# Patient Record
Sex: FEMALE | Race: WHITE | URBAN - NONMETROPOLITAN AREA
[De-identification: names, ages, dates, MRNs, and addresses within clinical notes are randomized per-mention and may not be internally consistent; named-entity substitution may affect disease eponyms.]

---

## 2017-03-05 ENCOUNTER — NURSE TRIAGE (OUTPATIENT)
Dept: ADMINISTRATIVE | Age: 37
End: 2017-03-05

## 2017-03-19 ENCOUNTER — NURSE TRIAGE (OUTPATIENT)
Dept: ADMINISTRATIVE | Age: 37
End: 2017-03-19

## 2017-04-16 ENCOUNTER — NURSE TRIAGE (OUTPATIENT)
Dept: ADMINISTRATIVE | Age: 37
End: 2017-04-16

## 2022-09-10 ENCOUNTER — HOSPITAL ENCOUNTER (INPATIENT)
Age: 42
LOS: 25 days | Discharge: NURSING, REHAB OR LONG-TERM CARE | DRG: 870 | End: 2022-10-05
Attending: INTERNAL MEDICINE | Admitting: STUDENT IN AN ORGANIZED HEALTH CARE EDUCATION/TRAINING PROGRAM
Payer: COMMERCIAL

## 2022-09-10 ENCOUNTER — APPOINTMENT (OUTPATIENT)
Dept: CT IMAGING | Age: 42
DRG: 870 | End: 2022-09-10
Attending: INTERNAL MEDICINE
Payer: COMMERCIAL

## 2022-09-10 DIAGNOSIS — R65.21 SEPTIC SHOCK (HCC): Primary | ICD-10-CM

## 2022-09-10 DIAGNOSIS — A41.9 SEPTIC SHOCK (HCC): Primary | ICD-10-CM

## 2022-09-10 PROBLEM — R41.82 ALTERED MENTAL STATUS: Status: ACTIVE | Noted: 2022-09-10

## 2022-09-10 LAB
ABSOLUTE EOS #: 0.41 K/UL (ref 0–0.4)
ABSOLUTE IMMATURE GRANULOCYTE: 0 K/UL (ref 0–0.3)
ABSOLUTE LYMPH #: 1.84 K/UL (ref 1–4.8)
ABSOLUTE MONO #: 0.61 K/UL (ref 0.1–0.8)
ALBUMIN SERPL-MCNC: 1.9 G/DL (ref 3.5–5.2)
ALBUMIN/GLOBULIN RATIO: 0.5 (ref 1–2.5)
ALP BLD-CCNC: 126 U/L (ref 35–104)
ALT SERPL-CCNC: 12 U/L (ref 5–33)
AMORPHOUS: ABNORMAL
ANION GAP SERPL CALCULATED.3IONS-SCNC: 11 MMOL/L (ref 9–17)
AST SERPL-CCNC: 18 U/L
BACTERIA: ABNORMAL
BASOPHILS # BLD: 0 % (ref 0–2)
BASOPHILS ABSOLUTE: 0 K/UL (ref 0–0.2)
BILIRUB SERPL-MCNC: 0.8 MG/DL (ref 0.3–1.2)
BILIRUBIN DIRECT: 0.6 MG/DL
BILIRUBIN URINE: NEGATIVE
BILIRUBIN, INDIRECT: 0.2 MG/DL (ref 0–1)
BUN BLDV-MCNC: 57 MG/DL (ref 6–20)
CALCIUM IONIZED: 1.1 MMOL/L (ref 1.13–1.33)
CALCIUM SERPL-MCNC: 7.7 MG/DL (ref 8.6–10.4)
CARBOXYHEMOGLOBIN: 1 % (ref 0–5)
CHLORIDE BLD-SCNC: 101 MMOL/L (ref 98–107)
CO2: 14 MMOL/L (ref 20–31)
COLOR: ABNORMAL
CREAT SERPL-MCNC: 2.18 MG/DL (ref 0.5–0.9)
EOSINOPHILS RELATIVE PERCENT: 2 % (ref 1–4)
EPITHELIAL CELLS UA: ABNORMAL /HPF (ref 0–5)
FIO2: ABNORMAL
GFR AFRICAN AMERICAN: 30 ML/MIN
GFR NON-AFRICAN AMERICAN: 25 ML/MIN
GFR SERPL CREATININE-BSD FRML MDRD: ABNORMAL ML/MIN/{1.73_M2}
GLUCOSE BLD-MCNC: 137 MG/DL (ref 65–105)
GLUCOSE BLD-MCNC: 155 MG/DL (ref 70–99)
GLUCOSE URINE: NEGATIVE
HCG QUALITATIVE: NEGATIVE
HCO3 VENOUS: 13.9 MMOL/L (ref 24–30)
HCT VFR BLD CALC: 32.2 % (ref 36.3–47.1)
HEMOGLOBIN: 10.1 G/DL (ref 11.9–15.1)
IMMATURE GRANULOCYTES: 0 %
INR BLD: 1
KETONES, URINE: ABNORMAL
LACTIC ACID, WHOLE BLOOD: 2.8 MMOL/L (ref 0.7–2.1)
LEUKOCYTE ESTERASE, URINE: ABNORMAL
LYMPHOCYTES # BLD: 9 % (ref 24–44)
MAGNESIUM: 2.2 MG/DL (ref 1.6–2.6)
MCH RBC QN AUTO: 27.6 PG (ref 25.2–33.5)
MCHC RBC AUTO-ENTMCNC: 31.4 G/DL (ref 28.4–34.8)
MCV RBC AUTO: 88 FL (ref 82.6–102.9)
MONOCYTES # BLD: 3 % (ref 1–7)
MORPHOLOGY: ABNORMAL
MORPHOLOGY: ABNORMAL
MYOGLOBIN: 123 NG/ML (ref 25–58)
NEGATIVE BASE EXCESS, VEN: 12.2 MMOL/L (ref 0–2)
NITRITE, URINE: NEGATIVE
NRBC AUTOMATED: 0.1 PER 100 WBC
O2 SAT, VEN: 86.4 % (ref 60–85)
PARTIAL THROMBOPLASTIN TIME: 25.2 SEC (ref 20.5–30.5)
PATIENT TEMP: 37
PCO2, VEN: 33.7 (ref 39–55)
PDW BLD-RTO: 15.9 % (ref 11.8–14.4)
PH UA: 5 (ref 5–8)
PH VENOUS: 7.24 (ref 7.32–7.42)
PLATELET # BLD: 249 K/UL (ref 138–453)
PMV BLD AUTO: 10.1 FL (ref 8.1–13.5)
PO2, VEN: 58.9 (ref 30–50)
POTASSIUM SERPL-SCNC: 5.3 MMOL/L (ref 3.7–5.3)
PROTEIN UA: ABNORMAL
PROTHROMBIN TIME: 11.1 SEC (ref 9.1–12.3)
RBC # BLD: 3.66 M/UL (ref 3.95–5.11)
RBC UA: ABNORMAL /HPF (ref 0–4)
SEG NEUTROPHILS: 86 % (ref 36–66)
SEGMENTED NEUTROPHILS ABSOLUTE COUNT: 17.54 K/UL (ref 1.8–7.7)
SODIUM BLD-SCNC: 126 MMOL/L (ref 135–144)
SPECIFIC GRAVITY UA: 1.02 (ref 1–1.03)
TOTAL CK: 97 U/L (ref 26–192)
TOTAL PROTEIN: 5.5 G/DL (ref 6.4–8.3)
TROPONIN, HIGH SENSITIVITY: 17 NG/L (ref 0–14)
TSH SERPL DL<=0.05 MIU/L-ACNC: 4.96 UIU/ML (ref 0.3–5)
TURBIDITY: ABNORMAL
URINE HGB: ABNORMAL
UROBILINOGEN, URINE: NORMAL
VANCOMYCIN RANDOM: 21.2 UG/ML
WBC # BLD: 20.4 K/UL (ref 3.5–11.3)
WBC UA: ABNORMAL /HPF (ref 0–5)
YEAST: ABNORMAL

## 2022-09-10 PROCEDURE — 2000000000 HC ICU R&B

## 2022-09-10 PROCEDURE — 80076 HEPATIC FUNCTION PANEL: CPT

## 2022-09-10 PROCEDURE — 2500000003 HC RX 250 WO HCPCS

## 2022-09-10 PROCEDURE — 6360000002 HC RX W HCPCS: Performed by: STUDENT IN AN ORGANIZED HEALTH CARE EDUCATION/TRAINING PROGRAM

## 2022-09-10 PROCEDURE — 82947 ASSAY GLUCOSE BLOOD QUANT: CPT

## 2022-09-10 PROCEDURE — 94761 N-INVAS EAR/PLS OXIMETRY MLT: CPT

## 2022-09-10 PROCEDURE — 82805 BLOOD GASES W/O2 SATURATION: CPT

## 2022-09-10 PROCEDURE — 80048 BASIC METABOLIC PNL TOTAL CA: CPT

## 2022-09-10 PROCEDURE — 84703 CHORIONIC GONADOTROPIN ASSAY: CPT

## 2022-09-10 PROCEDURE — 2500000003 HC RX 250 WO HCPCS: Performed by: STUDENT IN AN ORGANIZED HEALTH CARE EDUCATION/TRAINING PROGRAM

## 2022-09-10 PROCEDURE — 83735 ASSAY OF MAGNESIUM: CPT

## 2022-09-10 PROCEDURE — 2700000000 HC OXYGEN THERAPY PER DAY

## 2022-09-10 PROCEDURE — 2580000003 HC RX 258: Performed by: EMERGENCY MEDICINE

## 2022-09-10 PROCEDURE — 87086 URINE CULTURE/COLONY COUNT: CPT

## 2022-09-10 PROCEDURE — 6360000002 HC RX W HCPCS

## 2022-09-10 PROCEDURE — 81001 URINALYSIS AUTO W/SCOPE: CPT

## 2022-09-10 PROCEDURE — 6360000002 HC RX W HCPCS: Performed by: EMERGENCY MEDICINE

## 2022-09-10 PROCEDURE — 74176 CT ABD & PELVIS W/O CONTRAST: CPT

## 2022-09-10 PROCEDURE — 83874 ASSAY OF MYOGLOBIN: CPT

## 2022-09-10 PROCEDURE — 94002 VENT MGMT INPAT INIT DAY: CPT

## 2022-09-10 PROCEDURE — C9113 INJ PANTOPRAZOLE SODIUM, VIA: HCPCS | Performed by: STUDENT IN AN ORGANIZED HEALTH CARE EDUCATION/TRAINING PROGRAM

## 2022-09-10 PROCEDURE — 85610 PROTHROMBIN TIME: CPT

## 2022-09-10 PROCEDURE — 5A1955Z RESPIRATORY VENTILATION, GREATER THAN 96 CONSECUTIVE HOURS: ICD-10-PCS | Performed by: INTERNAL MEDICINE

## 2022-09-10 PROCEDURE — 82330 ASSAY OF CALCIUM: CPT

## 2022-09-10 PROCEDURE — 80202 ASSAY OF VANCOMYCIN: CPT

## 2022-09-10 PROCEDURE — 87641 MR-STAPH DNA AMP PROBE: CPT

## 2022-09-10 PROCEDURE — 36415 COLL VENOUS BLD VENIPUNCTURE: CPT

## 2022-09-10 PROCEDURE — 84484 ASSAY OF TROPONIN QUANT: CPT

## 2022-09-10 PROCEDURE — 82550 ASSAY OF CK (CPK): CPT

## 2022-09-10 PROCEDURE — 83605 ASSAY OF LACTIC ACID: CPT

## 2022-09-10 PROCEDURE — 36600 WITHDRAWAL OF ARTERIAL BLOOD: CPT

## 2022-09-10 PROCEDURE — 2580000003 HC RX 258: Performed by: STUDENT IN AN ORGANIZED HEALTH CARE EDUCATION/TRAINING PROGRAM

## 2022-09-10 PROCEDURE — 87040 BLOOD CULTURE FOR BACTERIA: CPT

## 2022-09-10 PROCEDURE — 85730 THROMBOPLASTIN TIME PARTIAL: CPT

## 2022-09-10 PROCEDURE — 84443 ASSAY THYROID STIM HORMONE: CPT

## 2022-09-10 PROCEDURE — 85025 COMPLETE CBC W/AUTO DIFF WBC: CPT

## 2022-09-10 RX ORDER — ENOXAPARIN SODIUM 100 MG/ML
30 INJECTION SUBCUTANEOUS 2 TIMES DAILY
Status: DISCONTINUED | OUTPATIENT
Start: 2022-09-10 | End: 2022-09-10

## 2022-09-10 RX ORDER — POTASSIUM CHLORIDE 7.45 MG/ML
10 INJECTION INTRAVENOUS PRN
Status: DISCONTINUED | OUTPATIENT
Start: 2022-09-10 | End: 2022-10-05 | Stop reason: HOSPADM

## 2022-09-10 RX ORDER — LEVOTHYROXINE SODIUM 0.07 MG/1
75 TABLET ORAL DAILY
Status: DISCONTINUED | OUTPATIENT
Start: 2022-09-11 | End: 2022-10-05 | Stop reason: HOSPADM

## 2022-09-10 RX ORDER — SODIUM CHLORIDE 9 MG/ML
INJECTION, SOLUTION INTRAVENOUS PRN
Status: DISCONTINUED | OUTPATIENT
Start: 2022-09-10 | End: 2022-10-05 | Stop reason: HOSPADM

## 2022-09-10 RX ORDER — ONDANSETRON 2 MG/ML
4 INJECTION INTRAMUSCULAR; INTRAVENOUS EVERY 6 HOURS PRN
Status: DISCONTINUED | OUTPATIENT
Start: 2022-09-10 | End: 2022-10-05 | Stop reason: HOSPADM

## 2022-09-10 RX ORDER — NOREPINEPHRINE BIT/0.9 % NACL 16MG/250ML
2-100 INFUSION BOTTLE (ML) INTRAVENOUS CONTINUOUS
Status: DISCONTINUED | OUTPATIENT
Start: 2022-09-10 | End: 2022-09-10 | Stop reason: SDUPTHER

## 2022-09-10 RX ORDER — HEPARIN SODIUM 5000 [USP'U]/ML
5000 INJECTION, SOLUTION INTRAVENOUS; SUBCUTANEOUS EVERY 8 HOURS SCHEDULED
Status: DISCONTINUED | OUTPATIENT
Start: 2022-09-10 | End: 2022-09-14

## 2022-09-10 RX ORDER — POTASSIUM CHLORIDE 29.8 MG/ML
20 INJECTION INTRAVENOUS PRN
Status: DISCONTINUED | OUTPATIENT
Start: 2022-09-10 | End: 2022-10-05 | Stop reason: HOSPADM

## 2022-09-10 RX ORDER — SODIUM CHLORIDE 0.9 % (FLUSH) 0.9 %
5-40 SYRINGE (ML) INJECTION PRN
Status: DISCONTINUED | OUTPATIENT
Start: 2022-09-10 | End: 2022-10-05 | Stop reason: HOSPADM

## 2022-09-10 RX ORDER — ONDANSETRON 4 MG/1
4 TABLET, ORALLY DISINTEGRATING ORAL EVERY 8 HOURS PRN
Status: DISCONTINUED | OUTPATIENT
Start: 2022-09-10 | End: 2022-10-05 | Stop reason: HOSPADM

## 2022-09-10 RX ORDER — POLYETHYLENE GLYCOL 3350 17 G/17G
17 POWDER, FOR SOLUTION ORAL DAILY PRN
Status: DISCONTINUED | OUTPATIENT
Start: 2022-09-10 | End: 2022-10-05 | Stop reason: HOSPADM

## 2022-09-10 RX ORDER — CALCIUM GLUCONATE 20 MG/ML
2000 INJECTION, SOLUTION INTRAVENOUS ONCE
Status: COMPLETED | OUTPATIENT
Start: 2022-09-10 | End: 2022-09-11

## 2022-09-10 RX ORDER — ACETAMINOPHEN 325 MG/1
650 TABLET ORAL EVERY 6 HOURS PRN
Status: DISCONTINUED | OUTPATIENT
Start: 2022-09-10 | End: 2022-10-05 | Stop reason: HOSPADM

## 2022-09-10 RX ORDER — SODIUM CHLORIDE 0.9 % (FLUSH) 0.9 %
5-40 SYRINGE (ML) INJECTION EVERY 12 HOURS SCHEDULED
Status: DISCONTINUED | OUTPATIENT
Start: 2022-09-10 | End: 2022-10-05 | Stop reason: HOSPADM

## 2022-09-10 RX ORDER — NOREPINEPHRINE BIT/0.9 % NACL 16MG/250ML
1-100 INFUSION BOTTLE (ML) INTRAVENOUS CONTINUOUS
Status: DISCONTINUED | OUTPATIENT
Start: 2022-09-10 | End: 2022-09-17

## 2022-09-10 RX ORDER — 0.9 % SODIUM CHLORIDE 0.9 %
1000 INTRAVENOUS SOLUTION INTRAVENOUS ONCE
Status: COMPLETED | OUTPATIENT
Start: 2022-09-10 | End: 2022-09-11

## 2022-09-10 RX ORDER — ACETAMINOPHEN 650 MG/1
650 SUPPOSITORY RECTAL EVERY 6 HOURS PRN
Status: DISCONTINUED | OUTPATIENT
Start: 2022-09-10 | End: 2022-10-05 | Stop reason: HOSPADM

## 2022-09-10 RX ORDER — PROPOFOL 10 MG/ML
5-50 INJECTION, EMULSION INTRAVENOUS CONTINUOUS
Status: DISCONTINUED | OUTPATIENT
Start: 2022-09-10 | End: 2022-09-13

## 2022-09-10 RX ADMIN — ANTI-FUNGAL POWDER MICONAZOLE NITRATE TALC FREE: 1.42 POWDER TOPICAL at 20:47

## 2022-09-10 RX ADMIN — SODIUM CHLORIDE, PRESERVATIVE FREE 10 ML: 5 INJECTION INTRAVENOUS at 20:48

## 2022-09-10 RX ADMIN — Medication 100 MCG/HR: at 19:22

## 2022-09-10 RX ADMIN — Medication 20 MCG/MIN: at 17:08

## 2022-09-10 RX ADMIN — CEFEPIME 2000 MG: 2 INJECTION, POWDER, FOR SOLUTION INTRAVENOUS at 20:46

## 2022-09-10 RX ADMIN — HEPARIN SODIUM 5000 UNITS: 5000 INJECTION INTRAVENOUS; SUBCUTANEOUS at 21:32

## 2022-09-10 RX ADMIN — SODIUM CHLORIDE: 9 INJECTION, SOLUTION INTRAVENOUS at 21:12

## 2022-09-10 RX ADMIN — PROPOFOL 5 MCG/KG/MIN: 10 INJECTION, EMULSION INTRAVENOUS at 21:26

## 2022-09-10 RX ADMIN — CALCIUM GLUCONATE 2000 MG: 20 INJECTION, SOLUTION INTRAVENOUS at 22:52

## 2022-09-10 RX ADMIN — SODIUM CHLORIDE, PRESERVATIVE FREE 40 MG: 5 INJECTION INTRAVENOUS at 20:47

## 2022-09-10 RX ADMIN — Medication 5 MG/HR: at 19:26

## 2022-09-10 RX ADMIN — SODIUM CHLORIDE 1000 ML: 9 INJECTION, SOLUTION INTRAVENOUS at 22:28

## 2022-09-10 ASSESSMENT — PULMONARY FUNCTION TESTS
PIF_VALUE: 11
PIF_VALUE: 13
PIF_VALUE: 13
PIF_VALUE: 11
PIF_VALUE: 10

## 2022-09-10 NOTE — PLAN OF CARE
Problem: Respiratory - Adult  Goal: Achieves optimal ventilation and oxygenation  9/10/2022 1941 by Radha Jorge RN  Outcome: Progressing  9/10/2022 1930 by Britt Curry RCP  Outcome: Progressing  Flowsheets (Taken 9/10/2022 1930)  Achieves optimal ventilation and oxygenation:   Assess for changes in respiratory status   Assess the need for suctioning and aspirate as needed   Respiratory therapy support as indicated   Assess for changes in mentation and behavior   Oxygen supplementation based on oxygen saturation or arterial blood gases  9/10/2022 1847 by Lalitha Nunez RCP  Outcome: Progressing     Problem: Safety - Medical Restraint  Goal: Remains free of injury from restraints (Restraint for Interference with Medical Device)  Description: INTERVENTIONS:  1. Determine that other, less restrictive measures have been tried or would not be effective before applying the restraint  2. Evaluate the patient's condition at the time of restraint application  3. Inform patient/family regarding the reason for restraint  4.  Q2H: Monitor safety, psychosocial status, comfort, nutrition and hydration  Outcome: Progressing

## 2022-09-10 NOTE — H&P
Critical Care - History and Physical Examination    Patient's name:  Chipper Canavan  Medical Record Number: [id number]  Patient's account/billing number: [de-identified]  Patient's YOB: 1980  Age: 38 y.o. Date of Admission: 9/10/2022  4:45 PM  Reason of ICU admission: Septic shock, SBO, AMS  Date of History and Physical Examination: 9/10/2022    Primary Care Physician: No primary care provider on file. Attending Physician: Dr. Alfredo Duvall Status: Full Code    Chief complaint: Septic shock      Reason for ICU admission: Septic shock, SBO, AMS    History Of Present Illness:   History was obtained from chart review. Chipper Canavan is a 38 y.o.  female who presented to [facility] ED after being found down at home unresponsive for an unknown period of time. At home she was found to be covered in urine and fecal matter with several scattered wounds, approximately 15 total per chart review, as well as concern for maggots in some of the wounds. Labs done at the time showed and CHULA with a BUN/Cr of 59/2.32, and CT scan showed malpositioning of the jejunum to the right abdomen with small bowel obstruction with abnormally dilated loops of small bowel which are gas and fluid filled with at least one transition point in the RLQ and hepatic steatosis. Labs were also significant for hyponatremia (128), hyperkalemia (5.1), hypoalbuminemia (1.5), elevated procalcitonin at 12.57, elevated lactate at 2.1. CBC showed leukocytosis of 19.8, anemia at 10.4. Urinalysis positive for UTI. She was also found to have positive blood cultures showed gram negative rods and gram positive cocci in clusters. She was being treated at outlying facility with vancomycin and zosyn. She was being treated for urosepsis and was subsequently found to have a small bowel obstruction that the surgical team at the facility could not manage and she was transferred. Past Medical History:    No past medical history on file.     Past Surgical History:    No past surgical history on file. Allergies:    No Known Allergies      Home Medications:   Prior to Admission medications    Not on File       Social History:   TOBACCO:   has no history on file for tobacco use. ETOH:   has no history on file for alcohol use. DRUGS:  has no history on file for drug use. OCCUPATION:      Family History:   No family history on file. REVIEW OF SYSTEMS (ROS):  Review of Systems   Unable to perform ROS: Acuity of condition (Patient condition)     Physical Exam:    Vitals: Pulse (!) 106   Resp 25   Ht 5' 7\" (1.702 m)   Wt (!) 307 lb (139.3 kg)   SpO2 99%   BMI 48.08 kg/m²     Body weight:   Wt Readings from Last 3 Encounters:   09/10/22 (!) 307 lb (139.3 kg)       Body Mass Index : Body mass index is 48.08 kg/m². PHYSICAL EXAMINATION :  Constitutional: Intubated, sedated  EENT: PERRLA, sclera clear, anicteric, moist mucous membranes  Neck: Supple, symmetrical, trachea midline  Respiratory: Diffuse rhonchi on ascultation  Cardiovascular: tachycardia with regular rhythm, normal S1, S2, no murmur noted, and 2+ distal pulses in all 4 extremities   Abdomen: soft, distended,  no masses or organomegaly  Neurological: Intubated and sedated. PERRL   Extremities:  peripheral pulses normal, 1+ pedal edema, no clubbing or cyanosis    Laboratory findings:-  CBC: No results for input(s): WBC, HGB, PLT in the last 72 hours. BMP:  No results for input(s): NA, K, CL, CO2, BUN, CREATININE, GLUCOSE in the last 72 hours. S. Calcium:No results for input(s): CALCIUM in the last 72 hours. S. Ionized Calcium:No results for input(s): IONCA in the last 72 hours. S. Magnesium:No results for input(s): MG in the last 72 hours. S. Phosphorus:No results for input(s): PHOS in the last 72 hours. S. Glucose:  Recent Labs     09/10/22  1754   POCGLU 137*     Glycosylated hemoglobin A1C: No results for input(s): LABA1C in the last 72 hours.   INR: No results for input(s): INR in the last 72 hours. Hepatic functions: No results for input(s): ALKPHOS, ALT, AST, PROT, BILITOT, BILIDIR, LABALBU in the last 72 hours. Pancreatic functions:No results for input(s): LACTA, AMYLASE in the last 72 hours. S. Lactic Acid: No results for input(s): LACTA in the last 72 hours. Cardiac enzymes:No results for input(s): CKTOTAL, CKMB, CKMBINDEX, TROPONINI in the last 72 hours. BNP:No results for input(s): BNP in the last 72 hours. Lipid profile: No results for input(s): CHOL, TRIG, HDL, LDL, LDLCALC in the last 72 hours. Blood Gases: No results found for: PH, PCO2, PO2, HCO3, O2SAT  Thyroid functions: No results found for: T4, TSH     Urinalysis: Urine dipstick shows positive for WBC's, positive for protein, and positive for leukocytes. Micro exam: 48 WBC's per HPF and + bacteria. Microbiology:  Cultures during this admission:   Blood cultures:                 [] None drawn      [] Negative             [x]  Positive (Details: gram negative rods and gram + cocci in clusters)  Urine Culture:                   [] None drawn      [] Negative             []  Positive (Details:  )  Sputum Culture:               [] None drawn       [] Negative             []  Positive (Details:  )   Endotracheal aspirate:     [] None drawn       [] Negative             []  Positive (Details:  )   -----------------------------------------------------------------  Radiological reports:    CXR: Right venous catheter tip at the SVC, ET tube 4 cm above leticia. Enteric tube extends into stomach. Mild right infrahilar opacities reflect area of nodular scar/calcified lung granuloma. No lobar consolidation, large pleural effusions, PTX, no acute bony abnormalities. Borderline prominent heart size. Multiple remote left thoracic rib deformity.     Last Echocardiogram findings: Preserved EF at 72% and no valvular abnormalities    Assessment and Plan     Patient Active Problem List   Diagnosis    Altered mental status       Additional assessment:  Amelia Toney is a 38 y.o. female who intially presented on 9/10/2022 for No chief complaint on file. PLAN/MEDICAL DECISION MAKING:  Neurologic:   Neuro checks per protocol  Sedation: versed and fentanyl  Pain control: Fentanyl  Cardiovascular:  HR:104  MAP: 59  MAP goal >65  EKG and Echo findings as mentioned above  Levophed to maintain MAP>65, currently on 20   Pulmonary:  Maintain oxygen sats >92%  Pulmonary toilet  Vent settings: Mode PRVC  RR 14 PEEP 8 FiO2 40  ABG: pH 7.24 pCO2 30 pO2 401 HCO3: 13 [facility])  Most recent CXR as mentioned above  Albuterol nebulizer,  GI/Nutrition  Glycolax prn  Ulcer Prophylaxis: PPI  Diet:Diet NPO  CT scan shows SBO   OG tube connected to low intermittent suction  General surgery consulted  Renal/Fluid/Electrolyte  IV Fluids: none  I/O: viramontes catheter in place, will continue to monitor I/O  BUN/Cr: 59/2.32  Monitor electrolytes, replace PRN   ID  WBC: No results found for: WBC  Tmax: No data recorded. Blood cultures + for gram negative rods and gram + cocci in clusters  Antimicrobials: vancomycin and cefipime day   Wound care nursing to evaluate the patient for multiple wounds  Hematology:  Hb 9.9/ Hct 30.3 at outlying facility  Will continue to monitor and transfuse as needed  Endocrine:   glucose controlled  Will do glucose checks q4 hours  Restarted home dose of synthroid  DVT Prophylaxis  Heparin subQ  Discharge Needs:  PT, OT, and Case Management      CODE STATUS: Full Code    DISPOSITION:  [x] To remain ICU  [] OK for out of ICU from [address] [facility] Robel Espinosa MD  Emergency Medicine Resident, PGY3  Critical Care Service   09/10/22 6:17 PM

## 2022-09-10 NOTE — PROGRESS NOTES
Pharmacy Note     Enoxaparin Dose Adjustment    Chris Rivas is a 38 y.o. female. Pharmacist assessment of enoxaparin dose for VTE prophylaxis. No results for input(s): BUN in the last 72 hours. No results for input(s): CREATININE in the last 72 hours. CrCl cannot be calculated (No successful lab value found. ).     Height:   Ht Readings from Last 1 Encounters:   09/10/22 5' 7\" (1.702 m)     Weight:  Wt Readings from Last 1 Encounters:   09/10/22 (!) 307 lb (139.3 kg)       The following enoxaparin dose has been adjusted based upon renal function and/or patient weight per P&T Guidelines:             Lovenox 30 mg BID changed to heparin 5000 u Q8    Liz Newman PharmD, BCCCP  9/10/2022  7:13 PM

## 2022-09-10 NOTE — PROGRESS NOTES
Multiple wounds noted on patient, including bilateral breasts, left flank, and chest. Wounds were cleansed and pictures were taken and uploaded to [facility]. Pictures were assigned to LDA's. Patient's back and buttocks were unable to be assessed due to tachypnea and fighting the ventilator.

## 2022-09-10 NOTE — PROGRESS NOTES
Patient arrived via EMS with fentanyl and versed drip. 500 mcg of fentanyl and 10 mg of versed wasted with Nicholas Villafana RN in the Rx destroyer. New fentanyl and versed bags hung.

## 2022-09-10 NOTE — PLAN OF CARE
Problem: Respiratory - Adult  Goal: Achieves optimal ventilation and oxygenation  9/10/2022 1930 by Triston Curry RCP  Outcome: Progressing  Flowsheets (Taken 9/10/2022 1930)  Achieves optimal ventilation and oxygenation:   Assess for changes in respiratory status   Assess the need for suctioning and aspirate as needed   Respiratory therapy support as indicated   Assess for changes in mentation and behavior   Oxygen supplementation based on oxygen saturation or arterial blood gases

## 2022-09-11 ENCOUNTER — APPOINTMENT (OUTPATIENT)
Dept: GENERAL RADIOLOGY | Age: 42
DRG: 870 | End: 2022-09-11
Attending: INTERNAL MEDICINE
Payer: COMMERCIAL

## 2022-09-11 ENCOUNTER — APPOINTMENT (OUTPATIENT)
Dept: ULTRASOUND IMAGING | Age: 42
DRG: 870 | End: 2022-09-11
Attending: INTERNAL MEDICINE
Payer: COMMERCIAL

## 2022-09-11 PROBLEM — R79.89 ELEVATED PROCALCITONIN: Status: ACTIVE | Noted: 2022-09-11

## 2022-09-11 PROBLEM — A41.9 SEPTIC SHOCK (HCC): Status: ACTIVE | Noted: 2022-09-11

## 2022-09-11 PROBLEM — R65.21 SEPTIC SHOCK (HCC): Status: ACTIVE | Noted: 2022-09-11

## 2022-09-11 PROBLEM — R65.20 SYSTEMIC INFLAMMATORY RESPONSE SYNDROME (SIRS) OF INFECTIOUS ORIGIN WITH ACUTE ORGAN FAILURE (HCC): Status: ACTIVE | Noted: 2022-09-11

## 2022-09-11 PROBLEM — N17.9 ACUTE KIDNEY INJURY (HCC): Status: ACTIVE | Noted: 2022-09-11

## 2022-09-11 PROBLEM — N64.1 BREAST, FAT NECROSIS: Status: ACTIVE | Noted: 2022-09-11

## 2022-09-11 PROBLEM — D72.825 BANDEMIA: Status: ACTIVE | Noted: 2022-09-11

## 2022-09-11 PROBLEM — G93.40 ACUTE ENCEPHALOPATHY: Status: ACTIVE | Noted: 2022-09-11

## 2022-09-11 PROBLEM — A41.89 GRAM POSITIVE SEPTICEMIA (HCC): Status: ACTIVE | Noted: 2022-09-11

## 2022-09-11 PROBLEM — L03.319 CELLULITIS OF SACRAL REGION: Status: ACTIVE | Noted: 2022-09-11

## 2022-09-11 PROBLEM — A41.50 GRAM-NEG SEPTICEMIA (HCC): Status: ACTIVE | Noted: 2022-09-11

## 2022-09-11 LAB
ABSOLUTE EOS #: 0 K/UL (ref 0–0.4)
ABSOLUTE IMMATURE GRANULOCYTE: 0.22 K/UL (ref 0–0.3)
ABSOLUTE LYMPH #: 1.74 K/UL (ref 1–4.8)
ABSOLUTE MONO #: 1.52 K/UL (ref 0.1–0.8)
ALBUMIN SERPL-MCNC: 2 G/DL (ref 3.5–5.2)
ALBUMIN/GLOBULIN RATIO: 0.6 (ref 1–2.5)
ALLEN TEST: ABNORMAL
ALLEN TEST: ABNORMAL
ALP BLD-CCNC: 120 U/L (ref 35–104)
ALT SERPL-CCNC: 12 U/L (ref 5–33)
ANION GAP SERPL CALCULATED.3IONS-SCNC: 15 MMOL/L (ref 9–17)
AST SERPL-CCNC: 19 U/L
BASOPHILS # BLD: 0 % (ref 0–2)
BASOPHILS ABSOLUTE: 0 K/UL (ref 0–0.2)
BILIRUB SERPL-MCNC: 0.6 MG/DL (ref 0.3–1.2)
BILIRUBIN DIRECT: 0.3 MG/DL
BILIRUBIN, INDIRECT: 0.3 MG/DL (ref 0–1)
BUN BLDV-MCNC: 53 MG/DL (ref 6–20)
CALCIUM SERPL-MCNC: 8 MG/DL (ref 8.6–10.4)
CHLORIDE BLD-SCNC: 100 MMOL/L (ref 98–107)
CO2: 13 MMOL/L (ref 20–31)
CREAT SERPL-MCNC: 2.22 MG/DL (ref 0.5–0.9)
CULTURE: NO GROWTH
EOSINOPHILS RELATIVE PERCENT: 0 % (ref 1–4)
FIO2: 30
FIO2: 40
GFR AFRICAN AMERICAN: 29 ML/MIN
GFR NON-AFRICAN AMERICAN: 24 ML/MIN
GFR SERPL CREATININE-BSD FRML MDRD: ABNORMAL ML/MIN/{1.73_M2}
GLUCOSE BLD-MCNC: 140 MG/DL (ref 74–100)
GLUCOSE BLD-MCNC: 183 MG/DL (ref 74–100)
GLUCOSE BLD-MCNC: 196 MG/DL (ref 70–99)
HCG(URINE) PREGNANCY TEST: NEGATIVE
HCT VFR BLD CALC: 31.2 % (ref 36.3–47.1)
HEMOGLOBIN: 9.8 G/DL (ref 11.9–15.1)
IMMATURE GRANULOCYTES: 1 %
LACTIC ACID, WHOLE BLOOD: 2.5 MMOL/L (ref 0.7–2.1)
LYMPHOCYTES # BLD: 8 % (ref 24–44)
MAGNESIUM: 2.1 MG/DL (ref 1.6–2.6)
MCH RBC QN AUTO: 28.4 PG (ref 25.2–33.5)
MCHC RBC AUTO-ENTMCNC: 31.4 G/DL (ref 28.4–34.8)
MCV RBC AUTO: 90.4 FL (ref 82.6–102.9)
MONOCYTES # BLD: 7 % (ref 1–7)
MORPHOLOGY: ABNORMAL
MRSA, DNA, NASAL: NEGATIVE
NEGATIVE BASE EXCESS, ART: 12 (ref 0–2)
NEGATIVE BASE EXCESS, ART: 12 (ref 0–2)
NRBC AUTOMATED: 0.2 PER 100 WBC
O2 DEVICE/FLOW/%: ABNORMAL
PATIENT TEMP: 36.3
PDW BLD-RTO: 16.1 % (ref 11.8–14.4)
PLATELET # BLD: 224 K/UL (ref 138–453)
PMV BLD AUTO: 10.4 FL (ref 8.1–13.5)
POC HCO3: 13.8 MMOL/L (ref 21–28)
POC HCO3: 14 MMOL/L (ref 21–28)
POC O2 SATURATION: 100 % (ref 94–98)
POC O2 SATURATION: 98 % (ref 94–98)
POC PCO2: 30.5 MM HG (ref 35–48)
POC PCO2: 31.5 MM HG (ref 35–48)
POC PH: 7.26 (ref 7.35–7.45)
POC PH: 7.26 (ref 7.35–7.45)
POC PO2: 127 MM HG (ref 83–108)
POC PO2: 190.1 MM HG (ref 83–108)
POTASSIUM SERPL-SCNC: 5.3 MMOL/L (ref 3.7–5.3)
RBC # BLD: 3.45 M/UL (ref 3.95–5.11)
SAMPLE SITE: ABNORMAL
SAMPLE SITE: ABNORMAL
SEG NEUTROPHILS: 84 % (ref 36–66)
SEGMENTED NEUTROPHILS ABSOLUTE COUNT: 18.22 K/UL (ref 1.8–7.7)
SODIUM BLD-SCNC: 128 MMOL/L (ref 135–144)
SPECIMEN DESCRIPTION: NORMAL
SPECIMEN DESCRIPTION: NORMAL
TOTAL PROTEIN: 5.4 G/DL (ref 6.4–8.3)
WBC # BLD: 21.7 K/UL (ref 3.5–11.3)

## 2022-09-11 PROCEDURE — 2580000003 HC RX 258: Performed by: STUDENT IN AN ORGANIZED HEALTH CARE EDUCATION/TRAINING PROGRAM

## 2022-09-11 PROCEDURE — 03HY32Z INSERTION OF MONITORING DEVICE INTO UPPER ARTERY, PERCUTANEOUS APPROACH: ICD-10-PCS | Performed by: INTERNAL MEDICINE

## 2022-09-11 PROCEDURE — 99291 CRITICAL CARE FIRST HOUR: CPT | Performed by: INTERNAL MEDICINE

## 2022-09-11 PROCEDURE — 94761 N-INVAS EAR/PLS OXIMETRY MLT: CPT

## 2022-09-11 PROCEDURE — 76642 ULTRASOUND BREAST LIMITED: CPT

## 2022-09-11 PROCEDURE — 71045 X-RAY EXAM CHEST 1 VIEW: CPT

## 2022-09-11 PROCEDURE — 37799 UNLISTED PX VASCULAR SURGERY: CPT

## 2022-09-11 PROCEDURE — 85025 COMPLETE CBC W/AUTO DIFF WBC: CPT

## 2022-09-11 PROCEDURE — 6360000002 HC RX W HCPCS: Performed by: STUDENT IN AN ORGANIZED HEALTH CARE EDUCATION/TRAINING PROGRAM

## 2022-09-11 PROCEDURE — 2580000003 HC RX 258: Performed by: INTERNAL MEDICINE

## 2022-09-11 PROCEDURE — 2700000000 HC OXYGEN THERAPY PER DAY

## 2022-09-11 PROCEDURE — 51702 INSERT TEMP BLADDER CATH: CPT

## 2022-09-11 PROCEDURE — 82947 ASSAY GLUCOSE BLOOD QUANT: CPT

## 2022-09-11 PROCEDURE — 6360000002 HC RX W HCPCS

## 2022-09-11 PROCEDURE — C9113 INJ PANTOPRAZOLE SODIUM, VIA: HCPCS | Performed by: STUDENT IN AN ORGANIZED HEALTH CARE EDUCATION/TRAINING PROGRAM

## 2022-09-11 PROCEDURE — 81025 URINE PREGNANCY TEST: CPT

## 2022-09-11 PROCEDURE — 2500000003 HC RX 250 WO HCPCS

## 2022-09-11 PROCEDURE — 6370000000 HC RX 637 (ALT 250 FOR IP): Performed by: EMERGENCY MEDICINE

## 2022-09-11 PROCEDURE — 82550 ASSAY OF CK (CPK): CPT

## 2022-09-11 PROCEDURE — 99255 IP/OBS CONSLTJ NEW/EST HI 80: CPT | Performed by: INTERNAL MEDICINE

## 2022-09-11 PROCEDURE — 82803 BLOOD GASES ANY COMBINATION: CPT

## 2022-09-11 PROCEDURE — 83605 ASSAY OF LACTIC ACID: CPT

## 2022-09-11 PROCEDURE — 36620 INSERTION CATHETER ARTERY: CPT

## 2022-09-11 PROCEDURE — 80076 HEPATIC FUNCTION PANEL: CPT

## 2022-09-11 PROCEDURE — 6360000002 HC RX W HCPCS: Performed by: INTERNAL MEDICINE

## 2022-09-11 PROCEDURE — 2500000003 HC RX 250 WO HCPCS: Performed by: STUDENT IN AN ORGANIZED HEALTH CARE EDUCATION/TRAINING PROGRAM

## 2022-09-11 PROCEDURE — 80048 BASIC METABOLIC PNL TOTAL CA: CPT

## 2022-09-11 PROCEDURE — 83735 ASSAY OF MAGNESIUM: CPT

## 2022-09-11 PROCEDURE — 94003 VENT MGMT INPAT SUBQ DAY: CPT

## 2022-09-11 PROCEDURE — 2000000000 HC ICU R&B

## 2022-09-11 RX ORDER — 0.9 % SODIUM CHLORIDE 0.9 %
1000 INTRAVENOUS SOLUTION INTRAVENOUS ONCE
Status: COMPLETED | OUTPATIENT
Start: 2022-09-11 | End: 2022-09-11

## 2022-09-11 RX ORDER — LINEZOLID 2 MG/ML
600 INJECTION, SOLUTION INTRAVENOUS EVERY 12 HOURS
Status: DISCONTINUED | OUTPATIENT
Start: 2022-09-11 | End: 2022-09-11

## 2022-09-11 RX ORDER — GINSENG 100 MG
CAPSULE ORAL 3 TIMES DAILY
Status: DISCONTINUED | OUTPATIENT
Start: 2022-09-11 | End: 2022-09-14

## 2022-09-11 RX ADMIN — DAPTOMYCIN 550 MG: 500 INJECTION, POWDER, LYOPHILIZED, FOR SOLUTION INTRAVENOUS at 20:10

## 2022-09-11 RX ADMIN — SODIUM CHLORIDE, PRESERVATIVE FREE 10 ML: 5 INJECTION INTRAVENOUS at 22:21

## 2022-09-11 RX ADMIN — BACITRACIN: 500 OINTMENT TOPICAL at 22:21

## 2022-09-11 RX ADMIN — Medication 4 MG/HR: at 12:08

## 2022-09-11 RX ADMIN — SODIUM BICARBONATE: 84 INJECTION, SOLUTION INTRAVENOUS at 22:45

## 2022-09-11 RX ADMIN — HEPARIN SODIUM 5000 UNITS: 5000 INJECTION INTRAVENOUS; SUBCUTANEOUS at 22:22

## 2022-09-11 RX ADMIN — BACITRACIN: 500 OINTMENT TOPICAL at 13:43

## 2022-09-11 RX ADMIN — HEPARIN SODIUM 5000 UNITS: 5000 INJECTION INTRAVENOUS; SUBCUTANEOUS at 06:16

## 2022-09-11 RX ADMIN — Medication 25 MCG/MIN: at 02:48

## 2022-09-11 RX ADMIN — SODIUM CHLORIDE, PRESERVATIVE FREE 10 ML: 5 INJECTION INTRAVENOUS at 11:58

## 2022-09-11 RX ADMIN — Medication 30 MCG/MIN: at 12:07

## 2022-09-11 RX ADMIN — PROPOFOL 10 MCG/KG/MIN: 10 INJECTION, EMULSION INTRAVENOUS at 12:14

## 2022-09-11 RX ADMIN — CEFEPIME 2000 MG: 2 INJECTION, POWDER, FOR SOLUTION INTRAVENOUS at 08:20

## 2022-09-11 RX ADMIN — SODIUM BICARBONATE: 84 INJECTION, SOLUTION INTRAVENOUS at 12:53

## 2022-09-11 RX ADMIN — HEPARIN SODIUM 5000 UNITS: 5000 INJECTION INTRAVENOUS; SUBCUTANEOUS at 13:42

## 2022-09-11 RX ADMIN — ANTI-FUNGAL POWDER MICONAZOLE NITRATE TALC FREE: 1.42 POWDER TOPICAL at 08:18

## 2022-09-11 RX ADMIN — BACITRACIN: 500 OINTMENT TOPICAL at 11:57

## 2022-09-11 RX ADMIN — LEVOTHYROXINE SODIUM 75 MCG: 75 TABLET ORAL at 08:14

## 2022-09-11 RX ADMIN — SODIUM BICARBONATE: 84 INJECTION, SOLUTION INTRAVENOUS at 03:00

## 2022-09-11 RX ADMIN — CEFEPIME 2000 MG: 2 INJECTION, POWDER, FOR SOLUTION INTRAVENOUS at 20:08

## 2022-09-11 RX ADMIN — SODIUM CHLORIDE, PRESERVATIVE FREE 40 MG: 5 INJECTION INTRAVENOUS at 08:19

## 2022-09-11 RX ADMIN — ANTI-FUNGAL POWDER MICONAZOLE NITRATE TALC FREE: 1.42 POWDER TOPICAL at 22:21

## 2022-09-11 RX ADMIN — SODIUM CHLORIDE 1000 ML: 9 INJECTION, SOLUTION INTRAVENOUS at 13:58

## 2022-09-11 RX ADMIN — Medication 200 MCG/HR: at 06:13

## 2022-09-11 RX ADMIN — PROPOFOL 15 MCG/KG/MIN: 10 INJECTION, EMULSION INTRAVENOUS at 05:18

## 2022-09-11 ASSESSMENT — PULMONARY FUNCTION TESTS
PIF_VALUE: 7
PIF_VALUE: 6
PIF_VALUE: 23
PIF_VALUE: 9
PIF_VALUE: 13
PIF_VALUE: 9
PIF_VALUE: 18
PIF_VALUE: 8
PIF_VALUE: 15
PIF_VALUE: 33
PIF_VALUE: 10
PIF_VALUE: 14
PIF_VALUE: 6
PIF_VALUE: 7
PIF_VALUE: 5
PIF_VALUE: 12
PIF_VALUE: 23
PIF_VALUE: 23

## 2022-09-11 NOTE — PLAN OF CARE
Problem: Respiratory - Adult  Goal: Achieves optimal ventilation and oxygenation  9/11/2022 1932 by Brandie Curry RCP  Outcome: Progressing  Flowsheets (Taken 9/11/2022 1932)  Achieves optimal ventilation and oxygenation:   Assess for changes in respiratory status   Assess the need for suctioning and aspirate as needed   Respiratory therapy support as indicated   Assess for changes in mentation and behavior   Oxygen supplementation based on oxygen saturation or arterial blood gases

## 2022-09-11 NOTE — PLAN OF CARE
Problem: Discharge Planning  Goal: Discharge to home or other facility with appropriate resources  9/11/2022 1318 by Maureen Tobar RN  Outcome: Progressing  9/11/2022 1122 by Aleah Rene RN  Outcome: Progressing  9/11/2022 0730 by Robert Felix RN  Outcome: Progressing     Problem: Respiratory - Adult  Goal: Achieves optimal ventilation and oxygenation  9/11/2022 1318 by Maureen Tobar RN  Outcome: Progressing  9/11/2022 1122 by Aleah Rene RN  Outcome: Progressing  9/11/2022 0804 by Boris Cervantes RCP  Outcome: Progressing  9/11/2022 0730 by Robert Felix RN  Outcome: Progressing     Problem: Safety - Medical Restraint  Goal: Remains free of injury from restraints (Restraint for Interference with Medical Device)  Description: INTERVENTIONS:  1. Determine that other, less restrictive measures have been tried or would not be effective before applying the restraint  2. Evaluate the patient's condition at the time of restraint application  3. Inform patient/family regarding the reason for restraint  4.  Q2H: Monitor safety, psychosocial status, comfort, nutrition and hydration  9/11/2022 1318 by Maureen Tobar RN  Outcome: Progressing  9/11/2022 1122 by Aleah Rene RN  Outcome: Progressing  9/11/2022 0730 by Robert Felix RN  Outcome: Progressing  Flowsheets  Taken 9/11/2022 0600  Remains free of injury from restraints (restraint for interference with medical device): Every 2 hours: Monitor safety, psychosocial status, comfort, nutrition and hydration  Taken 9/11/2022 0400  Remains free of injury from restraints (restraint for interference with medical device): Every 2 hours: Monitor safety, psychosocial status, comfort, nutrition and hydration  Taken 9/11/2022 0200  Remains free of injury from restraints (restraint for interference with medical device): Every 2 hours: Monitor safety, psychosocial status, comfort, nutrition and hydration  Taken 9/11/2022 0000  Remains free of injury from restraints (restraint for interference with medical device): Every 2 hours: Monitor safety, psychosocial status, comfort, nutrition and hydration  Taken 9/10/2022 2200  Remains free of injury from restraints (restraint for interference with medical device): Every 2 hours: Monitor safety, psychosocial status, comfort, nutrition and hydration  Taken 9/10/2022 2000  Remains free of injury from restraints (restraint for interference with medical device): Every 2 hours: Monitor safety, psychosocial status, comfort, nutrition and hydration     Problem: Pain  Goal: Verbalizes/displays adequate comfort level or baseline comfort level  9/11/2022 1318 by Yani Wood RN  Outcome: Progressing  9/11/2022 1122 by Sho Yao RN  Outcome: Progressing     Problem: Confusion  Goal: Confusion, delirium, dementia, or psychosis is improved or at baseline  Description: INTERVENTIONS:  1. Assess for possible contributors to thought disturbance, including medications, impaired vision or hearing, underlying metabolic abnormalities, dehydration, psychiatric diagnoses, and notify attending LIP  2. [place] high risk fall precautions, as indicated  3. Provide frequent short contacts to provide reality reorientation, refocusing and direction  4. Decrease environmental stimuli, including noise as appropriate  5. Monitor and intervene to maintain adequate nutrition, hydration, elimination, sleep and activity  6. If unable to ensure safety without constant attention obtain sitter and review sitter guidelines with assigned personnel  7.  Initiate Psychosocial CNS and Spiritual Care consult, as indicated  9/11/2022 1318 by Yani Wood RN  Outcome: Progressing  9/11/2022 1122 by Sho Yao RN  Outcome: Progressing

## 2022-09-11 NOTE — PROGRESS NOTES
[facility] [facility] Pharmacokinetic Monitoring Service - Vancomycin     Nichole Merlin is a 38 y.o. female starting on vancomycin therapy for SSTI & UTI. Pharmacy consulted by Alondra Gonzalez MD for monitoring and adjustment. Target Concentration: Goal AUC/JOSE ANTONIO 400-600 mg*hr/L    Additional Antimicrobials: Cefepime    Pertinent Laboratory Values: Wt Readings from Last 1 Encounters:   09/10/22 (!) 307 lb (139.3 kg)     Temp Readings from Last 1 Encounters:   09/11/22 97.3 °F (36.3 °C) (Bladder)     Estimated Creatinine Clearance: 47 mL/min (A) (based on SCr of 2.22 mg/dL (H)). Recent Labs     09/10/22  2038 09/11/22  0554   CREATININE 2.18* 2.22*   WBC 20.4* 21.7*     Procalcitonin:     Pertinent Cultures:  Culture Date Source Results   9/10/22 blood NG < 24 hrs   9/10/22 urine    MRSA Nasal Swab: N/A. Non-respiratory infection.     Plan:  Dosing recommendations based on Bayesian software  Start vancomycin 750 mg IV q24h  Anticipated AUC of 454 and trough concentration of 12.6 at steady state  Renal labs as indicated   Vancomycin concentration ordered for 9/12/22 @ 1000   Pharmacy will continue to monitor patient and adjust therapy as indicated    Thank you for the consult,  GORDY REDDY, [facility]  9/11/2022 8:48 AM

## 2022-09-11 NOTE — CONSULTS
[facility] -   Infectious diseases evaluation  admission date 9/10/2022    reason for consultation:   Sepsis sepsis    Impression :   Current:  Sepsis with septic shock  Altered mentation acute encephalopathy  CHULA  Bandemia  Lactic acidosis  Elevated procalcitonin 12  Gram-negative and gram-positive bacteremia-[facility]  Bilat breasts infected / necrotic wounds - maggots  Sacral sloughed skin w possible cellulitis  Hyperglycemia  Morbd obesity-BMI 47  COPD  CHULA - cr 2.22    Other:    Discussion / summary of stay / plan of care   Sepsis of unclear cause, with septic shock and elevated procalcitonin at 12. Very concerning for gram-negative organisms and hence a UTI is of a concern  CT abdomen pelvis at [facility] is negative for any bowel obstruction although it was a concern in [facility]  The urine analysis at [facility] is normal but I am not clear as of the UA at [facility]  The bacteremia with gram-negative and gram-positive is also of a concern, pending at [facility]  Recommendations   Continue cefepime we will add daptomycin  On levophed 25mcg  Stop Zyvox  Will reach out to [facility] further blood cultures and urine culture  Neg nasal MRSA swab    Infection Control Recommendations   [place] Precautions  Contact Isolation       Antimicrobial Stewardship Recommendations   Simplification of therapy  Targeted therapy      History of Present Illness:   Initial history:  Amelia Toney is a 38y.o.-year-old female found unresponsive at home was taken to [facility], found to have stools on many of her wounds and maggots.   CT of the abdomen showed concern for small bowel obstruction  She had hyponatremia with a sodium 128, leukocytosis elevated procalcitonin and lactic acid  Any blood culture came back positive for gram-negative rods gram-positive cocci clusters  CHULA  Transferred to us with concern of septic shock and sepsis-General surgery on board due to the concern of small bowel obstruction    After [facility]. Cristi's CT scan of the abdomen pelvis showed no pneumonia on the lower lobes and no acute abdomen. Patient is on antibiotics, infectious consulted for opinion.   The urine analysis on 9/10 at [facility] is not suggestive of infection-but I do not have the UA or the urine culture off from [address] [place] [address] urinalysis seems to be very contaminated with epithelial cells                          Interval changes  9/11/2022   Patient Vitals for the past 8 hrs:   BP Temp Temp src Pulse Resp SpO2   09/11/22 1700 -- -- -- (!) 109 13 100 %   09/11/22 1645 -- -- -- (!) 111 18 100 %   09/11/22 1630 -- -- -- (!) 113 18 98 %   09/11/22 1615 -- -- -- (!) 115 19 97 %   09/11/22 1600 (!) 124/52 99.9 °F (37.7 °C) Bladder (!) 113 15 97 %   09/11/22 1545 -- -- -- (!) 112 15 97 %   09/11/22 1530 -- -- -- (!) 115 20 97 %   09/11/22 1515 -- -- -- (!) 114 14 97 %   09/11/22 1508 -- -- -- (!) 113 19 97 %   09/11/22 1500 -- -- -- (!) 113 16 97 %   09/11/22 1445 -- -- -- (!) 113 15 96 %   09/11/22 1430 -- -- -- (!) 112 14 96 %   09/11/22 1415 -- -- -- (!) 109 15 96 %   09/11/22 1400 -- -- -- (!) 108 15 96 %   09/11/22 1345 -- -- -- (!) 106 18 97 %   09/11/22 1330 -- -- -- (!) 106 20 97 %   09/11/22 1315 -- -- -- (!) 106 18 97 %   09/11/22 1300 -- -- -- (!) 106 20 97 %   09/11/22 1245 -- -- -- (!) 106 18 97 %   09/11/22 1230 -- -- -- (!) 106 21 97 %   09/11/22 1215 -- -- -- (!) 103 15 97 %   09/11/22 1200 (!) 111/50 98.8 °F (37.1 °C) Bladder (!) 104 13 97 %   09/11/22 1145 -- -- -- (!) 107 (!) 7 97 %   09/11/22 1130 -- -- -- (!) 107 (!) 9 97 %   09/11/22 1115 -- -- -- (!) 105 (!) 8 97 %   09/11/22 1113 -- -- -- (!) 104 21 98 %   09/11/22 1100 -- -- -- (!) 103 (!) 9 98 %   09/11/22 1045 -- -- -- (!) 103 (!) 7 98 %   09/11/22 1030 -- -- -- (!) 103 (!) 9 99 %   09/11/22 1015 -- -- -- (!) 102 (!) 7 98 %   09/11/22 1000 -- -- -- 100 14 99 %       Summary of relevant labs:  Labs:  sodium 128,   leukocytosis  21  elevated procalcitonin   Elevated lactic acid 2.8  Creat 2.22  Micro:  BC GNR and CPC clusters at the [facility]  UA neg  Imaging:  BREASTS US neg for abscess  CT scan of the abdomen pelvis showed no pneumonia on the lower lobes and no acute abdomen. I have personally reviewed the past medical history, past surgical history, medications, social history, and family history, and I haveupdated the database accordingly. Allergies:   Amoxicillin and Robitussin day-night value lawrence [[id number]]     Review of Systems:     Review of Systems   Unable to perform ROS: Mental status change     Physical Examination :       Physical Exam  Constitutional:       Appearance: She is obese. She is ill-appearing. HENT:      Head: Normocephalic and atraumatic. Nose: Nose normal.      Mouth/Throat:      Mouth: Mucous membranes are moist.   Eyes:      General: No scleral icterus. Conjunctiva/sclera: Conjunctivae normal.   Cardiovascular:      Rate and Rhythm: Normal rate and regular rhythm. Heart sounds: Normal heart sounds. No murmur heard. Pulmonary:      Effort: No respiratory distress. Breath sounds: Normal breath sounds. Abdominal:      General: There is no distension. Tenderness: There is no abdominal tenderness. Genitourinary:     Comments: Urine elder  Musculoskeletal:         General: No swelling or deformity. Cervical back: Neck supple. No rigidity. Skin:     Coloration: Skin is not pale. Findings: Erythema and lesion present. Neurological:      Comments: On the vent   Psychiatric:      Comments: On the vent       Past Medical History:   No past medical history on file. Past Surgical  History:   No past surgical history on file.     Medications:      bacitracin   Topical TID    linezolid  600 mg IntraVENous Q12H    sodium chloride flush  5-40 mL IntraVENous 2 times per day    pantoprazole (PROTONIX) 40 mg injection  40 mg IntraVENous Daily    cefepime  2,000 mg IntraVENous Q12H    miconazole   Topical BID    heparin (porcine)  5,000 Units SubCUTAneous 3 times per day    levothyroxine  75 mcg Oral Daily       Social History:     Social History     Socioeconomic History    Marital status:      Spouse name: Not on file    Number of children: Not on file    Years of education: Not on file    Highest education level: Not on file   Occupational History    Not on file   Tobacco Use    Smoking status: Not on file    Smokeless tobacco: Not on file   Substance and Sexual Activity    Alcohol use: Not on file    Drug use: Not on file    Sexual activity: Not on file   Other Topics Concern    Not on file   Social History Narrative    Not on file     Social Determinants of Health     Financial Resource Strain: Not on file   Food Insecurity: Not on file   Transportation Needs: Not on file   Physical Activity: Not on file   Stress: Not on file   Social Connections: Not on file   Intimate Partner Violence: Not on file   Housing Stability: Not on file       Family History:   No family history on file. Medical Decision Making:   I have independently reviewed/ordered the following labs:    CBC with Differential:   Recent Labs     09/10/22  2038 09/11/22  0554   WBC 20.4* 21.7*   HGB 10.1* 9.8*   HCT 32.2* 31.2*    224   LYMPHOPCT 9* 8*   MONOPCT 3 7     BMP:  Recent Labs     09/10/22  2038 09/11/22  0554   * 128*   K 5.3 5.3    100   CO2 14* 13*   BUN 57* 53*   CREATININE 2.18* 2.22*   MG 2.2 2.1     Hepatic Function Panel:   Recent Labs     09/10/22  2038 09/11/22  0554   PROT 5.5* 5.4*   LABALBU 1.9* 2.0*   BILIDIR 0.6* 0.3   IBILI 0.2 0.3   BILITOT 0.8 0.6   ALKPHOS 126* 120*   ALT 12 12   AST 18 19     No results for input(s): RPR in the last 72 hours. No results for input(s): HIV in the last 72 hours. No results for input(s): BC in the last 72 hours.   Lab Results   Component Value Date/Time    CREATININE 2.22 09/11/2022 05:54 AM    GLUCOSE 196 09/11/2022 05:54 AM       Detailed results: Thank you for allowing us to participate in the care of this patient. Please call with questions. This note is created with the assistance of a speech recognition program.  While intending to generate adocument that actually reflects the content of the visit, the document can still have some errors including those of syntax and sound a like substitutions which may escape proof reading. It such instances, actual meaningcan be extrapolated by contextual diversion.     Jo Ann White MD  Office: [phone]  Perfect serve / office [phone]

## 2022-09-11 NOTE — PROCEDURES
Insert Arterial Line  Date/Time:  09/11/22, 2:24 AM  Performed by: Aaron Jason RCP    Patient identity confirmed: arm band and provided demographic data   Time out: Immediately prior to procedure a \"time out\" was called to verify the correct patient, procedure, equipment, support staff. Preparation: Patient was prepped and draped in the usual sterile fashion.     Location:left radial    Farrukh's test normal: yes  Needle gauge: 20     Number of attempts: 1  Post-procedure: transparent dressing applied and line secured    Patient tolerance: well

## 2022-09-11 NOTE — CONSULTS
General Surgery:  Consult Note        PATIENT NAME: Lieutenant Doug Metcalf   YOB: 1980    ADMISSION DATE: 9/10/2022  4:45 PM     Admitting Provider: Navid Johnson    Consulted Physician: Dr. Zack Alex: 9/10/2022    Chief Complaint:  Septic shock, found down  Consult Regarding:  SBO    HISTORY OF PRESENT ILLNESS:  The patient is a 38 y.o. female with unknown past medical history who was admitted on 9/11/22 as a transfer from [facility]. Patient is currently intubated and sedated and cannot provide any history, no family is present. Reportedly, patient was found down in her home. Last known well 1 week prior. Her  is a  who has not been home. At home she was covered in excrement and had multiple diffuse skin wounds. CT scan done at [facility] reportedly showed malpositioning of the jejunum with possible small bowel obstruction and dilated loops of small bowel and possible transition point. She also had a lactic acid of 2.1, leukocytosis of 19.8, positive UTI, and positive blood culture showing gram-negative rods and gram-positive cocci in clusters. She was transferred to [facility] for higher level of care. On chart check and care everywhere it appears patient has a history of depression, GERD, COPD and is a former smoker with 22 pack years. Labs done here indicate patient is hyponatremic, has CHULA, with leukocytosis of 20.4. She remains tachycardic and hypotensive on 20 of Levophed. Past Medical History:    No past medical history on file. Past Surgical History:    No past surgical history on file. Medications Prior to Admission:   No medications prior to admission.     Allergies:  Amoxicillin and Robitussin day-night value lawrence [phenylephrine-diphenhyd-dm-gg]    Social History:   Social History     Socioeconomic History    Marital status:      Spouse name: Not on file    Number of children: Not on file    Years of education: Not on file Highest education level: Not on file   Occupational History    Not on file   Tobacco Use    Smoking status: Not on file    Smokeless tobacco: Not on file   Substance and Sexual Activity    Alcohol use: Not on file    Drug use: Not on file    Sexual activity: Not on file   Other Topics Concern    Not on file   Social History Narrative    Not on file     Social Determinants of Health     Financial Resource Strain: Not on file   Food Insecurity: Not on file   Transportation Needs: Not on file   Physical Activity: Not on file   Stress: Not on file   Social Connections: Not on file   Intimate Partner Violence: Not on file   Housing Stability: Not on file       Family History:   No family history on file. REVIEW OF SYSTEMS:    Unable to determine due to patient factors: Intubated and sedated    PHYSICAL EXAM:    VITALS:  /77   Pulse (!) 103   Resp 20   Ht 5' 7\" (1.702 m)   Wt (!) 307 lb (139.3 kg)   SpO2 100%   BMI 48.08 kg/m²   INTAKE/OUTPUT:     Intake/Output Summary (Last 24 hours) at 9/10/2022 2255  Last data filed at 9/10/2022 1942  Gross per 24 hour   Intake 1.1 ml   Output --   Net 1.1 ml       CONSTITUTIONAL: Intubated and sedated, morbidly obese  HEENT: Normocephalic/atraumatic, without obvious abnormality. NECK:  Supple, symmetrical, trachea midline   CARDIOVASCULAR: Tachycardic, regular rhythm  LUNGS: Clear to auscultation bilaterally without evidence of wheezing or tachypnea. ABDOMEN: Morbidly obese, soft, nondistended, no apparent TTP. Without rigidity/rebound/peritoneal signs. MUSCULOSKELETAL: Muscle strength intact in all extremities bilaterally. NEUROLOGIC: CN II- XII intact. Gross motor intact without focal weakness. SKIN: No cyanosis noted. Multiple diffuse, superficial wounds most notably in the crevices under her breasts and groin, also over her left flank.   Bilateral breast wounds have fibrinous eschar over it surface  Orientation: Sedated      CBC with Differential:    Lab Results   Component Value Date/Time    WBC 20.4 09/10/2022 08:38 PM    RBC 3.66 09/10/2022 08:38 PM    HGB 10.1 09/10/2022 08:38 PM    HCT 32.2 09/10/2022 08:38 PM     09/10/2022 08:38 PM    MCV 88.0 09/10/2022 08:38 PM    MCH 27.6 09/10/2022 08:38 PM    MCHC 31.4 09/10/2022 08:38 PM    RDW 15.9 09/10/2022 08:38 PM    LYMPHOPCT 9 09/10/2022 08:38 PM    MONOPCT 3 09/10/2022 08:38 PM    BASOPCT 0 09/10/2022 08:38 PM    MONOSABS 0.61 09/10/2022 08:38 PM    LYMPHSABS 1.84 09/10/2022 08:38 PM    EOSABS 0.41 09/10/2022 08:38 PM    BASOSABS 0.00 09/10/2022 08:38 PM     BMP:    Lab Results   Component Value Date/Time     09/10/2022 08:38 PM    K 5.3 09/10/2022 08:38 PM     09/10/2022 08:38 PM    CO2 14 09/10/2022 08:38 PM    BUN 57 09/10/2022 08:38 PM    LABALBU 1.9 09/10/2022 08:38 PM    CREATININE 2.18 09/10/2022 08:38 PM    CALCIUM 7.7 09/10/2022 08:38 PM    GFRAA 30 09/10/2022 08:38 PM    LABGLOM 25 09/10/2022 08:38 PM    GLUCOSE 155 09/10/2022 08:38 PM       Pertinent Radiology:   No results found. ASSESSMENT:  Active Hospital Problems    Diagnosis Date Noted    Altered mental status [R41.82] 09/10/2022     Priority: Medium       42F with sepsis, and possible SBO on CT abdomen    Plan:  Examined the patient in person. Discussed the patient, history, physical, labs, imaging with the on-call attending. Awaiting CT abdomen/pelvis from [facility] to become available for review to further assess  Patient has multiple sources which may be contributing to her acute illness. The patient's abdominal exam relatively benign. Therefore, will await the images from [facility] to be uploaded rather than ordering new images at this time. If patient continues to decline and her abdominal exam changes, will consider ordering additional images at that time.   Serial abdominal exams  N.p.o., IVF  NGT must be placed to LI WS  Monitor for return of bowel function  Will trend lactic acid and WBC  No urgent surgical intervention indicated at this time. If patient's clinical picture worsens, may consider diagnostic laparoscopy. Continue medical management per primary        Electronically signed by Nata Saxena DO  on 9/10/2022 at 10:58 PM     Attending Physician Statement  I have discussed the case with Dr Elizabeth Flores, including pertinent history and exam findings with the resident. I have seen and examined the patient and the key elements of the encounter have been performed by me. I agree with the assessment, plan and orders as documented by the resident. Patient's abdomen is soft and nondistended. Physical exam is not consistent with bowel obstruction or ischemic bowel.      Electronically signed by Shar Moreau IV, DO  on 9/12/2022 at 3:35 PM

## 2022-09-11 NOTE — PROGRESS NOTES
Comprehensive Nutrition Assessment    Type and Reason for Visit:  Initial    Nutrition Recommendations/Plan:   Continue NPO, monitor plans for nutrition  If TF needed, recommend Vital High Protein at 45 mL/hr + 1 protein modular to provide 1184 kcal, 120 g pro with propofol at current rate ( +330 kcal)  If TPN needed, recommend initiating at 42 mL/hr with 200 g dextrose, 50 g AA     Malnutrition Assessment:  Malnutrition Status:  Insufficient data (09/11/22 1158)    Context:  Acute Illness       Nutrition Assessment:    Pt admitted for sepsis and possible SBO. Pt currently intubated and sedated. No nutrition at present. Meds reviewed: propofol, sodium bicarbonate in D5. Labs reviewed. BUN 53 mg/dL, Cr 2.2 mg/dL, Na 128 mmol/L. Nutrition Related Findings:    labs/meds reviewed Wound Type: Multiple, Pressure Injury, Unstageable       Current Nutrition Intake & Therapies:    Average Meal Intake: NPO  Average Supplements Intake: NPO  Additional Calorie Sources:  propofol at 12.5 mL/hr = 330 kcal    Anthropometric Measures:  Height: 5' 7\" (170.2 cm)  Ideal Body Weight (IBW): 135 lbs (61 kg)       Current Body Weight: 307 lb (139.3 kg),   IBW.     Current BMI (kg/m2): 48.1                          BMI Categories: Obese Class 3 (BMI 40.0 or greater)    Estimated Daily Nutrient Needs:  Energy Requirements Based On: Kcal/kg  Weight Used for Energy Requirements: Ideal  Energy (kcal/day): 8648-2804 kcal/day (22-25 kcal/kg IBW)  Weight Used for Protein Requirements: Ideal  Protein (g/day): 120 g/day (2 g/kg IBW)  Method Used for Fluid Requirements: Other (Comment)  Fluid (ml/day): per MD    Nutrition Diagnosis:   Inadequate oral intake related to impaired respiratory function as evidenced by NPO or clear liquid status due to medical condition, intubation    Nutrition Interventions:   Food and/or Nutrient Delivery: Continue NPO  Nutrition Education/Counseling: No recommendation at this time  Coordination of Nutrition Care: Continue to monitor while inpatient       Goals:  Previous Goal Met:  (goal set)  Goals: Initiate nutrition support, Initiate PO diet, within 7 days       Nutrition Monitoring and Evaluation:   Behavioral-Environmental Outcomes: None Identified  Food/Nutrient Intake Outcomes: Diet Advancement/Tolerance, Enteral Nutrition Intake/Tolerance  Physical Signs/Symptoms Outcomes: Biochemical Data, Nutrition Focused Physical Findings, Skin, Weight, GI Status    Discharge Planning:     Too soon to determine     Ty Mccoy [address] [facility] [address]  Contact: [phone]

## 2022-09-11 NOTE — PROGRESS NOTES
Critical Care Team - Daily Progress Note      Date and time: 2022 1:42 PM  Patient's name:  Chipper Canavan  Medical Record Number: [id number]  Patient's account/billing number: [de-identified]  Patient's YOB: 1980  Age: 38 y.o. Date of Admission: 9/10/2022  4:45 PM  Length of stay during current admission: 1      Primary Care Physician: No primary care provider on file. ICU Attending Physician: Dr. Yuni Nevarez    Code Status: Full Code    Reason for ICU admission: Septic shock, intubated requiring mechanical ventilation and pressor support      SUBJECTIVE:     OVERNIGHT EVENTS:     Patient continues to be intubated and sedated. Requiring sedation with fentanyl 200, propofol 15, Versed 8. Requiring pressor support with Levophed of 30. Vent settings PRVC 14/430/  ABG showing pH 7.25/31/127  Patient having low urine output but similar kidney function creatinine 2.2 to  Patient white count continues to rise from 20.4-21.7. Patient currently on vancomycin and cefepime. Fever:-  Temp (24hrs), Av.2 °F (36.8 °C), Min:97.3 °F (36.3 °C), Max:99.1 °F (38.6 °C)  Systolic (64DUO), NSO:655 , Min:78 , BTQ:056   Diastolic (41WQQ), OLW:68, Min:48, Max:88  Urine output in the last 24 hours:  In: 2009.5 [I.V.:909.5]  Out: 946 [Urine:563]  Patient Vitals for the past 96 hrs (Last 3 readings):   Weight   09/10/22 1742 (!) 307 lb (139.3 kg)     FLUIDS:Bicarb drip @ 125/hr  FEEDING:none  ANALGESICS:Fentanyl  SEDATION: Propofol  and Versed  THROMBO- PROPHYLAXIS: Heparin  MOBILIZATION:Strict bedrest  HEADS UP:15  ULCER PROPHYLAXIS: Protonix   GLYCEMIC CONTROL:None  SPONTANEOUS BREATHING TRIAL:None  BOWEL MANAGEMENT:Glycolasx PRN   INDWELLING CATHETER:Yanez, left IJ CVC, NG  DRUG DE-ESCALATION: Wean sedation    AWAKE & FOLLOWING COMMANDS:  [x] No   [] Yes    CURRENT VENTILATION STATUS:     [x] Ventilator  [] BIPAP  [] Nasal Cannula [] Room Air        SECRETIONS Amount:  [x] Small [] Moderate  [] Large  [] None  Color:     [] White [] Colored  [] Bloody    SEDATION:  RAAS Score:  [x] Propofol gtt  [x] Versed gtt  [] Ativan gtt   [] No Sedation    PARALYZED:  [x] No    [] Yes    DIARRHEA:                [x] No                [] Yes  (C. Difficile status: [] positive                                                                                                                       [] negative                                                                                                                     [] pending)    VASOPRESSORS:  [] No    [x] Yes    If yes -   [x] Levophed       [] Dopamine     [] Vasopressin       [] Dobutamine  [] Phenylephrine         [] Epinephrine    CENTRAL LINES:     [] No   [x] Yes   (Date of Insertion:   )           If yes -     [] Right IJ     [] Left IJ [] Right Femoral [] Left Femoral                   [] Right Subclavian [] Left Subclavian       LUQUE'S CATHETER:   [] No   [x] Yes  (Date of Insertion:   )     URINE OUTPUT:            [] Good   [x] Low              [] Anuric      OBJECTIVE:     VITAL SIGNS:  BP (!) 111/50   Pulse (!) 106   Temp 98.8 °F (37.1 °C) (Bladder)   Resp 20   Ht 5' 7\" (1.702 m)   Wt (!) 307 lb (139.3 kg)   SpO2 97%   BMI 48.08 kg/m²   Tmax over 24 hours:  Temp (24hrs), Av.2 °F (36.8 °C), Min:97.3 °F (36.3 °C), Max:99.1 °F (37.3 °C)      Patient Vitals for the past 6 hrs:   BP Temp Temp src Pulse Resp SpO2   22 1300 -- -- -- (!) 106 20 97 %   22 1245 -- -- -- (!) 106 18 97 %   22 1230 -- -- -- (!) 106 21 97 %   22 1215 -- -- -- (!) 103 15 97 %   22 1200 (!) 111/50 98.8 °F (37.1 °C) Bladder (!) 104 13 97 %   22 1145 -- -- -- (!) 107 (!) 7 97 %   22 1130 -- -- -- (!) 107 (!) 9 97 %   22 1115 -- -- -- (!) 105 (!) 8 97 %   22 1113 -- -- -- (!) 104 21 98 %   22 1100 -- -- -- (!) 103 (!) 9 98 %   22 1045 -- -- -- (!) 103 (!) 7 98 %   22 1030 -- -- -- (!) 103 (!) 9 99 %   22 1015 -- -- -- (!) 102 (!) 7 98 %   09/11/22 1000 -- -- -- 100 14 99 %   09/11/22 0945 -- -- -- (!) 102 (!) 7 98 %   09/11/22 0930 -- -- -- (!) 102 (!) 9 99 %   09/11/22 0915 -- -- -- (!) 101 10 100 %   09/11/22 0900 -- -- -- (!) 102 10 99 %   09/11/22 0845 -- -- -- (!) 103 15 100 %   09/11/22 0830 -- -- -- (!) 102 15 100 %   09/11/22 0815 -- -- -- (!) 104 13 100 %   09/11/22 0800 (!) 117/57 97.9 °F (36.6 °C) Bladder 100 10 100 %   09/11/22 0745 (!) 113/57 -- -- 100 (!) 6 100 %         Intake/Output Summary (Last 24 hours) at 9/11/2022 1342  Last data filed at 9/11/2022 1311  Gross per 24 hour   Intake 2009.5 ml   Output 663 ml   Net 1346.5 ml     Wt Readings from Last 2 Encounters:   09/10/22 (!) 307 lb (139.3 kg)     Body mass index is 48.08 kg/m². PHYSICAL EXAMINATION :  Constitutional: Intubated, sedated  EENT: PERRLA, sclera clear, anicteric, moist mucous membranes  Neck: Supple, symmetrical, trachea midline  Respiratory: Diffuse rhonchi on ascultation  Cardiovascular: tachycardia with regular rhythm, normal S1, S2, no murmur noted, and 2+ distal pulses in all 4 extremities   Abdomen: soft, distended,  no masses or organomegaly  Neurological: Intubated and sedated.  PERRL   Extremities:  peripheral pulses normal, 1+ pedal edema, no clubbing or cyanosis    MEDICATIONS:    Scheduled Meds:   bacitracin   Topical TID    linezolid  600 mg IntraVENous Q12H    sodium chloride flush  5-40 mL IntraVENous 2 times per day    pantoprazole (PROTONIX) 40 mg injection  40 mg IntraVENous Daily    cefepime  2,000 mg IntraVENous Q12H    miconazole   Topical BID    heparin (porcine)  5,000 Units SubCUTAneous 3 times per day    levothyroxine  75 mcg Oral Daily     Continuous Infusions:   IV infusion builder 125 mL/hr at 09/11/22 1253    norepinephrine 30 mcg/min (09/11/22 1311)    midazolam 2 mg/hr (09/11/22 1311)    fentaNYL 50 mcg/mL 150 mcg/hr (09/11/22 1311)    sodium chloride 10 mL/hr at 09/11/22 1311    propofol Stopped (09/11/22 1306)     PRN Meds:   sodium chloride flush, 5-40 mL, PRN  sodium chloride, , PRN  ondansetron, 4 mg, Q8H PRN   Or  ondansetron, 4 mg, Q6H PRN  polyethylene glycol, 17 g, Daily PRN  acetaminophen, 650 mg, Q6H PRN   Or  acetaminophen, 650 mg, Q6H PRN  potassium chloride, 20 mEq, PRN   Or  potassium chloride, 10 mEq, PRN        VENT SETTINGS (Comprehensive) (if applicable):  Vent Information  Equipment Changed: HME  Additional Respiratory Assessments  Heart Rate: (!) 106  Resp: 20  SpO2: 97 %  End Tidal CO2: 33 (%)  Position: Semi-Gama's  Humidification Source: HME  Cuff Pressure (cm H2O):  (mlt)  Skin Barrier Applied: No    ABGs:     Laboratory findings:    Complete Blood Count:   Recent Labs     09/10/22  2038 09/11/22  0554   WBC 20.4* 21.7*   HGB 10.1* 9.8*   HCT 32.2* 31.2*    224        Last 3 Blood Glucose:   Recent Labs     09/10/22  2038 09/11/22  0554   GLUCOSE 155* 196*        PT/INR:    Lab Results   Component Value Date/Time    PROTIME 11.1 09/10/2022 08:38 PM    INR 1.0 09/10/2022 08:38 PM     PTT:    Lab Results   Component Value Date/Time    APTT 25.2 09/10/2022 08:38 PM       Comprehensive Metabolic Profile:   Recent Labs     09/10/22  2038 09/11/22  0554   * 128*   K 5.3 5.3    100   CO2 14* 13*   BUN 57* 53*   CREATININE 2.18* 2.22*   GLUCOSE 155* 196*   CALCIUM 7.7* 8.0*   PROT 5.5* 5.4*   LABALBU 1.9* 2.0*   BILITOT 0.8 0.6   ALKPHOS 126* 120*   AST 18 19   ALT 12 12      Magnesium:   Lab Results   Component Value Date/Time    MG 2.1 09/11/2022 05:54 AM     Phosphorus: No results found for: PHOS  Ionized Calcium:   Lab Results   Component Value Date/Time    CAION 1.10 09/10/2022 08:38 PM        Urinalysis:     Troponin: No results for input(s): TROPONINI in the last 72 hours.     Microbiology:    Cultures during this admission:     Blood cultures:                 [] None drawn      [] Negative             []  Positive (Details:  )  Urine Culture:                   [] None drawn      [] Negative             []  Positive (Details:  )  Sputum Culture:               [] None drawn       [] Negative             []  Positive (Details:  )   Endotracheal aspirate:     [] None drawn       [] Negative             []  Positive (Details:  )     Other pertinent Labs:       Radiology/Imaging:     Chest Xray (2022):    ASSESSMENT:     Principal Problem:    Septic shock ([facility] [address]  Active Problems:    Altered mental status    Acute kidney injury ([facility] [address]  Resolved Problems:    * No resolved hospital problems. *            PLAN:     WEAN PER PROTOCOL:  [] No   [] Yes  [] N/A    DISCONTINUE ANY LABS:   [] No   [] Yes    ICU PROPHYLAXIS:  Stress ulcer:  [] PPI Agent  [] O5Otcmd [] Sucralfate  [] Other:  VTE:   [] Enoxaparin  [] Unfract.  Heparin Subcut  [] EPC Cuffs    NUTRITION:  [] NPO [] Tube Feeding (Specify: ) [] TPN  [] PO (Diet: Diet NPO)    HOME MEDICATIONS RECONCILED: [] No  [] Yes    INSULIN DRIP:   [] No   [] Yes    CONSULTATION NEEDED:  [] No   [] Yes    FAMILY UPDATED:    [] No   [] Yes    TRANSFER OUT OF ICU:   [] No   [] Yes    ADDITIONAL PLAN:    Neurologic:   Neuro -intubated and sedated  Neuro checks per protocol  Sedation: Propofol, Versed  Pain management: Fentanyl  Cardiovascular:  Hemodynamically stable on pressor support with levo  HR -106  MAPs -66  MAP goal 65    Pulmonary:  Maintain oxygen sats >92%  Pulmonary toilet  Vent Settings: Mode PRVC RR 14  PEEP 8 FiO2 30  ABG: pH 7.25 pCO2 31 pO2 127    GI/Nutrition  GlycoLax as needed  Ulcer Prophylaxis: Protonix  Diet:Diet NPO    Renal/Fluid/Electrolyte  IV Fluids: Bicarb drip 150 mEq at 125 cc/h  I/O: In: 2009.5 [I.V.:909.5]  Out: 663 [Urine:563]  Monitor electrolytes, replace PRN   ID  WBC:   Lab Results   Component Value Date    WBC 21.7 (H) 2022     Tmax: Temp (24hrs), Av.2 °F (36.8 °C), Min:97.3 °F (36.3 °C), Max:99.1 °F (37.3 °C)    Antimicrobials: Currently vancomycin and Zosyn, vancomycin discontinued due to kidney function. We will get infectious disease for further recommendations. Hematology:  Recent Labs     09/10/22  2038 09/11/22  0554   HGB 10.1* 9.8*    stable  Endocrine:   glucose controlled - most recent BGL is   Recent Labs     09/10/22  2038 09/11/22  0554   GLUCOSE 155* 196*     DVT Prophylaxis  Heparin            Rusty Boogie MD, MMARCY.              Critical care resident,  Department of Internal Medicine/ Critical care  [facility], [facility] ([place]             9/11/2022, 1:42 PM

## 2022-09-11 NOTE — PLAN OF CARE
Problem: Discharge Planning  Goal: Discharge to home or other facility with appropriate resources  Outcome: Progressing     Problem: Respiratory - Adult  Goal: Achieves optimal ventilation and oxygenation  9/11/2022 0730 by Ramon Bennett RN  Outcome: Progressing  9/10/2022 1941 by Miryam Henson RN  Outcome: Progressing  9/10/2022 1930 by Michael Curry RCP  Outcome: Progressing  Flowsheets (Taken 9/10/2022 1930)  Achieves optimal ventilation and oxygenation:   Assess for changes in respiratory status   Assess the need for suctioning and aspirate as needed   Respiratory therapy support as indicated   Assess for changes in mentation and behavior   Oxygen supplementation based on oxygen saturation or arterial blood gases  9/10/2022 1847 by Tosha Merritt RCP  Outcome: Progressing     Problem: Safety - Medical Restraint  Goal: Remains free of injury from restraints (Restraint for Interference with Medical Device)  Description: INTERVENTIONS:  1. Determine that other, less restrictive measures have been tried or would not be effective before applying the restraint  2. Evaluate the patient's condition at the time of restraint application  3. Inform patient/family regarding the reason for restraint  4.  Q2H: Monitor safety, psychosocial status, comfort, nutrition and hydration  9/11/2022 0730 by Ramon Bennett RN  Outcome: Progressing  Flowsheets  Taken 9/11/2022 0600  Remains free of injury from restraints (restraint for interference with medical device): Every 2 hours: Monitor safety, psychosocial status, comfort, nutrition and hydration  Taken 9/11/2022 0400  Remains free of injury from restraints (restraint for interference with medical device): Every 2 hours: Monitor safety, psychosocial status, comfort, nutrition and hydration  Taken 9/11/2022 0200  Remains free of injury from restraints (restraint for interference with medical device): Every 2 hours: Monitor safety, psychosocial status, comfort, nutrition and hydration  Taken 9/11/2022 0000  Remains free of injury from restraints (restraint for interference with medical device): Every 2 hours: Monitor safety, psychosocial status, comfort, nutrition and hydration  Taken 9/10/2022 2200  Remains free of injury from restraints (restraint for interference with medical device): Every 2 hours: Monitor safety, psychosocial status, comfort, nutrition and hydration  Taken 9/10/2022 2000  Remains free of injury from restraints (restraint for interference with medical device): Every 2 hours: Monitor safety, psychosocial status, comfort, nutrition and hydration  9/10/2022 1941 by Maureen Tobar, RN  Outcome: Progressing

## 2022-09-11 NOTE — PLAN OF CARE
Problem: Respiratory - Adult  Goal: Achieves optimal ventilation and oxygenation  9/11/2022 0804 by Kristyn Santos RCP  Outcome: Progressing   PROVIDE ADEQUATE OXYGENATION WITH ACCEPTABLE SP02/ABG'S    [x]  IDENTIFY APPROPRIATE OXYGEN THERAPY  [x]   MONITOR SP02/ABG'S AS NEEDED   [x]   PATIENT EDUCATION AS NEEDED

## 2022-09-11 NOTE — PLAN OF CARE
Problem: Discharge Planning  Goal: Discharge to home or other facility with appropriate resources  9/11/2022 1122 by Angela Levy RN  Outcome: Progressing  9/11/2022 0730 by Ramin Wilson RN  Outcome: Progressing     Problem: Respiratory - Adult  Goal: Achieves optimal ventilation and oxygenation  9/11/2022 1122 by Angela Levy RN  Outcome: Progressing  9/11/2022 0804 by Carolann Stallworth RCP  Outcome: Progressing  9/11/2022 0730 by Ramin Wilson RN  Outcome: Progressing     Problem: Safety - Medical Restraint  Goal: Remains free of injury from restraints (Restraint for Interference with Medical Device)  Description: INTERVENTIONS:  1. Determine that other, less restrictive measures have been tried or would not be effective before applying the restraint  2. Evaluate the patient's condition at the time of restraint application  3. Inform patient/family regarding the reason for restraint  4.  Q2H: Monitor safety, psychosocial status, comfort, nutrition and hydration  9/11/2022 1122 by Angela Levy RN  Outcome: Progressing  9/11/2022 0730 by Ramin Wilson RN  Outcome: Progressing  Flowsheets  Taken 9/11/2022 0600  Remains free of injury from restraints (restraint for interference with medical device): Every 2 hours: Monitor safety, psychosocial status, comfort, nutrition and hydration  Taken 9/11/2022 0400  Remains free of injury from restraints (restraint for interference with medical device): Every 2 hours: Monitor safety, psychosocial status, comfort, nutrition and hydration  Taken 9/11/2022 0200  Remains free of injury from restraints (restraint for interference with medical device): Every 2 hours: Monitor safety, psychosocial status, comfort, nutrition and hydration  Taken 9/11/2022 0000  Remains free of injury from restraints (restraint for interference with medical device): Every 2 hours: Monitor safety, psychosocial status, comfort, nutrition and hydration  Taken 9/10/2022 2200  Remains free of injury from restraints (restraint for interference with medical device): Every 2 hours: Monitor safety, psychosocial status, comfort, nutrition and hydration  Taken 9/10/2022 2000  Remains free of injury from restraints (restraint for interference with medical device): Every 2 hours: Monitor safety, psychosocial status, comfort, nutrition and hydration     Problem: Pain  Goal: Verbalizes/displays adequate comfort level or baseline comfort level  Outcome: Progressing     Problem: Confusion  Goal: Confusion, delirium, dementia, or psychosis is improved or at baseline  Description: INTERVENTIONS:  1. Assess for possible contributors to thought disturbance, including medications, impaired vision or hearing, underlying metabolic abnormalities, dehydration, psychiatric diagnoses, and notify attending LIP  2. [place] high risk fall precautions, as indicated  3. Provide frequent short contacts to provide reality reorientation, refocusing and direction  4. Decrease environmental stimuli, including noise as appropriate  5. Monitor and intervene to maintain adequate nutrition, hydration, elimination, sleep and activity  6. If unable to ensure safety without constant attention obtain sitter and review sitter guidelines with assigned personnel  7.  Initiate Psychosocial CNS and Spiritual Care consult, as indicated  Outcome: Progressing

## 2022-09-12 ENCOUNTER — APPOINTMENT (OUTPATIENT)
Dept: GENERAL RADIOLOGY | Age: 42
DRG: 870 | End: 2022-09-12
Attending: INTERNAL MEDICINE
Payer: COMMERCIAL

## 2022-09-12 LAB
ABSOLUTE EOS #: 0.35 K/UL (ref 0–0.4)
ABSOLUTE IMMATURE GRANULOCYTE: 0 K/UL (ref 0–0.3)
ABSOLUTE LYMPH #: 1.77 K/UL (ref 1–4.8)
ABSOLUTE MONO #: 0.89 K/UL (ref 0.1–0.8)
ALBUMIN SERPL-MCNC: 1.7 G/DL (ref 3.5–5.2)
ALBUMIN/GLOBULIN RATIO: 0.5 (ref 1–2.5)
ALLEN TEST: ABNORMAL
ALP BLD-CCNC: 129 U/L (ref 35–104)
ALT SERPL-CCNC: 11 U/L (ref 5–33)
ANION GAP SERPL CALCULATED.3IONS-SCNC: 14 MMOL/L (ref 9–17)
AST SERPL-CCNC: 18 U/L
BASOPHILS # BLD: 0 % (ref 0–2)
BASOPHILS ABSOLUTE: 0 K/UL (ref 0–0.2)
BILIRUB SERPL-MCNC: 0.5 MG/DL (ref 0.3–1.2)
BILIRUBIN DIRECT: 0.3 MG/DL
BILIRUBIN, INDIRECT: 0.2 MG/DL (ref 0–1)
BUN BLDV-MCNC: 51 MG/DL (ref 6–20)
CALCIUM SERPL-MCNC: 7.4 MG/DL (ref 8.6–10.4)
CHLORIDE BLD-SCNC: 101 MMOL/L (ref 98–107)
CO2: 19 MMOL/L (ref 20–31)
CREAT SERPL-MCNC: 1.9 MG/DL (ref 0.5–0.9)
EOSINOPHILS RELATIVE PERCENT: 2 % (ref 1–4)
FIO2: 30
GFR AFRICAN AMERICAN: 35 ML/MIN
GFR NON-AFRICAN AMERICAN: 29 ML/MIN
GFR SERPL CREATININE-BSD FRML MDRD: ABNORMAL ML/MIN/{1.73_M2}
GLUCOSE BLD-MCNC: 159 MG/DL (ref 74–100)
GLUCOSE BLD-MCNC: 171 MG/DL (ref 70–99)
HCT VFR BLD CALC: 27.6 % (ref 36.3–47.1)
HEMOGLOBIN: 9 G/DL (ref 11.9–15.1)
IMMATURE GRANULOCYTES: 0 %
LYMPHOCYTES # BLD: 10 % (ref 24–44)
MAGNESIUM: 2.1 MG/DL (ref 1.6–2.6)
MCH RBC QN AUTO: 28.7 PG (ref 25.2–33.5)
MCHC RBC AUTO-ENTMCNC: 32.6 G/DL (ref 28.4–34.8)
MCV RBC AUTO: 87.9 FL (ref 82.6–102.9)
MODE: ABNORMAL
MONOCYTES # BLD: 5 % (ref 1–7)
MORPHOLOGY: ABNORMAL
NEGATIVE BASE EXCESS, ART: 4 (ref 0–2)
NRBC AUTOMATED: 0.2 PER 100 WBC
O2 DEVICE/FLOW/%: ABNORMAL
PATIENT TEMP: 37.2
PDW BLD-RTO: 16.1 % (ref 11.8–14.4)
PLATELET # BLD: 201 K/UL (ref 138–453)
PMV BLD AUTO: 10.1 FL (ref 8.1–13.5)
POC HCO3: 20.9 MMOL/L (ref 21–28)
POC O2 SATURATION: 98 % (ref 94–98)
POC PCO2: 37.6 MM HG (ref 35–48)
POC PH: 7.35 (ref 7.35–7.45)
POC PO2: 114.7 MM HG (ref 83–108)
POTASSIUM SERPL-SCNC: 4.4 MMOL/L (ref 3.7–5.3)
RBC # BLD: 3.14 M/UL (ref 3.95–5.11)
SAMPLE SITE: ABNORMAL
SEG NEUTROPHILS: 83 % (ref 36–66)
SEGMENTED NEUTROPHILS ABSOLUTE COUNT: 14.69 K/UL (ref 1.8–7.7)
SODIUM BLD-SCNC: 134 MMOL/L (ref 135–144)
TOTAL CK: 31 U/L (ref 26–192)
TOTAL CK: 53 U/L (ref 26–192)
TOTAL PROTEIN: 5.1 G/DL (ref 6.4–8.3)
WBC # BLD: 17.7 K/UL (ref 3.5–11.3)

## 2022-09-12 PROCEDURE — 99213 OFFICE O/P EST LOW 20 MIN: CPT

## 2022-09-12 PROCEDURE — 6360000002 HC RX W HCPCS: Performed by: INTERNAL MEDICINE

## 2022-09-12 PROCEDURE — 82550 ASSAY OF CK (CPK): CPT

## 2022-09-12 PROCEDURE — C9113 INJ PANTOPRAZOLE SODIUM, VIA: HCPCS | Performed by: STUDENT IN AN ORGANIZED HEALTH CARE EDUCATION/TRAINING PROGRAM

## 2022-09-12 PROCEDURE — 2000000000 HC ICU R&B

## 2022-09-12 PROCEDURE — 6360000002 HC RX W HCPCS: Performed by: STUDENT IN AN ORGANIZED HEALTH CARE EDUCATION/TRAINING PROGRAM

## 2022-09-12 PROCEDURE — 94761 N-INVAS EAR/PLS OXIMETRY MLT: CPT

## 2022-09-12 PROCEDURE — 99291 CRITICAL CARE FIRST HOUR: CPT | Performed by: INTERNAL MEDICINE

## 2022-09-12 PROCEDURE — 71045 X-RAY EXAM CHEST 1 VIEW: CPT

## 2022-09-12 PROCEDURE — 83735 ASSAY OF MAGNESIUM: CPT

## 2022-09-12 PROCEDURE — 82947 ASSAY GLUCOSE BLOOD QUANT: CPT

## 2022-09-12 PROCEDURE — 2500000003 HC RX 250 WO HCPCS: Performed by: STUDENT IN AN ORGANIZED HEALTH CARE EDUCATION/TRAINING PROGRAM

## 2022-09-12 PROCEDURE — 94003 VENT MGMT INPAT SUBQ DAY: CPT

## 2022-09-12 PROCEDURE — 82803 BLOOD GASES ANY COMBINATION: CPT

## 2022-09-12 PROCEDURE — 2580000003 HC RX 258: Performed by: STUDENT IN AN ORGANIZED HEALTH CARE EDUCATION/TRAINING PROGRAM

## 2022-09-12 PROCEDURE — 80076 HEPATIC FUNCTION PANEL: CPT

## 2022-09-12 PROCEDURE — 6370000000 HC RX 637 (ALT 250 FOR IP): Performed by: EMERGENCY MEDICINE

## 2022-09-12 PROCEDURE — 80048 BASIC METABOLIC PNL TOTAL CA: CPT

## 2022-09-12 PROCEDURE — 37799 UNLISTED PX VASCULAR SURGERY: CPT

## 2022-09-12 PROCEDURE — 2700000000 HC OXYGEN THERAPY PER DAY

## 2022-09-12 PROCEDURE — 2580000003 HC RX 258: Performed by: INTERNAL MEDICINE

## 2022-09-12 PROCEDURE — 2580000003 HC RX 258: Performed by: EMERGENCY MEDICINE

## 2022-09-12 PROCEDURE — 99233 SBSQ HOSP IP/OBS HIGH 50: CPT | Performed by: INTERNAL MEDICINE

## 2022-09-12 PROCEDURE — 85025 COMPLETE CBC W/AUTO DIFF WBC: CPT

## 2022-09-12 PROCEDURE — 2500000003 HC RX 250 WO HCPCS

## 2022-09-12 RX ORDER — CALCIUM GLUCONATE 20 MG/ML
2000 INJECTION, SOLUTION INTRAVENOUS ONCE
Status: COMPLETED | OUTPATIENT
Start: 2022-09-12 | End: 2022-09-12

## 2022-09-12 RX ORDER — CIPROFLOXACIN 250 MG/1
250 TABLET, FILM COATED ORAL EVERY 8 HOURS
Status: ON HOLD | COMMUNITY
End: 2022-09-24 | Stop reason: HOSPADM

## 2022-09-12 RX ORDER — SODIUM CHLORIDE, SODIUM LACTATE, POTASSIUM CHLORIDE, AND CALCIUM CHLORIDE .6; .31; .03; .02 G/100ML; G/100ML; G/100ML; G/100ML
500 INJECTION, SOLUTION INTRAVENOUS ONCE
Status: COMPLETED | OUTPATIENT
Start: 2022-09-12 | End: 2022-09-12

## 2022-09-12 RX ORDER — OXYBUTYNIN CHLORIDE 15 MG/1
15 TABLET, EXTENDED RELEASE ORAL DAILY
COMMUNITY

## 2022-09-12 RX ADMIN — ANTI-FUNGAL POWDER MICONAZOLE NITRATE TALC FREE: 1.42 POWDER TOPICAL at 08:14

## 2022-09-12 RX ADMIN — BACITRACIN: 500 OINTMENT TOPICAL at 20:41

## 2022-09-12 RX ADMIN — SODIUM CHLORIDE, POTASSIUM CHLORIDE, SODIUM LACTATE AND CALCIUM CHLORIDE 500 ML: 600; 310; 30; 20 INJECTION, SOLUTION INTRAVENOUS at 12:12

## 2022-09-12 RX ADMIN — BACITRACIN: 500 OINTMENT TOPICAL at 08:14

## 2022-09-12 RX ADMIN — Medication 25 MCG/MIN: at 00:46

## 2022-09-12 RX ADMIN — DAPTOMYCIN 550 MG: 500 INJECTION, POWDER, LYOPHILIZED, FOR SOLUTION INTRAVENOUS at 17:54

## 2022-09-12 RX ADMIN — Medication 100 MCG/HR: at 07:46

## 2022-09-12 RX ADMIN — Medication 15 MCG/MIN: at 11:05

## 2022-09-12 RX ADMIN — LEVOTHYROXINE SODIUM 75 MCG: 75 TABLET ORAL at 06:28

## 2022-09-12 RX ADMIN — CEFEPIME 2000 MG: 2 INJECTION, POWDER, FOR SOLUTION INTRAVENOUS at 08:14

## 2022-09-12 RX ADMIN — SODIUM BICARBONATE: 84 INJECTION, SOLUTION INTRAVENOUS at 22:33

## 2022-09-12 RX ADMIN — HEPARIN SODIUM 5000 UNITS: 5000 INJECTION INTRAVENOUS; SUBCUTANEOUS at 13:52

## 2022-09-12 RX ADMIN — BACITRACIN: 500 OINTMENT TOPICAL at 13:52

## 2022-09-12 RX ADMIN — CALCIUM GLUCONATE 2000 MG: 20 INJECTION, SOLUTION INTRAVENOUS at 06:30

## 2022-09-12 RX ADMIN — SODIUM CHLORIDE, PRESERVATIVE FREE 40 MG: 5 INJECTION INTRAVENOUS at 09:59

## 2022-09-12 RX ADMIN — CEFEPIME 2000 MG: 2 INJECTION, POWDER, FOR SOLUTION INTRAVENOUS at 20:38

## 2022-09-12 RX ADMIN — HEPARIN SODIUM 5000 UNITS: 5000 INJECTION INTRAVENOUS; SUBCUTANEOUS at 06:28

## 2022-09-12 RX ADMIN — HEPARIN SODIUM 5000 UNITS: 5000 INJECTION INTRAVENOUS; SUBCUTANEOUS at 22:05

## 2022-09-12 RX ADMIN — Medication 175 MCG/HR: at 22:43

## 2022-09-12 RX ADMIN — SODIUM CHLORIDE: 9 INJECTION, SOLUTION INTRAVENOUS at 00:51

## 2022-09-12 RX ADMIN — SODIUM BICARBONATE: 84 INJECTION, SOLUTION INTRAVENOUS at 08:21

## 2022-09-12 RX ADMIN — SODIUM CHLORIDE, PRESERVATIVE FREE 10 ML: 5 INJECTION INTRAVENOUS at 08:15

## 2022-09-12 ASSESSMENT — PULMONARY FUNCTION TESTS
PIF_VALUE: 22
PIF_VALUE: 19
PIF_VALUE: 13
PIF_VALUE: 4
PIF_VALUE: 17
PIF_VALUE: 12
PIF_VALUE: 19
PIF_VALUE: 18
PIF_VALUE: 19
PIF_VALUE: 23
PIF_VALUE: 17

## 2022-09-12 NOTE — PLAN OF CARE
Problem: Nutrition Deficit:  Goal: Optimize nutritional status  9/12/2022 0620 by Danuta Martino RN  Outcome: Not Progressing     Problem: Skin/Tissue Integrity  Goal: Absence of new skin breakdown  Description: 1. Monitor for areas of redness and/or skin breakdown  2. Assess vascular access sites hourly  3. Every 4-6 hours minimum:  Change oxygen saturation probe site  4. Every 4-6 hours:  If on nasal continuous positive airway pressure, respiratory therapy assess nares and determine need for appliance change or resting period.   9/12/2022 0620 by Danuta Martino RN  Outcome: Not Progressing     Problem: Safety - Adult  Goal: Free from fall injury  Recent Flowsheet Documentation  Taken 9/12/2022 2344 by Danuta Martino RN  Free From Fall Injury: Instruct family/caregiver on patient safety  9/12/2022 0620 by Danuta Martino RN  Outcome: Not Progressing     Problem: ABCDS Injury Assessment  Goal: Absence of physical injury  Recent Flowsheet Documentation  Taken 9/12/2022 5542 by Danuta Martino RN  Absence of Physical Injury: Implement safety measures based on patient assessment  9/12/2022 0620 by Danuta Martino RN  Outcome: Not Progressing     Problem: Discharge Planning  Goal: Discharge to home or other facility with appropriate resources  9/12/2022 0908 by Viviana Villanueva RN  Outcome: Progressing  9/12/2022 0620 by Danuta Martino RN  Outcome: Not Progressing  Flowsheets (Taken 9/11/2022 2000)  Discharge to home or other facility with appropriate resources: Identify barriers to discharge with patient and caregiver     Problem: Respiratory - Adult  Goal: Achieves optimal ventilation and oxygenation  9/12/2022 0908 by Viviana Villanueva RN  Outcome: Progressing  9/12/2022 0620 by Danuta Martino RN  Outcome: Not Progressing  Flowsheets (Taken 9/11/2022 2000)  Achieves optimal ventilation and oxygenation: Assess for changes in respiratory status  9/11/2022 1932 by Escobar Curry RCP  Outcome: Progressing  Flowsheets (Taken 9/11/2022 1932)  Achieves optimal ventilation and oxygenation:   Assess for changes in respiratory status   Assess the need for suctioning and aspirate as needed   Respiratory therapy support as indicated   Assess for changes in mentation and behavior   Oxygen supplementation based on oxygen saturation or arterial blood gases     Problem: Safety - Medical Restraint  Goal: Remains free of injury from restraints (Restraint for Interference with Medical Device)  Description: INTERVENTIONS:  1. Determine that other, less restrictive measures have been tried or would not be effective before applying the restraint  2. Evaluate the patient's condition at the time of restraint application  3. Inform patient/family regarding the reason for restraint  4. Q2H: Monitor safety, psychosocial status, comfort, nutrition and hydration  9/12/2022 0908 by Melanie Crocker RN  Outcome: Progressing  9/12/2022 0620 by Alis Lu RN  Outcome: Not Progressing  Flowsheets (Taken 9/11/2022 2000)  Remains free of injury from restraints (restraint for interference with medical device): Every 2 hours: Monitor safety, psychosocial status, comfort, nutrition and hydration     Problem: Pain  Goal: Verbalizes/displays adequate comfort level or baseline comfort level  9/12/2022 0908 by Melanie Crocker RN  Outcome: Progressing  9/12/2022 0620 by Alis Lu RN  Outcome: Not Progressing     Problem: Confusion  Goal: Confusion, delirium, dementia, or psychosis is improved or at baseline  Description: INTERVENTIONS:  1. Assess for possible contributors to thought disturbance, including medications, impaired vision or hearing, underlying metabolic abnormalities, dehydration, psychiatric diagnoses, and notify attending LIP  2. [place] high risk fall precautions, as indicated  3. Provide frequent short contacts to provide reality reorientation, refocusing and direction  4.  Decrease environmental stimuli, including noise as appropriate  5. Monitor and intervene to maintain adequate nutrition, hydration, elimination, sleep and activity  6. If unable to ensure safety without constant attention obtain sitter and review sitter guidelines with assigned personnel  7. Initiate Psychosocial CNS and Spiritual Care consult, as indicated  9/12/2022 0908 by Jossy Bryant RN  Outcome: Progressing  9/12/2022 0620 by Froylan Mcpherson RN  Outcome: Not Progressing     Problem: Nutrition Deficit:  Goal: Optimize nutritional status  9/12/2022 0620 by Froylan Mcpherson RN  Outcome: Not Progressing     Problem: Skin/Tissue Integrity  Goal: Absence of new skin breakdown  Description: 1. Monitor for areas of redness and/or skin breakdown  2. Assess vascular access sites hourly  3. Every 4-6 hours minimum:  Change oxygen saturation probe site  4. Every 4-6 hours:  If on nasal continuous positive airway pressure, respiratory therapy assess nares and determine need for appliance change or resting period.   9/12/2022 0620 by Froylan Mcpherson RN  Outcome: Not Progressing     Problem: Safety - Adult  Goal: Free from fall injury  Recent Flowsheet Documentation  Taken 9/12/2022 6938 by Froylan Mcpherson RN  Free From Fall Injury: Instruct family/caregiver on patient safety  9/12/2022 0620 by Froylan Mcpherson RN  Outcome: Not Progressing     Problem: ABCDS Injury Assessment  Goal: Absence of physical injury  Recent Flowsheet Documentation  Taken 9/12/2022 8079 by Froylan Mcpherson RN  Absence of Physical Injury: Implement safety measures based on patient assessment  9/12/2022 0620 by Froylan Mcpherson RN  Outcome: Not Progressing

## 2022-09-12 NOTE — PLAN OF CARE
Problem: Discharge Planning  Goal: Discharge to home or other facility with appropriate resources  Outcome: Not Progressing  Flowsheets (Taken 9/11/2022 2000)  Discharge to home or other facility with appropriate resources: Identify barriers to discharge with patient and caregiver     Problem: Respiratory - Adult  Goal: Achieves optimal ventilation and oxygenation  9/12/2022 0620 by Stevie Duarte RN  Outcome: Not Progressing  Flowsheets (Taken 9/11/2022 2000)  Achieves optimal ventilation and oxygenation: Assess for changes in respiratory status  9/11/2022 1932 by Tianna Curry RCP  Outcome: Progressing  Flowsheets (Taken 9/11/2022 1932)  Achieves optimal ventilation and oxygenation:   Assess for changes in respiratory status   Assess the need for suctioning and aspirate as needed   Respiratory therapy support as indicated   Assess for changes in mentation and behavior   Oxygen supplementation based on oxygen saturation or arterial blood gases     Problem: Safety - Medical Restraint  Goal: Remains free of injury from restraints (Restraint for Interference with Medical Device)  Description: INTERVENTIONS:  1. Determine that other, less restrictive measures have been tried or would not be effective before applying the restraint  2. Evaluate the patient's condition at the time of restraint application  3. Inform patient/family regarding the reason for restraint  4.  Q2H: Monitor safety, psychosocial status, comfort, nutrition and hydration  Outcome: Not Progressing  Flowsheets (Taken 9/11/2022 2000)  Remains free of injury from restraints (restraint for interference with medical device): Every 2 hours: Monitor safety, psychosocial status, comfort, nutrition and hydration     Problem: Pain  Goal: Verbalizes/displays adequate comfort level or baseline comfort level  Outcome: Not Progressing     Problem: Confusion  Goal: Confusion, delirium, dementia, or psychosis is improved or at baseline  Description: INTERVENTIONS:  1. Assess for possible contributors to thought disturbance, including medications, impaired vision or hearing, underlying metabolic abnormalities, dehydration, psychiatric diagnoses, and notify attending LIP  2. [place] high risk fall precautions, as indicated  3. Provide frequent short contacts to provide reality reorientation, refocusing and direction  4. Decrease environmental stimuli, including noise as appropriate  5. Monitor and intervene to maintain adequate nutrition, hydration, elimination, sleep and activity  6. If unable to ensure safety without constant attention obtain sitter and review sitter guidelines with assigned personnel  7. Initiate Psychosocial CNS and Spiritual Care consult, as indicated  Outcome: Not Progressing     Problem: Nutrition Deficit:  Goal: Optimize nutritional status  Outcome: Not Progressing     Problem: Skin/Tissue Integrity  Goal: Absence of new skin breakdown  Description: 1. Monitor for areas of redness and/or skin breakdown  2. Assess vascular access sites hourly  3. Every 4-6 hours minimum:  Change oxygen saturation probe site  4. Every 4-6 hours:  If on nasal continuous positive airway pressure, respiratory therapy assess nares and determine need for appliance change or resting period.   Outcome: Not Progressing     Problem: Safety - Adult  Goal: Free from fall injury  Outcome: Not Progressing     Problem: ABCDS Injury Assessment  Goal: Absence of physical injury  Outcome: Not Progressing     Problem: Discharge Planning  Goal: Discharge to home or other facility with appropriate resources  Outcome: Not Progressing  Flowsheets (Taken 9/11/2022 2000)  Discharge to home or other facility with appropriate resources: Identify barriers to discharge with patient and caregiver     Problem: Respiratory - Adult  Goal: Achieves optimal ventilation and oxygenation  9/12/2022 0620 by Sheldon Hidalgo RN  Outcome: Not Progressing  Flowsheets (Taken 9/11/2022 maintain adequate nutrition, hydration, elimination, sleep and activity  6. If unable to ensure safety without constant attention obtain sitter and review sitter guidelines with assigned personnel  7. Initiate Psychosocial CNS and Spiritual Care consult, as indicated  Outcome: Not Progressing     Problem: Nutrition Deficit:  Goal: Optimize nutritional status  Outcome: Not Progressing     Problem: Skin/Tissue Integrity  Goal: Absence of new skin breakdown  Description: 1. Monitor for areas of redness and/or skin breakdown  2. Assess vascular access sites hourly  3. Every 4-6 hours minimum:  Change oxygen saturation probe site  4. Every 4-6 hours:  If on nasal continuous positive airway pressure, respiratory therapy assess nares and determine need for appliance change or resting period.   Outcome: Not Progressing     Problem: Safety - Adult  Goal: Free from fall injury  Outcome: Not Progressing     Problem: ABCDS Injury Assessment  Goal: Absence of physical injury  Outcome: Not Progressing

## 2022-09-12 NOTE — PROGRESS NOTES
Comprehensive Nutrition Assessment    Type and Reason for Visit:  Reassess, Consult (TF ordering/management)    Nutrition Recommendations/Plan:   Start Tube Feeding -Suggest Peptide Based Formula goal 50 mL/hr now that pt is off propofol. This will provide 1440 kcal and 90 g pro/day. Will monitor TF tolerance/adequacy and care plans. Modify TF as needed. Malnutrition Assessment:  Malnutrition Status:  Insufficient data (09/11/22 1158)    Context:  Acute Illness       Nutrition Assessment:    Pt remains on vent. No nutrition at present, but plan to start TF. RD consulted for TF ordering/management. Labs reviewed: Na 134 mmol/L, BUN 51 mg/dL, CR 1.90 mg/dL, Glu 171 mg/dL. Meds reviewed. Nutrition Related Findings:    labs/meds reviewed Wound Type: Multiple, Pressure Injury, Unstageable       Current Nutrition Intake & Therapies:    Diet NPO  Additional Calorie Sources:  none    Anthropometric Measures:  Height: 5' 7\" (170.2 cm)  Ideal Body Weight (IBW): 135 lbs (61 kg)    Admission Body Weight: 307 lb (139.3 kg)  Current Body Weight: 318 lb 6.4 oz (144.4 kg),   IBW. Weight Source: Bed Scale  Current BMI (kg/m2): 49.9                          BMI Categories: Obese Class 3 (BMI 40.0 or greater)    Estimated Daily Nutrient Needs:  Energy Requirements Based On: Kcal/kg  Weight Used for Energy Requirements: Ideal  Energy (kcal/day): 3464-4911 kcal/day (22-25 kcal/kg IBW)  Weight Used for Protein Requirements: Ideal  Protein (g/day):  g pro/day  Method Used for Fluid Requirements: Other (Comment)  Fluid (ml/day): per MD    Nutrition Diagnosis:   Inadequate oral intake related to impaired respiratory function as evidenced by NPO or clear liquid status due to medical condition, intubation    Nutrition Interventions:   Food and/or Nutrient Delivery: Start Tube Feeding -Suggest Peptide Based Formula goal 50 mL/hr now that pt is off propofol. This will provide 1440 kcal and 90 g pro/day.   Nutrition Education/Counseling: No recommendation at this time  Coordination of Nutrition Care: Continue to monitor while inpatient       Goals:  Previous Goal Met: Progressing toward Goal(s)  Goals: Meet at least 75% of estimated needs, within 7 days       Nutrition Monitoring and Evaluation:   Behavioral-Environmental Outcomes: None Identified  Food/Nutrient Intake Outcomes: Enteral Nutrition Intake/Tolerance  Physical Signs/Symptoms Outcomes: Biochemical Data, Nutrition Focused Physical Findings, Skin, Weight, Fluid Status or Edema    Discharge Planning:     Too soon to determine     [address], LD  Contact: [phone]

## 2022-09-12 NOTE — PROGRESS NOTES
1 Critical Care Team - Daily Progress Note      Date and time: 2022 11:40 AM  Patient's name:  Jimmie Mccoy Record Number: [id number]  Patient's account/billing number: [de-identified]  Patient's YOB: 1980  Age: 38 y.o. Date of Admission: 9/10/2022  4:45 PM  Length of stay during current admission: 2      Primary Care Physician: No primary care provider on file. ICU Attending Physician: Dr. Penny Mortensen    Code Status: Full Code    Reason for ICU admission: Septic shock, intubated requiring mechanical ventilation and pressor support      SUBJECTIVE:     OVERNIGHT EVENTS:     Patient continues to be intubated and sedated. Requiring sedation with fentanyl 100,weaned off propofol and versed. Requiring pressor support with Levophed of 15. Vent settings PRVC 14/430/5/30  ABG showing pH 7.35/37/114  Patient's urine output and kidney function improving     Fever:-  Temp (24hrs), Av.1 °F (37.3 °C), Min:98.6 °F (37 °C), Max:99.9 °F (29.9 °C)  Systolic (80PYB), MPH:387 , Min:106 , WYV:631   Diastolic (50DJM), GZO:34, Min:48, Max:85  Urine output in the last 24 hours:  In: 2180.7 [I.V.:1031.8]  Out: 1093 [MRBXF:0649]  Patient Vitals for the past 96 hrs (Last 3 readings):   Weight   22 0600 (!) 318 lb 6.4 oz (144.4 kg)   09/10/22 1742 (!) 307 lb (139.3 kg)         AWAKE & FOLLOWING COMMANDS:  [x] No   [] Yes    CURRENT VENTILATION STATUS:     [x] Ventilator  [] BIPAP  [] Nasal Cannula [] Room Air        SECRETIONS Amount:  [x] Small [] Moderate  [] Large  [] None  Color:     [] White [] Colored  [] Bloody    SEDATION:  RAAS Score:  [x] Propofol gtt  [x] Versed gtt  [] Ativan gtt   [] No Sedation    PARALYZED:  [x] No    [] Yes    DIARRHEA:                [x] No                [] Yes  (C. Difficile status: [] positive                                                                                                                       [] negative [] pending)    VASOPRESSORS:  [] No    [x] Yes    If yes -   [x] Levophed       [] Dopamine     [] Vasopressin       [] Dobutamine  [] Phenylephrine         [] Epinephrine    CENTRAL LINES:     [] No   [x] Yes   (Date of Insertion:   )           If yes -     [] Right IJ     [] Left IJ [] Right Femoral [] Left Femoral                   [] Right Subclavian [] Left Subclavian       LUQUE'S CATHETER:   [] No   [x] Yes  (Date of Insertion:   )     URINE OUTPUT:            [] Good   [x] Low              [] Anuric      OBJECTIVE:     VITAL SIGNS:  /71   Pulse 95   Temp 98.6 °F (37 °C) (Bladder)   Resp 17   Ht 5' 7\" (1.702 m)   Wt (!) 318 lb 6.4 oz (144.4 kg)   SpO2 99%   BMI 49.87 kg/m²   Tmax over 24 hours:  Temp (24hrs), Av.1 °F (37.3 °C), Min:98.6 °F (37 °C), Max:99.9 °F (37.7 °C)      Patient Vitals for the past 6 hrs:   BP Temp Temp src Pulse Resp SpO2 Weight   22 1116 -- -- -- 95 17 99 % --   22 1100 122/71 -- -- 96 14 99 % --   22 1045 111/72 -- -- 95 13 99 % --   22 1030 (!) 106/56 -- -- 97 12 100 % --   22 1015 (!) 109/53 -- -- 99 11 99 % --   22 1000 (!) 127/59 -- -- 95 14 99 % --   22 0945 130/60 -- -- 96 14 99 % --   22 0930 135/80 -- -- 98 15 100 % --   22 0915 (!) 133/58 -- -- 98 11 99 % --   22 0900 (!) 143/48 -- -- 100 13 99 % --   22 0845 138/63 -- -- (!) 112 14 100 % --   22 0830 136/60 -- -- (!) 102 11 99 % --   22 0815 137/67 -- -- (!) 104 11 99 % --   22 0800 133/61 98.6 °F (37 °C) Bladder 99 11 98 % --   22 0745 136/85 -- -- (!) 116 17 100 % --   22 0735 -- -- -- (!) 103 16 100 % --   22 0730 (!) 124/58 -- -- 98 11 99 % --   22 0715 135/78 -- -- (!) 107 18 100 % --   22 0700 (!) 123/55 -- -- 96 11 99 % --   22 0600 -- -- -- (!) 103 11 99 % (!) 318 lb 6.4 oz (144.4 kg)           Intake/Output Summary (Last 24 hours) at 9/12/2022 1140  Last data filed at 9/12/2022 1100  Gross per 24 hour   Intake 2077.44 ml   Output 973 ml   Net 1104.44 ml       Wt Readings from Last 2 Encounters:   09/12/22 (!) 318 lb 6.4 oz (144.4 kg)     Body mass index is 49.87 kg/m². PHYSICAL EXAMINATION :  Constitutional: Intubated, sedated  EENT: PERRLA, sclera clear, anicteric, moist mucous membranes  Neck: Supple, symmetrical, trachea midline  Respiratory: Diffuse rhonchi on ascultation  Cardiovascular: tachycardia with regular rhythm, normal S1, S2, no murmur noted, and 2+ distal pulses in all 4 extremities   Abdomen: soft, distended,  no masses or organomegaly  Neurological: Intubated and sedated.  PERRL   Skin: Extensive wounds on the back, buttocks, bilateral breast  Extremities:  peripheral pulses normal, 1+ pedal edema, no clubbing or cyanosis    MEDICATIONS:    Scheduled Meds:   lactated ringers bolus  500 mL IntraVENous Once    bacitracin   Topical TID    daptomycin (CUBICIN) IVPB  6 mg/kg (Adjusted) IntraVENous Q24H    sodium chloride flush  5-40 mL IntraVENous 2 times per day    pantoprazole (PROTONIX) 40 mg injection  40 mg IntraVENous Daily    cefepime  2,000 mg IntraVENous Q12H    miconazole   Topical BID    heparin (porcine)  5,000 Units SubCUTAneous 3 times per day    levothyroxine  75 mcg Oral Daily     Continuous Infusions:   IV infusion builder 125 mL/hr at 09/12/22 0821    norepinephrine 15 mcg/min (09/12/22 1105)    midazolam Stopped (09/11/22 1427)    fentaNYL 50 mcg/mL 100 mcg/hr (09/12/22 1049)    sodium chloride 10 mL/hr at 09/12/22 1049    propofol Stopped (09/11/22 1306)     PRN Meds:   sodium chloride flush, 5-40 mL, PRN  sodium chloride, , PRN  ondansetron, 4 mg, Q8H PRN   Or  ondansetron, 4 mg, Q6H PRN  polyethylene glycol, 17 g, Daily PRN  acetaminophen, 650 mg, Q6H PRN   Or  acetaminophen, 650 mg, Q6H PRN  potassium chloride, 20 mEq, PRN   Or  potassium chloride, 10 mEq, PRN        VENT SETTINGS (Comprehensive) (if applicable):  Vent Information  Equipment Changed: HME, Expiratory Filter  Additional Respiratory Assessments  Heart Rate: 95  Resp: 17  SpO2: 99 %  End Tidal CO2: 33 (%)  Position: Semi-Gama's  Humidification Source: HME  Cuff Pressure (cm H2O):  (mlt)  Skin Barrier Applied: No    ABGs:     Laboratory findings:    Complete Blood Count:   Recent Labs     09/10/22  2038 09/11/22  0554 09/12/22  0451   WBC 20.4* 21.7* 17.7*   HGB 10.1* 9.8* 9.0*   HCT 32.2* 31.2* 27.6*    224 201          Last 3 Blood Glucose:   Recent Labs     09/10/22  2038 09/11/22  0554 09/12/22  0451   GLUCOSE 155* 196* 171*          PT/INR:    Lab Results   Component Value Date/Time    PROTIME 11.1 09/10/2022 08:38 PM    INR 1.0 09/10/2022 08:38 PM     PTT:    Lab Results   Component Value Date/Time    APTT 25.2 09/10/2022 08:38 PM       Comprehensive Metabolic Profile:   Recent Labs     09/10/22  2038 09/11/22  0554 09/12/22  0451   * 128* 134*   K 5.3 5.3 4.4    100 101   CO2 14* 13* 19*   BUN 57* 53* 51*   CREATININE 2.18* 2.22* 1.90*   GLUCOSE 155* 196* 171*   CALCIUM 7.7* 8.0* 7.4*   PROT 5.5* 5.4* 5.1*   LABALBU 1.9* 2.0* 1.7*   BILITOT 0.8 0.6 0.5   ALKPHOS 126* 120* 129*   AST 18 19 18   ALT 12 12 11        Magnesium:   Lab Results   Component Value Date/Time    MG 2.1 09/12/2022 04:51 AM     Phosphorus: No results found for: PHOS  Ionized Calcium:   Lab Results   Component Value Date/Time    CAION 1.10 09/10/2022 08:38 PM        Urinalysis:     Troponin: No results for input(s): TROPONINI in the last 72 hours.     Microbiology:    Cultures during this admission:     Blood cultures:                 [] None drawn      [] Negative             []  Positive (Details:  )  Urine Culture:                   [] None drawn      [] Negative             []  Positive (Details:  )  Sputum Culture:               [] None drawn       [] Negative             []  Positive (Details:  )   Endotracheal aspirate:     [] None drawn       [] Negative             []  Positive (Details:  )     Other pertinent Labs:       Radiology/Imaging:     Chest Xray (2022):    ASSESSMENT:     Principal Problem:    Systemic inflammatory response syndrome (SIRS) of infectious origin with acute organ failure (HCC)  Active Problems:    Altered mental status    Acute kidney injury ([address]    Bandemia    Cellulitis of sacral region    Breast, fat necrosis    Gram-neg septicemia (HCC)    Gram positive septicemia (HCC)    Acute encephalopathy    Elevated procalcitonin  Resolved Problems:    * No resolved hospital problems. *            PLAN:     WEAN PER PROTOCOL:  [] No   [x] Yes  [] N/A    DISCONTINUE ANY LABS:   [x] No   [] Yes    ICU PROPHYLAXIS:  Stress ulcer:  [x] PPI Agent  [] B6Sdohs [] Sucralfate  [] Other:  VTE:   [] Enoxaparin  [] Unfract.  Heparin Subcut  [] EPC Cuffs    NUTRITION:  [] NPO [] Tube Feeding (Specify: ) [] TPN  [] PO (Diet: Diet NPO)    HOME MEDICATIONS RECONCILED: [x] No  [] Yes    INSULIN DRIP:   [x] No   [] Yes    CONSULTATION NEEDED:  [] No   [x] Yes    FAMILY UPDATED:    [x] No   [] Yes    TRANSFER OUT OF ICU:   [x] No   [] Yes    ADDITIONAL PLAN:    Neurologic:   Neuro -intubated and sedated  Neuro checks per protocol  Sedation: Propofol, Versed  Pain management: Fentanyl  Cardiovascular:  Hemodynamically stable on pressor support with levo  HR -94  MAPs - 98  MAP goal 65    Pulmonary:  Maintain oxygen sats >92%  Pulmonary toilet    GI/Nutrition  GlycoLax as needed  Ulcer Prophylaxis: Protonix  Diet:Diet NPO    Renal/Fluid/Electrolyte  IV Fluids: Bicarb drip 150 mEq at 75 cc/h  I/O: In: 2180.7 [I.V.:1031.8]  Out: 1093 [Urine:1093]  Monitor electrolytes, replace PRN   ID  WBC:   Lab Results   Component Value Date    WBC 17.7 (H) 2022     Tmax: Temp (24hrs), Av.1 °F (37.3 °C), Min:98.6 °F (37 °C), Max:99.9 °F (37.7 °C)    Antimicrobials: Continued daptomycin and cefepime  Hematology:  Recent Labs 09/10/22  2038 09/11/22  0554 09/12/22  0451   HGB 10.1* 9.8* 9.0*      stable  Endocrine:   glucose controlled - most recent BGL is   Recent Labs     09/10/22  2038 09/11/22  0554 09/12/22  0451   GLUCOSE 155* 196* 171*       DVT Prophylaxis  Heparin            Kenny Mathew MD, M.D. Critical care resident,  Department of Internal Medicine/ Critical care  [facility])             9/12/2022, 11:40 AM      Attending Physician Statement  I have discussed the care of Aj Peña, including pertinent history and exam findings,  with the resident. I have seen and examined the patient and the key elements of all parts of the encounter have been performed by me. I agree with the assessment, plan and orders as documented by the resident with additions . Total critical care time caring for this patient with life threatening, unstable organ failure, including direct patient contact, management of life support systems, review of data including imaging and labs, discussions with other team members and physicians at least 28   Min so far today, excluding procedures. Electronically signed by Colt West MD on   9/12/22 at 9:30 PM EDT    Please note that this chart was generated using voice recognition Dragon dictation software. Although every effort was made to ensure the accuracy of this automated transcription, some errors in transcription may have occurred.

## 2022-09-12 NOTE — CARE COORDINATION
Patient currently intubated/sedated with no family at bedside. CM attempted to call patient's , Abby Wilkerson ([phone]), and was unable to leave message d/t mailbox being full.

## 2022-09-12 NOTE — PROGRESS NOTES
General Surgery:  Daily Progress Note                PATIENT NAME: Blanco Metcalf     TODAY'S DATE: 9/12/2022, 8:12 AM  CC:  Sepsis secondary to UTI     SUBJECTIVE:     Pt seen and examined at bedside. She is intubated vent setting of FiO2 30%Currently on 18 of Levo for pressure support, downtrending from 19 previously and Fentanyl 100 for sedation   Breast ultrasound from 9/11/ 22 showed no evidence of abscess   CT abdomen on 9/10/22 showed no evidence of acute bowel obstruction at the time . On bacitracin ointment 3 times a day for wound care . On inspection , bilateral breasts on the lateral side appears necrotic with fibrinous eschar. Possible debridement for breasts      OBJECTIVE:   VITALS:  /85   Pulse (!) 103   Temp 99.9 °F (37.7 °C) (Bladder)   Resp 16   Ht 5' 7\" (1.702 m)   Wt (!) 318 lb 6.4 oz (144.4 kg)   SpO2 100%   BMI 49.87 kg/m²      INTAKE/OUTPUT:      Intake/Output Summary (Last 24 hours) at 9/12/2022 3534  Last data filed at 9/12/2022 6081  Gross per 24 hour   Intake 2088.68 ml   Output 923 ml   Net 1165.68 ml       PHYSICAL EXAM:  General Appearance: obese, on the vent with sedation   HEENT:  Normocephalic,   Heart: Heart regular rate and rhythm  Lungs: Normal expansion. Clear to auscultation. No rales, rhonchi, or wheezing. Skin: Multiple diffuse, superficial wounds most notably in the crevices under her breasts and groin, also over her left flank.   Bilateral breast wounds have fibrinous eschar over it surface    Data:  CBC:   Lab Results   Component Value Date/Time    WBC 17.7 09/12/2022 04:51 AM    RBC 3.14 09/12/2022 04:51 AM    HGB 9.0 09/12/2022 04:51 AM    HCT 27.6 09/12/2022 04:51 AM    MCV 87.9 09/12/2022 04:51 AM    MCH 28.7 09/12/2022 04:51 AM    MCHC 32.6 09/12/2022 04:51 AM    RDW 16.1 09/12/2022 04:51 AM     09/12/2022 04:51 AM    MPV 10.1 09/12/2022 04:51 AM     WBC:    Lab Results   Component Value Date/Time    WBC 17.7 09/12/2022 04:51 AM       Radiology Review:  CT ABDOMEN PELVIS WO CONTRAST Additional Contrast? None    Result Date: 9/11/2022  EXAMINATION: CT OF THE ABDOMEN AND PELVIS WITHOUT CONTRAST 9/10/2022 11:55 pm TECHNIQUE: CT of the abdomen and pelvis was performed without the administration of intravenous contrast. Multiplanar reformatted images are provided for review. Automated exposure control, iterative reconstruction, and/or weight based adjustment of the mA/kV was utilized to reduce the radiation dose to as low as reasonably achievable. COMPARISON: None. HISTORY: ORDERING SYSTEM PROVIDED HISTORY: concern for malrotation on [facility] CT scan; unable to upload disc or view previous study TECHNOLOGIST PROVIDED HISTORY: concern for malrotation on [facility] CT scan; unable to upload disc or view previous study Is the patient pregnant?->No Reason for Exam: concern for malrotation on [facility] CT scan; unable to upload disc or view previous study FINDINGS: Lower Chest: Minimal basilar atelectasis is identified. No cardiomegaly. Calcified lymph nodes are seen in the right perihilar region. Calcified granulomatous changes seen within the right lower lobe. No spiculated lung mass is identified. Organs: Diffuse fatty liver infiltration. No splenic mass is identified. No adrenal mass. No pancreatic mass. No peripancreatic inflammatory process. The gallbladder appears unremarkable. No renal calculi, ureteral calculi, or hydroureteronephrosis is identified. GI/Bowel: Visualized loops of bowel appear normal in caliber. No ileus or obstruction is identified. Normal appendix. No focal inflammatory bowel wall thickening is identified. The colon appears in appropriate position. The cecum is in the right lower quadrant as is the appendix. The ligament of Treitz is noted to be to the left of the spine. No small bowel obstruction. No mesenteric edema or significant mesenteric stranding is identified to suggest an acute malrotation. No chronic appearing bowel rotation is identified. Pelvis: No pelvic mass is identified. The pelvis is somewhat obscured by artifact from the patient's orthopedic hardware. A Yanez catheter is noted within the bladder. Uterus and adnexal structures appear grossly unremarkable. No free fluid in the pelvis. There is a left common iliac vein stent. Peritoneum/Retroperitoneum: No abdominal aortic aneurysm. No retroperitoneal or mesenteric lymphadenopathy is identified. Bones/Soft Tissues: Degenerative changes seen within the spine. No osseous destructive process is identified. 1. No CT findings to suggest an acute bowel malrotation. No cecal or sigmoid volvulus. No small bowel obstruction or colonic obstruction. No swelling of the central mesentery or mesenteric edema or gas. 2. Diffuse fatty liver infiltration. 3. No acute inflammatory process seen in the abdomen or pelvis. 4. Subsegmental atelectatic changes as well as chronic sequela of remote granulomatous process seen within the lungs. XR CHEST PORTABLE    1. Stable support lines and tubes. 2.  Mild increased interstitial prominence at the lung bases, possibly related to edema, atelectasis, or developing pneumonia. XR CHEST PORTABLE    Result Date: 9/11/2022  EXAMINATION: ONE XRAY VIEW OF THE CHEST 9/11/2022 5:55 am COMPARISON: Chest radiograph performed 10/02/2007. HISTORY: ORDERING SYSTEM PROVIDED HISTORY: intubated patient TECHNOLOGIST PROVIDED HISTORY: intubated patient FINDINGS: There is no acute consolidation or effusion. There is no pneumothorax. The mediastinal structures are unremarkable. The upper abdomen is unremarkable. The extrathoracic soft tissues are unremarkable. There is an endotracheal tube with tip in the midtrachea. There is a gastric tube and the tip is not visualized. There is a right internal jugular line with the tip in the mid SVC. No acute cardiopulmonary process. Support tubes as described above.      [address] [facility] BREAST LIMITED LEFT    Result Date: 9/11/2022  EXAMINATION: TARGETED ULTRASOUND OF THE RIGHT BREAST; TARGETED ULTRASOUND OF THE LEFT BREAST 9/11/2022 COMPARISON: Same day CT abdomen/pelvis HISTORY: ORDERING SYSTEM PROVIDED HISTORY: assess for abscess TECHNOLOGIST PROVIDED HISTORY: Assess for abscess FINDINGS: Targeted ultrasound of the bilateral breasts was performed. Right: Targeted ultrasound of the right breast in the area of concern near the 3 o'clock position was performed. There is no evidence of abscess. No solid or cystic mass is identified. No evidence of abnormal vascularity. Left: Targeted ultrasound of the left breast in the area of concern near the 7 o'clock position was performed. There is no evidence of abscess. No solid or cystic mass is identified. Areas of apparent prominent vascularity appear to correspond to vessels. No evidence of abscess in either breast. BIRADS: BIRADS - CATEGORY 1 Negative. Normal interval follow-up is recommended in 12 months. OVERALL ASSESSMENT - NEGATIVE A letter of notification will be sent to the patient regarding the results. The [employer] recommends annual mammograms for women 40 years and older. US BREAST LIMITED RIGHT    Result Date: 9/11/2022  EXAMINATION: TARGETED ULTRASOUND OF THE RIGHT BREAST; TARGETED ULTRASOUND OF THE LEFT BREAST 9/11/2022 COMPARISON: Same day CT abdomen/pelvis HISTORY: ORDERING SYSTEM PROVIDED HISTORY: assess for abscess TECHNOLOGIST PROVIDED HISTORY: Assess for abscess FINDINGS: Targeted ultrasound of the bilateral breasts was performed. Right: Targeted ultrasound of the right breast in the area of concern near the 3 o'clock position was performed. There is no evidence of abscess. No solid or cystic mass is identified. No evidence of abnormal vascularity. Left: Targeted ultrasound of the left breast in the area of concern near the 7 o'clock position was performed. There is no evidence of abscess.   No solid or cystic mass is identified. Areas of apparent prominent vascularity appear to correspond to vessels. No evidence of abscess in either breast. BIRADS: BIRADS - CATEGORY 1 Negative. Normal interval follow-up is recommended in 12 months. OVERALL ASSESSMENT - NEGATIVE A letter of notification will be sent to the patient regarding the results. The [employer] recommends annual mammograms for women 40 years and older. ASSESSMENT:  Active Hospital Problems    Diagnosis Date Noted    Systemic inflammatory response syndrome (SIRS) of infectious origin with acute organ failure ([address] [A41.9, R65.20] 09/11/2022     Priority: Medium    Acute kidney injury ([address] [N17.9] 09/11/2022     Priority: Medium    Bandemia [D72.825] 09/11/2022     Priority: Medium    Cellulitis of sacral region [L03.319] 09/11/2022     Priority: Medium    Breast, fat necrosis [N64.1] 09/11/2022     Priority: Medium    Gram-neg septicemia [address] [address] [A41.50] 09/11/2022     Priority: Medium    Gram positive septicemia ([address] [A41.89] 09/11/2022     Priority: Medium    Acute encephalopathy [G93.40] 09/11/2022     Priority: Medium    Elevated procalcitonin [R79.89] 09/11/2022     Priority: Medium    Altered mental status [R41.82] 09/10/2022     Priority: Medium       38 y.o. female with sepsis and chronic wound     Plan:    No evidence of abscess formation or necrotizing infection. Will continue to monitor skin wounds and surgically debride if indicated. Continue to treat cellulitis and continue local wound care  Wound care consult  ID onboard  No evidence of malrotation on CT abdomen/pelvis. Continue medical management per primary team      Ila Colon MD  General Surgery PGY5  Available via [facility]    Attending Physician Statement  I have discussed the case with Dr Aida Watson, including pertinent history and exam findings with the resident.  I have seen and examined the patient and the key elements of the encounter have been performed by me. I agree with the assessment, plan and orders as documented by the resident.       Electronically signed by Shar Moreau IV, DO  on 9/12/2022 at 3:36 PM

## 2022-09-13 ENCOUNTER — APPOINTMENT (OUTPATIENT)
Dept: GENERAL RADIOLOGY | Age: 42
DRG: 870 | End: 2022-09-13
Attending: INTERNAL MEDICINE
Payer: COMMERCIAL

## 2022-09-13 LAB
ABSOLUTE EOS #: 0.66 K/UL (ref 0–0.4)
ABSOLUTE IMMATURE GRANULOCYTE: 0.13 K/UL (ref 0–0.3)
ABSOLUTE LYMPH #: 2.23 K/UL (ref 1–4.8)
ABSOLUTE MONO #: 1.18 K/UL (ref 0.1–0.8)
ALBUMIN SERPL-MCNC: 1.5 G/DL (ref 3.5–5.2)
ALBUMIN/GLOBULIN RATIO: 0.4 (ref 1–2.5)
ALLEN TEST: ABNORMAL
ALP BLD-CCNC: 120 U/L (ref 35–104)
ALT SERPL-CCNC: 11 U/L (ref 5–33)
ANION GAP SERPL CALCULATED.3IONS-SCNC: 14 MMOL/L (ref 9–17)
AST SERPL-CCNC: 21 U/L
BASOPHILS # BLD: 0 % (ref 0–2)
BASOPHILS ABSOLUTE: 0 K/UL (ref 0–0.2)
BILIRUB SERPL-MCNC: 0.5 MG/DL (ref 0.3–1.2)
BILIRUBIN DIRECT: 0.3 MG/DL
BILIRUBIN, INDIRECT: 0.2 MG/DL (ref 0–1)
BUN BLDV-MCNC: 42 MG/DL (ref 6–20)
C-REACTIVE PROTEIN: 203.6 MG/L (ref 0–5)
CALCIUM SERPL-MCNC: 7.7 MG/DL (ref 8.6–10.4)
CHLORIDE BLD-SCNC: 101 MMOL/L (ref 98–107)
CO2: 21 MMOL/L (ref 20–31)
CREAT SERPL-MCNC: 1.41 MG/DL (ref 0.5–0.9)
EOSINOPHILS RELATIVE PERCENT: 5 % (ref 1–4)
FIO2: 30
GFR AFRICAN AMERICAN: 49 ML/MIN
GFR NON-AFRICAN AMERICAN: 41 ML/MIN
GFR SERPL CREATININE-BSD FRML MDRD: ABNORMAL ML/MIN/{1.73_M2}
GLUCOSE BLD-MCNC: 110 MG/DL (ref 70–99)
GLUCOSE BLD-MCNC: 88 MG/DL (ref 65–105)
GLUCOSE BLD-MCNC: 91 MG/DL (ref 74–100)
HCT VFR BLD CALC: 29.7 % (ref 36.3–47.1)
HEMOGLOBIN: 9.1 G/DL (ref 11.9–15.1)
IMMATURE GRANULOCYTES: 1 %
LYMPHOCYTES # BLD: 17 % (ref 24–44)
MAGNESIUM: 2.1 MG/DL (ref 1.6–2.6)
MCH RBC QN AUTO: 27.4 PG (ref 25.2–33.5)
MCHC RBC AUTO-ENTMCNC: 30.6 G/DL (ref 28.4–34.8)
MCV RBC AUTO: 89.5 FL (ref 82.6–102.9)
MODE: ABNORMAL
MONOCYTES # BLD: 9 % (ref 1–7)
MORPHOLOGY: ABNORMAL
NRBC AUTOMATED: 0.2 PER 100 WBC
O2 DEVICE/FLOW/%: ABNORMAL
PDW BLD-RTO: 16.5 % (ref 11.8–14.4)
PLATELET # BLD: 147 K/UL (ref 138–453)
PMV BLD AUTO: 10.1 FL (ref 8.1–13.5)
POC HCO3: 25.9 MMOL/L (ref 21–28)
POC LACTIC ACID: 3.56 MMOL/L (ref 0.56–1.39)
POC O2 SATURATION: 99 % (ref 94–98)
POC PCO2: 33.2 MM HG (ref 35–48)
POC PH: 7.5 (ref 7.35–7.45)
POC PO2: 134.3 MM HG (ref 83–108)
POSITIVE BASE EXCESS, ART: 3 (ref 0–3)
POTASSIUM SERPL-SCNC: 4.2 MMOL/L (ref 3.7–5.3)
RBC # BLD: 3.32 M/UL (ref 3.95–5.11)
RBC # BLD: ABNORMAL 10*6/UL
SAMPLE SITE: ABNORMAL
SEG NEUTROPHILS: 68 % (ref 36–66)
SEGMENTED NEUTROPHILS ABSOLUTE COUNT: 8.9 K/UL (ref 1.8–7.7)
SODIUM BLD-SCNC: 136 MMOL/L (ref 135–144)
TOTAL PROTEIN: 5 G/DL (ref 6.4–8.3)
WBC # BLD: 13.1 K/UL (ref 3.5–11.3)

## 2022-09-13 PROCEDURE — 2580000003 HC RX 258: Performed by: STUDENT IN AN ORGANIZED HEALTH CARE EDUCATION/TRAINING PROGRAM

## 2022-09-13 PROCEDURE — 2000000000 HC ICU R&B

## 2022-09-13 PROCEDURE — 94761 N-INVAS EAR/PLS OXIMETRY MLT: CPT

## 2022-09-13 PROCEDURE — 82803 BLOOD GASES ANY COMBINATION: CPT

## 2022-09-13 PROCEDURE — C9113 INJ PANTOPRAZOLE SODIUM, VIA: HCPCS | Performed by: STUDENT IN AN ORGANIZED HEALTH CARE EDUCATION/TRAINING PROGRAM

## 2022-09-13 PROCEDURE — 80048 BASIC METABOLIC PNL TOTAL CA: CPT

## 2022-09-13 PROCEDURE — 6370000000 HC RX 637 (ALT 250 FOR IP): Performed by: EMERGENCY MEDICINE

## 2022-09-13 PROCEDURE — 82947 ASSAY GLUCOSE BLOOD QUANT: CPT

## 2022-09-13 PROCEDURE — 80076 HEPATIC FUNCTION PANEL: CPT

## 2022-09-13 PROCEDURE — 99212 OFFICE O/P EST SF 10 MIN: CPT

## 2022-09-13 PROCEDURE — 85025 COMPLETE CBC W/AUTO DIFF WBC: CPT

## 2022-09-13 PROCEDURE — 99291 CRITICAL CARE FIRST HOUR: CPT | Performed by: INTERNAL MEDICINE

## 2022-09-13 PROCEDURE — 94003 VENT MGMT INPAT SUBQ DAY: CPT

## 2022-09-13 PROCEDURE — 2500000003 HC RX 250 WO HCPCS

## 2022-09-13 PROCEDURE — 71045 X-RAY EXAM CHEST 1 VIEW: CPT

## 2022-09-13 PROCEDURE — 36600 WITHDRAWAL OF ARTERIAL BLOOD: CPT

## 2022-09-13 PROCEDURE — 2700000000 HC OXYGEN THERAPY PER DAY

## 2022-09-13 PROCEDURE — 2500000003 HC RX 250 WO HCPCS: Performed by: STUDENT IN AN ORGANIZED HEALTH CARE EDUCATION/TRAINING PROGRAM

## 2022-09-13 PROCEDURE — 6360000002 HC RX W HCPCS: Performed by: STUDENT IN AN ORGANIZED HEALTH CARE EDUCATION/TRAINING PROGRAM

## 2022-09-13 PROCEDURE — 83735 ASSAY OF MAGNESIUM: CPT

## 2022-09-13 PROCEDURE — 2580000003 HC RX 258: Performed by: INTERNAL MEDICINE

## 2022-09-13 PROCEDURE — 6360000002 HC RX W HCPCS: Performed by: INTERNAL MEDICINE

## 2022-09-13 PROCEDURE — 36415 COLL VENOUS BLD VENIPUNCTURE: CPT

## 2022-09-13 PROCEDURE — 6360000002 HC RX W HCPCS: Performed by: EMERGENCY MEDICINE

## 2022-09-13 PROCEDURE — 99233 SBSQ HOSP IP/OBS HIGH 50: CPT | Performed by: INTERNAL MEDICINE

## 2022-09-13 PROCEDURE — 86140 C-REACTIVE PROTEIN: CPT

## 2022-09-13 PROCEDURE — 83605 ASSAY OF LACTIC ACID: CPT

## 2022-09-13 RX ORDER — SODIUM CHLORIDE, SODIUM LACTATE, POTASSIUM CHLORIDE, CALCIUM CHLORIDE 600; 310; 30; 20 MG/100ML; MG/100ML; MG/100ML; MG/100ML
INJECTION, SOLUTION INTRAVENOUS CONTINUOUS
Status: DISCONTINUED | OUTPATIENT
Start: 2022-09-13 | End: 2022-09-16

## 2022-09-13 RX ORDER — SODIUM CHLORIDE, SODIUM LACTATE, POTASSIUM CHLORIDE, CALCIUM CHLORIDE 600; 310; 30; 20 MG/100ML; MG/100ML; MG/100ML; MG/100ML
INJECTION, SOLUTION INTRAVENOUS CONTINUOUS
Status: CANCELLED | OUTPATIENT
Start: 2022-09-13

## 2022-09-13 RX ORDER — DEXMEDETOMIDINE HYDROCHLORIDE 4 UG/ML
.1-1.5 INJECTION, SOLUTION INTRAVENOUS PRN
Status: DISCONTINUED | OUTPATIENT
Start: 2022-09-13 | End: 2022-09-19

## 2022-09-13 RX ORDER — FENTANYL CITRATE 50 UG/ML
50 INJECTION, SOLUTION INTRAMUSCULAR; INTRAVENOUS
Status: DISCONTINUED | OUTPATIENT
Start: 2022-09-13 | End: 2022-09-27

## 2022-09-13 RX ORDER — FENTANYL CITRATE 50 UG/ML
50 INJECTION, SOLUTION INTRAMUSCULAR; INTRAVENOUS EVERY 6 HOURS PRN
Status: DISCONTINUED | OUTPATIENT
Start: 2022-09-13 | End: 2022-09-13

## 2022-09-13 RX ADMIN — HEPARIN SODIUM 5000 UNITS: 5000 INJECTION INTRAVENOUS; SUBCUTANEOUS at 05:30

## 2022-09-13 RX ADMIN — ANTI-FUNGAL POWDER MICONAZOLE NITRATE TALC FREE: 1.42 POWDER TOPICAL at 08:14

## 2022-09-13 RX ADMIN — SODIUM CHLORIDE: 9 INJECTION, SOLUTION INTRAVENOUS at 09:41

## 2022-09-13 RX ADMIN — HEPARIN SODIUM 5000 UNITS: 5000 INJECTION INTRAVENOUS; SUBCUTANEOUS at 13:26

## 2022-09-13 RX ADMIN — HEPARIN SODIUM 5000 UNITS: 5000 INJECTION INTRAVENOUS; SUBCUTANEOUS at 22:00

## 2022-09-13 RX ADMIN — SODIUM CHLORIDE, PRESERVATIVE FREE 40 MG: 5 INJECTION INTRAVENOUS at 08:13

## 2022-09-13 RX ADMIN — CEFEPIME 2000 MG: 2 INJECTION, POWDER, FOR SOLUTION INTRAVENOUS at 20:31

## 2022-09-13 RX ADMIN — ANTI-FUNGAL POWDER MICONAZOLE NITRATE TALC FREE: 1.42 POWDER TOPICAL at 20:31

## 2022-09-13 RX ADMIN — SODIUM CHLORIDE, PRESERVATIVE FREE 10 ML: 5 INJECTION INTRAVENOUS at 20:31

## 2022-09-13 RX ADMIN — Medication 100 MCG/HR: at 13:21

## 2022-09-13 RX ADMIN — SODIUM CHLORIDE, POTASSIUM CHLORIDE, SODIUM LACTATE AND CALCIUM CHLORIDE: 600; 310; 30; 20 INJECTION, SOLUTION INTRAVENOUS at 13:59

## 2022-09-13 RX ADMIN — LEVOTHYROXINE SODIUM 75 MCG: 75 TABLET ORAL at 06:52

## 2022-09-13 RX ADMIN — CEFEPIME 2000 MG: 2 INJECTION, POWDER, FOR SOLUTION INTRAVENOUS at 06:53

## 2022-09-13 RX ADMIN — SODIUM BICARBONATE: 84 INJECTION, SOLUTION INTRAVENOUS at 13:23

## 2022-09-13 RX ADMIN — BACITRACIN: 500 OINTMENT TOPICAL at 08:13

## 2022-09-13 RX ADMIN — DAPTOMYCIN 550 MG: 500 INJECTION, POWDER, LYOPHILIZED, FOR SOLUTION INTRAVENOUS at 18:07

## 2022-09-13 RX ADMIN — SODIUM CHLORIDE, PRESERVATIVE FREE 10 ML: 5 INJECTION INTRAVENOUS at 08:14

## 2022-09-13 RX ADMIN — FENTANYL CITRATE 50 MCG: 50 INJECTION, SOLUTION INTRAMUSCULAR; INTRAVENOUS at 16:02

## 2022-09-13 ASSESSMENT — PULMONARY FUNCTION TESTS
PIF_VALUE: 14
PIF_VALUE: 12
PIF_VALUE: 18
PIF_VALUE: 14
PIF_VALUE: 12
PIF_VALUE: 6
PIF_VALUE: 24

## 2022-09-13 NOTE — PROGRESS NOTES
[facility]  Occupational Therapy Not Seen Note    DATE: 2022    NAME: Gladis Suarez  MRN: [id number]   : 1980      Patient not seen this date for Occupational Therapy due to:    Patient is not appropriate for active participation in OT evaluation/treatment at this time d/t intubated and sedated    Next Scheduled Treatment: check back 2022    Electronically signed by LULA Wu on 2022 at 1:10 PM

## 2022-09-13 NOTE — PROGRESS NOTES
[facility] -   Infectious diseases evaluation  admission date 9/10/2022    reason for consultation:   Sepsis     Impression :   Current:  Sepsis with septic shock  Altered mentation acute encephalopathy  CHULA  Bandemia  Lactic acidosis  Elevated procalcitonin 12  Gram-negative and gram-positive bacteremia-[facility]  Bilat breasts infected / necrotic wounds - maggots  Sacral sloughed skin from moisture  Hyperglycemia  Morbd obesity-BMI 47  COPD  CHULA - cr 2.22    Other:    Discussion / summary of stay / plan of care   Sepsis of unclear cause, with septic shock and elevated procalcitonin at 12. Very concerning for gram-negative organisms and hence a UTI is of a concern  CT abdomen pelvis at [facility]. [facility] is negative for any bowel obstruction although it was a concern in [facility]  The urine analysis at [facility] is normal but I am not clear as of the UA at [facility]  The bacteremia with gram-negative and gram-positive is also of a concern, pending at [facility]  Recommendations   Continue cefepime we will add daptomycin  Will reach out to [facility] further blood cultures and urine culture  Neg nasal MRSA swab    Infection Control Recommendations   [place] Precautions  Contact Isolation       Antimicrobial Stewardship Recommendations   Simplification of therapy  Targeted therapy      History of Present Illness:   Initial history:  Nichole Merlin is a 38y.o.-year-old female found unresponsive at home was taken to [facility], found to have stools on many of her wounds and maggots.   CT of the abdomen showed concern for small bowel obstruction  She had hyponatremia with a sodium 128, leukocytosis elevated procalcitonin and lactic acid  Any blood culture came back positive for gram-negative rods gram-positive cocci clusters  CHULA  Transferred to us with concern of septic shock and sepsis-General surgery on board due to the concern of small bowel obstruction    After transfer to [facility]. [facility] CT scan of the abdomen pelvis showed no pneumonia on the lower lobes and no acute abdomen. Patient is on antibiotics, infectious consulted for opinion.   The urine analysis on 9/10 at [facility] is not suggestive of infection-but I do not have the UA or the urine culture off from [address] [place] [address] urinalysis seems to be very contaminated with epithelial cells                          Interval changes  9/12/2022   Patient Vitals for the past 8 hrs:   BP Temp Temp src Pulse Resp SpO2   09/12/22 1922 -- -- -- (!) 111 19 100 %   09/12/22 1845 (!) 138/54 -- -- (!) 107 11 100 %   09/12/22 1830 (!) 136/57 -- -- (!) 105 12 100 %   09/12/22 1815 135/73 -- -- (!) 106 12 100 %   09/12/22 1800 (!) 139/54 -- -- (!) 109 13 100 %   09/12/22 1745 (!) 131/47 -- -- (!) 112 11 100 %   09/12/22 1730 (!) 146/80 -- -- (!) 113 15 100 %   09/12/22 1715 137/87 -- -- (!) 113 13 100 %   09/12/22 1700 132/76 -- -- (!) 116 14 100 %   09/12/22 1645 135/79 -- -- (!) 118 17 100 %   09/12/22 1630 (!) 135/95 -- -- (!) 122 16 100 %   09/12/22 1615 135/64 -- -- (!) 124 16 100 %   09/12/22 1600 (!) 182/125 98.4 °F (36.9 °C) Bladder (!) 124 19 99 %   09/12/22 1545 (!) 159/139 -- -- (!) 116 16 100 %   09/12/22 1530 (!) 141/92 -- -- (!) 102 13 100 %   09/12/22 1515 117/88 -- -- 94 10 100 %     Eating blood cultures results from [address] [place] [address] blood cultures are negative, and the white count slightly better 17  All the wounds are evaluated with wound care on the bedside, the back has multiple open ulcers but no clear cellulitis at this point    Breast macerated wounds, all pressure wounds    Patient is on 30 FiO2 5 of PEEP sputum is thin yellow    Yaenz with elder urine  Remains intubated    Summary of relevant labs:  Labs:  sodium 128,   leukocytosis  21 -17  elevated procalcitonin   Elevated lactic acid 2.8  Creat 2.22  Micro:  BC GNR and CPC clusters at the [facility]  UA neg 9/10   MRSA nasal swab negative  blood culture 9/10     Imaging:  BREASTS US neg for abscess  CT scan of the abdomen pelvis showed no pneumonia on the lower lobes and no acute abdomen. I have personally reviewed the past medical history, past surgical history, medications, social history, and family history, and I haveupdated the database accordingly. Allergies:   Amoxicillin and Robitussin day-night value lawrence [[id number]]     Review of Systems:     Review of Systems   Unable to perform ROS: Mental status change     Physical Examination :       Physical Exam  Constitutional:       General: She is not in acute distress. Appearance: She is obese. She is ill-appearing. HENT:      Head: Normocephalic and atraumatic. Nose: Nose normal.      Mouth/Throat:      Mouth: Mucous membranes are moist.   Eyes:      General: No scleral icterus. Conjunctiva/sclera: Conjunctivae normal.   Cardiovascular:      Rate and Rhythm: Normal rate and regular rhythm. Heart sounds: Normal heart sounds. No murmur heard. No friction rub. Pulmonary:      Effort: No respiratory distress. Breath sounds: Normal breath sounds. Abdominal:      General: There is no distension. Tenderness: There is no abdominal tenderness. Genitourinary:     Comments: Urine elder  Musculoskeletal:         General: No swelling or deformity. Cervical back: Neck supple. No rigidity. Skin:     Coloration: Skin is not pale. Findings: Erythema and lesion present. Neurological:      Comments: On the vent   Psychiatric:      Comments: On the vent       Past Medical History:   No past medical history on file. Past Surgical  History:   No past surgical history on file.     Medications:      bacitracin   Topical TID    daptomycin (CUBICIN) IVPB  6 mg/kg (Adjusted) IntraVENous Q24H    sodium chloride flush  5-40 mL IntraVENous 2 times per day    pantoprazole (PROTONIX) 40 mg injection  40 mg IntraVENous Daily cefepime  2,000 mg IntraVENous Q12H    miconazole   Topical BID    heparin (porcine)  5,000 Units SubCUTAneous 3 times per day    levothyroxine  75 mcg Oral Daily       Social History:     Social History     Socioeconomic History    Marital status:      Spouse name: Not on file    Number of children: Not on file    Years of education: Not on file    Highest education level: Not on file   Occupational History    Not on file   Tobacco Use    Smoking status: Not on file    Smokeless tobacco: Not on file   Substance and Sexual Activity    Alcohol use: Not on file    Drug use: Not on file    Sexual activity: Not on file   Other Topics Concern    Not on file   Social History Narrative    Not on file     Social Determinants of Health     Financial Resource Strain: Not on file   Food Insecurity: Not on file   Transportation Needs: Not on file   Physical Activity: Not on file   Stress: Not on file   Social Connections: Not on file   Intimate Partner Violence: Not on file   Housing Stability: Not on file       Family History:   No family history on file. Medical Decision Making:   I have independently reviewed/ordered the following labs:    CBC with Differential:   Recent Labs     09/11/22  0554 09/12/22  0451   WBC 21.7* 17.7*   HGB 9.8* 9.0*   HCT 31.2* 27.6*    201   LYMPHOPCT 8* 10*   MONOPCT 7 5       BMP:  Recent Labs     09/11/22  0554 09/12/22  0451   * 134*   K 5.3 4.4    101   CO2 13* 19*   BUN 53* 51*   CREATININE 2.22* 1.90*   MG 2.1 2.1       Hepatic Function Panel:   Recent Labs     09/11/22  0554 09/12/22  0451   PROT 5.4* 5.1*   LABALBU 2.0* 1.7*   BILIDIR 0.3 0.3   IBILI 0.3 0.2   BILITOT 0.6 0.5   ALKPHOS 120* 129*   ALT 12 11   AST 19 18       No results for input(s): RPR in the last 72 hours. No results for input(s): HIV in the last 72 hours. No results for input(s): BC in the last 72 hours.   Lab Results   Component Value Date/Time    CREATININE 1.90 09/12/2022 04:51 AM GLUCOSE 171 09/12/2022 04:51 AM       Detailed results: Thank you for allowing us to participate in the care of this patient. Please call with questions. This note is created with the assistance of a speech recognition program.  While intending to generate adocument that actually reflects the content of the visit, the document can still have some errors including those of syntax and sound a like substitutions which may escape proof reading. It such instances, actual meaningcan be extrapolated by contextual diversion.     Adan Cox MD  Office: [phone]  Perfect serve / office [phone]

## 2022-09-13 NOTE — PLAN OF CARE
Problem: Respiratory - Adult  Goal: Achieves optimal ventilation and oxygenation  9/12/2022 2032 by Jas Curry RCP  Outcome: Progressing  Flowsheets (Taken 9/12/2022 2032)  Achieves optimal ventilation and oxygenation:   Assess for changes in respiratory status   Assess the need for suctioning and aspirate as needed   Respiratory therapy support as indicated   Assess for changes in mentation and behavior   Oxygen supplementation based on oxygen saturation or arterial blood gases

## 2022-09-13 NOTE — PROGRESS NOTES
General Surgery:  Daily Progress Note                    PATIENT NAME: Claudia Metcalf     TODAY'S DATE: 9/13/2022, 5:35 AM  CC:  Sepsis secondary to UTI     SUBJECTIVE:     Pt seen and examined at bedside. Patient currently on cefepime and daptomycin, ID on board   Wound care is on board   Pt seen and examined at bedside. She is intubated vent setting of FiO2 30%. Fentanyl increased to 175 from yesterday (100) for sedation. Levophed is being weaned off (20-->13-->7 today)   Breast ultrasound from 9/11/ 22 showed no evidence of abscess   CT abdomen on 9/10/22 showed no evidence of acute bowel obstruction at the time . On bacitracin ointment 3 times a day for wound care . OBJECTIVE:   VITALS:  BP (!) 104/48   Pulse (!) 107   Temp 98.4 °F (36.9 °C) (Bladder)   Resp 21   Ht 5' 7\" (1.702 m)   Wt (!) 318 lb 6.4 oz (144.4 kg)   SpO2 100%   BMI 49.87 kg/m²      INTAKE/OUTPUT:      Intake/Output Summary (Last 24 hours) at 9/13/2022 0535  Last data filed at 9/13/2022 0300  Gross per 24 hour   Intake 1362.07 ml   Output 765 ml   Net 597.07 ml       PHYSICAL EXAM:  General Appearance: obese  HEENT:  Normocephalic, atraumatic,   Heart: Heart regular rate and rhythm  Lungs: Normal expansion. Clear to auscultation. No rales, rhonchi, or wheezing. Skin: Multiple diffuse, superficial wounds most notably in the crevices under her breasts and groin, also over her left flank.   Have bandages on all wounds per wound care     Data:  CBC:   Lab Results   Component Value Date/Time    WBC 13.1 09/13/2022 01:13 AM    RBC 3.32 09/13/2022 01:13 AM    HGB 9.1 09/13/2022 01:13 AM    HCT 29.7 09/13/2022 01:13 AM    MCV 89.5 09/13/2022 01:13 AM    MCH 27.4 09/13/2022 01:13 AM    MCHC 30.6 09/13/2022 01:13 AM    RDW 16.5 09/13/2022 01:13 AM     09/13/2022 01:13 AM    MPV 10.1 09/13/2022 01:13 AM     WBC:    Lab Results   Component Value Date/Time    WBC 13.1 09/13/2022 01:13 AM       Radiology Review:  CT ABDOMEN PELVIS WO CONTRAST Additional Contrast? None    Result Date: 9/11/2022  EXAMINATION: CT OF THE ABDOMEN AND PELVIS WITHOUT CONTRAST 9/10/2022 11:55 pm TECHNIQUE: CT of the abdomen and pelvis was performed without the administration of intravenous contrast. Multiplanar reformatted images are provided for review. Automated exposure control, iterative reconstruction, and/or weight based adjustment of the mA/kV was utilized to reduce the radiation dose to as low as reasonably achievable. COMPARISON: None. HISTORY: ORDERING SYSTEM PROVIDED HISTORY: concern for malrotation on [facility] CT scan; unable to upload disc or view previous study TECHNOLOGIST PROVIDED HISTORY: concern for malrotation on [facility] CT scan; unable to upload disc or view previous study Is the patient pregnant?->No Reason for Exam: concern for malrotation on [facility] CT scan; unable to upload disc or view previous study FINDINGS: Lower Chest: Minimal basilar atelectasis is identified. No cardiomegaly. Calcified lymph nodes are seen in the right perihilar region. Calcified granulomatous changes seen within the right lower lobe. No spiculated lung mass is identified. Organs: Diffuse fatty liver infiltration. No splenic mass is identified. No adrenal mass. No pancreatic mass. No peripancreatic inflammatory process. The gallbladder appears unremarkable. No renal calculi, ureteral calculi, or hydroureteronephrosis is identified. GI/Bowel: Visualized loops of bowel appear normal in caliber. No ileus or obstruction is identified. Normal appendix. No focal inflammatory bowel wall thickening is identified. The colon appears in appropriate position. The cecum is in the right lower quadrant as is the appendix. The ligament of Treitz is noted to be to the left of the spine. No small bowel obstruction. No mesenteric edema or significant mesenteric stranding is identified to suggest an acute malrotation.   No chronic appearing bowel rotation is identified. Pelvis: No pelvic mass is identified. The pelvis is somewhat obscured by artifact from the patient's orthopedic hardware. A Yanez catheter is noted within the bladder. Uterus and adnexal structures appear grossly unremarkable. No free fluid in the pelvis. There is a left common iliac vein stent. Peritoneum/Retroperitoneum: No abdominal aortic aneurysm. No retroperitoneal or mesenteric lymphadenopathy is identified. Bones/Soft Tissues: Degenerative changes seen within the spine. No osseous destructive process is identified. 1. No CT findings to suggest an acute bowel malrotation. No cecal or sigmoid volvulus. No small bowel obstruction or colonic obstruction. No swelling of the central mesentery or mesenteric edema or gas. 2. Diffuse fatty liver infiltration. 3. No acute inflammatory process seen in the abdomen or pelvis. 4. Subsegmental atelectatic changes as well as chronic sequela of remote granulomatous process seen within the lungs. XR CHEST PORTABLE    Result Date: 9/12/2022  EXAMINATION: ONE XRAY VIEW OF THE CHEST 9/12/2022 6:14 am COMPARISON: 09/11/2022 HISTORY: ORDERING SYSTEM PROVIDED HISTORY: intubated patient TECHNOLOGIST PROVIDED HISTORY: intubated patient FINDINGS: Endotracheal tube, enteric tube, right IJ central venous catheter remain in place. Multiple cardiac monitoring leads are overlying the chest.  Heart size is stable. There is mild increased interstitial prominence at the lung bases. No evidence of airspace consolidation, pneumothorax, or pleural effusion. 1.  Stable support lines and tubes. 2.  Mild increased interstitial prominence at the lung bases, possibly related to edema, atelectasis, or developing pneumonia. XR CHEST PORTABLE    Result Date: 9/11/2022  EXAMINATION: ONE XRAY VIEW OF THE CHEST 9/11/2022 5:55 am COMPARISON: Chest radiograph performed 10/02/2007.  HISTORY: ORDERING SYSTEM PROVIDED HISTORY: intubated patient TECHNOLOGIST PROVIDED HISTORY: intubated patient FINDINGS: There is no acute consolidation or effusion. There is no pneumothorax. The mediastinal structures are unremarkable. The upper abdomen is unremarkable. The extrathoracic soft tissues are unremarkable. There is an endotracheal tube with tip in the midtrachea. There is a gastric tube and the tip is not visualized. There is a right internal jugular line with the tip in the mid SVC. No acute cardiopulmonary process. Support tubes as described above. US BREAST LIMITED LEFT    Result Date: 9/11/2022  EXAMINATION: TARGETED ULTRASOUND OF THE RIGHT BREAST; TARGETED ULTRASOUND OF THE LEFT BREAST 9/11/2022 COMPARISON: Same day CT abdomen/pelvis HISTORY: ORDERING SYSTEM PROVIDED HISTORY: assess for abscess TECHNOLOGIST PROVIDED HISTORY: Assess for abscess FINDINGS: Targeted ultrasound of the bilateral breasts was performed. Right: Targeted ultrasound of the right breast in the area of concern near the 3 o'clock position was performed. There is no evidence of abscess. No solid or cystic mass is identified. No evidence of abnormal vascularity. Left: Targeted ultrasound of the left breast in the area of concern near the 7 o'clock position was performed. There is no evidence of abscess. No solid or cystic mass is identified. Areas of apparent prominent vascularity appear to correspond to vessels. No evidence of abscess in either breast. BIRADS: BIRADS - CATEGORY 1 Negative. Normal interval follow-up is recommended in 12 months. OVERALL ASSESSMENT - NEGATIVE A letter of notification will be sent to the patient regarding the results. The [employer] recommends annual mammograms for women 40 years and older.     US BREAST LIMITED RIGHT    Result Date: 9/11/2022  EXAMINATION: TARGETED ULTRASOUND OF THE RIGHT BREAST; TARGETED ULTRASOUND OF THE LEFT BREAST 9/11/2022 COMPARISON: Same day CT abdomen/pelvis HISTORY: ORDERING SYSTEM PROVIDED HISTORY: assess for abscess TECHNOLOGIST PROVIDED HISTORY: Assess for abscess FINDINGS: Targeted ultrasound of the bilateral breasts was performed. Right: Targeted ultrasound of the right breast in the area of concern near the 3 o'clock position was performed. There is no evidence of abscess. No solid or cystic mass is identified. No evidence of abnormal vascularity. Left: Targeted ultrasound of the left breast in the area of concern near the 7 o'clock position was performed. There is no evidence of abscess. No solid or cystic mass is identified. Areas of apparent prominent vascularity appear to correspond to vessels. No evidence of abscess in either breast. BIRADS: BIRADS - CATEGORY 1 Negative. Normal interval follow-up is recommended in 12 months. OVERALL ASSESSMENT - NEGATIVE A letter of notification will be sent to the patient regarding the results. The [employer] recommends annual mammograms for women 40 years and older. ASSESSMENT:  Active Hospital Problems    Diagnosis Date Noted    Systemic inflammatory response syndrome (SIRS) of infectious origin with acute organ failure ([address] [A41.9, R65.20] 09/11/2022     Priority: Medium    Acute kidney injury ([address] [N17.9] 09/11/2022     Priority: Medium    Bandemia [D72.825] 09/11/2022     Priority: Medium    Cellulitis of sacral region [L03.319] 09/11/2022     Priority: Medium    Breast, fat necrosis [N64.1] 09/11/2022     Priority: Medium    Gram-neg septicemia ([address] [A41.50] 09/11/2022     Priority: Medium    Gram positive septicemia ([address] [A41.89] 09/11/2022     Priority: Medium    Acute encephalopathy [G93.40] 09/11/2022     Priority: Medium    Elevated procalcitonin [R79.89] 09/11/2022     Priority: Medium    Altered mental status [R41.82] 09/10/2022     Priority: Medium     38 y.o. female with sepsis and chronic wound     Plan:  No evidence of abscess formation or necrotizing infection. Pressor requirements decreasing.    Continue to treat cellulitis and continue local wound care  Wound care consulted. Consider enzymatic debridement. Will continue to monitor skin wounds and surgically debride if indicated. ID onboard: On daptomycin and cefepime. No evidence of malrotation on CT abdomen/pelvis. Continue medical management per primary team      Gregorio Stephenson MD  General Surgery PGY5  Available via [facility]     Attending Physician Statement  I have discussed the case with Dr Jimy Avendaño, including pertinent history and exam findings with the resident. I have seen and examined the patient and the key elements of the encounter have been performed by me. I agree with the assessment, plan and orders as documented by the resident.       Electronically signed by Jazmine Alarcon DO  on 9/14/2022 at 10:08 AM

## 2022-09-13 NOTE — PLAN OF CARE
Problem: Discharge Planning  Goal: Discharge to home or other facility with appropriate resources  9/13/2022 1038 by Olivia Flaherty RN  Outcome: Progressing     Problem: Respiratory - Adult  Goal: Achieves optimal ventilation and oxygenation  9/13/2022 1038 by Olivia Flaherty RN  Outcome: Progressing     Problem: Safety - Medical Restraint  Goal: Remains free of injury from restraints (Restraint for Interference with Medical Device)  Description: INTERVENTIONS:  1. Determine that other, less restrictive measures have been tried or would not be effective before applying the restraint  2. Evaluate the patient's condition at the time of restraint application  3. Inform patient/family regarding the reason for restraint  4. Q2H: Monitor safety, psychosocial status, comfort, nutrition and hydration  9/13/2022 1038 by Olivia Flaherty RN  Outcome: Progressing  Flowsheets (Taken 9/13/2022 0000 by Dameon Lagunas RN)  Remains free of injury from restraints (restraint for interference with medical device): Every 2 hours: Monitor safety, psychosocial status, comfort, nutrition and hydration     Problem: Pain  Goal: Verbalizes/displays adequate comfort level or baseline comfort level  9/13/2022 1038 by Olivia Flaherty RN  Outcome: Progressing     Problem: Confusion  Goal: Confusion, delirium, dementia, or psychosis is improved or at baseline  Description: INTERVENTIONS:  1. Assess for possible contributors to thought disturbance, including medications, impaired vision or hearing, underlying metabolic abnormalities, dehydration, psychiatric diagnoses, and notify attending LIP  2. [place] high risk fall precautions, as indicated  3. Provide frequent short contacts to provide reality reorientation, refocusing and direction  4. Decrease environmental stimuli, including noise as appropriate  5. Monitor and intervene to maintain adequate nutrition, hydration, elimination, sleep and activity  6.  If unable to ensure safety without constant attention obtain sitter and review sitter guidelines with assigned personnel  7. Initiate Psychosocial CNS and Spiritual Care consult, as indicated  9/13/2022 1038 by Dayne Howard RN  Outcome: Progressing     Problem: Nutrition Deficit:  Goal: Optimize nutritional status  Outcome: Progressing     Problem: Skin/Tissue Integrity  Goal: Absence of new skin breakdown  Description: 1. Monitor for areas of redness and/or skin breakdown  2. Assess vascular access sites hourly  3. Every 4-6 hours minimum:  Change oxygen saturation probe site  4. Every 4-6 hours:  If on nasal continuous positive airway pressure, respiratory therapy assess nares and determine need for appliance change or resting period.   Outcome: Progressing     Problem: Safety - Adult  Goal: Free from fall injury  9/13/2022 1038 by Dayne Howard RN  Outcome: Progressing     Problem: ABCDS Injury Assessment  Goal: Absence of physical injury  Outcome: Progressing

## 2022-09-13 NOTE — CARE COORDINATION
09/13/22 1602   Service Assessment   Patient Orientation Sedated  (patient intubated and sedated. spoke with  on telephone to complete assessment)   History Provided By [facility]   Primary [address] Spouse/Significant Other;Children   PCP Verified by CM Yes  (patient sees a physician at the [facility] at [place] [id number] in the clinic)   Last Visit to PCP Within last 3 months   Prior Functional Level Independent in ADLs/IADLs   Current Functional Level Assistance with the following:   Can patient return to prior living arrangement Other (see comment)  (per spouse, patient was lying on couch and not ambulating or bathing herself just prior to admission. will need placement)   Ability to make needs known: Unable   Social/Functional History   Lives With Spouse;Daughter  (lives with spouse and daughter elder)   Home Layout Two level; Able to Live on Main level with bedroom/bathroom   Home Access Stairs to enter without rails   Entrance Stairs - Number of Steps [address] Help From Family   ADL Assistance Needs assistance   Toileting Needs assistance   [address] Needs assistance   Homemaking Responsibilities No   Ambulation Assistance Needs assistance   Transfer Assistance Needs assistance   Active  No   Mode of Transportation Family   Discharge Planning   Type of Residence House   Living Arrangements Spouse/Significant Other;Children  (spouse and daughter)   Current Services Prior To Admission None   Potential Assistance Needed N/A   DME Ordered? No   Potential Assistance Purchasing Medications No   Type of Home Care Services None   Patient expects to be discharged to: Skilled nursing facility   One/Two Story Residence Two story, live on first floor   Lift Chair Available No   History of falls? 0   Services At/After Discharge   [employer] Information Provided?  No   Condition of Participation: Discharge Planning   The Plan for Transition of Care is related to the following treatment goals: comfort   The Patient and/or Patient Representative was provided with a Choice of Provider? Patient   The Patient and/Or Patient Representative agree with the Discharge Plan? Yes   Freedom of Choice list was provided with basic dialogue that supports the patient's individualized plan of care/goals, treatment preferences, and shares the quality data associated with the providers? Yes       Call placed to patient's spouse to discuss transitional planning. Per patient spouse, patient was sleeping on couch for last month and not getting up. He reports that the patient has also refused to bathe other than using baby wipes for the last year. He reports that patient has a cane that she previously used to ambulate but has not been very active recently. Discussed possibility of need for SNF after hospital discharge and patient spouse is in agreement. Patient's spouse advised that he would not be able to make it up to the hospital until this weekend as he is working and they live in [place]. Offered to email list of SNF's to spouse but he states that his email is not working. Advised that list of SNF's that are in-network with patient's insurance will be left at bedside for him to review. Requested spouse select 3 options from the list. Spouse agreeable to plan.

## 2022-09-13 NOTE — PROGRESS NOTES
[address] [place] [address] Continence Nurse  Consult Note       NAME:  Flip Metcalf  MEDICAL RECORD NUMBER:  [id number]  AGE: 38 y.o. GENDER: female  : 1980  TODAY'S DATE:  2022    Subjective:     Reason for WOCN Evaluation and Assessment: \"multiple wounds to back, breasts\"      Connor Falcon is a 38 y.o. female referred by:   [x] Physician  [] Nursing  [] Other:     Wound Identification:  Wound Type: pressure and caustic dermatitis  Contributing Factors: chronic pressure, decreased mobility, shear force, obesity, incontinence of stool, incontinence of urine, poor hygiene, and non-adherence      Patient was found to be obtunded. Down time in bed prolonged but unknown exact amount. Severe buttock, groin, posterior thigh incontinence associated dermatitis, torso and extremity fold intertrigo, pressure injures of the breasts, left upper arm, and bilateral buttock/ischial tuberosities.            Objective:      BP (!) 110/51   Pulse (!) 113   Temp 98.4 °F (36.9 °C) (Bladder)   Resp 19   Ht 5' 7\" (1.702 m)   Wt (!) 318 lb 6.4 oz (144.4 kg)   SpO2 99%   BMI 49.87 kg/m²   Facundo Risk Score: Facundo Scale Score: 10    LABS    CBC:   Lab Results   Component Value Date/Time    WBC 13.1 2022 01:13 AM    RBC 3.32 2022 01:13 AM    HGB 9.1 2022 01:13 AM     CMP:  Albumin:    Lab Results   Component Value Date/Time    LABALBU 1.5 2022 01:13 AM     PT/INR:    Lab Results   Component Value Date/Time    PROTIME 11.1 09/10/2022 08:38 PM    INR 1.0 09/10/2022 08:38 PM     HgBA1c:  No results found for: LABA1C  PTT: No components found for: LABPTT      Assessment:       Measurements:     22 1605   Wound 09/10/22 Breast Left   Date First Assessed/Time First Assessed: 09/10/22 1730   Present on Hospital Admission: Yes  Primary Wound Type: Pressure Injury  Location: Breast  Wound Location Orientation: Left   Wound Etiology Pressure Unstageable   Dressing Status New dressing applied   Wound Cleansed Soap and water   Dressing/Treatment Triad hydro/zinc oxide-based hydrophilic paste; Other (comment)  (Sorbex pad)   Dressing Change Due 09/13/22   Wound Length (cm) 15 cm  (superior and inferior breast)   Wound Width (cm) 10 cm   Wound Depth (cm) 0.2 cm   Wound Surface Area (cm^2) 150 cm^2   Wound Volume (cm^3) 30 cm^3   Wound Assessment Pink/red;Subcutaneous;Eschar moist   Drainage Amount Moderate   Drainage Description Serosanguinous   Odor Mild   Wound 09/10/22 Other (Comment) mammary folds bilaterally   Date First Assessed/Time First Assessed: 09/10/22 1730   Present on Hospital Admission: Yes  Primary Wound Type: Other (comment)  Location: Other (Comment)  Wound Description (Comments): mammary folds bilaterally   Wound Etiology Other  (severe intertrigo)   Dressing Status New dressing applied   Wound Cleansed Soap and water   Dressing/Treatment Hydrofiber Ag; Interdry Ag/wicking fabric with Ag   Dressing Change Due 09/13/22   Wound Assessment Subcutaneous;Pink/red   Drainage Amount Small   Drainage Description Serosanguinous   Odor None   Felicia-wound Assessment Intact   Wound 09/10/22 Breast Right   Date First Assessed/Time First Assessed: 09/10/22 1730   Present on Hospital Admission: Yes  Primary Wound Type: Pressure Injury  Location: Breast  Wound Location Orientation: Right   Wound Etiology Pressure Stage 3   Dressing Status New dressing applied   Wound Cleansed Soap and water   Dressing/Treatment Triad hydro/zinc oxide-based hydrophilic paste; Other (comment)  (Sorbex pads)   Wound Length (cm) 8 cm   Wound Width (cm) 9 cm   Wound Depth (cm) 0.2 cm   Wound Surface Area (cm^2) 72 cm^2   Wound Volume (cm^3) 14.4 cm^3   Wound Assessment Subcutaneous;Pink/red;Slough   Drainage Amount Moderate   Drainage Description Serosanguinous   Odor None   Wound 09/10/22 Rib Cage Left;Lateral   Date First Assessed/Time First Assessed: 09/10/22 1530   Present on Hospital Admission: Yes  Primary Wound Type: Pressure Injury intertrigo   Date First Assessed/Time First Assessed: 09/12/22 1705   Present on Hospital Admission: Yes  Location: Thigh  Wound Location Orientation: Left;Medial  Wound Description (Comments): severe intertrigo   Wound Etiology Other   Dressing Status New dressing applied   Wound Cleansed Soap and water   Dressing/Treatment Triad hydro/zinc oxide-based hydrophilic paste; Other (comment)  (Sorbex pad)   Dressing Change Due 09/13/22   Wound Length (cm) 4 cm   Wound Width (cm) 2.2 cm   Wound Depth (cm) 0.2 cm   Wound Surface Area (cm^2) 8.8 cm^2   Wound Volume (cm^3) 1.76 cm^3   Wound Assessment Subcutaneous;Pink/red   Drainage Amount Moderate   Odor None   Felicia-wound Assessment Intact            Response to treatment:  Well tolerated by patient. Plan:     Plan of Care: Turn every 2 hours  Float heels off of bed with pillows under calves  Use lift sling to reposition patient to minimize potential for shear injury. Breasts: Triad Hydrophilic Wound Dressing paste, Sorbex pads, Pinc/HyTape to secure. Change daily and prn soilage  Breast folds/chest: Cyvera Ag gelling fiber to the wound beds, separate the inframammary folds with DriGo HP cloths. Change daily and prn soilage  Abdominal fold: DriGo HP cloths. Change at least every 5 days and prn solage  Medial thighs and groin: separate with cloths, moisture wicking under pads  Buttocks, posterior thighs, low back: Triad Hydrophiic Wound Dressing paste daily and prn hygiene.             Specialty Bed Required : Yes   [x] Low Air Loss   [x] Pressure Redistribution  [] Fluid Immersion  [x] Bariatric  [] Total Pressure Relief  [] Other:     Discharge Plan:  tbd    Patient/Caregiver Teaching:      [] Indicates understanding       [] Needs reinforcement  [] Unsuccessful      [] Verbal Understanding  [] Demonstrated understanding       [x] No evidence of learning  [] Refused teaching         [] N/A    Contact the Wound Ostomy RN on-call Monday-Friday [phone] via [employer] by searching \"wound\" under \"groups\" and selecting the on-call clinician.        Electronically signed by Rob Burrows RN, [facility] on 9/13/2022 at 8:10 AM

## 2022-09-13 NOTE — PROGRESS NOTES
Critical Care Team - Daily Progress Note      Date and time: 2022 7:01 AM  Patient's name:  Jimmie Mccoy Record Number: [id number]  Patient's account/billing number: [de-identified]  Patient's YOB: 1980  Age: 38 y.o. Date of Admission: 9/10/2022  4:45 PM  Length of stay during current admission: 3      Primary Care Physician: No primary care provider on file. ICU Attending Physician: Dr. Gauri Dickerson    Code Status: Full Code    Reason for ICU admission: Septic shock, intubated requiring mechanical ventilation and pressor support      SUBJECTIVE:     OVERNIGHT EVENTS:     Patient continues to be intubated and sedated. Requiring sedation with fentanyl 175, off propofol and versed. Requiring pressor support with Levophed, weaned to 1mcg/min  Vent settings PRVC 14/430/5/30  ABG showing pH  7.499/33.2/134.3/25.9- on CPAP  Patient's urine output and kidney function continue to improve  No acute events overnight     Fever:-  Temp (24hrs), Av.4 °F (36.9 °C), Min:98.2 °F (36.8 °C), Max:98.6 °F (37 °C)  Systolic (63YWR), WJH:131 , Min:99 , QMV:740   Diastolic (02CJX), KIN:75, Min:47, Max:139  Urine output in the last 24 hours:  In: 1095.8 [I.V.:468.8]  Out: 740 [Urine:740]  Patient Vitals for the past 96 hrs (Last 3 readings):   Weight   22 0600 (!) 318 lb 6.4 oz (144.4 kg)   09/10/22 1742 (!) 307 lb (139.3 kg)       AWAKE & FOLLOWING COMMANDS:  [x] No   [] Yes    CURRENT VENTILATION STATUS:     [x] Ventilator  [] BIPAP  [] Nasal Cannula [] Room Air      SECRETIONS Amount:  [x] Small [] Moderate  [] Large  [] None  Color:     [] White [] Colored  [] Bloody    SEDATION:  RAAS Score:  [] Propofol gtt  [] Versed gtt  [] Ativan gtt   [] No Sedation    PARALYZED:  [x] No    [] Yes    DIARRHEA:                [x] No                [] Yes  (C. Difficile status: [] Positive   [] negative [] pending)    VASOPRESSORS:  [] No    [x] Yes    If yes -   [x] Levophed       [] Dopamine     [] Vasopressin       [] Dobutamine  [] Phenylephrine         [] Epinephrine    CENTRAL LINES:     [] No   [x] Yes   (Date of Insertion: 9/10/22  )           If yes -     [x] Right IJ     [] Left IJ [] Right Femoral [] Left Femoral                   [] Right Subclavian [] Left Subclavian    LUQUE'S CATHETER:   [] No   [x] Yes  (Date of Insertion:22)     URINE OUTPUT:            [] Good   [x] Low              [] Anuric      OBJECTIVE:     VITAL SIGNS:  BP (!) 110/51   Pulse (!) 112   Temp 98.4 °F (36.9 °C) (Bladder)   Resp 12   Ht 5' 7\" (1.702 m)   Wt (!) 318 lb 6.4 oz (144.4 kg)   SpO2 100%   BMI 49.87 kg/m²   Tmax over 24 hours:  Temp (24hrs), Av.4 °F (36.9 °C), Min:98.2 °F (36.8 °C), Max:98.6 °F (37 °C)      Patient Vitals for the past 6 hrs:   BP Pulse Resp SpO2   22 0531 (!) 110/51 (!) 112 12 100 %   22 0516 104/66 (!) 111 13 100 %   22 0501 -- (!) 112 14 100 %   22 0446 -- (!) 109 14 100 %   22 0431 -- (!) 112 15 100 %   22 0416 -- (!) 109 (!) 8 100 %   22 0401 -- (!) 113 11 100 %   22 0346 (!) 99/52 (!) 107 21 100 %   22 0145 (!) 104/48 99 13 100 %   22 0130 (!) 115/59 (!) 104 19 100 %   22 0115 (!) 108/59 (!) 107 12 100 %         Intake/Output Summary (Last 24 hours) at 2022 0701  Last data filed at 2022 0656  Gross per 24 hour   Intake 1095.75 ml   Output 740 ml   Net 355.75 ml       Wt Readings from Last 2 Encounters:   22 (!) 318 lb 6.4 oz (144.4 kg)     Body mass index is 49.87 kg/m².         PHYSICAL EXAMINATION :  Constitutional: Intubated, sedated  EENT: PERRLA, sclera clear, anicteric, moist mucous membranes  Neck: Supple, symmetrical, trachea midline  Respiratory: Diffuse rhonchi on ascultation  Cardiovascular: tachycardia with regular rhythm, normal S1, S2, no murmur noted, and 2+ distal pulses in all 4 extremities   Abdomen: soft, distended,  no masses or organomegaly  Neurological: Intubated and sedated.  Opens eyes to voice  Skin: Extensive wounds on the back, buttocks, bilateral breast  Extremities:  peripheral pulses normal, 1+ pedal edema, no clubbing or cyanosis    MEDICATIONS:    Scheduled Meds:   bacitracin   Topical TID    daptomycin (CUBICIN) IVPB  6 mg/kg (Adjusted) IntraVENous Q24H    sodium chloride flush  5-40 mL IntraVENous 2 times per day    pantoprazole (PROTONIX) 40 mg injection  40 mg IntraVENous Daily    cefepime  2,000 mg IntraVENous Q12H    miconazole   Topical BID    heparin (porcine)  5,000 Units SubCUTAneous 3 times per day    levothyroxine  75 mcg Oral Daily     Continuous Infusions:   IV infusion builder 75 mL/hr at 09/12/22 2233    norepinephrine Stopped (09/13/22 0539)    midazolam Stopped (09/11/22 1427)    fentaNYL 50 mcg/mL 175 mcg/hr (09/13/22 0542)    sodium chloride 10 mL/hr at 09/13/22 0542    propofol Stopped (09/11/22 1306)     PRN Meds:   sodium chloride flush, 5-40 mL, PRN  sodium chloride, , PRN  ondansetron, 4 mg, Q8H PRN   Or  ondansetron, 4 mg, Q6H PRN  polyethylene glycol, 17 g, Daily PRN  acetaminophen, 650 mg, Q6H PRN   Or  acetaminophen, 650 mg, Q6H PRN  potassium chloride, 20 mEq, PRN   Or  potassium chloride, 10 mEq, PRN        VENT SETTINGS (Comprehensive) (if applicable):  Vent Information  Equipment Changed: Expiratory Filter, HME, Suction catheter  Additional Respiratory Assessments  Heart Rate: (!) 112  Resp: 12  SpO2: 100 %  End Tidal CO2: 37 (%)  Position: Semi-Gama's  Humidification Source: HME  Cuff Pressure (cm H2O):  (mlt)  Skin Barrier Applied: No    ABGs:     Laboratory findings:    Complete Blood Count:   Recent Labs     09/11/22  0554 09/12/22 0451 09/13/22  0113   WBC 21.7* 17.7* 13.1*   HGB 9.8* 9.0* 9.1*   HCT 31.2* 27.6* 29.7*    201 147          Last 3 Blood Glucose:   Recent Labs     09/11/22  0554 09/12/22 0451 09/13/22  0113 GLUCOSE 196* 171* 110*          PT/INR:    Lab Results   Component Value Date/Time    PROTIME 11.1 09/10/2022 08:38 PM    INR 1.0 09/10/2022 08:38 PM     PTT:    Lab Results   Component Value Date/Time    APTT 25.2 09/10/2022 08:38 PM       Comprehensive Metabolic Profile:   Recent Labs     09/11/22  0554 09/12/22  0451 09/13/22  0113   * 134* 136   K 5.3 4.4 4.2    101 101   CO2 13* 19* 21   BUN 53* 51* 42*   CREATININE 2.22* 1.90* 1.41*   GLUCOSE 196* 171* 110*   CALCIUM 8.0* 7.4* 7.7*   PROT 5.4* 5.1* 5.0*   LABALBU 2.0* 1.7* 1.5*   BILITOT 0.6 0.5 0.5   ALKPHOS 120* 129* 120*   AST 19 18 21   ALT 12 11 11        Magnesium:   Lab Results   Component Value Date/Time    MG 2.1 09/13/2022 01:13 AM     Phosphorus: No results found for: PHOS  Ionized Calcium:   Lab Results   Component Value Date/Time    CAION 1.10 09/10/2022 08:38 PM        Urinalysis:     Troponin: No results for input(s): TROPONINI in the last 72 hours. Microbiology:    Cultures during this admission:     Blood cultures:                 [] None drawn      [x] Negative             []  Positive (Details:  )  Urine Culture:                   [] None drawn      [x] Negative             []  Positive (Details:  )  Sputum Culture:               [x] None drawn       [] Negative             []  Positive (Details:  )   Endotracheal aspirate:     [x] None drawn       [] Negative             []  Positive (Details:  )     Chest Xray (9/13/2022): pending    ASSESSMENT:     Principal Problem:    Systemic inflammatory response syndrome (SIRS) of infectious origin with acute organ failure ([address]  Active Problems:    Altered mental status    Acute kidney injury ([address]    Bandemia    Cellulitis of sacral region    Breast, fat necrosis    Gram-neg septicemia ([address]    Gram positive septicemia ([address]    Acute encephalopathy    Elevated procalcitonin  Resolved Problems:    * No resolved hospital problems.  *    PLAN:       Neurologic:   Neuro -intubated and sedated  Neuro checks per protocol  Sedation: Propofol, Versed. Currently off   Pain management: Fentanyl     Cardiovascular:  Hemodynamically stable on pressor support with levo. Weaning   HR:   LA: 3.56  MAPs: 56-84  MAP goal >65    Pulmonary:  Maintain oxygen sats >92%  Pulmonary toilet  Vent settings PRVC 14/430/5/30  ABG showing pH 7.499/33.2/134.3/25.9- on CPAP  SBT daily. Extubate when tolerating CPAP and following commands   CXR:  Pending     GI/Nutrition  GlycoLax as needed  Ulcer Prophylaxis: Protonix  Diet:Diet NPO  ADULT TUBE FEEDING; Orogastric; Peptide Based; Continuous; 10; Yes; 10; Q 4 hours; 50; 30; Q 4 hours  Albumin 1.5  Alk phos 120, ALT/AST , T bili 0.5  9/10 CT A/P: no malrotation, no bowel obstruction, fatty liver, atelectasis     Renal/Fluid/Electrolyte  IV Fluids: Bicarb drip 150 mEq at 75 cc/h  I/O: In: 1095.8 [I.V.:468.8]  Out: 740 [Urine:740] 0.2cc/kg/hr  Na 136, K 4.2, Mg 2.1  Monitor electrolytes, replace PRN     ID  WBC: 13.1 (17.7, 21.7)  .6  Tmax: Temp (24hrs), Av.4 °F (36.9 °C), Min:98.2 °F (36.8 °C), Max:98.6 °F (37 °C)    Antimicrobials: Continued daptomycin and cefepime  Cultures:  Urine: Negative  Blood: negative     Hematology:  Recent Labs     22  0554 22  0451 22  0113   HGB 9.8* 9.0* 9.1*      Plts 147 (201)  Daily CBC    Endocrine:   glucose controlled - most recent BGL is   Synthroid 75mcg daily   Recent Labs     22  0554 22  0451 22  0113   GLUCOSE 196* 171* 110*       DVT Prophylaxis   - Heparin SQ  Skin  General surgery following for debridement of wounds as indicated   Wound care following     Dispo: Stay in ICU    Boy Vogel M.D.              Critical care resident,  Department of Internal Medicine/ Critical care  [facility])             2022, 7:01 AM  Attending Physician Statement  I have discussed the care of Aj Peña, including pertinent history and exam findings,  with the resident. I have seen and examined the patient and the key elements of all parts of the encounter have been performed by me. I agree with the assessment, plan and orders as documented by the resident with additions . Continue sbt   Change sedation to precedex gtt          Total critical care time caring for this patient with life threatening, unstable organ failure, including direct patient contact, management of life support systems, review of data including imaging and labs, discussions with other team members and physicians at least 27   Min so far today, excluding procedures. Electronically signed by Rory Plaza MD on   9/13/22 at 10:04 PM EDT    Please note that this chart was generated using voice recognition Dragon dictation software. Although every effort was made to ensure the accuracy of this automated transcription, some errors in transcription may have occurred.

## 2022-09-13 NOTE — PLAN OF CARE
Continue nonsurgical management after breast wound showing improvement. Recommend enzymatic treatment such as santyl for patient's breast wound. Continue wound care management for patient, no acute surgical intervention at this point. General surgery service will sign off at this time. Thank you for the consult. Please call/page us if you have any questions/concerns. We appreciate being involved in the care of your patient.      Electronically signed by Betzaida Metzger DO on 9/13/2022 at 3:29 PM

## 2022-09-14 ENCOUNTER — APPOINTMENT (OUTPATIENT)
Dept: GENERAL RADIOLOGY | Age: 42
DRG: 870 | End: 2022-09-14
Attending: INTERNAL MEDICINE
Payer: COMMERCIAL

## 2022-09-14 LAB
ABSOLUTE EOS #: 0.43 K/UL (ref 0–0.4)
ABSOLUTE IMMATURE GRANULOCYTE: 0.54 K/UL (ref 0–0.3)
ABSOLUTE LYMPH #: 1.51 K/UL (ref 1–4.8)
ABSOLUTE MONO #: 1.08 K/UL (ref 0.1–0.8)
ALBUMIN SERPL-MCNC: 1.5 G/DL (ref 3.5–5.2)
ALBUMIN/GLOBULIN RATIO: 0.5 (ref 1–2.5)
ALLEN TEST: POSITIVE
ALP BLD-CCNC: 103 U/L (ref 35–104)
ALT SERPL-CCNC: 16 U/L (ref 5–33)
ANION GAP SERPL CALCULATED.3IONS-SCNC: 11 MMOL/L (ref 9–17)
AST SERPL-CCNC: 38 U/L
BASOPHILS # BLD: 0 % (ref 0–2)
BASOPHILS ABSOLUTE: 0 K/UL (ref 0–0.2)
BILIRUB SERPL-MCNC: 0.4 MG/DL (ref 0.3–1.2)
BILIRUBIN DIRECT: 0.2 MG/DL
BILIRUBIN, INDIRECT: 0.2 MG/DL (ref 0–1)
BUN BLDV-MCNC: 36 MG/DL (ref 6–20)
C-REACTIVE PROTEIN: 136.1 MG/L (ref 0–5)
CALCIUM SERPL-MCNC: 7 MG/DL (ref 8.6–10.4)
CHLORIDE BLD-SCNC: 100 MMOL/L (ref 98–107)
CO2: 25 MMOL/L (ref 20–31)
CREAT SERPL-MCNC: 1.1 MG/DL (ref 0.5–0.9)
EOSINOPHILS RELATIVE PERCENT: 4 % (ref 1–4)
FIO2: 30
GFR AFRICAN AMERICAN: >60 ML/MIN
GFR NON-AFRICAN AMERICAN: 54 ML/MIN
GFR SERPL CREATININE-BSD FRML MDRD: ABNORMAL ML/MIN/{1.73_M2}
GLUCOSE BLD-MCNC: 112 MG/DL (ref 70–99)
GLUCOSE BLD-MCNC: 121 MG/DL (ref 65–105)
GLUCOSE BLD-MCNC: 96 MG/DL (ref 65–105)
GLUCOSE BLD-MCNC: 99 MG/DL (ref 74–100)
HCT VFR BLD CALC: 26.3 % (ref 36.3–47.1)
HEMOGLOBIN: 8.2 G/DL (ref 11.9–15.1)
IMMATURE GRANULOCYTES: 5 %
LYMPHOCYTES # BLD: 14 % (ref 24–44)
MAGNESIUM: 2 MG/DL (ref 1.6–2.6)
MCH RBC QN AUTO: 27.3 PG (ref 25.2–33.5)
MCHC RBC AUTO-ENTMCNC: 31.2 G/DL (ref 28.4–34.8)
MCV RBC AUTO: 87.7 FL (ref 82.6–102.9)
MODE: ABNORMAL
MONOCYTES # BLD: 10 % (ref 1–7)
MORPHOLOGY: ABNORMAL
NRBC AUTOMATED: 0.2 PER 100 WBC
NUCLEATED RED BLOOD CELLS: 1 PER 100 WBC
O2 DEVICE/FLOW/%: ABNORMAL
PDW BLD-RTO: 16.3 % (ref 11.8–14.4)
PLATELET # BLD: 130 K/UL (ref 138–453)
PMV BLD AUTO: 10.6 FL (ref 8.1–13.5)
POC HCO3: 27.6 MMOL/L (ref 21–28)
POC O2 SATURATION: 99 % (ref 94–98)
POC PCO2: 39.1 MM HG (ref 35–48)
POC PH: 7.46 (ref 7.35–7.45)
POC PO2: 120.2 MM HG (ref 83–108)
POSITIVE BASE EXCESS, ART: 4 (ref 0–3)
POTASSIUM SERPL-SCNC: 3.8 MMOL/L (ref 3.7–5.3)
PROCALCITONIN: 1.88 NG/ML
RBC # BLD: 3 M/UL (ref 3.95–5.11)
REASON FOR REJECTION: NORMAL
SAMPLE SITE: ABNORMAL
SEG NEUTROPHILS: 67 % (ref 36–66)
SEGMENTED NEUTROPHILS ABSOLUTE COUNT: 7.24 K/UL (ref 1.8–7.7)
SODIUM BLD-SCNC: 136 MMOL/L (ref 135–144)
TOTAL CK: 314 U/L (ref 26–192)
TOTAL PROTEIN: 4.8 G/DL (ref 6.4–8.3)
WBC # BLD: 10.8 K/UL (ref 3.5–11.3)
ZZ NTE CLEAN UP: ORDERED TEST: NORMAL
ZZ NTE WITH NAME CLEAN UP: SPECIMEN SOURCE: NORMAL

## 2022-09-14 PROCEDURE — 80048 BASIC METABOLIC PNL TOTAL CA: CPT

## 2022-09-14 PROCEDURE — 82947 ASSAY GLUCOSE BLOOD QUANT: CPT

## 2022-09-14 PROCEDURE — 82803 BLOOD GASES ANY COMBINATION: CPT

## 2022-09-14 PROCEDURE — 2580000003 HC RX 258: Performed by: STUDENT IN AN ORGANIZED HEALTH CARE EDUCATION/TRAINING PROGRAM

## 2022-09-14 PROCEDURE — 2500000003 HC RX 250 WO HCPCS: Performed by: INTERNAL MEDICINE

## 2022-09-14 PROCEDURE — 2000000000 HC ICU R&B

## 2022-09-14 PROCEDURE — 99233 SBSQ HOSP IP/OBS HIGH 50: CPT | Performed by: INTERNAL MEDICINE

## 2022-09-14 PROCEDURE — 51798 US URINE CAPACITY MEASURE: CPT

## 2022-09-14 PROCEDURE — 36600 WITHDRAWAL OF ARTERIAL BLOOD: CPT

## 2022-09-14 PROCEDURE — C9113 INJ PANTOPRAZOLE SODIUM, VIA: HCPCS | Performed by: STUDENT IN AN ORGANIZED HEALTH CARE EDUCATION/TRAINING PROGRAM

## 2022-09-14 PROCEDURE — 71045 X-RAY EXAM CHEST 1 VIEW: CPT

## 2022-09-14 PROCEDURE — 86140 C-REACTIVE PROTEIN: CPT

## 2022-09-14 PROCEDURE — 2580000003 HC RX 258: Performed by: INTERNAL MEDICINE

## 2022-09-14 PROCEDURE — 6360000002 HC RX W HCPCS: Performed by: EMERGENCY MEDICINE

## 2022-09-14 PROCEDURE — 84145 PROCALCITONIN (PCT): CPT

## 2022-09-14 PROCEDURE — 82550 ASSAY OF CK (CPK): CPT

## 2022-09-14 PROCEDURE — 94761 N-INVAS EAR/PLS OXIMETRY MLT: CPT

## 2022-09-14 PROCEDURE — 6370000000 HC RX 637 (ALT 250 FOR IP): Performed by: EMERGENCY MEDICINE

## 2022-09-14 PROCEDURE — 99212 OFFICE O/P EST SF 10 MIN: CPT

## 2022-09-14 PROCEDURE — 83735 ASSAY OF MAGNESIUM: CPT

## 2022-09-14 PROCEDURE — 2500000003 HC RX 250 WO HCPCS: Performed by: STUDENT IN AN ORGANIZED HEALTH CARE EDUCATION/TRAINING PROGRAM

## 2022-09-14 PROCEDURE — 6360000002 HC RX W HCPCS: Performed by: STUDENT IN AN ORGANIZED HEALTH CARE EDUCATION/TRAINING PROGRAM

## 2022-09-14 PROCEDURE — 80076 HEPATIC FUNCTION PANEL: CPT

## 2022-09-14 PROCEDURE — 6370000000 HC RX 637 (ALT 250 FOR IP)

## 2022-09-14 PROCEDURE — 85025 COMPLETE CBC W/AUTO DIFF WBC: CPT

## 2022-09-14 PROCEDURE — 94003 VENT MGMT INPAT SUBQ DAY: CPT

## 2022-09-14 PROCEDURE — 99291 CRITICAL CARE FIRST HOUR: CPT | Performed by: INTERNAL MEDICINE

## 2022-09-14 PROCEDURE — 6360000002 HC RX W HCPCS: Performed by: INTERNAL MEDICINE

## 2022-09-14 PROCEDURE — 2700000000 HC OXYGEN THERAPY PER DAY

## 2022-09-14 PROCEDURE — 36415 COLL VENOUS BLD VENIPUNCTURE: CPT

## 2022-09-14 RX ORDER — ENOXAPARIN SODIUM 100 MG/ML
30 INJECTION SUBCUTANEOUS 2 TIMES DAILY
Status: DISCONTINUED | OUTPATIENT
Start: 2022-09-14 | End: 2022-10-05 | Stop reason: HOSPADM

## 2022-09-14 RX ORDER — SODIUM CHLORIDE, SODIUM LACTATE, POTASSIUM CHLORIDE, AND CALCIUM CHLORIDE .6; .31; .03; .02 G/100ML; G/100ML; G/100ML; G/100ML
500 INJECTION, SOLUTION INTRAVENOUS ONCE
Status: COMPLETED | OUTPATIENT
Start: 2022-09-14 | End: 2022-09-14

## 2022-09-14 RX ORDER — MIDODRINE HYDROCHLORIDE 5 MG/1
10 TABLET ORAL EVERY 8 HOURS
Status: DISCONTINUED | OUTPATIENT
Start: 2022-09-14 | End: 2022-10-05 | Stop reason: HOSPADM

## 2022-09-14 RX ADMIN — SODIUM CHLORIDE, PRESERVATIVE FREE 10 ML: 5 INJECTION INTRAVENOUS at 20:11

## 2022-09-14 RX ADMIN — Medication 7 MCG/MIN: at 08:21

## 2022-09-14 RX ADMIN — MIDODRINE HYDROCHLORIDE 10 MG: 5 TABLET ORAL at 10:25

## 2022-09-14 RX ADMIN — ENOXAPARIN SODIUM 30 MG: 100 INJECTION SUBCUTANEOUS at 20:12

## 2022-09-14 RX ADMIN — SODIUM CHLORIDE, POTASSIUM CHLORIDE, SODIUM LACTATE AND CALCIUM CHLORIDE: 600; 310; 30; 20 INJECTION, SOLUTION INTRAVENOUS at 10:15

## 2022-09-14 RX ADMIN — ANTI-FUNGAL POWDER MICONAZOLE NITRATE TALC FREE: 1.42 POWDER TOPICAL at 07:59

## 2022-09-14 RX ADMIN — CEFTRIAXONE SODIUM 2000 MG: 10 INJECTION, POWDER, FOR SOLUTION INTRAVENOUS at 11:31

## 2022-09-14 RX ADMIN — SODIUM CHLORIDE, POTASSIUM CHLORIDE, SODIUM LACTATE AND CALCIUM CHLORIDE 500 ML: 600; 310; 30; 20 INJECTION, SOLUTION INTRAVENOUS at 10:17

## 2022-09-14 RX ADMIN — LEVOTHYROXINE SODIUM 75 MCG: 75 TABLET ORAL at 07:58

## 2022-09-14 RX ADMIN — ANTI-FUNGAL POWDER MICONAZOLE NITRATE TALC FREE: 1.42 POWDER TOPICAL at 20:11

## 2022-09-14 RX ADMIN — FENTANYL CITRATE 50 MCG: 50 INJECTION, SOLUTION INTRAMUSCULAR; INTRAVENOUS at 06:08

## 2022-09-14 RX ADMIN — CEFEPIME 2000 MG: 2 INJECTION, POWDER, FOR SOLUTION INTRAVENOUS at 07:58

## 2022-09-14 RX ADMIN — HEPARIN SODIUM 5000 UNITS: 5000 INJECTION INTRAVENOUS; SUBCUTANEOUS at 06:07

## 2022-09-14 RX ADMIN — FENTANYL CITRATE 50 MCG: 50 INJECTION, SOLUTION INTRAMUSCULAR; INTRAVENOUS at 14:25

## 2022-09-14 RX ADMIN — SODIUM CHLORIDE, PRESERVATIVE FREE 40 MG: 5 INJECTION INTRAVENOUS at 07:58

## 2022-09-14 RX ADMIN — MIDODRINE HYDROCHLORIDE 10 MG: 5 TABLET ORAL at 17:56

## 2022-09-14 RX ADMIN — SODIUM CHLORIDE, POTASSIUM CHLORIDE, SODIUM LACTATE AND CALCIUM CHLORIDE: 600; 310; 30; 20 INJECTION, SOLUTION INTRAVENOUS at 03:39

## 2022-09-14 RX ADMIN — DAPTOMYCIN 550 MG: 500 INJECTION, POWDER, LYOPHILIZED, FOR SOLUTION INTRAVENOUS at 17:58

## 2022-09-14 RX ADMIN — SODIUM CHLORIDE 10 ML/HR: 9 INJECTION, SOLUTION INTRAVENOUS at 10:17

## 2022-09-14 ASSESSMENT — PULMONARY FUNCTION TESTS
PIF_VALUE: 18
PIF_VALUE: 10
PIF_VALUE: 8
PIF_VALUE: 8
PIF_VALUE: 11
PIF_VALUE: 12
PIF_VALUE: 12
PIF_VALUE: 17

## 2022-09-14 NOTE — PROGRESS NOTES
Physician Progress Note      Melissa Stafford  CSN #:                  [id number]  :                       1980  ADMIT DATE:       9/10/2022 4:45 PM  [address] [place] DATE:  RESPONDING  PROVIDER #:        Sara Ariza MD        QUERY TEXT:    Type of Encephalopathy: Please provide further specificity, if known. Clinical indicators include: septic, fever, systemic inflammatory response   syndrome, sirs, infectious, altered mental status, septicemia, acute,   encephalopathy, metabolic acidosis  Options provided:  -- Anoxic/hypoxic encephalopathy  -- Metabolic encephalopathy  -- Toxic encephalopathy  -- Hepatic encephalopathy  -- Hypertensive encephalopathy  -- Other - I will add my own diagnosis  -- Disagree - Not applicable / Not valid  -- Disagree - Clinically Unable to determine / Unknown        PROVIDER RESPONSE TEXT:    The patient has metabolic encephalopathy.       Electronically signed by:  Sara Ariza MD 2022 4:58 PM

## 2022-09-14 NOTE — PLAN OF CARE
Problem: Discharge Planning  Goal: Discharge to home or other facility with appropriate resources  9/14/2022 1031 by Carolynn Downing RN  Outcome: Progressing     Problem: Respiratory - Adult  Goal: Achieves optimal ventilation and oxygenation  9/14/2022 1031 by Carolynn Downing RN  Outcome: Progressing     Problem: Safety - Medical Restraint  Goal: Remains free of injury from restraints (Restraint for Interference with Medical Device)  Description: INTERVENTIONS:  1. Determine that other, less restrictive measures have been tried or would not be effective before applying the restraint  2. Evaluate the patient's condition at the time of restraint application  3. Inform patient/family regarding the reason for restraint  4. Q2H: Monitor safety, psychosocial status, comfort, nutrition and hydration  9/14/2022 1031 by Carolynn Downing RN  Outcome: Progressing  Flowsheets (Taken 9/14/2022 0800 by Melissa Ribera RN)  Remains free of injury from restraints (restraint for interference with medical device): Every 2 hours: Monitor safety, psychosocial status, comfort, nutrition and hydration     Problem: Pain  Goal: Verbalizes/displays adequate comfort level or baseline comfort level  9/14/2022 1031 by Carolynn Downing RN  Outcome: Progressing     Problem: Confusion  Goal: Confusion, delirium, dementia, or psychosis is improved or at baseline  Description: INTERVENTIONS:  1. Assess for possible contributors to thought disturbance, including medications, impaired vision or hearing, underlying metabolic abnormalities, dehydration, psychiatric diagnoses, and notify attending LIP  2. [place] high risk fall precautions, as indicated  3. Provide frequent short contacts to provide reality reorientation, refocusing and direction  4. Decrease environmental stimuli, including noise as appropriate  5. Monitor and intervene to maintain adequate nutrition, hydration, elimination, sleep and activity  6.  If unable to ensure safety without constant attention obtain sitter and review sitter guidelines with assigned personnel  7. Initiate Psychosocial CNS and Spiritual Care consult, as indicated  9/14/2022 1031 by Jennifer Quiles RN  Outcome: Progressing     Problem: Nutrition Deficit:  Goal: Optimize nutritional status  Outcome: Progressing     Problem: Skin/Tissue Integrity  Goal: Absence of new skin breakdown  Description: 1. Monitor for areas of redness and/or skin breakdown  2. Assess vascular access sites hourly  3. Every 4-6 hours minimum:  Change oxygen saturation probe site  4. Every 4-6 hours:  If on nasal continuous positive airway pressure, respiratory therapy assess nares and determine need for appliance change or resting period.   9/14/2022 1031 by Jennifer Quiles RN  Outcome: Progressing     Problem: Safety - Adult  Goal: Free from fall injury  9/14/2022 1031 by Jennifer Quiles RN  Outcome: Progressing     Problem: ABCDS Injury Assessment  Goal: Absence of physical injury  Outcome: Progressing  Flowsheets (Taken 9/14/2022 0800)  Absence of Physical Injury: Implement safety measures based on patient assessment

## 2022-09-14 NOTE — PROGRESS NOTES
[facility] -   Infectious diseases evaluation  admission date 9/10/2022    reason for consultation:   Sepsis     Impression :   Current:  Sepsis with septic shock  Altered mentation acute encephalopathy  CHULA  Bandemia  Lactic acidosis  Elevated procalcitonin 12  Gram-negative and gram-positive bacteremia-[place] [facility]  Bilat breasts infected / necrotic wounds - maggots  Sacral sloughed skin from moisture  Hyperglycemia  Morbd obesity-BMI 47  COPD  CHULA - cr 2.22    Other:    Discussion / summary of stay / plan of care   Sepsis of unclear cause, with septic shock and elevated procalcitonin at 12. Very concerning for gram-negative organisms and hence a UTI is of a concern  CT abdomen pelvis at [facility]. [facility] is negative for any bowel obstruction although it was a concern in [facility]  The urine analysis at [place] [address] is normal but I am not clear as of the UA at [facility]  The bacteremia with gram-negative and gram-positive is also of a concern, pending at [facility]  Recommendations   Continue cefepime we will add daptomycin  CPK 12  Will reach out to [facility] further blood cultures and urine culture  Neg nasal MRSA swab    Infection Control Recommendations   [place] Precautions  Contact Isolation       Antimicrobial Stewardship Recommendations   Simplification of therapy  Targeted therapy      History of Present Illness:   Initial history:  Tom Drake is a 38y.o.-year-old female found unresponsive at home was taken to [facility], found to have stools on many of her wounds and maggots.   CT of the abdomen showed concern for small bowel obstruction  She had hyponatremia with a sodium 128, leukocytosis elevated procalcitonin and lactic acid  Any blood culture came back positive for gram-negative rods gram-positive cocci clusters  CHULA  Transferred to us with concern of septic shock and sepsis-General surgery on board due to the concern of small bowel obstruction    After transfer to [facility]. [facility]'s CT scan of the abdomen pelvis showed no pneumonia on the lower lobes and no acute abdomen. Patient is on antibiotics, infectious consulted for opinion.   The urine analysis on 9/10 at [facility] is not suggestive of infection-but I do not have the UA or the urine culture off from [address] [place] [address] urinalysis seems to be very contaminated with epithelial cells                          Interval changes  9/13/2022   Patient Vitals for the past 8 hrs:   BP Temp Temp src Pulse Resp SpO2   09/13/22 2306 -- -- -- (!) 107 19 100 %   09/13/22 1929 -- -- -- (!) 118 21 100 %   09/13/22 1845 (!) 110/45 -- -- (!) 111 18 100 %   09/13/22 1830 (!) 106/53 -- -- (!) 110 16 100 %   09/13/22 1815 (!) 112/50 -- -- (!) 115 15 100 %   09/13/22 1800 109/74 98.1 °F (36.7 °C) Bladder (!) 111 18 100 %   09/13/22 1745 (!) 110/45 -- -- (!) 114 19 100 %   09/13/22 1730 (!) 108/50 -- -- (!) 114 19 100 %   09/13/22 1715 (!) 97/54 -- -- (!) 116 18 100 %   09/13/22 1700 110/86 -- -- (!) 116 13 100 %   09/13/22 1645 (!) 137/111 -- -- (!) 116 19 100 %   09/13/22 1630 126/77 -- -- (!) 115 21 100 %   09/13/22 1615 (!) 146/118 -- -- (!) 112 14 100 %   09/13/22 1600 103/60 98.4 °F (36.9 °C) Bladder (!) 120 16 100 %   09/13/22 1547 -- -- -- (!) 125 22 100 %   09/13/22 1545 113/88 -- -- -- -- --   09/13/22 1530 100/79 -- -- (!) 122 19 100 %     Pend blood cultures results from [address] [place] [address] blood cultures are negative,     All the wounds are evaluated with wound care on the bedside, the back has multiple open ulcers but no clear cellulitis at this point    Breast macerated wounds, all pressure wounds  Abd wall hernia -   FIo2 30 peep 5 on the vent -sedated - sputum beige  Yanez in place    Summary of relevant labs:  Labs:  sodium 128, 136  leukocytosis  21 -17 - 13  elevated procalcitonin   Elevated lactic acid 2.8  Creat 2.22 - 1.4  Micro:  BC GNR and CPC clusters at the BV  UA neg 9/10   MRSA nasal swab negative  blood culture 9/10     Imaging:  BREASTS US neg for abscess  CT scan of the abdomen pelvis showed no pneumonia on the lower lobes and no acute abdomen. I have personally reviewed the past medical history, past surgical history, medications, social history, and family history, and I haveupdated the database accordingly. Allergies:   Amoxicillin and Robitussin day-night value lawrecne [phenylephrine-diphenhyd-[id number]]     Review of Systems:     Review of Systems   Unable to perform ROS: Mental status change     Physical Examination :       Physical Exam  Constitutional:       General: She is not in acute distress. Appearance: She is obese. She is ill-appearing. HENT:      Head: Normocephalic and atraumatic. Nose: Nose normal.      Mouth/Throat:      Mouth: Mucous membranes are moist.   Eyes:      General: No scleral icterus. Conjunctiva/sclera: Conjunctivae normal.   Cardiovascular:      Rate and Rhythm: Normal rate and regular rhythm. Heart sounds: Normal heart sounds. No murmur heard. No friction rub. Pulmonary:      Effort: No respiratory distress. Breath sounds: Normal breath sounds. Abdominal:      General: There is no distension. Tenderness: There is no abdominal tenderness. Genitourinary:     Comments: Urine elder  Musculoskeletal:         General: No swelling or deformity. Cervical back: Neck supple. No rigidity. Skin:     Coloration: Skin is not pale. Findings: Lesion present. No erythema. Neurological:      Comments: On the vent   Psychiatric:      Comments: On the vent       Past Medical History:   No past medical history on file. Past Surgical  History:   No past surgical history on file.     Medications:      bacitracin   Topical TID    daptomycin (CUBICIN) IVPB  6 mg/kg (Adjusted) IntraVENous Q24H    sodium chloride flush  5-40 mL IntraVENous 2 times per day    pantoprazole (PROTONIX) 40 mg injection  40 mg IntraVENous Daily    cefepime  2,000 mg IntraVENous Q12H    miconazole   Topical BID    heparin (porcine)  5,000 Units SubCUTAneous 3 times per day    levothyroxine  75 mcg Oral Daily       Social History:     Social History     Socioeconomic History    Marital status:      Spouse name: Not on file    Number of children: Not on file    Years of education: Not on file    Highest education level: Not on file   Occupational History    Not on file   Tobacco Use    Smoking status: Not on file    Smokeless tobacco: Not on file   Substance and Sexual Activity    Alcohol use: Not on file    Drug use: Not on file    Sexual activity: Not on file   Other Topics Concern    Not on file   Social History Narrative    Not on file     Social Determinants of Health     Financial Resource Strain: Not on file   Food Insecurity: Not on file   Transportation Needs: Not on file   Physical Activity: Not on file   Stress: Not on file   Social Connections: Not on file   Intimate Partner Violence: Not on file   Housing Stability: Not on file       Family History:   No family history on file. Medical Decision Making:   I have independently reviewed/ordered the following labs:    CBC with Differential:   Recent Labs     09/12/22  0451 09/13/22  0113   WBC 17.7* 13.1*   HGB 9.0* 9.1*   HCT 27.6* 29.7*    147   LYMPHOPCT 10* 17*   MONOPCT 5 9*       BMP:  Recent Labs     09/12/22  0451 09/13/22  0113   * 136   K 4.4 4.2    101   CO2 19* 21   BUN 51* 42*   CREATININE 1.90* 1.41*   MG 2.1 2.1       Hepatic Function Panel:   Recent Labs     09/12/22  0451 09/13/22  0113   PROT 5.1* 5.0*   LABALBU 1.7* 1.5*   BILIDIR 0.3 0.3   IBILI 0.2 0.2   BILITOT 0.5 0.5   ALKPHOS 129* 120*   ALT 11 11   AST 18 21       No results for input(s): RPR in the last 72 hours. No results for input(s): HIV in the last 72 hours. No results for input(s): BC in the last 72 hours.   Lab Results   Component Value Date/Time    CREATININE 1.41 09/13/2022 01:13 AM    GLUCOSE 110 09/13/2022 01:13 AM       Detailed results: Thank you for allowing us to participate in the care of this patient. Please call with questions. This note is created with the assistance of a speech recognition program.  While intending to generate adocument that actually reflects the content of the visit, the document can still have some errors including those of syntax and sound a like substitutions which may escape proof reading. It such instances, actual meaningcan be extrapolated by contextual diversion.     Mark Gonzalez MD  Office: [phone]  Perfect serve / office [phone]

## 2022-09-14 NOTE — PROGRESS NOTES
[facility] Wound Ostomy Continence Nurse  Follow Up      NAME:  Ranjeet Metcalf  MEDICAL RECORD NUMBER:  [id number]  AGE: 38 y.o. GENDER: female  : 1980  TODAY'S DATE:  2022    Subjective:     Reason for WOCN Evaluation and Assessment: \"multiple wounds to back, breasts\"      Rajiv Sarkar is a 38 y.o. female referred by:   [x] Physician  [] Nursing  [] Other:      Wound Identification:  Wound Type: pressure and caustic dermatitis  Contributing Factors: chronic pressure, decreased mobility, shear force, obesity, incontinence of stool, incontinence of urine, poor hygiene, and non-adherence        Patient was found to be obtunded. Down time in bed prolonged but unknown exact amount. Severe buttock, groin, posterior thigh incontinence associated dermatitis, torso and extremity fold intertrigo, pressure injures of the breasts, left upper arm, and bilateral buttock/ischial tuberosities.              Objective:      /70   Pulse (!) 113   Temp 99 °F (37.2 °C)   Resp 20   Ht 5' 7\" (1.702 m)   Wt (!) 318 lb 6.4 oz (144.4 kg)   SpO2 100%   BMI 49.87 kg/m²   Facundo Risk Score: Facundo Scale Score: 12    LABS    CBC:   Lab Results   Component Value Date/Time    WBC 10.8 2022 05:15 AM    RBC 3.00 2022 05:15 AM    HGB 8.2 2022 05:15 AM     CMP:  Albumin:    Lab Results   Component Value Date/Time    LABALBU 1.5 2022 05:15 AM     PT/INR:    Lab Results   Component Value Date/Time    PROTIME 11.1 09/10/2022 08:38 PM    INR 1.0 09/10/2022 08:38 PM     HgBA1c:  No results found for: LABA1C  PTT: No components found for: LABPTT      Assessment:       Measurements:     22 1430   Wound 09/10/22 Breast Left   Date First Assessed/Time First Assessed: 09/10/22 1730   Present on Hospital Admission: Yes  Primary Wound Type: Pressure Injury  Location: Breast  Wound Location Orientation: Left   Wound Image     Wound Etiology Pressure Stage 3   Dressing Status New dressing applied   Wound Cleansed Soap and water   Dressing/Treatment Triad hydro/zinc oxide-based hydrophilic paste; Hydrofiber Ag; Other (comment)  (Sorbex pads, PINC tape to secure)   Dressing Change Due 09/15/22   Wound Assessment Subcutaneous;Pink/red;Eschar moist;Slough   Drainage Amount Moderate   Drainage Description Serosanguinous   Odor None   Felicia-wound Assessment Fragile   Wound 09/10/22 Other (Comment) mammary folds bilaterally   Date First Assessed/Time First Assessed: 09/10/22 1730   Present on Hospital Admission: Yes  Primary Wound Type: Other (comment)  Location: Other (Comment)  Wound Description (Comments): mammary folds bilaterally   Wound Image     Wound Etiology Other  (severe intertrigo)   Dressing Status New dressing applied   Wound Cleansed Soap and water   Dressing/Treatment Hydrofiber Ag; Other (comment)  (DriGo HP cloths)   Dressing Change Due 09/15/22   Wound Assessment Subcutaneous;Pink/red   Drainage Amount Small   Drainage Description Serosanguinous   Odor None   Felicia-wound Assessment Intact; Blanchable erythema   Wound 09/10/22 Breast Right   Date First Assessed/Time First Assessed: 09/10/22 1730   Present on Hospital Admission: Yes  Primary Wound Type: Pressure Injury  Location: Breast  Wound Location Orientation: Right   Wound Image    Wound Etiology Pressure Stage 3   Dressing Status New dressing applied   Wound Cleansed Soap and water   Dressing/Treatment Hydrofiber Ag; Other (comment)  (Sorbex pad)   Dressing Change Due 09/15/22   Wound Assessment Subcutaneous;Pink/red   Drainage Amount Moderate   Drainage Description Serosanguinous   Odor None   Felicia-wound Assessment Fragile   Wound 09/10/22 Rib Cage Left;Lateral   Date First Assessed/Time First Assessed: 09/10/22 1530   Present on Hospital Admission: Yes  Primary Wound Type: Pressure Injury  Location: (c) Rib Cage  Wound Location Orientation: Left;Lateral   Wound Image    Wound Etiology Other  (severe incontinence associated dermatitis)   Dressing Status New dressing applied   Wound Cleansed Soap and water   Dressing/Treatment Triad hydro/zinc oxide-based hydrophilic paste   Dressing Change Due 09/15/22   Wound Assessment Pink/red;Denuded;Fibrinous   Drainage Amount Moderate   Drainage Description Serosanguinous   Odor None   Felicia-wound Assessment Fragile   Wound 09/10/22 Buttocks incontinence associated dermatitis   Date First Assessed/Time First Assessed: 09/10/22 1530   Present on Hospital Admission: Yes  Primary Wound Type: Pressure Injury  Location: Buttocks  Wound Description (Comments): incontinence associated dermatitis   Wound Image      Wound Etiology Other   Dressing Status New dressing applied   Wound Cleansed Soap and water   Dressing/Treatment Triad hydro/zinc oxide-based hydrophilic paste  (moisture wicking under pads)   Dressing Change Due 09/15/22   Wound Assessment Subcutaneous;Pink/red;Denuded  (bilateral iscial areas with ST 3 injuries)   Drainage Amount Moderate   Drainage Description Serosanguinous   Odor None   Felicia-wound Assessment Fragile   Wound 09/12/22 Arm Left;Proximal   Date First Assessed/Time First Assessed: 09/12/22 1600   Present on Hospital Admission: Yes  Primary Wound Type: Pressure Injury  Location: Arm  Wound Location Orientation: Left;Proximal   Wound Image    Dressing Status New dressing applied   Wound Cleansed Soap and water   Dressing/Treatment Triad hydro/zinc oxide-based hydrophilic paste   Dressing Change Due 09/15/22   Wound Assessment Pink/red   Drainage Amount Small   Drainage Description Serous   Odor None   Felicia-wound Assessment Intact; Blanchable erythema   Wound 09/12/22 Thigh Left;Medial severe intertrigo   Date First Assessed/Time First Assessed: 09/12/22 1705   Present on Hospital Admission: Yes  Location: Thigh  Wound Location Orientation: Left;Medial  Wound Description (Comments): severe intertrigo   Wound Etiology Other  (severe incontinence associated dermatitis)   Dressing Status New dressing applied   Wound Cleansed Soap and water   Dressing/Treatment Triad hydro/zinc oxide-based hydrophilic paste  (moisture wicking under pad to separate the thighs)   Wound Assessment Subcutaneous;Pink/red   Drainage Amount Moderate   Drainage Description Serosanguinous   Odor None   Felicia-wound Assessment Blanchable erythema           Response to treatment:  Well tolerated by patient. Plan:      Plan of Care: Turn every 2 hours  Float heels off of bed with pillows under calves  Use lift sling to reposition patient to minimize potential for shear injury. Breasts: Triad Hydrophilic Wound Dressing paste to the remaining necrotic tissue, Opticell Ag gelling fiber to the red open areas, Sorbex pads, Pinc/HyTape to secure. Change daily and prn soilage  Breast folds/chest: Opticell Ag gelling fiber to the wound beds, separate the inframammary folds with DriGo HP cloths. Change daily and prn soilage  Abdominal fold: DriGo HP cloths. Change at least every 5 days and prn solage  Medial thighs and groin: separate with cloths, moisture wicking under pads  Buttocks, posterior thighs, low back: Triad Hydrophiic Wound Dressing paste daily and prn hygiene. Specialty Bed Required : Yes   [x] Low Air Loss   [x] Pressure Redistribution  [] Fluid Immersion  [x] Bariatric  [] Total Pressure Relief  [] Other:      Discharge Plan:  tbd     Patient/Caregiver Teaching:        [] Indicates understanding                [] Needs reinforcement  [] Unsuccessful                                  [] Verbal Understanding  [] Demonstrated understanding        [x] No evidence of learning  [] Refused teaching                           [] N/A     Contact the Wound Ostomy RN on-call Monday-Friday [phone] via [employer] by searching \"wound\" under \"groups\" and selecting the on-call clinician.              Electronically signed by Hollis Levin RN, CWON on 9/14/2022 at 4:22 PM

## 2022-09-14 NOTE — PROGRESS NOTES
Critical Care Team - Daily Progress Note      Date and time: 2022 7:36 AM  Patient's name:  Ezra Pearson  Medical Record Number: [id number]  Patient's account/billing number: [de-identified]  Patient's YOB: 1980  Age: 38 y.o. Date of Admission: 9/10/2022  4:45 PM  Length of stay during current admission: 4      Primary Care Physician: No primary care provider on file. ICU Attending Physician: Dr. Shine Almaguer    Code Status: Full Code    Reason for ICU admission: Septic shock, intubated requiring mechanical ventilation and pressor support      SUBJECTIVE:     OVERNIGHT EVENTS:     No acute events overnight  Continues to be intubated  No sedation. Not following commands at this point  Minimum vent settings  Noted increase in Levophed requirement as compared to yesterday, currently at 7 mcg  Urine output 621 mL in last 24 hours  CHULA improving, creatinine 1.10 today  Leukocytosis improved, 10.8 today  Drop in platelet count to 865 -monitor at this point    Temp (24hrs), Av.2 °F (36.8 °C), Min:97.9 °F (36.6 °C), Max:98.4 °F (82.9 °C)  Systolic (82GEN), LUCRETIA:957 , Min:71 , HCI:266   Diastolic (19IIN), YWQ:34, Min:39, Max:118  Urine output in the last 24 hours:  In: 1586.4 [I.V.:1536.4]  Out: 671 [Urine:621]  Patient Vitals for the past 96 hrs (Last 3 readings):   Weight   22 0600 (!) 318 lb 6.4 oz (144.4 kg)   09/10/22 1742 (!) 307 lb (139.3 kg)       AWAKE & FOLLOWING COMMANDS:  [x] No   [] Yes    CURRENT VENTILATION STATUS:     [x] Ventilator  [] BIPAP  [] Nasal Cannula [] Room Air      SECRETIONS Amount:  [x] Small [] Moderate  [] Large  [] None  Color:     [] White [] Colored  [] Bloody    SEDATION:  RAAS Score:  [] Propofol gtt  [] Versed gtt  [] Ativan gtt   [] No Sedation    PARALYZED:  [x] No    [] Yes    DIARRHEA:                [x] No                [] Yes  (C. Difficile status: [] Positive   [] negative [] pending)    VASOPRESSORS:  [] No    [x] Yes    If yes -   [x] Levophed       [] Dopamine     [] Vasopressin       [] Dobutamine  [] Phenylephrine         [] Epinephrine    CENTRAL LINES:     [] No   [x] Yes   (Date of Insertion: 9/10/22  )           If yes -     [x] Right IJ     [] Left IJ [] Right Femoral [] Left Femoral                   [] Right Subclavian [] Left Subclavian    LUQUE'S CATHETER:   [] No   [x] Yes  (Date of Insertion:22)     URINE OUTPUT:            [] Good   [x] Low              [] Anuric      OBJECTIVE:     VITAL SIGNS:  /76   Pulse (!) 117   Temp 98.1 °F (36.7 °C) (Bladder)   Resp 18   Ht 5' 7\" (1.702 m)   Wt (!) 318 lb 6.4 oz (144.4 kg)   SpO2 100%   BMI 49.87 kg/m²   Tmax over 24 hours:  Temp (24hrs), Av.2 °F (36.8 °C), Min:97.9 °F (36.6 °C), Max:98.4 °F (36.9 °C)      Patient Vitals for the past 6 hrs:   BP Pulse Resp SpO2   22 0516 -- (!) 117 18 100 %   22 0430 108/76 (!) 113 20 100 %   22 0415 -- (!) 113 18 100 %   22 0400 -- (!) 114 19 100 %   22 0345 103/65 (!) 105 22 100 %   22 0330 107/61 (!) 114 21 100 %   22 0321 -- 97 17 100 %   22 0315 (!) 104/51 98 18 100 %   22 0300 (!) 104/56 (!) 107 17 100 %   22 0245 (!) 105/48 (!) 111 18 100 %   22 0230 96/74 (!) 111 18 100 %   22 0215 (!) 102/51 (!) 101 19 100 %   22 0200 (!) 102/52 (!) 110 17 100 %   22 0145 (!) 105/51 (!) 106 21 100 %         Intake/Output Summary (Last 24 hours) at 2022 0736  Last data filed at 2022 3573  Gross per 24 hour   Intake 1567.92 ml   Output 671 ml   Net 896.92 ml       Wt Readings from Last 2 Encounters:   22 (!) 318 lb 6.4 oz (144.4 kg)     Body mass index is 49.87 kg/m².         PHYSICAL EXAMINATION :  Constitutional: Intubated  EENT: PERRLA, sclera clear, anicteric, moist mucous membranes  Neck: Supple, symmetrical, trachea midline  Respiratory: Diffuse rhonchi on ascultation  Cardiovascular: tachycardia with regular rhythm, normal S1, S2, no murmur noted, and 2+ distal pulses in all 4 extremities   Abdomen: soft, distended,  no masses or organomegaly  Neurological: Intubated.   Not following commands at this point  Skin: Extensive wounds on the back, buttocks, bilateral breast  Extremities:  peripheral pulses normal, no pedal edema, no clubbing or cyanosis    MEDICATIONS:    Scheduled Meds:   bacitracin   Topical TID    daptomycin (CUBICIN) IVPB  6 mg/kg (Adjusted) IntraVENous Q24H    sodium chloride flush  5-40 mL IntraVENous 2 times per day    pantoprazole (PROTONIX) 40 mg injection  40 mg IntraVENous Daily    cefepime  2,000 mg IntraVENous Q12H    miconazole   Topical BID    heparin (porcine)  5,000 Units SubCUTAneous 3 times per day    levothyroxine  75 mcg Oral Daily     Continuous Infusions:   lactated ringers 75 mL/hr at 09/14/22 0614    norepinephrine 7 mcg/min (09/14/22 0614)    [Held by provider] fentaNYL 50 mcg/mL Stopped (09/13/22 1354)    sodium chloride 10 mL/hr at 09/14/22 0614     PRN Meds:   dexmedetomidine, 0.1-1.5 mcg/kg/hr, PRN  fentanNYL, 50 mcg, Q2H PRN  sodium chloride flush, 5-40 mL, PRN  sodium chloride, , PRN  ondansetron, 4 mg, Q8H PRN   Or  ondansetron, 4 mg, Q6H PRN  polyethylene glycol, 17 g, Daily PRN  acetaminophen, 650 mg, Q6H PRN   Or  acetaminophen, 650 mg, Q6H PRN  potassium chloride, 20 mEq, PRN   Or  potassium chloride, 10 mEq, PRN        VENT SETTINGS (Comprehensive) (if applicable):  Vent Information  Ventilator ID: [id number]  Equipment Changed: Airway securing device  Additional Respiratory Assessments  Heart Rate: (!) 117  Resp: 18  SpO2: 100 %  End Tidal CO2: 36 (%)  Position: Semi-Gama's  Humidification Source: HME  Cuff Pressure (cm H2O):  (mlt)  Skin Barrier Applied: No    ABGs:     Laboratory findings:    Complete Blood Count:   Recent Labs     09/12/22  0451 09/13/22  0113 09/14/22  0515   WBC 17.7* 13.1* 10.8 HGB 9.0* 9.1* 8.2*   HCT 27.6* 29.7* 26.3*    147 130*          Last 3 Blood Glucose:   Recent Labs     09/12/22 0451 09/13/22  0113 09/14/22  0515   GLUCOSE 171* 110* 112*          PT/INR:    Lab Results   Component Value Date/Time    PROTIME 11.1 09/10/2022 08:38 PM    INR 1.0 09/10/2022 08:38 PM     PTT:    Lab Results   Component Value Date/Time    APTT 25.2 09/10/2022 08:38 PM       Comprehensive Metabolic Profile:   Recent Labs     09/12/22 0451 09/13/22  0113 09/14/22  0515   * 136 136   K 4.4 4.2 3.8    101 100   CO2 19* 21 25   BUN 51* 42* 36*   CREATININE 1.90* 1.41* 1.10*   GLUCOSE 171* 110* 112*   CALCIUM 7.4* 7.7* 7.0*   PROT 5.1* 5.0* 4.8*   LABALBU 1.7* 1.5* 1.5*   BILITOT 0.5 0.5 0.4   ALKPHOS 129* 120* 103   AST 18 21 38*   ALT 11 11 16        Magnesium:   Lab Results   Component Value Date/Time    MG 2.0 09/14/2022 05:15 AM     Phosphorus: No results found for: PHOS  Ionized Calcium:   Lab Results   Component Value Date/Time    CAION 1.10 09/10/2022 08:38 PM        Urinalysis:     Troponin: No results for input(s): TROPONINI in the last 72 hours.     Microbiology:    Cultures during this admission:     Blood cultures:                 [] None drawn      [x] Negative             []  Positive (Details:  )  Urine Culture:                   [] None drawn      [x] Negative             []  Positive (Details:  )  Sputum Culture:               [x] None drawn       [] Negative             []  Positive (Details:  )   Endotracheal aspirate:     [x] None drawn       [] Negative             []  Positive (Details:  )     Chest Xray (9/14/2022): pending    ASSESSMENT:     Principal Problem:    Septic shock ([facility] [address]  Active Problems:    Altered mental status    Acute kidney injury ([facility] [address]    Bandemia    Cellulitis of sacral region    Breast, fat necrosis    Gram-neg septicemia ([facility] [address]    Gram positive septicemia ([facility]    Acute encephalopathy    Elevated procalcitonin  Resolved Problems:    * No resolved hospital problems. *    PLAN:       Neurologic:   Neuro -intubated  Neuro checks per protocol  Sedation: off sedation  Pain management: Fentanyl     Cardiovascular:  Septic shock  Continue vasopressor support with Levophed  Target MAP greater than 65  Monitor heart rate, sinus tachycardia    Pulmonary:  Intubated and on mechanical ventilation  Chest x-ray without any consolidation/congestion  Weaned well yesterday  Continue daily SBT  Extubation once able to follow commands    GI/Nutrition  Initial concern of gut malrotation ruled out on CT abdomen and pelvis 9/10  GlycoLax as needed  Ulcer Prophylaxis: Protonix  Diet:Diet NPO  ADULT TUBE FEEDING; Orogastric; Peptide Based; Continuous; 10; Yes; 10; Q 4 hours; 50; 30; Q 4 hours  Protein calorie malnutrition: albumin 1.5  Hold tube feed for possible extubation later today    Renal/Fluid/Electrolyte  Acute kidney injury with metabolic acidosis  Improving at this point  Continue IV fluids at 75 cc/h  Urine output on the lower side  Consider IV fluid bolus today  I/O: In: 1586.4 [I.V.:1536.4]  Out: 671 [Urine:621]    ID  Initial presentation with septic shock  Source likely urine/skin  Urine and blood cultures has been negative here at [facility]  Leukocytosis improving  Tmax: Temp (24hrs), Av.2 °F (36.8 °C), Min:97.9 °F (36.6 °C), Max:98.4 °F (36.9 °C)    Antimicrobials: Continued daptomycin and cefepime per infectious diseases    Hematology:  Recent Labs     22  0451 22  0113 22  0515   HGB 9.0* 9.1* 8.2*      Plts 130 (249) -continue to monitor at this point.   Low suspicion of HIT at this point  Daily CBC    Endocrine:   glucose controlled  Synthroid 75mcg daily   Recent Labs     22  0451 22  0113 22  0515   GLUCOSE 171* 110* 112*       DVT Prophylaxis   - Heparin SQ  Skin  Multiple skin wounds involving breast, back and thighs  General surgery evaluated and not recommending any surgical intervention  Continue with wound care eval and treat    Dispo: To remain in ICU    Ghulam Lenton Meigs, MD           Critical care resident,  Department of Internal Medicine/ Critical care  [facility]             9/14/2022, 7:36 AM  Attending Physician Statement  I have discussed the care of Aj Peña, including pertinent history and exam findings,  with the resident. I have seen and examined the patient and the key elements of all parts of the encounter have been performed by me. I agree with the assessment, plan and orders as documented by the resident with additions . Changed to Lovenox once a day instead of heparin  Wean Levophed keep MAP >65  Continue daily spontaneous breathing trial  Monitor off sedation  Encephalopathy persists    Total critical care time caring for this patient with life threatening, unstable organ failure, including direct patient contact, management of life support systems, review of data including imaging and labs, discussions with other team members and physicians at least 28   Min so far today, excluding procedures. Electronically signed by Jonas De Los Santos MD on   9/14/22 at 4:38 PM EDT    Please note that this chart was generated using voice recognition Dragon dictation software. Although every effort was made to ensure the accuracy of this automated transcription, some errors in transcription may have occurred.

## 2022-09-14 NOTE — PLAN OF CARE
Problem: Respiratory - Adult  Goal: Achieves optimal ventilation and oxygenation  9/13/2022 2126 by Diane Mendosa RCP  Outcome: Progressing     Problem: Skin/Tissue Integrity  Goal: Absence of new skin breakdown  9/13/2022 2126 by Diane Mendosa RCP  Outcome: Progressing

## 2022-09-14 NOTE — PROGRESS NOTES
[facility] -   Infectious diseases evaluation  admission date 9/10/2022    reason for consultation:   Sepsis     Impression :   Current:  Sepsis with septic shock  Altered mentation acute encephalopathy  CHULA  Bandemia  Lactic acidosis  Elevated procalcitonin 12  Gram-negative and gram-positive bacteremia-[facility]  Proteus R cipro bactrim - S ceftriaxone ampicillin  Staph hominis R ancef   Staph epi R ancef  U cx [facility] 9/9 e coli S cipro  ceftriaxone ampicillin  Bilat breasts infected / necrotic wounds - maggots  Sacral sloughed skin from moisture  Hyperglycemia  Morbd obesity-BMI 47  COPD  CHULA - cr 2.22    Other:    Discussion / summary of stay / plan of care   Sepsis of unclear cause, with septic shock and elevated procalcitonin at 12. Very concerning for gram-negative organisms and hence a UTI is of a concern  CT abdomen pelvis at [facility] is negative for any bowel obstruction although it was a concern in [facility]  The urine analysis at [facility] is normal but I am not clear as of the UA at [facility]  The bacteremia with gram-negative and gram-positive is also of a concern, pending at [facility]  Recommendations   According to sensi of the org in [facility], ceftriaxone 2 weeks till 9/24 and daptomycin short course till 9/17  Source of the proteus septicemia is likely the skin -   Also treating UTI  Staph bacteremia could be from the skin source of just a contamination  Repeat CK CRP procal    Neg nasal MRSA swab    Infection Control Recommendations   [place] Precautions  Contact Isolation       Antimicrobial Stewardship Recommendations   Simplification of therapy  Targeted therapy      History of Present Illness:   Initial history:  Ashanti Mccarthy is a 38y.o.-year-old female found unresponsive at home was taken to [facility], found to have stools on many of her wounds and maggots.   CT of the abdomen showed concern for small bowel obstruction  She had hyponatremia with a sodium 128, leukocytosis elevated procalcitonin and lactic acid  Any blood culture came back positive for gram-negative rods gram-positive cocci clusters  CHULA  Transferred to us with concern of septic shock and sepsis-General surgery on board due to the concern of small bowel obstruction    After transfer to [facility]. [facility] CT scan of the abdomen pelvis showed no pneumonia on the lower lobes and no acute abdomen. Patient is on antibiotics, infectious consulted for opinion.   The urine analysis on 9/10 at [facility] is not suggestive of infection-but I do not have the UA or the urine culture off from [address] [place] [address] urinalysis seems to be very contaminated with epithelial cells                          Interval changes  9/14/2022   Patient Vitals for the past 8 hrs:   BP Temp Pulse Resp SpO2   09/14/22 1000 -- 99.3 °F (37.4 °C) -- -- --   09/14/22 0903 -- -- (!) 113 19 98 %   09/14/22 0800 -- 99.1 °F (37.3 °C) -- -- --   09/14/22 0516 -- -- (!) 117 18 100 %   09/14/22 0430 108/76 -- (!) 113 20 100 %   09/14/22 0415 -- -- (!) 113 18 100 %   09/14/22 0400 -- -- (!) 114 19 100 %   09/14/22 0345 103/65 -- (!) 105 22 100 %   09/14/22 0330 107/61 -- (!) 114 21 100 %   09/14/22 0321 -- -- 97 17 100 %   09/14/22 0315 (!) 104/51 -- 98 18 100 %   09/14/22 0300 (!) 104/56 -- (!) 107 17 100 %   09/14/22 0245 (!) 105/48 -- (!) 111 18 100 %     Blood cultures from [facility] are back with Proteus and 2 strains of staph coagulase-negative  U cx over there showed E coli multiS  AB switched accordingly    All the wounds are evaluated with wound care on the bedside, the back has multiple open ulcers but no clear cellulitis at this point    The breasts have macerated wounds and the necrotic superficial skin is sloughing, no deep induration in both breasts, both being pressure wounds    Levophed 7 mcg increased today, she lost her art line, temperature 37 4, on the ventilator sedated,  Sputum is brown, tolerating tube feeds,  WBC 10.8    Procalcitonin 1.88   improved      Summary of relevant labs:  Labs:  sodium 128, 136  leukocytosis  21 -17 - 13  elevated procalcitonin   Elevated lactic acid 2.8  Creat 2.22 - 1.4  -1 36 improved  Micro:  BC GNR and CPC clusters at the [facility]  UA neg 9/10   MRSA nasal swab negative  blood culture 9/10     Imaging:  BREASTS US neg for abscess  CT scan of the abdomen pelvis showed no pneumonia on the lower lobes and no acute abdomen. I have personally reviewed the past medical history, past surgical history, medications, social history, and family history, and I haveupdated the database accordingly. Allergies:   Amoxicillin and Robitussin day-night value lawrence [[id number]]     Review of Systems:     Review of Systems   Unable to perform ROS: Mental status change     Physical Examination :       Physical Exam  Constitutional:       General: She is not in acute distress. Appearance: She is obese. She is ill-appearing. She is not toxic-appearing. HENT:      Head: Normocephalic and atraumatic. Nose: Nose normal.      Mouth/Throat:      Mouth: Mucous membranes are moist.   Eyes:      General: No scleral icterus. Conjunctiva/sclera: Conjunctivae normal.   Cardiovascular:      Rate and Rhythm: Normal rate and regular rhythm. Heart sounds: Normal heart sounds. No murmur heard. No friction rub. Pulmonary:      Effort: No respiratory distress. Breath sounds: Normal breath sounds. Abdominal:      General: There is no distension. Tenderness: There is no abdominal tenderness. Genitourinary:     Comments: Urine elder  Musculoskeletal:         General: No swelling or deformity. Cervical back: Neck supple. No rigidity or tenderness. Skin:     Coloration: Skin is not pale. Findings: Lesion present. No erythema. Neurological:      Comments:  On the vent   Psychiatric: Comments: On the vent       Past Medical History:   No past medical history on file. Past Surgical  History:   No past surgical history on file. Medications:      midodrine  10 mg Oral q8h    enoxaparin  30 mg SubCUTAneous BID    lactated ringers bolus  500 mL IntraVENous Once    daptomycin (CUBICIN) IVPB  6 mg/kg (Adjusted) IntraVENous Q24H    sodium chloride flush  5-40 mL IntraVENous 2 times per day    pantoprazole (PROTONIX) 40 mg injection  40 mg IntraVENous Daily    cefepime  2,000 mg IntraVENous Q12H    miconazole   Topical BID    levothyroxine  75 mcg Oral Daily       Social History:     Social History     Socioeconomic History    Marital status:      Spouse name: Not on file    Number of children: Not on file    Years of education: Not on file    Highest education level: Not on file   Occupational History    Not on file   Tobacco Use    Smoking status: Not on file    Smokeless tobacco: Not on file   Substance and Sexual Activity    Alcohol use: Not on file    Drug use: Not on file    Sexual activity: Not on file   Other Topics Concern    Not on file   Social History Narrative    Not on file     Social Determinants of Health     Financial Resource Strain: Not on file   Food Insecurity: Not on file   Transportation Needs: Not on file   Physical Activity: Not on file   Stress: Not on file   Social Connections: Not on file   Intimate Partner Violence: Not on file   Housing Stability: Not on file       Family History:   No family history on file.    Medical Decision Making:   I have independently reviewed/ordered the following labs:    CBC with Differential:   Recent Labs     09/13/22  0113 09/14/22  0515   WBC 13.1* 10.8   HGB 9.1* 8.2*   HCT 29.7* 26.3*    130*   LYMPHOPCT 17* 14*   MONOPCT 9* 10*       BMP:  Recent Labs     09/13/22  0113 09/14/22  0515    136   K 4.2 3.8    100   CO2 21 25   BUN 42* 36*   CREATININE 1.41* 1.10*   MG 2.1 2.0       Hepatic Function Panel: Recent Labs     09/13/22  0113 09/14/22  0515   PROT 5.0* 4.8*   LABALBU 1.5* 1.5*   BILIDIR 0.3 0.2   IBILI 0.2 0.2   BILITOT 0.5 0.4   ALKPHOS 120* 103   ALT 11 16   AST 21 38*       No results for input(s): RPR in the last 72 hours. No results for input(s): HIV in the last 72 hours. No results for input(s): BC in the last 72 hours. Lab Results   Component Value Date/Time    CREATININE 1.10 09/14/2022 05:15 AM    GLUCOSE 112 09/14/2022 05:15 AM       Detailed results: Thank you for allowing us to participate in the care of this patient. Please call with questions. This note is created with the assistance of a speech recognition program.  While intending to generate adocument that actually reflects the content of the visit, the document can still have some errors including those of syntax and sound a like substitutions which may escape proof reading. It such instances, actual meaningcan be extrapolated by contextual diversion.     Rocio Fraser MD  Office: [phone]  Perfect serve / office [phone]

## 2022-09-14 NOTE — PROGRESS NOTES
SPIRITUAL CARE DEPARTMENT - [facility]  PROGRESS NOTE    Shift date: 9.13.2022  Shift day: Tuesday   Shift # 2    Room # 3003/3003-01   Name: Rosetta Vela                Evangelical: unknown   Place of [place]ism: unknown    Referral: Routine Visit    Admit Date & Time: 9/10/2022  4:45 PM    Assessment:  Rosetta Vela is a 38 y.o. female in the hospital. Patient unavailable at this time. No family present at this time. Nurse explains that son has been visiting and  is a  who will be coming to visit on the weekend. Intervention:   spoke with nurse regarding family for the patient to determine support system and decision makers. Outcome:  Family is in contact and visiting     Plan:  Lillette Scripture will remain available to offer spiritual and emotional support as needed. Electronically signed by Jose Elisa Akins on 9/13/2022 at 8:09 PM.  [facility] [facility]  [phone]       09/13/22 2002   Encounter Summary   Service Provided For: Patient not available   Referral/Consult From: [facility] [place]   Support System Children;Spouse   Last Encounter  09/13/22   Complexity of Encounter Low   Begin Time 1810   End Time  1820   Total Time Calculated 10 min   Encounter    Type Initial Screen/Assessment   Assessment/Intervention/Outcome   Assessment Unable to assess   Intervention Explored Coping Skills/Resources; Discussed illness injury and its impact     Electronically signed by Ava Leger on 9/13/2022 at 8:09 PM

## 2022-09-15 ENCOUNTER — APPOINTMENT (OUTPATIENT)
Dept: CT IMAGING | Age: 42
DRG: 870 | End: 2022-09-15
Attending: INTERNAL MEDICINE
Payer: COMMERCIAL

## 2022-09-15 ENCOUNTER — APPOINTMENT (OUTPATIENT)
Dept: GENERAL RADIOLOGY | Age: 42
DRG: 870 | End: 2022-09-15
Attending: INTERNAL MEDICINE
Payer: COMMERCIAL

## 2022-09-15 LAB
ALBUMIN SERPL-MCNC: 1.3 G/DL (ref 3.5–5.2)
ALBUMIN/GLOBULIN RATIO: 0.4 (ref 1–2.5)
ALLEN TEST: POSITIVE
ALP BLD-CCNC: 115 U/L (ref 35–104)
ALT SERPL-CCNC: 19 U/L (ref 5–33)
ANION GAP SERPL CALCULATED.3IONS-SCNC: 9 MMOL/L (ref 9–17)
AST SERPL-CCNC: 44 U/L
BILIRUB SERPL-MCNC: 0.3 MG/DL (ref 0.3–1.2)
BILIRUBIN DIRECT: <0.1 MG/DL
BILIRUBIN, INDIRECT: ABNORMAL MG/DL (ref 0–1)
BUN BLDV-MCNC: 32 MG/DL (ref 6–20)
CALCIUM SERPL-MCNC: 6.7 MG/DL (ref 8.6–10.4)
CHLORIDE BLD-SCNC: 102 MMOL/L (ref 98–107)
CO2: 26 MMOL/L (ref 20–31)
CREAT SERPL-MCNC: 1.06 MG/DL (ref 0.5–0.9)
CULTURE: NORMAL
FIO2: 30
GFR AFRICAN AMERICAN: >60 ML/MIN
GFR NON-AFRICAN AMERICAN: 56 ML/MIN
GFR SERPL CREATININE-BSD FRML MDRD: ABNORMAL ML/MIN/{1.73_M2}
GLUCOSE BLD-MCNC: 113 MG/DL (ref 70–99)
GLUCOSE BLD-MCNC: 116 MG/DL (ref 74–100)
Lab: NORMAL
MAGNESIUM: 1.9 MG/DL (ref 1.6–2.6)
O2 DEVICE/FLOW/%: ABNORMAL
POC HCO3: 27.5 MMOL/L (ref 21–28)
POC O2 SATURATION: 99 % (ref 94–98)
POC PCO2: 33.6 MM HG (ref 35–48)
POC PH: 7.52 (ref 7.35–7.45)
POC PO2: 111.1 MM HG (ref 83–108)
POSITIVE BASE EXCESS, ART: 4 (ref 0–3)
POTASSIUM SERPL-SCNC: 4.1 MMOL/L (ref 3.7–5.3)
REASON FOR REJECTION: NORMAL
SAMPLE SITE: ABNORMAL
SODIUM BLD-SCNC: 137 MMOL/L (ref 135–144)
SPECIMEN DESCRIPTION: NORMAL
TOTAL PROTEIN: 4.3 G/DL (ref 6.4–8.3)
ZZ NTE CLEAN UP: ORDERED TEST: NORMAL
ZZ NTE WITH NAME CLEAN UP: SPECIMEN SOURCE: NORMAL

## 2022-09-15 PROCEDURE — 94003 VENT MGMT INPAT SUBQ DAY: CPT

## 2022-09-15 PROCEDURE — 76937 US GUIDE VASCULAR ACCESS: CPT

## 2022-09-15 PROCEDURE — 6360000002 HC RX W HCPCS: Performed by: EMERGENCY MEDICINE

## 2022-09-15 PROCEDURE — 02HV33Z INSERTION OF INFUSION DEVICE INTO SUPERIOR VENA CAVA, PERCUTANEOUS APPROACH: ICD-10-PCS | Performed by: EMERGENCY MEDICINE

## 2022-09-15 PROCEDURE — 2580000003 HC RX 258: Performed by: INTERNAL MEDICINE

## 2022-09-15 PROCEDURE — 6360000002 HC RX W HCPCS: Performed by: STUDENT IN AN ORGANIZED HEALTH CARE EDUCATION/TRAINING PROGRAM

## 2022-09-15 PROCEDURE — C1751 CATH, INF, PER/CENT/MIDLINE: HCPCS

## 2022-09-15 PROCEDURE — 82803 BLOOD GASES ANY COMBINATION: CPT

## 2022-09-15 PROCEDURE — 71045 X-RAY EXAM CHEST 1 VIEW: CPT

## 2022-09-15 PROCEDURE — 36568 INSJ PICC <5 YR W/O IMAGING: CPT

## 2022-09-15 PROCEDURE — 36600 WITHDRAWAL OF ARTERIAL BLOOD: CPT

## 2022-09-15 PROCEDURE — 2500000003 HC RX 250 WO HCPCS

## 2022-09-15 PROCEDURE — 2580000003 HC RX 258: Performed by: STUDENT IN AN ORGANIZED HEALTH CARE EDUCATION/TRAINING PROGRAM

## 2022-09-15 PROCEDURE — 2700000000 HC OXYGEN THERAPY PER DAY

## 2022-09-15 PROCEDURE — 2000000000 HC ICU R&B

## 2022-09-15 PROCEDURE — 2500000003 HC RX 250 WO HCPCS: Performed by: INTERNAL MEDICINE

## 2022-09-15 PROCEDURE — 89220 SPUTUM SPECIMEN COLLECTION: CPT

## 2022-09-15 PROCEDURE — 51798 US URINE CAPACITY MEASURE: CPT

## 2022-09-15 PROCEDURE — 83735 ASSAY OF MAGNESIUM: CPT

## 2022-09-15 PROCEDURE — 70450 CT HEAD/BRAIN W/O DYE: CPT

## 2022-09-15 PROCEDURE — 94761 N-INVAS EAR/PLS OXIMETRY MLT: CPT

## 2022-09-15 PROCEDURE — 80076 HEPATIC FUNCTION PANEL: CPT

## 2022-09-15 PROCEDURE — 82947 ASSAY GLUCOSE BLOOD QUANT: CPT

## 2022-09-15 PROCEDURE — 6370000000 HC RX 637 (ALT 250 FOR IP)

## 2022-09-15 PROCEDURE — 2500000003 HC RX 250 WO HCPCS: Performed by: EMERGENCY MEDICINE

## 2022-09-15 PROCEDURE — 99291 CRITICAL CARE FIRST HOUR: CPT | Performed by: INTERNAL MEDICINE

## 2022-09-15 PROCEDURE — 6360000002 HC RX W HCPCS: Performed by: INTERNAL MEDICINE

## 2022-09-15 PROCEDURE — 99233 SBSQ HOSP IP/OBS HIGH 50: CPT | Performed by: INTERNAL MEDICINE

## 2022-09-15 PROCEDURE — 36415 COLL VENOUS BLD VENIPUNCTURE: CPT

## 2022-09-15 PROCEDURE — C9113 INJ PANTOPRAZOLE SODIUM, VIA: HCPCS | Performed by: STUDENT IN AN ORGANIZED HEALTH CARE EDUCATION/TRAINING PROGRAM

## 2022-09-15 PROCEDURE — 2580000003 HC RX 258: Performed by: EMERGENCY MEDICINE

## 2022-09-15 PROCEDURE — 6370000000 HC RX 637 (ALT 250 FOR IP): Performed by: EMERGENCY MEDICINE

## 2022-09-15 PROCEDURE — 80048 BASIC METABOLIC PNL TOTAL CA: CPT

## 2022-09-15 RX ORDER — SODIUM CHLORIDE 0.9 % (FLUSH) 0.9 %
5-40 SYRINGE (ML) INJECTION PRN
Status: DISCONTINUED | OUTPATIENT
Start: 2022-09-15 | End: 2022-10-05 | Stop reason: HOSPADM

## 2022-09-15 RX ORDER — DEXMEDETOMIDINE HYDROCHLORIDE 4 UG/ML
INJECTION, SOLUTION INTRAVENOUS
Status: COMPLETED
Start: 2022-09-15 | End: 2022-09-15

## 2022-09-15 RX ORDER — LIDOCAINE HYDROCHLORIDE 10 MG/ML
5 INJECTION, SOLUTION INFILTRATION; PERINEURAL ONCE
Status: DISCONTINUED | OUTPATIENT
Start: 2022-09-15 | End: 2022-10-05 | Stop reason: HOSPADM

## 2022-09-15 RX ORDER — SODIUM CHLORIDE 0.9 % (FLUSH) 0.9 %
5-40 SYRINGE (ML) INJECTION EVERY 12 HOURS SCHEDULED
Status: DISCONTINUED | OUTPATIENT
Start: 2022-09-15 | End: 2022-09-21 | Stop reason: SDUPTHER

## 2022-09-15 RX ORDER — SODIUM CHLORIDE, SODIUM LACTATE, POTASSIUM CHLORIDE, AND CALCIUM CHLORIDE .6; .31; .03; .02 G/100ML; G/100ML; G/100ML; G/100ML
500 INJECTION, SOLUTION INTRAVENOUS ONCE
Status: COMPLETED | OUTPATIENT
Start: 2022-09-15 | End: 2022-09-15

## 2022-09-15 RX ORDER — SODIUM CHLORIDE 9 MG/ML
25 INJECTION, SOLUTION INTRAVENOUS PRN
Status: DISCONTINUED | OUTPATIENT
Start: 2022-09-15 | End: 2022-10-05 | Stop reason: HOSPADM

## 2022-09-15 RX ADMIN — SODIUM CHLORIDE: 9 INJECTION, SOLUTION INTRAVENOUS at 01:21

## 2022-09-15 RX ADMIN — SODIUM CHLORIDE, PRESERVATIVE FREE 10 ML: 5 INJECTION INTRAVENOUS at 20:54

## 2022-09-15 RX ADMIN — MIDODRINE HYDROCHLORIDE 10 MG: 5 TABLET ORAL at 04:01

## 2022-09-15 RX ADMIN — ENOXAPARIN SODIUM 30 MG: 100 INJECTION SUBCUTANEOUS at 08:26

## 2022-09-15 RX ADMIN — DEXMEDETOMIDINE HYDROCHLORIDE 400 MCG: 4 INJECTION, SOLUTION INTRAVENOUS at 09:29

## 2022-09-15 RX ADMIN — ANTI-FUNGAL POWDER MICONAZOLE NITRATE TALC FREE: 1.42 POWDER TOPICAL at 08:26

## 2022-09-15 RX ADMIN — DAPTOMYCIN 550 MG: 500 INJECTION, POWDER, LYOPHILIZED, FOR SOLUTION INTRAVENOUS at 18:46

## 2022-09-15 RX ADMIN — MIDODRINE HYDROCHLORIDE 10 MG: 5 TABLET ORAL at 18:41

## 2022-09-15 RX ADMIN — FENTANYL CITRATE 50 MCG: 50 INJECTION, SOLUTION INTRAMUSCULAR; INTRAVENOUS at 01:12

## 2022-09-15 RX ADMIN — SODIUM CHLORIDE, POTASSIUM CHLORIDE, SODIUM LACTATE AND CALCIUM CHLORIDE 500 ML: 600; 310; 30; 20 INJECTION, SOLUTION INTRAVENOUS at 09:36

## 2022-09-15 RX ADMIN — LEVOTHYROXINE SODIUM 75 MCG: 75 TABLET ORAL at 06:32

## 2022-09-15 RX ADMIN — SODIUM CHLORIDE, POTASSIUM CHLORIDE, SODIUM LACTATE AND CALCIUM CHLORIDE: 600; 310; 30; 20 INJECTION, SOLUTION INTRAVENOUS at 01:12

## 2022-09-15 RX ADMIN — CEFTRIAXONE SODIUM 2000 MG: 10 INJECTION, POWDER, FOR SOLUTION INTRAVENOUS at 11:12

## 2022-09-15 RX ADMIN — FENTANYL CITRATE 50 MCG: 50 INJECTION, SOLUTION INTRAMUSCULAR; INTRAVENOUS at 17:50

## 2022-09-15 RX ADMIN — SODIUM CHLORIDE, PRESERVATIVE FREE 10 ML: 5 INJECTION INTRAVENOUS at 20:53

## 2022-09-15 RX ADMIN — DEXMEDETOMIDINE HYDROCHLORIDE 0.5 MCG/KG/HR: 4 INJECTION, SOLUTION INTRAVENOUS at 09:44

## 2022-09-15 RX ADMIN — SODIUM CHLORIDE, PRESERVATIVE FREE 10 ML: 5 INJECTION INTRAVENOUS at 08:34

## 2022-09-15 RX ADMIN — MIDODRINE HYDROCHLORIDE 10 MG: 5 TABLET ORAL at 08:26

## 2022-09-15 RX ADMIN — FENTANYL CITRATE 50 MCG: 50 INJECTION, SOLUTION INTRAMUSCULAR; INTRAVENOUS at 21:03

## 2022-09-15 RX ADMIN — ANTI-FUNGAL POWDER MICONAZOLE NITRATE TALC FREE: 1.42 POWDER TOPICAL at 20:54

## 2022-09-15 RX ADMIN — ENOXAPARIN SODIUM 30 MG: 100 INJECTION SUBCUTANEOUS at 20:53

## 2022-09-15 RX ADMIN — FENTANYL CITRATE 50 MCG: 50 INJECTION, SOLUTION INTRAMUSCULAR; INTRAVENOUS at 03:57

## 2022-09-15 RX ADMIN — SODIUM CHLORIDE, PRESERVATIVE FREE 40 MG: 5 INJECTION INTRAVENOUS at 08:26

## 2022-09-15 ASSESSMENT — PULMONARY FUNCTION TESTS
PIF_VALUE: 11
PIF_VALUE: 11
PIF_VALUE: 15
PIF_VALUE: 7
PIF_VALUE: 12
PIF_VALUE: 12
PIF_VALUE: 14

## 2022-09-15 NOTE — PLAN OF CARE
Problem: Discharge Planning  Goal: Discharge to home or other facility with appropriate resources  Outcome: Progressing     Problem: Respiratory - Adult  Goal: Achieves optimal ventilation and oxygenation  9/15/2022 0449 by Chrissy Pearce RN  Outcome: Progressing     Problem: Safety - Medical Restraint  Goal: Remains free of injury from restraints (Restraint for Interference with Medical Device)  Description: INTERVENTIONS:  1. Determine that other, less restrictive measures have been tried or would not be effective before applying the restraint  2. Evaluate the patient's condition at the time of restraint application  3. Inform patient/family regarding the reason for restraint  4.  Q2H: Monitor safety, psychosocial status, comfort, nutrition and hydration  Outcome: Progressing  Flowsheets  Taken 9/15/2022 0400 by Chrissy Pearce RN  Remains free of injury from restraints (restraint for interference with medical device):   Determine that other, less restrictive measures have been tried or would not be effective before applying the restraint   Evaluate the patient's condition at the time of restraint application   Inform patient/family regarding the reason for restraint   Every 2 hours: Monitor safety, psychosocial status, comfort, nutrition and hydration  Taken 9/15/2022 0200 by Chrissy Pearce RN  Remains free of injury from restraints (restraint for interference with medical device):   Determine that other, less restrictive measures have been tried or would not be effective before applying the restraint   Evaluate the patient's condition at the time of restraint application   Inform patient/family regarding the reason for restraint  Taken 9/15/2022 0000 by Chrissy Pearce RN  Remains free of injury from restraints (restraint for interference with medical device):   Determine that other, less restrictive measures have been tried or would not be effective before applying the restraint   Evaluate the patient's condition at the time of restraint application   Inform patient/family regarding the reason for restraint   Every 2 hours: Monitor safety, psychosocial status, comfort, nutrition and hydration  Taken 9/14/2022 2200 by Clarisse Renee RN  Remains free of injury from restraints (restraint for interference with medical device):   Determine that other, less restrictive measures have been tried or would not be effective before applying the restraint   Evaluate the patient's condition at the time of restraint application   Inform patient/family regarding the reason for restraint   Every 2 hours: Monitor safety, psychosocial status, comfort, nutrition and hydration  Taken 9/14/2022 2000 by Christine Lee RN  Remains free of injury from restraints (restraint for interference with medical device): Every 2 hours: Monitor safety, psychosocial status, comfort, nutrition and hydration     Problem: Pain  Goal: Verbalizes/displays adequate comfort level or baseline comfort level  Outcome: Progressing     Problem: Confusion  Goal: Confusion, delirium, dementia, or psychosis is improved or at baseline  Description: INTERVENTIONS:  1. Assess for possible contributors to thought disturbance, including medications, impaired vision or hearing, underlying metabolic abnormalities, dehydration, psychiatric diagnoses, and notify attending LIP  2. [place] high risk fall precautions, as indicated  3. Provide frequent short contacts to provide reality reorientation, refocusing and direction  4. Decrease environmental stimuli, including noise as appropriate  5. Monitor and intervene to maintain adequate nutrition, hydration, elimination, sleep and activity  6. If unable to ensure safety without constant attention obtain sitter and review sitter guidelines with assigned personnel  7.  Initiate Psychosocial CNS and Spiritual Care consult, as indicated  Outcome: Progressing     Problem: Nutrition Deficit:  Goal: Optimize nutritional status  Outcome: Progressing     Problem: Skin/Tissue Integrity  Goal: Absence of new skin breakdown  Description: 1. Monitor for areas of redness and/or skin breakdown  2. Assess vascular access sites hourly  3. Every 4-6 hours minimum:  Change oxygen saturation probe site  4. Every 4-6 hours:  If on nasal continuous positive airway pressure, respiratory therapy assess nares and determine need for appliance change or resting period.   9/15/2022 0449 by Yesi Robin RN  Outcome: Progressing     Problem: Safety - Adult  Goal: Free from fall injury  Outcome: Progressing     Problem: ABCDS Injury Assessment  Goal: Absence of physical injury  Outcome: Progressing  Flowsheets (Taken 9/14/2022 2000)  Absence of Physical Injury: Implement safety measures based on patient assessment

## 2022-09-15 NOTE — PROGRESS NOTES
Physician Progress Note      Jazzy Heard  CSN #:                  [id number]  :                       1980  ADMIT DATE:       9/10/2022 4:45 PM  [address] [place] DATE:  RESPONDING  PROVIDER #:        Alea Carter          QUERY TEXT:    Pt admitted with Sepsis with shock. Pt noted to have COPD no documentation of   any home oxygen came on ventilator from [facility]. If possible, please   document in the progress notes and discharge summary if you are evaluating   and/or treating any of the following: The medical record reflects the following:  Risk Factors: Found down,  COPD, AMS  Clinical Indicators: H/P  Vent settings: Mode PRVC  RR 14 PEEP 8 FiO2   40. ABG: pH 7.24 pCO2 30 pO2 401 HCO3: 13 (values from outlying facility,   repeat pending). CXR No acute cardiopulmonary process. Treatment: Came to V's on ventilator, labs, ICU monitoring. Consults ID/Gen   Surg  Options provided:  -- Acute respiratory failure with hypoxia  -- Acute respiratory failure with hypercapnia  -- Acute respiratory failure with hypoxia and hypercapnia  -- Acute on chronic respiratory failure with hypoxia  -- Acute on chronic respiratory failure with hypercapnia  -- Acute on chronic respiratory failure with hypoxia and hypercapnia  -- Other - I will add my own diagnosis  -- Disagree - Not applicable / Not valid  -- Disagree - Clinically unable to determine / Unknown  -- Refer to Clinical Documentation Reviewer    PROVIDER RESPONSE TEXT:    Intubated due to mental status    Query created by:  Rebecca Simons on 2022 7:36 AM      Electronically signed by:  Alea Carter 9/15/2022 8:39 AM

## 2022-09-15 NOTE — PROGRESS NOTES
Physical Therapy        Physical Therapy Cancel Note      DATE: 9/15/2022    NAME: Emili Galindo  MRN: [id number]   : 1980      Patient not seen this date for Physical Therapy due to:    Strict Bedrest: PT to continue to follow and further address as indicated.        Electronically signed by Trina Marcus PT on 9/15/2022 at 9:58 AM

## 2022-09-15 NOTE — CARE COORDINATION
Consult received d/t pt being found in own excrement, home alone frequently, wounds over whole body. Pt intubated - chart reviewed and discussed with RN  [facility] Pt has a son and dtr. Noted plans for SNF at discharge. Pt is not old enough for an APS ref. Called favian, [facility], and left message - await return call.   Will also f/u with pt once she is alert/oriented to determine if pt unable to care for herself or unwilling to do so

## 2022-09-15 NOTE — PLAN OF CARE
Problem: Discharge Planning  Goal: Discharge to home or other facility with appropriate resources  9/15/2022 1242 by Ruma Cody RN  Outcome: Progressing  9/15/2022 0449 by Fausto Richardson RN  Outcome: Progressing     Problem: Respiratory - Adult  Goal: Achieves optimal ventilation and oxygenation  9/15/2022 1242 by Ruma Cody RN  Outcome: Progressing  9/15/2022 0449 by Fausto Richardson RN  Outcome: Progressing  9/15/2022 0100 by Kay Burris RCP  Outcome: Progressing     Problem: Safety - Medical Restraint  Goal: Remains free of injury from restraints (Restraint for Interference with Medical Device)  Description: INTERVENTIONS:  1. Determine that other, less restrictive measures have been tried or would not be effective before applying the restraint  2. Evaluate the patient's condition at the time of restraint application  3. Inform patient/family regarding the reason for restraint  4.  Q2H: Monitor safety, psychosocial status, comfort, nutrition and hydration  9/15/2022 1242 by Ruma Coyd RN  Outcome: Progressing  Flowsheets (Taken 9/15/2022 0600 by Fausto Richardson RN)  Remains free of injury from restraints (restraint for interference with medical device):   Determine that other, less restrictive measures have been tried or would not be effective before applying the restraint   Evaluate the patient's condition at the time of restraint application   Inform patient/family regarding the reason for restraint   Every 2 hours: Monitor safety, psychosocial status, comfort, nutrition and hydration  9/15/2022 0449 by Fausto Richardson RN  Outcome: Progressing  Flowsheets  Taken 9/15/2022 0400 by Fausto Richardson RN  Remains free of injury from restraints (restraint for interference with medical device):   Determine that other, less restrictive measures have been tried or would not be effective before applying the restraint   Evaluate the patient's condition at the time of restraint application   Inform patient/family regarding the reason for restraint   Every 2 hours: Monitor safety, psychosocial status, comfort, nutrition and hydration  Taken 9/15/2022 0200 by Yesi Robin RN  Remains free of injury from restraints (restraint for interference with medical device):   Determine that other, less restrictive measures have been tried or would not be effective before applying the restraint   Evaluate the patient's condition at the time of restraint application   Inform patient/family regarding the reason for restraint  Taken 9/15/2022 0000 by Yesi Robin RN  Remains free of injury from restraints (restraint for interference with medical device):   Determine that other, less restrictive measures have been tried or would not be effective before applying the restraint   Evaluate the patient's condition at the time of restraint application   Inform patient/family regarding the reason for restraint   Every 2 hours: Monitor safety, psychosocial status, comfort, nutrition and hydration  Taken 9/14/2022 2200 by Yesi Robin RN  Remains free of injury from restraints (restraint for interference with medical device):   Determine that other, less restrictive measures have been tried or would not be effective before applying the restraint   Evaluate the patient's condition at the time of restraint application   Inform patient/family regarding the reason for restraint   Every 2 hours: Monitor safety, psychosocial status, comfort, nutrition and hydration  Taken 9/14/2022 2000 by Joey Kruse RN  Remains free of injury from restraints (restraint for interference with medical device): Every 2 hours: Monitor safety, psychosocial status, comfort, nutrition and hydration     Problem: Pain  Goal: Verbalizes/displays adequate comfort level or baseline comfort level  9/15/2022 1242 by Abimael Aguirre RN  Outcome: Progressing  9/15/2022 0449 by Yesi Robin RN  Outcome: Progressing     Problem: Confusion  Goal: Confusion, delirium, dementia, or psychosis is improved or at baseline  Description: INTERVENTIONS:  1. Assess for possible contributors to thought disturbance, including medications, impaired vision or hearing, underlying metabolic abnormalities, dehydration, psychiatric diagnoses, and notify attending LIP  2. [place] high risk fall precautions, as indicated  3. Provide frequent short contacts to provide reality reorientation, refocusing and direction  4. Decrease environmental stimuli, including noise as appropriate  5. Monitor and intervene to maintain adequate nutrition, hydration, elimination, sleep and activity  6. If unable to ensure safety without constant attention obtain sitter and review sitter guidelines with assigned personnel  7. Initiate Psychosocial CNS and Spiritual Care consult, as indicated  9/15/2022 1242 by Gm Tipton RN  Outcome: Progressing  9/15/2022 0449 by Adore Mckeon RN  Outcome: Progressing     Problem: Nutrition Deficit:  Goal: Optimize nutritional status  9/15/2022 1242 by Gm Tipton RN  Outcome: Progressing  9/15/2022 0449 by Adore Mckeon RN  Outcome: Progressing     Problem: Skin/Tissue Integrity  Goal: Absence of new skin breakdown  Description: 1. Monitor for areas of redness and/or skin breakdown  2. Assess vascular access sites hourly  3. Every 4-6 hours minimum:  Change oxygen saturation probe site  4. Every 4-6 hours:  If on nasal continuous positive airway pressure, respiratory therapy assess nares and determine need for appliance change or resting period.   9/15/2022 1242 by Gm Tipton RN  Outcome: Progressing  9/15/2022 0449 by Adore Mckeon RN  Outcome: Progressing  9/15/2022 0100 by Jessica Lugo RCP  Outcome: Progressing     Problem: Safety - Adult  Goal: Free from fall injury  9/15/2022 1242 by Gm Tipton RN  Outcome: Progressing  9/15/2022 0449 by Adore Mckeon RN  Outcome: Progressing     Problem: ABCDS Injury Assessment  Goal: Absence of physical injury  9/15/2022 1242 by Kierra Carrillo RN  Outcome: Progressing  9/15/2022 0449 by Johnny Wright RN  Outcome: Progressing  Flowsheets (Taken 9/14/2022 2000)  Absence of Physical Injury: Implement safety measures based on patient assessment

## 2022-09-15 NOTE — CARE COORDINATION
Transitional planning:  Nurse rounds: Pt is home alone. Was found laying in excrement. Multiple wounds over whole body. Son visited once. Has spouse who travels a lot. NCM notes states spouse will not be able to review SNF choice list until this weekend. SW consult.

## 2022-09-15 NOTE — PROGRESS NOTES
Comprehensive Nutrition Assessment    Type and Reason for Visit:  Reassess    Nutrition Recommendations/Plan:   Continue Current TF; Peptide Based Formula at 55 mL/hr. Will continue to montior TF tolerance/adequacy-  modify TF as needed. Malnutrition Assessment:  Malnutrition Status:  Insufficient data (09/11/22 1158)      Nutrition Assessment:    Pt remains on vent. Peptide Based TF currently running at 55 mL/hr (goal 50 mL/hr ordered). Last BM noted: 9/10/22. Meds reviewed. Nutrition Related Findings:    labs/meds reviewed Wound Type: Multiple, Pressure Injury, Unstageable       Current Nutrition Intake & Therapies:    Diet NPO  ADULT TUBE FEEDING; Orogastric; Peptide Based; Continuous; 10; Yes; 10; Q 4 hours; 50; 30; Q 4 hours  Current Tube Feeding (TF) Orders:  Feeding Route: Orogastric  Formula: Peptide Based  Schedule: Continuous  Feeding Regimen:currently running at 55 mL/hr  Current TF & Flush Orders Provides: 55 mL/hr =1584 kcal and 99 g pro/day    Additional Calorie Sources:  none    Anthropometric Measures:  Height: 5' 7\" (170.2 cm)  Ideal Body Weight (IBW): 135 lbs (61 kg)    Admission Body Weight: 307 lb (139.3 kg)  Current Body Weight: 318 lb 6.4 oz (144.4 kg),   IBW.  Weight Source: Bed Scale  Current BMI (kg/m2): 49.9                          BMI Categories: Obese Class 3 (BMI 40.0 or greater)    Estimated Daily Nutrient Needs:  Energy Requirements Based On: Kcal/kg  Weight Used for Energy Requirements: Ideal  Energy (kcal/day): 1500 kcal/day  Weight Used for Protein Requirements: Ideal  Protein (g/day):  g pro/day  Method Used for Fluid Requirements: Other (Comment)  Fluid (ml/day): per MD    Nutrition Diagnosis:   Inadequate oral intake related to impaired respiratory function as evidenced by NPO or clear liquid status due to medical condition, nutrition support - enteral nutrition    Nutrition Interventions:   Food and/or Nutrient Delivery: Continue Current TF; Peptide Based Formula at 55 mL/hr  Nutrition Education/Counseling: No recommendation at this time  Coordination of Nutrition Care: Continue to monitor while inpatient       Goals:  Previous Goal Met: Goal(s) Achieved  Goals: Meet at least 75% of estimated needs, within 7 days       Nutrition Monitoring and Evaluation:   Behavioral-Environmental Outcomes: None Identified  Food/Nutrient Intake Outcomes: Enteral Nutrition Intake/Tolerance  Physical Signs/Symptoms Outcomes: Biochemical Data, Nutrition Focused Physical Findings, Skin, Weight, Fluid Status or Edema    Discharge Planning:     Too soon to determine     [address], LD  Contact: [phone]

## 2022-09-15 NOTE — PROGRESS NOTES
Critical Care Team - Daily Progress Note      Date and time: 9/15/2022 7:54 AM  Patient's name:  Jimmie Mccoy Record Number: [id number]  Patient's account/billing number: [de-identified]  Patient's YOB: 1980  Age: 38 y.o. Date of Admission: 9/10/2022  4:45 PM  Length of stay during current admission: 5      Primary Care Physician: No primary care provider on file. ICU Attending Physician: Dr. Eliseo Arndt    Code Status: Full Code    Reason for ICU admission: Septic shock, intubated requiring mechanical ventilation and pressor support      SUBJECTIVE:     OVERNIGHT EVENTS:     No acute events overnight  Continues to be intubated  Low vent settings  No vasopressor requirement at this point  Not following commands. No sedation at this point  Labs pending this morning  140 mL documented urine output in last 24 hours  Blood gas: pH 7.52, PCO2 33, PO2 111    Temp (24hrs), Av.4 °F (37.4 °C), Min:99 °F (37.2 °C), Max:99.9 °F (80.0 °C)  Systolic (67PBR), SEZ:337 , Min:83 , GNB:691   Diastolic (59FCX), ZLB:98, Min:48, Max:89  Urine output in the last 24 hours:  In: 3234.5 [I.V.:2107.5; NG/GT:1077]  Out: 140 [Urine:140]  Patient Vitals for the past 96 hrs (Last 3 readings):   Weight   22 0600 (!) 318 lb 6.4 oz (144.4 kg)       AWAKE & FOLLOWING COMMANDS:  [x] No   [] Yes    CURRENT VENTILATION STATUS:     [x] Ventilator  [] BIPAP  [] Nasal Cannula [] Room Air      SECRETIONS Amount:  [x] Small [] Moderate  [] Large  [] None  Color:     [] White [] Colored  [] Bloody    SEDATION:  RAAS Score:  [] Propofol gtt  [] Versed gtt  [] Ativan gtt   [x] No Sedation    PARALYZED:  [x] No    [] Yes    DIARRHEA:                [x] No                [] Yes  (C. Difficile status: [] Positive   [] negative                                                                                                                     [] pending)    VASOPRESSORS:  [] No    [x] Yes    If yes -   [] Levophed       [] Dopamine [] Vasopressin       [] Dobutamine  [] Phenylephrine         [] Epinephrine    CENTRAL LINES:     [] No   [x] Yes   (Date of Insertion: 9/10/22  )           If yes -     [x] Right IJ     [] Left IJ [] Right Femoral [] Left Femoral                   [] Right Subclavian [] Left Subclavian    LUQUE'S CATHETER:   [] No   [x] Yes  (Date of Insertion:22)     URINE OUTPUT:            [] Good   [x] Low              [] Anuric      OBJECTIVE:     VITAL SIGNS:  BP 91/72   Pulse (!) 111   Temp 99.5 °F (37.5 °C) (Esophageal)   Resp 22   Ht 5' 7\" (1.702 m)   Wt (!) 318 lb 6.4 oz (144.4 kg)   SpO2 100%   BMI 49.87 kg/m²   Tmax over 24 hours:  Temp (24hrs), Av.4 °F (37.4 °C), Min:99 °F (37.2 °C), Max:99.9 °F (37.7 °C)      Patient Vitals for the past 6 hrs:   BP Temp Temp src Pulse Resp SpO2   09/15/22 0700 91/72 -- -- (!) 111 22 --   09/15/22 0600 (!) 110/56 99.5 °F (37.5 °C) Esophageal (!) 122 19 100 %   09/15/22 0532 97/60 -- -- -- -- --   09/15/22 0510 -- -- -- (!) 125 18 100 %   09/15/22 0500 -- -- -- (!) 121 19 100 %   09/15/22 0400 (!) 83/48 99.7 °F (37.6 °C) Esophageal (!) 119 15 100 %   09/15/22 0300 (!) 118/56 -- -- (!) 123 19 100 %   09/15/22 0200 108/75 99.9 °F (37.7 °C) Esophageal (!) 123 22 100 %         Intake/Output Summary (Last 24 hours) at 9/15/2022 0754  Last data filed at 9/15/2022 0645  Gross per 24 hour   Intake 3234.54 ml   Output 140 ml   Net 3094.54 ml       Wt Readings from Last 2 Encounters:   22 (!) 318 lb 6.4 oz (144.4 kg)     Body mass index is 49.87 kg/m². PHYSICAL EXAMINATION :  Constitutional: Intubated  EENT: PERRLA, sclera clear, anicteric, moist mucous membranes  Neck: Supple, symmetrical, trachea midline  Respiratory: Diffuse rhonchi on ascultation  Cardiovascular: tachycardia with regular rhythm, normal S1, S2, no murmur noted, and 2+ distal pulses in all 4 extremities   Abdomen: soft, distended,  no masses or organomegaly  Neurological: Intubated.   Not following commands at this point  Skin: Extensive wounds on the back, buttocks, bilateral breast  Extremities:  peripheral pulses normal, no pedal edema, no clubbing or cyanosis    MEDICATIONS:    Scheduled Meds:   midodrine  10 mg Oral q8h    enoxaparin  30 mg SubCUTAneous BID    cefTRIAXone (ROCEPHIN) IV  2,000 mg IntraVENous Q24H    daptomycin (CUBICIN) IVPB  6 mg/kg (Adjusted) IntraVENous Q24H    sodium chloride flush  5-40 mL IntraVENous 2 times per day    pantoprazole (PROTONIX) 40 mg injection  40 mg IntraVENous Daily    miconazole   Topical BID    levothyroxine  75 mcg Oral Daily     Continuous Infusions:   lactated ringers 75 mL/hr at 09/15/22 0645    norepinephrine Stopped (09/14/22 2216)    sodium chloride 10 mL/hr at 09/15/22 0645     PRN Meds:   dexmedetomidine, 0.1-1.5 mcg/kg/hr, PRN  fentanNYL, 50 mcg, Q2H PRN  sodium chloride flush, 5-40 mL, PRN  sodium chloride, , PRN  ondansetron, 4 mg, Q8H PRN   Or  ondansetron, 4 mg, Q6H PRN  polyethylene glycol, 17 g, Daily PRN  acetaminophen, 650 mg, Q6H PRN   Or  acetaminophen, 650 mg, Q6H PRN  potassium chloride, 20 mEq, PRN   Or  potassium chloride, 10 mEq, PRN        VENT SETTINGS (Comprehensive) (if applicable):  Vent Information  Ventilator ID: [id number]  Equipment Changed: HME, Expiratory Filter  Vent Mode: CPAP  Additional Respiratory Assessments  Heart Rate: (!) 111  Resp: 22  SpO2: 100 %  End Tidal CO2: 39 (%)  Position: Semi-Gama's  Humidification Source: HME  Cuff Pressure (cm H2O):  (mlt)  Skin Barrier Applied: No    ABGs:     Laboratory findings:    Complete Blood Count:   Recent Labs     09/13/22 0113 09/14/22 0515   WBC 13.1* 10.8   HGB 9.1* 8.2*   HCT 29.7* 26.3*    130*          Last 3 Blood Glucose:   Recent Labs     09/13/22  0113 09/14/22  0515   GLUCOSE 110* 112*          PT/INR:    Lab Results   Component Value Date/Time    PROTIME 11.1 09/10/2022 08:38 PM    INR 1.0 09/10/2022 08:38 PM     PTT:    Lab Results   Component Value Date/Time    APTT 25.2 09/10/2022 08:38 PM       Comprehensive Metabolic Profile:   Recent Labs     09/13/22  0113 09/14/22  0515    136   K 4.2 3.8    100   CO2 21 25   BUN 42* 36*   CREATININE 1.41* 1.10*   GLUCOSE 110* 112*   CALCIUM 7.7* 7.0*   PROT 5.0* 4.8*   LABALBU 1.5* 1.5*   BILITOT 0.5 0.4   ALKPHOS 120* 103   AST 21 38*   ALT 11 16        Magnesium:   Lab Results   Component Value Date/Time    MG 2.0 09/14/2022 05:15 AM     Phosphorus: No results found for: PHOS  Ionized Calcium:   Lab Results   Component Value Date/Time    CAION 1.10 09/10/2022 08:38 PM        Urinalysis:     Troponin: No results for input(s): TROPONINI in the last 72 hours. Microbiology:    Cultures during this admission:     Blood cultures:                 [] None drawn      [x] Negative             []  Positive (Details:  )  Urine Culture:                   [] None drawn      [x] Negative             []  Positive (Details:  )  Sputum Culture:               [x] None drawn       [] Negative             []  Positive (Details:  )   Endotracheal aspirate:     [x] None drawn       [] Negative             []  Positive (Details:  )     Chest Xray (9/15/2022): pending    ASSESSMENT:     Principal Problem:    Septic shock ([facility]  Active Problems:    Altered mental status    Acute kidney injury ([address]    Bandemia    Cellulitis of sacral region    Breast, fat necrosis    Gram-neg septicemia ([facility]    Gram positive septicemia ([facility]    Acute encephalopathy    Elevated procalcitonin  Resolved Problems:    * No resolved hospital problems.  *    PLAN:     Neurologic:   Neuro -intubated  Neuro checks per protocol  Persistent encephalopathy  Would consider taqueria imaging today  Sedation: off sedation  Pain management: Fentanyl     Cardiovascular:  Septic shock  Target MAP greater than 65  Monitor heart rate, sinus tachycardia    Pulmonary:  Intubated and on mechanical ventilation  Chest x-ray without any consolidation/congestion  Weaned well yesterday  SBT twice daily  Extubation once able to follow commands    GI/Nutrition  Initial concern of gut malrotation ruled out on CT abdomen and pelvis 9/10  GlycoLax as needed  Ulcer Prophylaxis: Protonix  Diet:Diet NPO  ADULT TUBE FEEDING; Orogastric; Peptide Based; Continuous; 10; Yes; 10; Q 4 hours; 50; 30; Q 4 hours  Protein calorie malnutrition    Renal/Fluid/Electrolyte  Acute kidney injury with metabolic acidosis  Creatinine was coming down till yesterday  Labs pending this morning  IV fluids at 75 cc/h  Consider fluid bolus today as well  I/O: In: 3234.5 [I.V.:2107.5; NG/GT:1077]  Out: 140 [Urine:140]    ID  Initial presentation with septic shock  Source likely urine/skin  Gram-negative and gram-positive bacteremia at [facility]  Urine and blood cultures has been negative here at [facility]  Leukocytosis improving  Tmax: Temp (24hrs), Av.4 °F (37.4 °C), Min:99 °F (37.2 °C), Max:99.9 °F (37.7 °C)    Antimicrobials: Continued daptomycin and cefepime per infectious diseases    Hematology:  Recent Labs     22  0113 22  0515   HGB 9.1* 8.2*      Plts 130 (249) - continue to monitor at this point. Low suspicion of HIT at this point  Daily CBC    Endocrine:   glucose controlled  Synthroid 75mcg daily   Recent Labs     22  0113 22  0515   GLUCOSE 110* 112*       DVT Prophylaxis   - Heparin SQ  Skin  Multiple skin wounds involving breast, back and thighs  General surgery evaluated and not recommending any surgical intervention  Continue with wound care eval and treat    Dispo: To remain in ICU    Kwadwo Jimenez MD           Critical care resident,  Department of Internal Medicine/ Critical care  [facility])             9/15/2022, 7:54 AM  Attending Physician Statement  I have discussed the care of Aj Peña, including pertinent history and exam findings,  with the resident.  I have seen and examined the patient and the key elements of all parts of the encounter have been performed by me. I agree with the assessment, plan and orders as documented by the resident with additions . Encephalopathy persists   Wean precedex   Ct head   Picc line   Cont antibiotics   Encephalopathy precludes extubation - robin sbt          Total critical care time caring for this patient with life threatening, unstable organ failure, including direct patient contact, management of life support systems, review of data including imaging and labs, discussions with other team members and physicians at least 27   Min so far today, excluding procedures. Electronically signed by Kimberlee Soliman MD on   9/15/22 at 4:46 PM EDT    Please note that this chart was generated using voice recognition Dragon dictation software. Although every effort was made to ensure the accuracy of this automated transcription, some errors in transcription may have occurred.

## 2022-09-15 NOTE — PROGRESS NOTES
Writer received call from pt's  for update. Updated and question answered. He states that he is gone all week and sometimes able to be home on weekends. States his wife has been sleeping on the couch propped up with lots of pillow to almost sitting position with head forward and chin on her chest.  He states that pt does not get off the couch all day and hasn't been outdoors all summer. He also states that pt has not showered in over a year she just using baby wipes.

## 2022-09-15 NOTE — PROGRESS NOTES
[facility]  Occupational Therapy Not Seen Note    DATE: 9/15/2022    NAME: Tom Drake  MRN: [id number]   : 1980      Patient not seen this date for Occupational Therapy due to:    Patient is not appropriate for active participation in OT evaluation/treatment at this time d/t intubated/SBR    Next Scheduled Treatment: check back 2022    Electronically signed by JAYLON Webster/L on 9/15/2022 at 10:01 AM

## 2022-09-15 NOTE — PLAN OF CARE
Problem: Respiratory - Adult  Goal: Achieves optimal ventilation and oxygenation  Outcome: Progressing     Problem: Skin/Tissue Integrity  Goal: Absence of new skin breakdown  Outcome: Progressing The patient is here for a 6 month pap smear  The patient is having no complaints

## 2022-09-15 NOTE — PROCEDURES
History/labs/allergies reviewed    Benefits include stable long term intravenous access. Risks include failure to obtain the desired result(s) of the procedure, discomfort, injury, the need for additional therapies, permanent loss of body function, bleeding from the site, arterial puncture, air embolism, nerve damage, hematoma, phlebitis, catheter fracture/rupture, catheter embolism, catheter occlusion, catheter migration, catheter site infection, unintentional/accidental removal of catheter, bloodstream infection, infiltration, cardiac arrhythmia, vein thrombosis, difficult catheter removal, pleural effusion, pericardial effusion/cardiac tamponade, and death. Alternatives discussed including centrally inserted central catheter, as well as less invasive procedures such as multiple peripheral IVs, extended dwell catheters and midline placement    Consent signed and obtained by proceduralist   Placed by  AMELIA Hernandez RN  Assisted by  FREDDY  Consent signed and obtained by physician  Time out performed using two identifiers  Catheter type 3 lumen picc  Product type solo2 w vps rhythm  Lot # [id number]  Expiration date 3/31/2023  Catheter size 5 Indonesian  Trimmed at 41 cm  Total length inserted 38 cm  External catheter length 3 cm  Location right basilic vein 0.07 cm  Number of attempts 1  Estimated blood loss 2 ml  Pre procedure cardiac Rhythm sinus tachycardia per bedside telemetry and vps rhythm  Placement verified by- VPS rhythm Max P wave noted by amplitude changes of the P wave, positive blood return, flushes easily  Special equipment used- ultrasound, micro-introducer technique and vps rhythm  Catheter secured with statlock  Dressing applied- Tegaderm CHG  Lidocaine administered intradermally conc. 1% 2 mL  PICC line education:   [ x ] Discussed with patient/Family or POA prior to signing Informed Procedural Consent.   Risks and Benefits along with reason for procedure were discussed and teaching was reinforced with an education handout on PICC insertion. [facility] FAQ Catheter Associated Blood Stream Infections and [address] [facility] [place] [address] REV. 7/13 Nursing and Booklet left at bedside or in chart. Patient (Family or POA) acknowledged understanding of information taught and agreed to procedure. [  ] Was not discussed with patient/family or POA due to pts medical status at time of procedure. pts family or POA not available to discuss PICC education.  [facility] FAQ Catheter Associated Blood Stream Infections and [address] [facility] REV. 7/13 Nursing and Booklet left at bedside or in chart      Tia Kessler RN

## 2022-09-16 ENCOUNTER — APPOINTMENT (OUTPATIENT)
Dept: GENERAL RADIOLOGY | Age: 42
DRG: 870 | End: 2022-09-16
Attending: INTERNAL MEDICINE
Payer: COMMERCIAL

## 2022-09-16 LAB
ABSOLUTE EOS #: 0.53 K/UL (ref 0–0.44)
ABSOLUTE IMMATURE GRANULOCYTE: 0.37 K/UL (ref 0–0.3)
ABSOLUTE LYMPH #: 2.24 K/UL (ref 1.1–3.7)
ABSOLUTE MONO #: 0.94 K/UL (ref 0.1–1.2)
ALBUMIN SERPL-MCNC: 1.4 G/DL (ref 3.5–5.2)
ALBUMIN/GLOBULIN RATIO: 0.5 (ref 1–2.5)
ALLEN TEST: POSITIVE
ALP BLD-CCNC: 127 U/L (ref 35–104)
ALT SERPL-CCNC: 20 U/L (ref 5–33)
ANION GAP SERPL CALCULATED.3IONS-SCNC: 11 MMOL/L (ref 9–17)
AST SERPL-CCNC: 38 U/L
BASOPHILS # BLD: 0 % (ref 0–2)
BASOPHILS ABSOLUTE: 0.04 K/UL (ref 0–0.2)
BILIRUB SERPL-MCNC: 0.3 MG/DL (ref 0.3–1.2)
BILIRUBIN DIRECT: 0.1 MG/DL
BILIRUBIN, INDIRECT: 0.2 MG/DL (ref 0–1)
BUN BLDV-MCNC: 31 MG/DL (ref 6–20)
CALCIUM SERPL-MCNC: 7.4 MG/DL (ref 8.6–10.4)
CHLORIDE BLD-SCNC: 105 MMOL/L (ref 98–107)
CO2: 24 MMOL/L (ref 20–31)
CREAT SERPL-MCNC: 0.96 MG/DL (ref 0.5–0.9)
EOSINOPHILS RELATIVE PERCENT: 6 % (ref 1–4)
FIO2: 30
GFR AFRICAN AMERICAN: >60 ML/MIN
GFR NON-AFRICAN AMERICAN: >60 ML/MIN
GFR SERPL CREATININE-BSD FRML MDRD: ABNORMAL ML/MIN/{1.73_M2}
GLUCOSE BLD-MCNC: 101 MG/DL (ref 65–105)
GLUCOSE BLD-MCNC: 108 MG/DL (ref 70–99)
GLUCOSE BLD-MCNC: 111 MG/DL (ref 65–105)
GLUCOSE BLD-MCNC: 115 MG/DL (ref 74–100)
GLUCOSE BLD-MCNC: 70 MG/DL (ref 65–105)
GLUCOSE BLD-MCNC: 85 MG/DL (ref 65–105)
HCT VFR BLD CALC: 24.3 % (ref 36.3–47.1)
HEMOGLOBIN: 7.5 G/DL (ref 11.9–15.1)
IMMATURE GRANULOCYTES: 4 %
LYMPHOCYTES # BLD: 25 % (ref 24–43)
MAGNESIUM: 1.8 MG/DL (ref 1.6–2.6)
MCH RBC QN AUTO: 27.8 PG (ref 25.2–33.5)
MCHC RBC AUTO-ENTMCNC: 30.9 G/DL (ref 28.4–34.8)
MCV RBC AUTO: 90 FL (ref 82.6–102.9)
MODE: ABNORMAL
MONOCYTES # BLD: 10 % (ref 3–12)
NRBC AUTOMATED: 0.7 PER 100 WBC
O2 DEVICE/FLOW/%: ABNORMAL
PATIENT TEMP: 37.7
PDW BLD-RTO: 16.4 % (ref 11.8–14.4)
PLATELET # BLD: ABNORMAL K/UL (ref 138–453)
PLATELET, FLUORESCENCE: 55 K/UL (ref 138–453)
PLATELET, IMMATURE FRACTION: 4.4 % (ref 1.1–10.3)
POC HCO3: 27.4 MMOL/L (ref 21–28)
POC O2 SATURATION: 99 % (ref 94–98)
POC PCO2 TEMP: 36 MM HG
POC PCO2: 35.3 MM HG (ref 35–48)
POC PH TEMP: 7.49
POC PH: 7.5 (ref 7.35–7.45)
POC PO2 TEMP: 139 MM HG
POC PO2: 134.7 MM HG (ref 83–108)
POSITIVE BASE EXCESS, ART: 4 (ref 0–3)
POTASSIUM SERPL-SCNC: 5.2 MMOL/L (ref 3.7–5.3)
RBC # BLD: 2.7 M/UL (ref 3.95–5.11)
RBC # BLD: ABNORMAL 10*6/UL
SAMPLE SITE: ABNORMAL
SEG NEUTROPHILS: 54 % (ref 36–65)
SEGMENTED NEUTROPHILS ABSOLUTE COUNT: 4.91 K/UL (ref 1.5–8.1)
SODIUM BLD-SCNC: 140 MMOL/L (ref 135–144)
TOTAL PROTEIN: 4.5 G/DL (ref 6.4–8.3)
WBC # BLD: 9 K/UL (ref 3.5–11.3)

## 2022-09-16 PROCEDURE — 94761 N-INVAS EAR/PLS OXIMETRY MLT: CPT

## 2022-09-16 PROCEDURE — 85025 COMPLETE CBC W/AUTO DIFF WBC: CPT

## 2022-09-16 PROCEDURE — 80048 BASIC METABOLIC PNL TOTAL CA: CPT

## 2022-09-16 PROCEDURE — 2500000003 HC RX 250 WO HCPCS: Performed by: STUDENT IN AN ORGANIZED HEALTH CARE EDUCATION/TRAINING PROGRAM

## 2022-09-16 PROCEDURE — 2580000003 HC RX 258: Performed by: EMERGENCY MEDICINE

## 2022-09-16 PROCEDURE — 2500000003 HC RX 250 WO HCPCS: Performed by: INTERNAL MEDICINE

## 2022-09-16 PROCEDURE — 85055 RETICULATED PLATELET ASSAY: CPT

## 2022-09-16 PROCEDURE — 94003 VENT MGMT INPAT SUBQ DAY: CPT

## 2022-09-16 PROCEDURE — 6360000002 HC RX W HCPCS: Performed by: INTERNAL MEDICINE

## 2022-09-16 PROCEDURE — 82803 BLOOD GASES ANY COMBINATION: CPT

## 2022-09-16 PROCEDURE — 97167 OT EVAL HIGH COMPLEX 60 MIN: CPT

## 2022-09-16 PROCEDURE — 6370000000 HC RX 637 (ALT 250 FOR IP): Performed by: EMERGENCY MEDICINE

## 2022-09-16 PROCEDURE — 6360000002 HC RX W HCPCS: Performed by: EMERGENCY MEDICINE

## 2022-09-16 PROCEDURE — 82947 ASSAY GLUCOSE BLOOD QUANT: CPT

## 2022-09-16 PROCEDURE — 83735 ASSAY OF MAGNESIUM: CPT

## 2022-09-16 PROCEDURE — C9113 INJ PANTOPRAZOLE SODIUM, VIA: HCPCS | Performed by: STUDENT IN AN ORGANIZED HEALTH CARE EDUCATION/TRAINING PROGRAM

## 2022-09-16 PROCEDURE — 6360000002 HC RX W HCPCS: Performed by: STUDENT IN AN ORGANIZED HEALTH CARE EDUCATION/TRAINING PROGRAM

## 2022-09-16 PROCEDURE — 2580000003 HC RX 258: Performed by: INTERNAL MEDICINE

## 2022-09-16 PROCEDURE — 2580000003 HC RX 258: Performed by: STUDENT IN AN ORGANIZED HEALTH CARE EDUCATION/TRAINING PROGRAM

## 2022-09-16 PROCEDURE — 99291 CRITICAL CARE FIRST HOUR: CPT | Performed by: INTERNAL MEDICINE

## 2022-09-16 PROCEDURE — 99213 OFFICE O/P EST LOW 20 MIN: CPT

## 2022-09-16 PROCEDURE — 2700000000 HC OXYGEN THERAPY PER DAY

## 2022-09-16 PROCEDURE — 36415 COLL VENOUS BLD VENIPUNCTURE: CPT

## 2022-09-16 PROCEDURE — 99232 SBSQ HOSP IP/OBS MODERATE 35: CPT | Performed by: INTERNAL MEDICINE

## 2022-09-16 PROCEDURE — 89220 SPUTUM SPECIMEN COLLECTION: CPT

## 2022-09-16 PROCEDURE — 97530 THERAPEUTIC ACTIVITIES: CPT

## 2022-09-16 PROCEDURE — 71045 X-RAY EXAM CHEST 1 VIEW: CPT

## 2022-09-16 PROCEDURE — 80076 HEPATIC FUNCTION PANEL: CPT

## 2022-09-16 PROCEDURE — 6370000000 HC RX 637 (ALT 250 FOR IP)

## 2022-09-16 PROCEDURE — 2000000000 HC ICU R&B

## 2022-09-16 PROCEDURE — 97162 PT EVAL MOD COMPLEX 30 MIN: CPT

## 2022-09-16 RX ORDER — DEXTROSE MONOHYDRATE 25 G/50ML
25 INJECTION, SOLUTION INTRAVENOUS ONCE
Status: COMPLETED | OUTPATIENT
Start: 2022-09-16 | End: 2022-09-16

## 2022-09-16 RX ORDER — DEXTROSE AND SODIUM CHLORIDE 5; .9 G/100ML; G/100ML
INJECTION, SOLUTION INTRAVENOUS CONTINUOUS
Status: DISCONTINUED | OUTPATIENT
Start: 2022-09-16 | End: 2022-09-18

## 2022-09-16 RX ADMIN — FENTANYL CITRATE 50 MCG: 50 INJECTION, SOLUTION INTRAMUSCULAR; INTRAVENOUS at 02:31

## 2022-09-16 RX ADMIN — ENOXAPARIN SODIUM 30 MG: 100 INJECTION SUBCUTANEOUS at 08:03

## 2022-09-16 RX ADMIN — DEXTROSE AND SODIUM CHLORIDE: 5; 900 INJECTION, SOLUTION INTRAVENOUS at 16:42

## 2022-09-16 RX ADMIN — LEVOTHYROXINE SODIUM 75 MCG: 75 TABLET ORAL at 05:32

## 2022-09-16 RX ADMIN — SODIUM CHLORIDE, PRESERVATIVE FREE 40 MG: 5 INJECTION INTRAVENOUS at 08:03

## 2022-09-16 RX ADMIN — SODIUM CHLORIDE, PRESERVATIVE FREE 10 ML: 5 INJECTION INTRAVENOUS at 08:08

## 2022-09-16 RX ADMIN — ENOXAPARIN SODIUM 30 MG: 100 INJECTION SUBCUTANEOUS at 20:35

## 2022-09-16 RX ADMIN — CEFTRIAXONE SODIUM 2000 MG: 10 INJECTION, POWDER, FOR SOLUTION INTRAVENOUS at 11:12

## 2022-09-16 RX ADMIN — MIDODRINE HYDROCHLORIDE 10 MG: 5 TABLET ORAL at 02:32

## 2022-09-16 RX ADMIN — DEXTROSE MONOHYDRATE 25 G: 25 INJECTION, SOLUTION INTRAVENOUS at 16:40

## 2022-09-16 RX ADMIN — ANTI-FUNGAL POWDER MICONAZOLE NITRATE TALC FREE: 1.42 POWDER TOPICAL at 08:05

## 2022-09-16 RX ADMIN — DAPTOMYCIN 550 MG: 500 INJECTION, POWDER, LYOPHILIZED, FOR SOLUTION INTRAVENOUS at 18:36

## 2022-09-16 RX ADMIN — MIDODRINE HYDROCHLORIDE 10 MG: 5 TABLET ORAL at 10:00

## 2022-09-16 RX ADMIN — ANTI-FUNGAL POWDER MICONAZOLE NITRATE TALC FREE: 1.42 POWDER TOPICAL at 19:58

## 2022-09-16 RX ADMIN — SODIUM CHLORIDE, PRESERVATIVE FREE 10 ML: 5 INJECTION INTRAVENOUS at 19:58

## 2022-09-16 RX ADMIN — FENTANYL CITRATE 50 MCG: 50 INJECTION, SOLUTION INTRAMUSCULAR; INTRAVENOUS at 05:38

## 2022-09-16 RX ADMIN — SODIUM CHLORIDE, PRESERVATIVE FREE 10 ML: 5 INJECTION INTRAVENOUS at 08:34

## 2022-09-16 ASSESSMENT — PULMONARY FUNCTION TESTS
PIF_VALUE: 10
PIF_VALUE: 12

## 2022-09-16 NOTE — PROGRESS NOTES
[facility] -   Infectious diseases evaluation  admission date 9/10/2022    reason for consultation:   Sepsis     Impression :   Current:  Sepsis with septic shock  Altered mentation acute encephalopathy  CHULA  Bandemia  Lactic acidosis  Elevated procalcitonin 12  Gram-negative and gram-positive bacteremia-[facility]  Proteus R cipro bactrim - S ceftriaxone ampicillin  Staph hominis R ancef   Staph epi R ancef  U cx BVH 9/9 e coli S cipro  ceftriaxone ampicillin  Bilat breasts infected / necrotic wounds - maggots  Sacral sloughed skin from moisture  Hyperglycemia  Morbd obesity-BMI 47  COPD  CHULA - cr 2.22    Other:    Discussion / summary of stay / plan of care   Sepsis of unclear cause, with septic shock and elevated procalcitonin at 12. Very concerning for gram-negative organisms and hence a UTI is of a concern  CT abdomen pelvis at [facility] is negative for any bowel obstruction although it was a concern in [facility]  The urine analysis at [facility] is normal but I am not clear as of the UA at [facility]  The bacteremia with gram-negative and gram-positive is also of a concern, pending at [facility]  Recommendations   According to sensi of the org in [facility], ceftriaxone 2 weeks till 9/24 and daptomycin short course till 9/16  Repeat CK 9/17  Source of the proteus septicemia is likely the wounds-  Also treating UTI  Wound care    Neg nasal MRSA swab    Infection Control Recommendations   [place] Precautions  Contact Isolation       Antimicrobial Stewardship Recommendations   Simplification of therapy  Targeted therapy      History of Present Illness:   Initial history:  Justina Condon is a 38y.o.-year-old female found unresponsive at home was taken to [facility], found to have stools on many of her wounds and maggots.   CT of the abdomen showed concern for small bowel obstruction  She had hyponatremia with a sodium 128, leukocytosis elevated procalcitonin and lactic acid  Any blood culture came back positive for gram-negative rods gram-positive cocci clusters  CHULA  Transferred to us with concern of septic shock and sepsis-General surgery on board due to the concern of small bowel obstruction    After transfer to [facility] Cristi's CT scan of the abdomen pelvis showed no pneumonia on the lower lobes and no acute abdomen. Patient is on antibiotics, infectious consulted for opinion.   The urine analysis on 9/10 at [facility] is not suggestive of infection-but I do not have the UA or the urine culture off from [address] [place] [address] urinalysis seems to be very contaminated with epithelial cells                          Interval changes  9/16/2022   Patient Vitals for the past 8 hrs:   BP Temp Temp src Pulse Resp SpO2   09/16/22 1400 (!) 104/42 97.5 °F (36.4 °C) Oral -- -- --   09/16/22 1200 109/62 97.7 °F (36.5 °C) Oral (!) 106 17 99 %   09/16/22 1100 114/62 98.4 °F (36.9 °C) Axillary (!) 109 22 100 %   09/16/22 1032 -- -- -- (!) 115 25 100 %   09/16/22 1000 (!) 113/41 99.3 °F (37.4 °C) Esophageal (!) 107 19 100 %   09/16/22 0900 111/70 99.3 °F (37.4 °C) Esophageal (!) 111 19 98 %     Blood cultures from [facility] are back with Proteus and 2 strains of staph coagulase-negative  U cx over there showed E coli multiS  AB switched accordingly    All the wounds are evaluated with wound care on the bedside, the back has multiple open ulcers but no clear cellulitis at this point    [place] [address] OFF  The breasts had macerated wounds and the necrotic superficial skin is sloughing, no deep induration in both breasts, both being pressure wounds    9/16 extubated and appropriate -lungs clear -  Abd soft non tender  No rash    WBC 10.8 - 9    Procalcitonin 1.88   improved      Summary of relevant labs:  Labs:  sodium 128, 136  leukocytosis  21 -17 - 13  elevated procalcitonin   Elevated lactic acid 2.8  Creat 2.22 - 1.4  -1 36 improved  Micro:  BC GNR and CPC clusters at the [facility]  UA neg 9/10   MRSA nasal swab negative  blood culture 9/10     Imaging:  BREASTS US neg for abscess  CT scan of the abdomen pelvis showed no pneumonia on the lower lobes and no acute abdomen. I have personally reviewed the past medical history, past surgical history, medications, social history, and family history, and I haveupdated the database accordingly. Allergies:   Amoxicillin and Robitussin day-night value lawrence [[id number]]     Review of Systems:     Review of Systems   Constitutional:  Negative for activity change. HENT:  Negative for congestion. Eyes:  Negative for discharge. Respiratory:  Negative for apnea. Cardiovascular:  Negative for chest pain. Gastrointestinal:  Negative for abdominal distention. Endocrine: Negative for polyphagia. Genitourinary:  Negative for dysuria. Musculoskeletal:  Negative for arthralgias. Allergic/Immunologic: Negative for immunocompromised state. Neurological:  Negative for dizziness. Hematological:  Negative for adenopathy. Psychiatric/Behavioral:  Negative for agitation. Physical Examination :       Physical Exam  Constitutional:       General: She is not in acute distress. Appearance: She is obese. She is not ill-appearing or diaphoretic. HENT:      Head: Normocephalic and atraumatic. Nose: Nose normal.      Mouth/Throat:      Mouth: Mucous membranes are moist.   Eyes:      General: No scleral icterus. Conjunctiva/sclera: Conjunctivae normal.   Cardiovascular:      Rate and Rhythm: Normal rate and regular rhythm. Heart sounds: Normal heart sounds. No murmur heard. No friction rub. Pulmonary:      Effort: No respiratory distress. Breath sounds: Normal breath sounds. Abdominal:      General: There is no distension. Tenderness: There is no abdominal tenderness.    Genitourinary: Comments: Urine elder  Musculoskeletal:         General: No swelling or deformity. Cervical back: Neck supple. No rigidity or tenderness. Skin:     Coloration: Skin is not pale. Findings: Lesion present. No erythema. Neurological:      General: No focal deficit present. Mental Status: She is alert. Cranial Nerves: No cranial nerve deficit. Sensory: No sensory deficit. Psychiatric:         Mood and Affect: Mood normal.         Thought Content: Thought content normal.       Past Medical History:   No past medical history on file. Past Surgical  History:   No past surgical history on file.     Medications:      lidocaine 1 % injection  5 mL IntraDERmal Once    sodium chloride flush  5-40 mL IntraVENous 2 times per day    midodrine  10 mg Oral q8h    enoxaparin  30 mg SubCUTAneous BID    cefTRIAXone (ROCEPHIN) IV  2,000 mg IntraVENous Q24H    daptomycin (CUBICIN) IVPB  6 mg/kg (Adjusted) IntraVENous Q24H    sodium chloride flush  5-40 mL IntraVENous 2 times per day    pantoprazole (PROTONIX) 40 mg injection  40 mg IntraVENous Daily    miconazole   Topical BID    levothyroxine  75 mcg Oral Daily       Social History:     Social History     Socioeconomic History    Marital status:      Spouse name: Not on file    Number of children: Not on file    Years of education: Not on file    Highest education level: Not on file   Occupational History    Not on file   Tobacco Use    Smoking status: Not on file    Smokeless tobacco: Not on file   Substance and Sexual Activity    Alcohol use: Not on file    Drug use: Not on file    Sexual activity: Not on file   Other Topics Concern    Not on file   Social History Narrative    Not on file     Social Determinants of Health     Financial Resource Strain: Not on file   Food Insecurity: Not on file   Transportation Needs: Not on file   Physical Activity: Not on file   Stress: Not on file   Social Connections: Not on file   Intimate Partner Violence: Not on file   Housing Stability: Not on file       Family History:   No family history on file. Medical Decision Making:   I have independently reviewed/ordered the following labs:    CBC with Differential:   Recent Labs     09/14/22  0515 09/16/22  0644   WBC 10.8 9.0   HGB 8.2* 7.5*   HCT 26.3* 24.3*   * See Reflexed IPF Result   LYMPHOPCT 14* 25   MONOPCT 10* 10       BMP:  Recent Labs     09/15/22  0735 09/16/22  0644    140   K 4.1 5.2    105   CO2 26 24   BUN 32* 31*   CREATININE 1.06* 0.96*   MG 1.9 1.8       Hepatic Function Panel:   Recent Labs     09/15/22  0735 09/16/22  0644   PROT 4.3* 4.5*   LABALBU 1.3* 1.4*   BILIDIR <0.1 0.1   IBILI Can not be calculated 0.2   BILITOT 0.3 0.3   ALKPHOS 115* 127*   ALT 19 20   AST 44* 38*       No results for input(s): RPR in the last 72 hours. No results for input(s): HIV in the last 72 hours. No results for input(s): BC in the last 72 hours. Lab Results   Component Value Date/Time    CREATININE 0.96 09/16/2022 06:44 AM    GLUCOSE 108 09/16/2022 06:44 AM       Detailed results: Thank you for allowing us to participate in the care of this patient. Please call with questions. This note is created with the assistance of a speech recognition program.  While intending to generate adocument that actually reflects the content of the visit, the document can still have some errors including those of syntax and sound a like substitutions which may escape proof reading. It such instances, actual meaningcan be extrapolated by contextual diversion.     Carrie Barba MD  Office: [phone]  Perfect serve / office [phone]

## 2022-09-16 NOTE — PROGRESS NOTES
[facility]  Occupational Therapy Not Seen Note    DATE: 2022    NAME: Bryce Paz  MRN: [id number]   : 1980      Patient not seen this date for Occupational Therapy due to: Other: Pt just extubated and strict bedrest order remains in place. OT will continue to pursue readiness of evaluation.  PT following for PROM currently    Next Scheduled Treatment: 2022    Electronically signed by LULA Art on 2022 at 10:47 AM

## 2022-09-16 NOTE — PLAN OF CARE
Problem: Discharge Planning  Goal: Discharge to home or other facility with appropriate resources  9/16/2022 0111 by Joceline Davis RN  Outcome: Progressing     Problem: Respiratory - Adult  Goal: Achieves optimal ventilation and oxygenation  9/16/2022 0111 by Joceline Davis RN  Outcome: Progressing     Problem: Safety - Medical Restraint  Goal: Remains free of injury from restraints (Restraint for Interference with Medical Device)  Description: INTERVENTIONS:  1. Determine that other, less restrictive measures have been tried or would not be effective before applying the restraint  2. Evaluate the patient's condition at the time of restraint application  3. Inform patient/family regarding the reason for restraint  4.  Q2H: Monitor safety, psychosocial status, comfort, nutrition and hydration  9/16/2022 0111 by Joceline Davis RN  Outcome: Progressing  Flowsheets  Taken 9/16/2022 0000 by Joceline Davis RN  Remains free of injury from restraints (restraint for interference with medical device):   Determine that other, less restrictive measures have been tried or would not be effective before applying the restraint   Evaluate the patient's condition at the time of restraint application   Inform patient/family regarding the reason for restraint   Every 2 hours: Monitor safety, psychosocial status, comfort, nutrition and hydration  Taken 9/15/2022 2200 by Joceline Davis RN  Remains free of injury from restraints (restraint for interference with medical device):   Determine that other, less restrictive measures have been tried or would not be effective before applying the restraint   Evaluate the patient's condition at the time of restraint application   Inform patient/family regarding the reason for restraint   Every 2 hours: Monitor safety, psychosocial status, comfort, nutrition and hydration  Taken 9/15/2022 2000 by Joceline Davis RN  Remains free of injury from restraints (restraint for interference with medical device):   Determine that other, less restrictive measures have been tried or would not be effective before applying the restraint   Evaluate the patient's condition at the time of restraint application   Inform patient/family regarding the reason for restraint   Every 2 hours: Monitor safety, psychosocial status, comfort, nutrition and hydration  Taken 9/15/2022 1400 by Bernarda Manriquez RN  Remains free of injury from restraints (restraint for interference with medical device):   Determine that other, less restrictive measures have been tried or would not be effective before applying the restraint   Evaluate the patient's condition at the time of restraint application   Inform patient/family regarding the reason for restraint   Every 2 hours: Monitor safety, psychosocial status, comfort, nutrition and hydration     Problem: Pain  Goal: Verbalizes/displays adequate comfort level or baseline comfort level  9/16/2022 0111 by Jose Torres RN  Outcome: Progressing     Problem: Confusion  Goal: Confusion, delirium, dementia, or psychosis is improved or at baseline  Description: INTERVENTIONS:  1. Assess for possible contributors to thought disturbance, including medications, impaired vision or hearing, underlying metabolic abnormalities, dehydration, psychiatric diagnoses, and notify attending LIP  2. Wall high risk fall precautions, as indicated  3. Provide frequent short contacts to provide reality reorientation, refocusing and direction  4. Decrease environmental stimuli, including noise as appropriate  5. Monitor and intervene to maintain adequate nutrition, hydration, elimination, sleep and activity  6. If unable to ensure safety without constant attention obtain sitter and review sitter guidelines with assigned personnel  7.  Initiate Psychosocial CNS and Spiritual Care consult, as indicated  9/16/2022 0111 by Jose Torres RN  Outcome: Progressing     Problem: Nutrition Deficit:  Goal: Optimize nutritional status  9/16/2022 0111 by Adore Mckeon RN  Outcome: Progressing  Flowsheets (Taken 9/15/2022 1455 by Winston Bowers, ECHO, LD)  Nutrient intake appropriate for improving, restoring, or maintaining nutritional needs: Recommend, monitor, and adjust tube feedings and TPN/PPN based on assessed needs     Problem: Skin/Tissue Integrity  Goal: Absence of new skin breakdown  Description: 1. Monitor for areas of redness and/or skin breakdown  2. Assess vascular access sites hourly  3. Every 4-6 hours minimum:  Change oxygen saturation probe site  4. Every 4-6 hours:  If on nasal continuous positive airway pressure, respiratory therapy assess nares and determine need for appliance change or resting period.   9/16/2022 0111 by Adore Mckeon RN  Outcome: Progressing     Problem: Safety - Adult  Goal: Free from fall injury  9/16/2022 0111 by Adore Mckeon RN  Outcome: Progressing     Problem: ABCDS Injury Assessment  Goal: Absence of physical injury  9/16/2022 0111 by Adore Mckeon RN  Outcome: Progressing  Flowsheets (Taken 9/15/2022 2000)  Absence of Physical Injury: Implement safety measures based on patient assessment

## 2022-09-16 NOTE — PROGRESS NOTES
Order obtained for extubation. SpO2 of 100 on 30% FiO2. Patient extubated and placed on 2 liters/min via nasal cannula. Post extubation SpO2 is 100% with HR  115 bpm and RR 25 breaths/min. Patient had strong cough that was non-productive. Extubation Well tolerated by patient. .   Breath Sounds: clear    DHEERAJ LENTZ RCP   10:34 AM

## 2022-09-16 NOTE — PROGRESS NOTES
[facility] -   Infectious diseases evaluation  admission date 9/10/2022    reason for consultation:   Sepsis     Impression :   Current:  Sepsis with septic shock  Altered mentation acute encephalopathy  CHULA  Bandemia  Lactic acidosis  Elevated procalcitonin 12  Gram-negative and gram-positive bacteremia-[facility]  Proteus R cipro bactrim - S ceftriaxone ampicillin  Staph hominis R ancef   Staph epi R ancef  U cx BVH 9/9 e coli S cipro  ceftriaxone ampicillin  Bilat breasts infected / necrotic wounds - maggots  Sacral sloughed skin from moisture  Hyperglycemia  Morbd obesity-BMI 47  COPD  CHULA - cr 2.22    Other:    Discussion / summary of stay / plan of care   Sepsis of unclear cause, with septic shock and elevated procalcitonin at 12. Very concerning for gram-negative organisms and hence a UTI is of a concern  CT abdomen pelvis at [place]. [place] is negative for any bowel obstruction although it was a concern in [facility]  The urine analysis at [facility] is normal but I am not clear as of the UA at [facility]  The bacteremia with gram-negative and gram-positive is also of a concern, pending at [facility]  Recommendations   According to sensi of the org in [facility], ceftriaxone 2 weeks till 9/24 and daptomycin short course till 9/17  Source of the proteus septicemia is likely the skin -   Also treating UTI  Staph bacteremia could be from the skin source of just a contamination  Repeat CK CRP procal    Neg nasal MRSA swab    Infection Control Recommendations   [place] Precautions  Contact Isolation       Antimicrobial Stewardship Recommendations   Simplification of therapy  Targeted therapy      History of Present Illness:   Initial history:  Chipper Canavan is a 38y.o.-year-old female found unresponsive at home was taken to [facility], found to have stools on many of her wounds and maggots.   CT of the abdomen showed concern for small bowel obstruction  She had hyponatremia with a sodium 128, leukocytosis elevated procalcitonin and lactic acid  Any blood culture came back positive for gram-negative rods gram-positive cocci clusters  CHULA  Transferred to us with concern of septic shock and sepsis-General surgery on board due to the concern of small bowel obstruction    After transfer to [address] [place] [address]. Cristi's CT scan of the abdomen pelvis showed no pneumonia on the lower lobes and no acute abdomen. Patient is on antibiotics, infectious consulted for opinion.   The urine analysis on 9/10 at [facility] is not suggestive of infection-but I do not have the UA or the urine culture off from [address] [place] [address] urinalysis seems to be very contaminated with epithelial cells                          Interval changes  9/15/2022   Patient Vitals for the past 8 hrs:   BP Pulse Resp SpO2   09/15/22 2105 -- (!) 116 19 100 %   09/15/22 1800 96/66 (!) 118 24 --   09/15/22 1700 111/71 (!) 121 24 --   09/15/22 1614 -- (!) 118 27 99 %   09/15/22 1500 96/72 99 18 --     Blood cultures from [facility] are back with Proteus and 2 strains of staph coagulase-negative  U cx over there showed E coli multiS  AB switched accordingly    All the wounds are evaluated with wound care on the bedside, the back has multiple open ulcers but no clear cellulitis at this point    The breasts have macerated wounds and the necrotic superficial skin is sloughing, no deep induration in both breasts, both being pressure wounds    Patient is off Levophed, sedated 30% FiO2 5 of PEEP,  She has no fever, little hypotensive  Breast sounds improving  Labs reviewed    WBC 10.8    Procalcitonin 1.88   improved      Summary of relevant labs:  Labs:  sodium 128, 136  leukocytosis  21 -17 - 13  elevated procalcitonin   Elevated lactic acid 2.8  Creat 2.22 - 1.4  -1 36 improved  Micro:  BC GNR and CPC clusters at the [facility]  UA neg 9/10   MRSA nasal swab negative  blood culture 9/10 Imaging:  BREASTS US neg for abscess  CT scan of the abdomen pelvis showed no pneumonia on the lower lobes and no acute abdomen. I have personally reviewed the past medical history, past surgical history, medications, social history, and family history, and I haveupdated the database accordingly. Allergies:   Amoxicillin and Robitussin day-night value lawrence [[id number]]     Review of Systems:     Review of Systems   Unable to perform ROS: Mental status change     Physical Examination :       Physical Exam  Constitutional:       General: She is not in acute distress. Appearance: She is obese. She is not ill-appearing or diaphoretic. HENT:      Head: Normocephalic and atraumatic. Nose: Nose normal.      Mouth/Throat:      Mouth: Mucous membranes are moist.   Eyes:      General: No scleral icterus. Conjunctiva/sclera: Conjunctivae normal.   Cardiovascular:      Rate and Rhythm: Normal rate and regular rhythm. Heart sounds: Normal heart sounds. No murmur heard. No friction rub. Pulmonary:      Effort: No respiratory distress. Breath sounds: Normal breath sounds. Abdominal:      General: There is no distension. Tenderness: There is no abdominal tenderness. Genitourinary:     Comments: Urine elder  Musculoskeletal:         General: No swelling or deformity. Cervical back: Neck supple. No rigidity or tenderness. Skin:     Coloration: Skin is not pale. Findings: Lesion present. No erythema. Neurological:      Comments: On the vent   Psychiatric:      Comments: On the vent       Past Medical History:   No past medical history on file. Past Surgical  History:   No past surgical history on file.     Medications:      lidocaine 1 % injection  5 mL IntraDERmal Once    sodium chloride flush  5-40 mL IntraVENous 2 times per day    midodrine  10 mg Oral q8h    enoxaparin  30 mg SubCUTAneous BID    cefTRIAXone (ROCEPHIN) IV  2,000 mg IntraVENous Q24H daptomycin (CUBICIN) IVPB  6 mg/kg (Adjusted) IntraVENous Q24H    sodium chloride flush  5-40 mL IntraVENous 2 times per day    pantoprazole (PROTONIX) 40 mg injection  40 mg IntraVENous Daily    miconazole   Topical BID    levothyroxine  75 mcg Oral Daily       Social History:     Social History     Socioeconomic History    Marital status:      Spouse name: Not on file    Number of children: Not on file    Years of education: Not on file    Highest education level: Not on file   Occupational History    Not on file   Tobacco Use    Smoking status: Not on file    Smokeless tobacco: Not on file   Substance and Sexual Activity    Alcohol use: Not on file    Drug use: Not on file    Sexual activity: Not on file   Other Topics Concern    Not on file   Social History Narrative    Not on file     Social Determinants of Health     Financial Resource Strain: Not on file   Food Insecurity: Not on file   Transportation Needs: Not on file   Physical Activity: Not on file   Stress: Not on file   Social Connections: Not on file   Intimate Partner Violence: Not on file   Housing Stability: Not on file       Family History:   No family history on file. Medical Decision Making:   I have independently reviewed/ordered the following labs:    CBC with Differential:   Recent Labs     09/13/22  0113 09/14/22 0515   WBC 13.1* 10.8   HGB 9.1* 8.2*   HCT 29.7* 26.3*    130*   LYMPHOPCT 17* 14*   MONOPCT 9* 10*       BMP:  Recent Labs     09/14/22  0515 09/15/22  0735    137   K 3.8 4.1    102   CO2 25 26   BUN 36* 32*   CREATININE 1.10* 1.06*   MG 2.0 1.9       Hepatic Function Panel:   Recent Labs     09/14/22  0515 09/15/22  0735   PROT 4.8* 4.3*   LABALBU 1.5* 1.3*   BILIDIR 0.2 <0.1   IBILI 0.2 Can not be calculated   BILITOT 0.4 0.3   ALKPHOS 103 115*   ALT 16 19   AST 38* 44*       No results for input(s): RPR in the last 72 hours. No results for input(s): HIV in the last 72 hours.   No results for input(s): BC in the last 72 hours. Lab Results   Component Value Date/Time    CREATININE 1.06 09/15/2022 07:35 AM    GLUCOSE 113 09/15/2022 07:35 AM       Detailed results: Thank you for allowing us to participate in the care of this patient. Please call with questions. This note is created with the assistance of a speech recognition program.  While intending to generate adocument that actually reflects the content of the visit, the document can still have some errors including those of syntax and sound a like substitutions which may escape proof reading. It such instances, actual meaningcan be extrapolated by contextual diversion.     Keara Beltre MD  Office: [phone]  Perfect serve / office [phone]

## 2022-09-16 NOTE — PROGRESS NOTES
[facility] Wound Ostomy  Nurse  Follow up  Note       NAME:  [address] [place] [address] RECORD NUMBER:  [id number]  AGE: 38 y.o. GENDER: female  : 1980  TODAY'S DATE:  2022    Subjective   Reason for [id number] Nurse Evaluation and Assessment: \"multiple wounds to back, breasts\"     Wound Identification:  Wound Type: pressure and caustic dermatitis  Contributing Factors: chronic pressure, decreased mobility, shear force, obesity, incontinence of stool, incontinence of urine, poor hygiene, and non-adherence        Patient was found to be obtunded. Down time in bed prolonged but unknown exact amount. Severe buttock, groin, posterior thigh incontinence associated dermatitis, torso and extremity fold intertrigo, pressure injures of the breasts, left upper arm, and bilateral buttock/ischial tuberosities.        Objective    BP 96/79   Pulse (!) 109   Temp 97.5 °F (36.4 °C) (Oral)   Resp 29   Ht 5' 7\" (1.702 m)   Wt (!) 318 lb 6.4 oz (144.4 kg)   SpO2 97%   BMI 49.87 kg/m²     LABS:  WBC:    Lab Results   Component Value Date/Time    WBC 9.0 2022 06:44 AM     H/H:    Lab Results   Component Value Date/Time    HGB 7.5 2022 06:44 AM    HCT 24.3 2022 06:44 AM     PTT:    Lab Results   Component Value Date/Time    APTT 25.2 09/10/2022 08:38 PM   [APTT}  PT/INR:    Lab Results   Component Value Date/Time    PROTIME 11.1 09/10/2022 08:38 PM    INR 1.0 09/10/2022 08:38 PM     HgBA1c:  No results found for: LABA1C    Assessment   Facundo Risk Score: Facundo Scale Score: 11    Patient Active Problem List   Diagnosis Code    Altered mental status R41.82    Systemic inflammatory response syndrome (SIRS) of infectious origin with acute organ failure (HCC) A41.9, R65.20    Acute kidney injury ([address] N17.9    Bandemia D72.825    Cellulitis of sacral region L03.319    Breast, fat necrosis N64.1    Gram-neg septicemia (HCC) A41.50    Gram positive septicemia (HCC) A41.89    Acute encephalopathy G93.40    Elevated procalcitonin R79.89       Measurements:       09/16/22 1545   Wound 09/10/22 Breast Left   Date First Assessed/Time First Assessed: 09/10/22 1730   Present on Hospital Admission: Yes  Primary Wound Type: Pressure Injury  Location: Breast  Wound Location Orientation: Left   Wound Image     Wound Etiology Pressure Stage 3   Dressing Status New dressing applied   Wound Cleansed Soap and water   Dressing/Treatment Triad hydro/zinc oxide-based hydrophilic paste; Hydrofiber Ag; Other (comment)   Dressing Change Due 09/17/22   Wound Assessment Eschar moist;Subcutaneous   Drainage Amount Moderate   Drainage Description Serosanguinous   Felicia-wound Assessment Maceration   Wound Thickness Description not for Pressure Injury Full thickness   Wound 09/10/22 Other (Comment) mammary folds bilaterally   Date First Assessed/Time First Assessed: 09/10/22 1730   Present on Hospital Admission: Yes  Primary Wound Type: Other (comment)  Location: Other (Comment)  Wound Description (Comments): mammary folds bilaterally   Wound Image     Dressing Status New dressing applied   Wound Cleansed Soap and water   Dressing/Treatment Hydrofiber Ag; Other (comment)   Dressing Change Due 09/17/22   Wound Assessment Subcutaneous   Drainage Amount Moderate   Drainage Description Serosanguinous   Felicia-wound Assessment Maceration   Wound 09/10/22 Breast Right   Date First Assessed/Time First Assessed: 09/10/22 1730   Present on Hospital Admission: Yes  Primary Wound Type: Pressure Injury  Location: Breast  Wound Location Orientation: Right   Wound Image    Wound Etiology Pressure Stage 3   Dressing Status New dressing applied   Wound Cleansed Soap and water   Dressing/Treatment Hydrofiber Ag; Foam   Dressing Change Due 09/17/22   Wound Assessment Subcutaneous;Eschar moist   Drainage Amount Moderate   Drainage Description Serosanguinous   Felicia-wound Assessment Maceration   Wound 09/10/22 Rib Cage Left;Lateral   Date First Assessed/Time First Assessed: 09/10/22 1530   Present on Hospital Admission: Yes  Primary Wound Type: Pressure Injury  Location: (c) Rib Cage  Wound Location Orientation: Left;Lateral   Wound Cleansed Soap and water   Dressing/Treatment Triad hydro/zinc oxide-based hydrophilic paste   Dressing Change Due 09/17/22   Wound Assessment Subcutaneous;Eschar moist   Drainage Amount Moderate   Drainage Description Serosanguinous   Felicia-wound Assessment Maceration   Wound 09/10/22 Buttocks incontinence associated dermatitis   Date First Assessed/Time First Assessed: 09/10/22 1530   Present on Hospital Admission: Yes  Primary Wound Type: Pressure Injury  Location: Buttocks  Wound Description (Comments): incontinence associated dermatitis   Wound Image      Dressing Status New dressing applied   Wound Cleansed Soap and water   Dressing/Treatment Triad hydro/zinc oxide-based hydrophilic paste   Dressing Change Due 09/17/22   Wound Assessment Subcutaneous;Pink/red;Fibrinous   Drainage Amount Moderate   Drainage Description Serosanguinous   Felicia-wound Assessment Maceration   Wound Thickness Description not for Pressure Injury Full thickness   Wound 09/12/22 Arm Left;Proximal   Date First Assessed/Time First Assessed: 09/12/22 1600   Present on Hospital Admission: Yes  Primary Wound Type: Pressure Injury  Location: Arm  Wound Location Orientation: Left;Proximal   Wound Image    Wound Etiology Pressure Stage 3   Dressing Status New dressing applied   Wound Cleansed Soap and water   Dressing/Treatment Triad hydro/zinc oxide-based hydrophilic paste   Dressing Change Due 09/17/22   Wound Assessment Subcutaneous;Pink/red   Drainage Amount Moderate   Drainage Description Serosanguinous   Wound 09/12/22 Thigh Left;Medial severe intertrigo   Date First Assessed/Time First Assessed: 09/12/22 1705   Present on Hospital Admission: Yes  Location: Thigh  Wound Location Orientation: Left;Medial  Wound Description (Comments): severe intertrigo   Wound Image    Dressing Status New dressing applied   Wound Cleansed Soap and water   Dressing/Treatment Foam   Dressing Change Due 09/17/22   Wound Assessment Subcutaneous;Pink/red   Drainage Amount Moderate   Drainage Description Serosanguinous   Wound Thickness Description not for Pressure Injury Full thickness      Wounds demonstrate continued incremental improvement with moist eschar thinning and receding to reveal pink subcutaneous tissue. Response to treatment:  Well tolerated by patient. Plan   Plan of Care: Turn every 2 hours  Float heels off of bed with pillows under calves  Use lift sling to reposition patient to minimize potential for shear injury. Breasts: Triad Hydrophilic Wound Dressing paste to the remaining necrotic tissue, Opticell Ag gelling fiber to the red open areas, Sorbex pads, Pinc/HyTape to secure. Change daily and prn soilage  Breast folds/chest: Opticell Ag gelling fiber to the wound beds, separate the inframammary folds with DriGo HP cloths. Change daily and prn soilage  Abdominal fold: DriGo HP cloths. Change at least every 5 days and prn solage  Medial thighs and groin: separate with cloths, moisture wicking under pads  Buttocks, posterior thighs, low back: Triad Hydrophiic Wound Dressing paste daily and prn hygiene.          Specialty Bed Required : Yes   [x] Low Air Loss   [x] Pressure Redistribution  [] Fluid Immersion  [] Bariatric  [] Total Pressure Relief  [] Other:     Current Diet: Diet NPO  ADULT TUBE FEEDING; Orogastric; Peptide Based; Continuous; 55; No; 30; Q 4 hours      Patient/Caregiver Teaching:  Level of patient/caregiver understanding able to:   [] Indicates understanding       [x] Needs reinforcement  [] Unsuccessful      [] Verbal Understanding  [] Demonstrated understanding       [] No evidence of learning  [] Refused teaching         [] Nuzhat Pena RN BSN, [employer]

## 2022-09-16 NOTE — PROGRESS NOTES
Occupational Therapy  Facility/Department: [facility] CAR 3- MICU  Occupational Therapy Initial Assessment    Name: Eric Landers  : 1980  MRN: [id number]  Date of Service: 2022    Copied from chart:  Eric Landers is a 38 y.o.  female who presented to [facility] ED after being found down at home unresponsive for an unknown period of time. At home she was found to be covered in urine and fecal matter with several scattered wounds, approximately 15 total per chart review, as well as concern for maggots in some of the wounds. Labs done at the time showed and CHULA with a BUN/Cr of 59/2.32, and CT scan showed malpositioning of the jejunum to the right abdomen with small bowel obstruction with abnormally dilated loops of small bowel which are gas and fluid filled with at least one transition point in the RLQ and hepatic steatosis. Labs were also significant for hyponatremia (128), hyperkalemia (5.1), hypoalbuminemia (1.5), elevated procalcitonin at 12.57, elevated lactate at 2.1. CBC showed leukocytosis of 19.8, anemia at 10.4. Urinalysis positive for UTI. She was also found to have positive blood cultures showed gram negative rods and gram positive cocci in clusters. She was being treated at outlying facility with vancomycin and zosyn. She was being treated for urosepsis and was subsequently found to have a small bowel obstruction that the surgical team at the facility could not manage and she was transferred. Discharge Recommendations:  Patient would benefit from continued therapy after discharge          Patient Diagnosis(es): There were no encounter diagnoses. Past Medical History:  has no past medical history on file. Past Surgical History:  has no past surgical history on file. Assessment   Performance deficits / Impairments: Decreased functional mobility ; Decreased ADL status; Decreased ROM; Decreased strength;Decreased safe awareness;Decreased cognition;Decreased endurance;Decreased balance;Decreased high-level IADLs;Decreased posture  Assessment: Pt agreeable to OT eval however moderately lethargic throughout. Pt completed bed mobility with Max A X2 for trunk and BLE progression. Pt tolerated ~12 minutes of static and dynamic sitting requiring Mod A for balance d/t posterior lean and poor trunk control. Pt demonstrates above deficits listed and will require continued OT to address deficits in order for pt to regain functional independence and improve quality of life  Prognosis: Good  Decision Making: High Complexity  REQUIRES OT FOLLOW-UP: Yes  Activity Tolerance  Activity Tolerance: Patient limited by fatigue;Patient limited by pain        Plan   Plan  Times per Week: 3-5 x/wk  Current Treatment Recommendations: Strengthening, ROM, Balance training, Functional mobility training, Endurance training, Cognitive reorientation, Pain management, Safety education & training, Patient/Caregiver education & training, Equipment evaluation, education, & procurement, Positioning, Self-Care / ADL, Home management training     Restrictions  Restrictions/Precautions  Restrictions/Precautions: General Precautions  Required Braces or Orthoses?: No  Position Activity Restriction  Other position/activity restrictions: extubated 9/16    Subjective   General  Patient assessed for rehabilitation services?: Yes  Family / Caregiver Present: No  General Comment  Comments: RN ok'd pt for OT eval this date. Pt agreeable to session and pleasant/cooperative throughout.  Pt reports 10/10 pain to LLE    Social/Functional History  Social/Functional History  Lives With: Spouse  Type of Home: House  Home Layout: Two level, Able to Live on Main level with bedroom/bathroom  Home Access: Stairs to enter without rails  Entrance Stairs - Number of Steps: 5  Bathroom Shower/Tub: Walk-in shower  Bathroom Toilet: Standard  Bathroom Equipment: Shower chair, Hand-held shower  Home Equipment: mayo Ramsey (pt reports using SPC at a baseline.)  Receives Help From: Family  ADL Assistance: Independent  Toileting: Independent  Homemaking Assistance: Independent  Homemaking Responsibilities: Yes  Ambulation Assistance: Independent  Transfer Assistance: Independent  Active : Yes  Mode of Transportation: Family  Leisure & Hobbies: Wants to go back to school  Additional Comments: Pt reports  is not home very often and may not be able to provide much physical assistance. Pt reports dtr may be able to help upon discharge. Objective   Safety Devices  Type of Devices: Left in bed;Call light within reach;Nurse notified; Bed alarm in place  Restraints  Restraints Initially in Place: No    Bed Mobility Training  Bed Mobility Training: Yes  Overall Level of Assistance: Maximum assistance;Assist X2;Additional time (pt engaged in supine <> sit requiring Max A X2 for trunk and BLE progression. Pt requires Max VCs/TCs for initiation)  Interventions: Safety awareness training; Tactile cues; Verbal cues  Supine to Sit: Maximum assistance;Assist X2;Additional time  Sit to Supine: Maximum assistance;Assist X2;Adaptive equipment  Balance  Sitting: Impaired (poor sitting balance requiring Mod A grossly d/t posterior lean and poor trunk control)  Transfer Training  Transfer Training: No (unsafe to attempt d/t poor sitting balance)    AROM: Generally decreased, functional (B shoulders limited secondary to weakness and tightness at end range; all other joints WFL)  Strength: Generally decreased, functional (B shoulders 3-/4; B elbows and hands 4-/5)  Coordination: Within functional limits  Tone: Normal  Sensation: Intact    ADL  Feeding: Minimal assistance;Setup;Verbal cueing; Increased time to complete  Grooming: Minimal assistance;Setup;Verbal cueing; Increased time to complete  UE Bathing: Moderate assistance;Setup;Verbal cueing; Increased time to complete  LE Bathing: Maximum assistance;Setup;Verbal cueing; Increased time to complete  UE Dressing: Moderate assistance;Setup;Verbal cueing; Increased time to complete  LE Dressing: Dependent/Total  Toileting: Dependent/Total    Activity Tolerance  Activity Tolerance: Patient limited by fatigue;Patient limited by endurance       Vision  Vision: Impaired  Vision Exceptions: Wears glasses at all times  Hearing  Hearing: Within functional limits  Cognition  Overall Cognitive Status: Exceptions  Arousal/Alertness: Delayed responses to stimuli  Following Commands: Follows one step commands with increased time; Follows one step commands with repetition  Problem Solving: Assistance required to identify errors made;Assistance required to generate solutions  Insights: Decreased awareness of deficits  Initiation: Requires cues for some  Sequencing: Requires cues for some                 Education Provided Comments: Pt ed on OT role, OT POC, safety awareness, bed mobility training, sitting balance training, posture, and importance of continued OT. Annie Clutter return at this time    LUE PROM (degrees)  LUE PROM: WFL  LUE AROM (degrees)  LUE AROM : Exceptions  LUE General AROM: shoulders limited secondary to weakness and tightness at end range; all other joints WFL  L Shoulder Flexion 0-180: 0-70  Left Hand AROM (degrees)  Left Hand AROM: WFL  RUE PROM (degrees)  RUE PROM: WFL  RUE AROM (degrees)  RUE AROM : Exceptions  R Shoulder Flexion 0-180: 0-70  Right Hand AROM (degrees)  Right Hand AROM: WFL       Hand Dominance  Hand Dominance: Right            AM-PAC Score        AM-[facility] Inpatient Daily Activity Raw Score: 12 (09/16/22 1724)  AM-PAC Inpatient ADL T-Scale Score : 30.6 (09/16/22 1724)  ADL Inpatient CMS 0-100% Score: 66.57 (09/16/22 1724)  ADL Inpatient CMS G-Code Modifier : CL (09/16/22 1724)    Goals  Short Term Goals  Time Frame for Short term goals: By discharge, pt will:  Short Term Goal 1:  Increase BUE strength by 1/2+ muscle grade through utilization of BUE strengthening HEP  Short Term Goal 2: Demo Min A for UB ADLs  Short Term Goal 3: Demo Mod A for LB ADLs with AE PRN  Short Term Goal 4: Demo 15+ min dynamic sitting and reaching outside ADRIANA with unilateral hand release and CGA for improved performance of ADLs/IADLs  Short Term Goal 5: Follow 75% of 2-step commands with appropriate initiation and sequencing for improved functional independence  Short Term Goal 6: NOTIFY OTR TO UPDATE GOALS AS PT PROGRESSES       Therapy Time   Individual Concurrent Group Co-treatment   Time In 1324         Time Out 1352         Minutes 28         Timed Code Treatment Minutes: 10 Minutes       Ray Villafana OTR/L

## 2022-09-16 NOTE — PLAN OF CARE
Problem: Discharge Planning  Goal: Discharge to home or other facility with appropriate resources  Outcome: Progressing  Flowsheets (Taken 9/16/2022 0800)  Discharge to home or other facility with appropriate resources:   Identify barriers to discharge with patient and caregiver   Identify discharge learning needs (meds, wound care, etc)   Arrange for needed discharge resources and transportation as appropriate     Problem: Respiratory - Adult  Goal: Achieves optimal ventilation and oxygenation  9/16/2022 1845 by Terra Quezada RN  Outcome: Progressing  9/16/2022 0919 by Jose Hernandez RCP  Outcome: Progressing  Flowsheets (Taken 9/16/2022 0800 by Terra Quezada RN)  Achieves optimal ventilation and oxygenation:   Assess for changes in respiratory status   Assess for changes in mentation and behavior   Position to facilitate oxygenation and minimize respiratory effort   Oxygen supplementation based on oxygen saturation or arterial blood gases     Problem: Safety - Medical Restraint  Goal: Remains free of injury from restraints (Restraint for Interference with Medical Device)  Description: INTERVENTIONS:  1. Determine that other, less restrictive measures have been tried or would not be effective before applying the restraint  2. Evaluate the patient's condition at the time of restraint application  3. Inform patient/family regarding the reason for restraint  4.  Q2H: Monitor safety, psychosocial status, comfort, nutrition and hydration  Outcome: Progressing  Flowsheets  Taken 9/16/2022 0800 by Terra Quezada RN  Remains free of injury from restraints (restraint for interference with medical device):   Determine that other, less restrictive measures have been tried or would not be effective before applying the restraint   Evaluate the patient's condition at the time of restraint application   Inform patient/family regarding the reason for restraint   Every 2 hours: Monitor safety, psychosocial status, comfort, nutrition and hydration  Taken 9/16/2022 0600 by David Lopez RN  Remains free of injury from restraints (restraint for interference with medical device):   Determine that other, less restrictive measures have been tried or would not be effective before applying the restraint   Evaluate the patient's condition at the time of restraint application   Inform patient/family regarding the reason for restraint   Every 2 hours: Monitor safety, psychosocial status, comfort, nutrition and hydration     Problem: Pain  Goal: Verbalizes/displays adequate comfort level or baseline comfort level  Outcome: Progressing  Flowsheets (Taken 9/16/2022 0700)  Verbalizes/displays adequate comfort level or baseline comfort level:   Encourage patient to monitor pain and request assistance   Assess pain using appropriate pain scale   Administer analgesics based on type and severity of pain and evaluate response   Implement non-pharmacological measures as appropriate and evaluate response     Problem: Confusion  Goal: Confusion, delirium, dementia, or psychosis is improved or at baseline  Description: INTERVENTIONS:  1. Assess for possible contributors to thought disturbance, including medications, impaired vision or hearing, underlying metabolic abnormalities, dehydration, psychiatric diagnoses, and notify attending LIP  2. [place] high risk fall precautions, as indicated  3. Provide frequent short contacts to provide reality reorientation, refocusing and direction  4. Decrease environmental stimuli, including noise as appropriate  5. Monitor and intervene to maintain adequate nutrition, hydration, elimination, sleep and activity  6. If unable to ensure safety without constant attention obtain sitter and review sitter guidelines with assigned personnel  7.  Initiate Psychosocial CNS and Spiritual Care consult, as indicated  Outcome: Progressing     Problem: Nutrition Deficit:  Goal: Optimize nutritional status  Outcome: Progressing     Problem: Skin/Tissue Integrity  Goal: Absence of new skin breakdown  Description: 1. Monitor for areas of redness and/or skin breakdown  2. Assess vascular access sites hourly  3. Every 4-6 hours minimum:  Change oxygen saturation probe site  4. Every 4-6 hours:  If on nasal continuous positive airway pressure, respiratory therapy assess nares and determine need for appliance change or resting period.   Outcome: Progressing     Problem: Safety - Adult  Goal: Free from fall injury  Outcome: Progressing     Problem: ABCDS Injury Assessment  Goal: Absence of physical injury  Outcome: Progressing

## 2022-09-16 NOTE — PROGRESS NOTES
Critical Care Team - Daily Progress Note      Date and time: 2022 7:50 AM  Patient's name:  Jimmie Mccoy Record Number: [id number]  Patient's account/billing number: [de-identified]  Patient's YOB: 1980  Age: 38 y.o. Date of Admission: 9/10/2022  4:45 PM  Length of stay during current admission: 6      Primary Care Physician: No primary care provider on file. ICU Attending Physician: Dr. Alvarez Clachan    Code Status: Full Code    Reason for ICU admission: Septic shock, intubated requiring mechanical ventilation and pressor support      SUBJECTIVE:     OVERNIGHT EVENTS:     No acute events overnight  Intubated, mechanical ventilation  Low vent settings  No sedation  Patient is awake and following commands this morning  CT head was negative yesterday  Documented urine output is low but seems to be inaccurate  BMP pending this morning  Hemoglobin 7.5, gradual drop likely due to hemodilution  Platelet count dropping, 55 today. Could be related to sepsis. Low probability of HIT. PICC line was placed yesterday    Temp (24hrs), Av °F (37.8 °C), Min:99 °F (37.2 °C), Max:100.4 °F (38 °C)  Systolic (11ZNW), HOZ:147 , Min:88 , HFH:006   Diastolic (44BLT), EKC:96, Min:42, Max:73  Urine output in the last 24 hours: In: 3664.5 [I.V.:2089.5; NG/GT:1025]  Out: 400 [Urine:400]  No data found.     AWAKE & FOLLOWING COMMANDS:  [] No   [x] Yes    CURRENT VENTILATION STATUS:     [x] Ventilator  [] BIPAP  [] Nasal Cannula [] Room Air      SECRETIONS Amount:  [x] Small [] Moderate  [] Large  [] None  Color:     [] White [] Colored  [] Bloody    SEDATION:  RAAS Score:  [] Propofol gtt  [] Versed gtt  [] Ativan gtt   [x] No Sedation    PARALYZED:  [x] No    [] Yes    DIARRHEA:                [x] No                [] Yes  (C. Difficile status: [] Positive   [] negative                                                                                                                     [] pending)    VASOPRESSORS: [x] No    [] Yes    If yes -   [] Levophed       [] Dopamine     [] Vasopressin       [] Dobutamine  [] Phenylephrine         [] Epinephrine    CENTRAL LINES:     [] No   [] Yes   (Date of Insertion: 9/10/22  )           If yes -     [] Right IJ     [] Left IJ [] Right Femoral [] Left Femoral                   [] Right Subclavian [] Left Subclavian    LUQUE'S CATHETER:   [] No   [x] Yes  (Date of Insertion:22)     URINE OUTPUT:            [] Good   [x] Low              [] Anuric      OBJECTIVE:     VITAL SIGNS:  /65   Pulse (!) 119   Temp 99 °F (37.2 °C) (Esophageal)   Resp 20   Ht 5' 7\" (1.702 m)   Wt (!) 318 lb 6.4 oz (144.4 kg)   SpO2 98%   BMI 49.87 kg/m²   Tmax over 24 hours:  Temp (24hrs), Av °F (37.8 °C), Min:99 °F (37.2 °C), Max:100.4 °F (38 °C)      Patient Vitals for the past 6 hrs:   BP Temp Temp src Pulse Resp SpO2   22 0715 104/65 -- -- (!) 119 20 --   22 0700 99/64 -- -- (!) 118 21 98 %   22 0600 (!) 96/54 99 °F (37.2 °C) Esophageal (!) 103 17 94 %   22 0500 (!) 103/47 -- -- (!) 122 24 98 %   22 0408 -- -- -- (!) 119 20 100 %   22 0400 104/73 99.9 °F (37.7 °C) Esophageal (!) 116 19 100 %   22 0300 110/67 -- -- 93 20 98 %   22 0200 (!) 88/57 100.4 °F (38 °C) Esophageal (!) 116 21 --         Intake/Output Summary (Last 24 hours) at 2022 0750  Last data filed at 2022 0644  Gross per 24 hour   Intake 3664.49 ml   Output 400 ml   Net 3264.49 ml       Wt Readings from Last 2 Encounters:   22 (!) 318 lb 6.4 oz (144.4 kg)     Body mass index is 49.87 kg/m².         PHYSICAL EXAMINATION :  Constitutional: Intubated  EENT: PERRLA, sclera clear, anicteric, moist mucous membranes  Neck: Supple, symmetrical, trachea midline  Respiratory: Diffuse rhonchi on ascultation  Cardiovascular: tachycardia with regular rhythm, normal S1, S2, no murmur noted, and 2+ distal pulses in all 4 extremities   Abdomen: soft, distended,  no masses or organomegaly  Neurological: Awake, following commands  Skin: Extensive wounds on the back, buttocks, bilateral breast  Extremities:  peripheral pulses normal, no pedal edema, no clubbing or cyanosis    MEDICATIONS:    Scheduled Meds:   lidocaine 1 % injection  5 mL IntraDERmal Once    sodium chloride flush  5-40 mL IntraVENous 2 times per day    midodrine  10 mg Oral q8h    enoxaparin  30 mg SubCUTAneous BID    cefTRIAXone (ROCEPHIN) IV  2,000 mg IntraVENous Q24H    daptomycin (CUBICIN) IVPB  6 mg/kg (Adjusted) IntraVENous Q24H    sodium chloride flush  5-40 mL IntraVENous 2 times per day    pantoprazole (PROTONIX) 40 mg injection  40 mg IntraVENous Daily    miconazole   Topical BID    levothyroxine  75 mcg Oral Daily     Continuous Infusions:   sodium chloride      lactated ringers 75 mL/hr at 09/16/22 0644    norepinephrine Stopped (09/14/22 2216)    sodium chloride 10 mL/hr at 09/16/22 0644     PRN Meds:   sodium chloride flush, 5-40 mL, PRN  sodium chloride, 25 mL, PRN  dexmedetomidine, 0.1-1.5 mcg/kg/hr, PRN  fentanNYL, 50 mcg, Q2H PRN  sodium chloride flush, 5-40 mL, PRN  sodium chloride, , PRN  ondansetron, 4 mg, Q8H PRN   Or  ondansetron, 4 mg, Q6H PRN  polyethylene glycol, 17 g, Daily PRN  acetaminophen, 650 mg, Q6H PRN   Or  acetaminophen, 650 mg, Q6H PRN  potassium chloride, 20 mEq, PRN   Or  potassium chloride, 10 mEq, PRN        VENT SETTINGS (Comprehensive) (if applicable):  Vent Information  Ventilator ID: [id number]  Equipment Changed: HME, Expiratory Filter  Vent Mode: AC/PRVC  Additional Respiratory Assessments  Heart Rate: (!) 119  Resp: 20  SpO2: 98 %  End Tidal CO2: 39 (%)  Position: Semi-Gama's  Humidification Source: HME  Circuit Condensation: Not drained  Cuff Pressure (cm H2O):  (mlt)  Skin Barrier Applied: No    ABGs:     Laboratory findings:    Complete Blood Count:   Recent Labs     09/14/22  0515 09/16/22 0644   WBC 10.8 9.0   HGB 8.2* 7.5*   HCT 26.3* 24.3*   * See Reflexed IPF Result          Last 3 Blood Glucose:   Recent Labs     09/14/22  0515 09/15/22  0735   GLUCOSE 112* 113*          PT/INR:    Lab Results   Component Value Date/Time    PROTIME 11.1 09/10/2022 08:38 PM    INR 1.0 09/10/2022 08:38 PM     PTT:    Lab Results   Component Value Date/Time    APTT 25.2 09/10/2022 08:38 PM       Comprehensive Metabolic Profile:   Recent Labs     09/14/22  0515 09/15/22  0735    137   K 3.8 4.1    102   CO2 25 26   BUN 36* 32*   CREATININE 1.10* 1.06*   GLUCOSE 112* 113*   CALCIUM 7.0* 6.7*   PROT 4.8* 4.3*   LABALBU 1.5* 1.3*   BILITOT 0.4 0.3   ALKPHOS 103 115*   AST 38* 44*   ALT 16 19        Magnesium:   Lab Results   Component Value Date/Time    MG 1.9 09/15/2022 07:35 AM     Phosphorus: No results found for: PHOS  Ionized Calcium:   Lab Results   Component Value Date/Time    CAION 1.10 09/10/2022 08:38 PM        Urinalysis:     Troponin: No results for input(s): TROPONINI in the last 72 hours. Microbiology:    Cultures during this admission:     Blood cultures:                 [] None drawn      [x] Negative             []  Positive (Details:  )  Urine Culture:                   [] None drawn      [x] Negative             []  Positive (Details:  )  Sputum Culture:               [x] None drawn       [] Negative             []  Positive (Details:  )   Endotracheal aspirate:     [x] None drawn       [] Negative             []  Positive (Details:  )     Chest Xray (9/16/2022): pending    ASSESSMENT:     Principal Problem:    Systemic inflammatory response syndrome (SIRS) of infectious origin with acute organ failure ([address]  Active Problems:    Altered mental status    Acute kidney injury ([address]    Bandemia    Cellulitis of sacral region    Breast, fat necrosis    Gram-neg septicemia ([address]    Gram positive septicemia ([address]    Acute encephalopathy    Elevated procalcitonin  Resolved Problems:    * No resolved hospital problems.  *    PLAN:     Neurologic:   Neuro -intubated  Neuro checks per protocol  Encephalopathy improved-awake and alert  CT head negative  Sedation: off sedation  Pain management: Fentanyl     Cardiovascular:  Septic shock-resolved  Target MAP greater than 65  Monitor heart rate, sinus tachycardia  Continue midodrine 10 mg 3 times daily    Pulmonary:  Intubated and on mechanical ventilation  Chest x-ray without any consolidation/congestion  SBT today  Extubation if tolerates SBT    GI/Nutrition  Initial concern of gut malrotation ruled out on CT abdomen and pelvis 9/10  GlycoLax as needed  Ulcer Prophylaxis: Protonix  Diet:Diet NPO  ADULT TUBE FEEDING; Orogastric; Peptide Based; Continuous; 55; No; 30; Q 4 hours  Protein calorie malnutrition  Hold tube feed in anticipation of extubation    Renal/Fluid/Electrolyte  Acute kidney injury with metabolic acidosis  CHULA improving  Labs pending this morning  IV fluids at 75 cc/h  Consider fluid bolus as needed  I/O: In: 3664.5 [I.V.:2089.5; NG/GT:1025]  Out: 400 [Urine:400]    ID  Initial presentation with septic shock  Source likely urine/skin  Proteus, staph hominis, staph epidermidis from blood cultures at [facility]  Urine and blood cultures has been negative here at [facility]  Leukocytosis improving  Tmax: Temp (24hrs), Av °F (37.8 °C), Min:99 °F (37.2 °C), Max:100.4 °F (38 °C)    Antimicrobials: Ceftriaxone till  and daptomycin till  per infectious disease    Hematology:  Recent Labs     22  0515 22  0644   HGB 8.2* 7.5*      Plts 55 (249) - continue to monitor at this point. Low suspicion of HIT at this point.   Could be related to sepsis  Gradual drop in hemoglobin likely hemodilution  Daily CBC    Endocrine:   glucose controlled  Synthroid 75mcg daily   Recent Labs     22  0515 09/15/22  0735   GLUCOSE 112* 113*       DVT Prophylaxis   - Heparin SQ  Skin  Multiple skin wounds involving breast, back and thighs  General surgery evaluated and not recommending any surgical intervention  Continue with wound care eval and treat    Dispo: To remain in ICU    Kwadwo Dixon MD           Critical care resident  Department of Internal Medicine/ Critical care  [facility])             9/16/2022, 7:50 AM    Attending Physician Statement  I have discussed the care of Aj Peña, including pertinent history and exam findings,  with the resident. I have seen and examined the patient and the key elements of all parts of the encounter have been performed by me. I agree with the assessment, plan and orders as documented by the resident with additions . Junior sbt   Awake follows commands   Extubated          Total critical care time caring for this patient with life threatening, unstable organ failure, including direct patient contact, management of life support systems, review of data including imaging and labs, discussions with other team members and physicians at least 27   Min so far today, excluding procedures. Electronically signed by Linh Mccormack MD on   9/16/22 at 5:39 PM EDT    Please note that this chart was generated using voice recognition Dragon dictation software. Although every effort was made to ensure the accuracy of this automated transcription, some errors in transcription may have occurred.

## 2022-09-16 NOTE — PLAN OF CARE
Problem: Respiratory - Adult  Goal: Achieves optimal ventilation and oxygenation  9/15/2022 2215 by Aydin Salas RCP  Outcome: Progressing     Problem: Skin/Tissue Integrity  Goal: Absence of new skin breakdown  9/15/2022 2215 by Aydin Salas RCP  Outcome: Progressing

## 2022-09-16 NOTE — PROGRESS NOTES
Physical Therapy  Facility/Department: [facility] CAR 3- MICU  Physical Therapy Initial Assessment    Name: Fabio Wilder  : 1980  MRN: [id number]  Date of Service: 2022  Copied from chart:  Fabio Wilder is a 38 y.o.  female who presented to [facility] ED after being found down at home unresponsive for an unknown period of time. At home she was found to be covered in urine and fecal matter with several scattered wounds, approximately 15 total per chart review, as well as concern for maggots in some of the wounds. Labs done at the time showed and CHULA with a BUN/Cr of 59/2.32, and CT scan showed malpositioning of the jejunum to the right abdomen with small bowel obstruction with abnormally dilated loops of small bowel which are gas and fluid filled with at least one transition point in the RLQ and hepatic steatosis. Labs were also significant for hyponatremia (128), hyperkalemia (5.1), hypoalbuminemia (1.5), elevated procalcitonin at 12.57, elevated lactate at 2.1. CBC showed leukocytosis of 19.8, anemia at 10.4. Urinalysis positive for UTI. She was also found to have positive blood cultures showed gram negative rods and gram positive cocci in clusters. She was being treated at outlying facility with vancomycin and zosyn. She was being treated for urosepsis and was subsequently found to have a small bowel obstruction that the surgical team at the facility could not manage and she was transferred. Discharge Recommendations:  Patient would benefit from continued therapy after discharge   PT Equipment Recommendations  Equipment Needed: No (CTA)      Patient Diagnosis(es): There were no encounter diagnoses. Past Medical History:  has no past medical history on file. Past Surgical History:  has no past surgical history on file. Assessment   Body Structures, Functions, Activity Limitations Requiring Skilled Therapeutic Intervention: Decreased functional mobility ; Decreased strength;Decreased endurance;Decreased cognition;Decreased balance; Increased pain  Assessment: Pt with mobility deficits requiring max-A x2 to perform bed mobility, mod-A to sit at the EOB x12 minutes secondary to significant posterior lean. Pt demonstrates significantly impaired endurance, BLE strength, trunk control, and safety awareness which places the pt at a high fall risk. Pt requires frequent verbal cues for all aspects of mobility. Pt would be unsafe to return to prior living arrangements upon discharge. Pt would benefit from additional therapy upon discharge to maximize functional independence. Therapy Prognosis: Good  Decision Making: Medium Complexity  Requires PT Follow-Up: Yes  Activity Tolerance  Activity Tolerance: Patient limited by fatigue;Patient limited by endurance     Plan   Plan  Plan:  (5-6x/week)  Current Treatment Recommendations: Strengthening, Balance training, Functional mobility training, ROM, Transfer training, Gait training, Endurance training, Therapeutic activities, Equipment evaluation, education, & procurement, Home exercise program, Safety education & training, Patient/Caregiver education & training  Safety Devices  Type of Devices: Left in bed, Call light within reach, Patient at risk for falls, All fall risk precautions in place, Nurse notified, Bed alarm in place  Restraints  Restraints Initially in Place: No     Restrictions  Restrictions/Precautions  Restrictions/Precautions: General Precautions  Required Braces or Orthoses?: No  Position Activity Restriction  Other position/activity restrictions: extubated 9/16     Subjective   General  Patient assessed for rehabilitation services?: Yes  Response To Previous Treatment: Not applicable  Family / Caregiver Present: No  Follows Commands: Within Functional Limits  Subjective  Subjective: Pt supine in bed and slightly lethargic but agreeable to therapy, RN agreeable to therapy. Pt pleasant and cooperative throughout.   Pt reports 10/10 L leg pain.         Social/Functional History  Social/Functional History  Lives With: Spouse  Type of Home: House  Home Layout: Two level, Able to Live on Main level with bedroom/bathroom  Home Access: Stairs to enter without rails  Entrance Stairs - Number of Steps: 5  Bathroom Shower/Tub: Walk-in shower  Bathroom Toilet: Standard  Bathroom Equipment: Shower chair, Hand-held shower  Home Equipment: Santi Bers, standard (pt reports using SPC at a baseline.)  Receives Help From: Family  ADL Assistance: Independent  Toileting: Independent  Homemaking Assistance: Independent  Homemaking Responsibilities: Yes  Ambulation Assistance: Independent  Transfer Assistance: Independent  Active : Yes  Mode of Transportation: Family  Leisure & Hobbies: Wants to go back to school  Additional Comments: Pt reports that her  is not home very often and may not be able to provide much physical assistance. Pt reports her dtr may be able to help upon discharge. Vision/Hearing  Vision  Vision: Impaired  Vision Exceptions: Wears glasses at all times  Hearing  Hearing: Within functional limits    Cognition   Cognition  Overall Cognitive Status: Exceptions  Arousal/Alertness: Delayed responses to stimuli  Following Commands: Follows one step commands with increased time; Follows one step commands with repetition  Problem Solving: Assistance required to identify errors made;Assistance required to generate solutions  Insights: Decreased awareness of deficits  Initiation: Requires cues for some  Sequencing: Requires cues for some     Objective                 AROM RLE (degrees)  RLE AROM: WFL  AROM LLE (degrees)  LLE AROM : WFL  AROM RUE (degrees)  RUE General AROM: Co-eval with OT, see OT note for UE detail. AROM LUE (degrees)  LUE General AROM: Co-eval with OT, see OT note for UE detail.   Strength RLE  Strength RLE: Exception  R Hip Flexion: 2+/5  R Knee Extension: 3+/5  R Ankle Dorsiflexion: 4-/5  R Ankle Plantar flexion: 4-/5  Strength LLE  Strength LLE: Exception  L Hip Flexion: 2+/5  L Knee Extension: 3+/5  L Ankle Dorsiflexion: 4-/5  L Ankle Plantar Flexion: 4-/5  Strength RUE  Comment: Co-eval with OT, see OT note for UE detail. Strength LUE  Comment: Co-eval with OT, see OT note for UE detail. Bed mobility  Supine to Sit: 2 Person assistance;Maximum assistance  Sit to Supine: 2 Person assistance;Maximum assistance  Scooting: Maximal assistance  Bed Mobility Comments: HOB elevated ~35 degrees without use of handrails. Increased time/effort to perform. Transfers  Comment: Unsafe to attempt this date secondary to poor sitting balance, increased work of breathing  Ambulation  More Ambulation?: No  Stairs/Curb  Stairs?: No     Balance  Posture: Poor  Sitting - Static: Poor;+  Sitting - Dynamic: Poor  Comments: sitting balance assessed at the EOB. Pt requires mod-A to maintain sittin at the EOB. Sitting balance challenged x12 minutes this date. AM-PAC Score  AM-PAC Inpatient Mobility Raw Score : 8 (09/16/22 1600)  AM-PAC Inpatient T-Scale Score : 28.52 (09/16/22 1600)  Mobility Inpatient CMS 0-100% Score: 86.62 (09/16/22 1600)  Mobility Inpatient CMS G-Code Modifier : CM (09/16/22 1600)              Goals  Short Term Goals  Time Frame for Short term goals: 14 visits  Short term goal 1: Pt will perform sit<>stand transfer with min-A. Short term goal 2: Pt will perform bed mobility with min-A. Short term goal 3: Pt will ambulate 30 feet with a RW and CGA. Short term goal 4: Pt will demonstrate fair standing balance to decrease fall risk. Short term goal 5: Pt will tolerate a 35 minute therapy session to promote increased endurance. Additional Goals?: No       Education  Patient Education  Education Given To: Patient  Education Provided: Role of Therapy;Transfer Training;Plan of Care; Fall Prevention Strategies  Education Method: Verbal  Barriers to Learning: None  Education Outcome: Verbalized understanding      Therapy Time   Individual Concurrent Group Co-treatment   Time In 1324         Time Out 1351         Minutes 27         Timed Code Treatment Minutes: 8 Minutes       Hayde Albright, PT

## 2022-09-16 NOTE — PLAN OF CARE
Problem: Respiratory - Adult  Goal: Achieves optimal ventilation and oxygenation  9/16/2022 0919 by David Owens RCP  Outcome: Progressing  Flowsheets (Taken 9/16/2022 0800 by June Yates RN)  Achieves optimal ventilation and oxygenation:   Assess for changes in respiratory status   Assess for changes in mentation and behavior   Position to facilitate oxygenation and minimize respiratory effort   Oxygen supplementation based on oxygen saturation or arterial blood gases     Problem: OXYGENATION/RESPIRATORY FUNCTION  Goal: Patient will maintain patent airway  Outcome: Ongoing  Goal: Patient will achieve/maintain normal respiratory rate/effort  Respiratory rate and effort will be within normal limits for the patient  Outcome: Ongoing    Problem: MECHANICAL VENTILATION  Goal: Patient will maintain patent airway  Outcome: Ongoing  Goal: Oral health is maintained or improved  Outcome: Ongoing  Goal: ET tube will be managed safely  Outcome: Ongoing  Goal: Ability to express needs and understand communication  Outcome: Ongoing  Goal: Mobility/activity is maintained at optimum level for patient  Outcome: Ongoing    Problem: ASPIRATION PRECAUTIONS  Goal: Patients risk of aspiration is minimized  Outcome: Ongoing    Problem: SKIN INTEGRITY  Goal: Skin integrity is maintained or improved  Outcome: Ongoing

## 2022-09-17 LAB
ABSOLUTE EOS #: 0.48 K/UL (ref 0–0.44)
ABSOLUTE IMMATURE GRANULOCYTE: 0.15 K/UL (ref 0–0.3)
ABSOLUTE LYMPH #: 1.96 K/UL (ref 1.1–3.7)
ABSOLUTE MONO #: 0.75 K/UL (ref 0.1–1.2)
ALBUMIN SERPL-MCNC: 1.3 G/DL (ref 3.5–5.2)
ALBUMIN/GLOBULIN RATIO: 0.4 (ref 1–2.5)
ALP BLD-CCNC: 118 U/L (ref 35–104)
ALT SERPL-CCNC: 19 U/L (ref 5–33)
ANION GAP SERPL CALCULATED.3IONS-SCNC: 7 MMOL/L (ref 9–17)
AST SERPL-CCNC: 29 U/L
BASOPHILS # BLD: 0 % (ref 0–2)
BASOPHILS ABSOLUTE: 0.03 K/UL (ref 0–0.2)
BILIRUB SERPL-MCNC: 0.2 MG/DL (ref 0.3–1.2)
BILIRUBIN DIRECT: 0.1 MG/DL
BILIRUBIN, INDIRECT: 0.1 MG/DL (ref 0–1)
BUN BLDV-MCNC: 28 MG/DL (ref 6–20)
CALCIUM SERPL-MCNC: 7.1 MG/DL (ref 8.6–10.4)
CHLORIDE BLD-SCNC: 107 MMOL/L (ref 98–107)
CO2: 26 MMOL/L (ref 20–31)
CREAT SERPL-MCNC: 0.81 MG/DL (ref 0.5–0.9)
EOSINOPHILS RELATIVE PERCENT: 7 % (ref 1–4)
GFR AFRICAN AMERICAN: >60 ML/MIN
GFR NON-AFRICAN AMERICAN: >60 ML/MIN
GFR SERPL CREATININE-BSD FRML MDRD: ABNORMAL ML/MIN/{1.73_M2}
GLUCOSE BLD-MCNC: 130 MG/DL (ref 65–105)
GLUCOSE BLD-MCNC: 67 MG/DL (ref 65–105)
GLUCOSE BLD-MCNC: 73 MG/DL (ref 65–105)
GLUCOSE BLD-MCNC: 74 MG/DL (ref 65–105)
GLUCOSE BLD-MCNC: 78 MG/DL (ref 65–105)
GLUCOSE BLD-MCNC: 80 MG/DL (ref 70–99)
GLUCOSE BLD-MCNC: 98 MG/DL (ref 65–105)
HCT VFR BLD CALC: 23.5 % (ref 36.3–47.1)
HEMOGLOBIN: 7.4 G/DL (ref 11.9–15.1)
IMMATURE GRANULOCYTES: 2 %
LYMPHOCYTES # BLD: 28 % (ref 24–43)
MAGNESIUM: 1.9 MG/DL (ref 1.6–2.6)
MCH RBC QN AUTO: 28.5 PG (ref 25.2–33.5)
MCHC RBC AUTO-ENTMCNC: 31.5 G/DL (ref 28.4–34.8)
MCV RBC AUTO: 90.4 FL (ref 82.6–102.9)
MONOCYTES # BLD: 11 % (ref 3–12)
NRBC AUTOMATED: 0.3 PER 100 WBC
PDW BLD-RTO: 16.6 % (ref 11.8–14.4)
PLATELET # BLD: 150 K/UL (ref 138–453)
PMV BLD AUTO: 10.4 FL (ref 8.1–13.5)
POTASSIUM SERPL-SCNC: 4.1 MMOL/L (ref 3.7–5.3)
RBC # BLD: 2.6 M/UL (ref 3.95–5.11)
RBC # BLD: ABNORMAL 10*6/UL
SEG NEUTROPHILS: 53 % (ref 36–65)
SEGMENTED NEUTROPHILS ABSOLUTE COUNT: 3.73 K/UL (ref 1.5–8.1)
SODIUM BLD-SCNC: 140 MMOL/L (ref 135–144)
TOTAL PROTEIN: 4.7 G/DL (ref 6.4–8.3)
WBC # BLD: 7.1 K/UL (ref 3.5–11.3)

## 2022-09-17 PROCEDURE — C9113 INJ PANTOPRAZOLE SODIUM, VIA: HCPCS | Performed by: STUDENT IN AN ORGANIZED HEALTH CARE EDUCATION/TRAINING PROGRAM

## 2022-09-17 PROCEDURE — 6360000002 HC RX W HCPCS: Performed by: STUDENT IN AN ORGANIZED HEALTH CARE EDUCATION/TRAINING PROGRAM

## 2022-09-17 PROCEDURE — 2580000003 HC RX 258: Performed by: STUDENT IN AN ORGANIZED HEALTH CARE EDUCATION/TRAINING PROGRAM

## 2022-09-17 PROCEDURE — 2060000000 HC ICU INTERMEDIATE R&B

## 2022-09-17 PROCEDURE — 85025 COMPLETE CBC W/AUTO DIFF WBC: CPT

## 2022-09-17 PROCEDURE — 6370000000 HC RX 637 (ALT 250 FOR IP)

## 2022-09-17 PROCEDURE — 2580000003 HC RX 258: Performed by: EMERGENCY MEDICINE

## 2022-09-17 PROCEDURE — 99291 CRITICAL CARE FIRST HOUR: CPT | Performed by: INTERNAL MEDICINE

## 2022-09-17 PROCEDURE — 99233 SBSQ HOSP IP/OBS HIGH 50: CPT | Performed by: INTERNAL MEDICINE

## 2022-09-17 PROCEDURE — 6370000000 HC RX 637 (ALT 250 FOR IP): Performed by: EMERGENCY MEDICINE

## 2022-09-17 PROCEDURE — 83735 ASSAY OF MAGNESIUM: CPT

## 2022-09-17 PROCEDURE — 80076 HEPATIC FUNCTION PANEL: CPT

## 2022-09-17 PROCEDURE — 36415 COLL VENOUS BLD VENIPUNCTURE: CPT

## 2022-09-17 PROCEDURE — 2500000003 HC RX 250 WO HCPCS: Performed by: INTERNAL MEDICINE

## 2022-09-17 PROCEDURE — 80048 BASIC METABOLIC PNL TOTAL CA: CPT

## 2022-09-17 PROCEDURE — 1200000000 HC SEMI PRIVATE

## 2022-09-17 PROCEDURE — 6360000002 HC RX W HCPCS: Performed by: INTERNAL MEDICINE

## 2022-09-17 RX ADMIN — CEFTRIAXONE SODIUM 2000 MG: 10 INJECTION, POWDER, FOR SOLUTION INTRAVENOUS at 12:20

## 2022-09-17 RX ADMIN — ANTI-FUNGAL POWDER MICONAZOLE NITRATE TALC FREE: 1.42 POWDER TOPICAL at 09:06

## 2022-09-17 RX ADMIN — ANTI-FUNGAL POWDER MICONAZOLE NITRATE TALC FREE: 1.42 POWDER TOPICAL at 20:39

## 2022-09-17 RX ADMIN — ENOXAPARIN SODIUM 30 MG: 100 INJECTION SUBCUTANEOUS at 08:59

## 2022-09-17 RX ADMIN — SODIUM CHLORIDE, PRESERVATIVE FREE 10 ML: 5 INJECTION INTRAVENOUS at 08:58

## 2022-09-17 RX ADMIN — MIDODRINE HYDROCHLORIDE 10 MG: 5 TABLET ORAL at 16:51

## 2022-09-17 RX ADMIN — SODIUM CHLORIDE, PRESERVATIVE FREE 40 MG: 5 INJECTION INTRAVENOUS at 08:57

## 2022-09-17 RX ADMIN — SODIUM CHLORIDE, PRESERVATIVE FREE 10 ML: 5 INJECTION INTRAVENOUS at 20:48

## 2022-09-17 RX ADMIN — LEVOTHYROXINE SODIUM 75 MCG: 75 TABLET ORAL at 08:57

## 2022-09-17 RX ADMIN — ENOXAPARIN SODIUM 30 MG: 100 INJECTION SUBCUTANEOUS at 20:39

## 2022-09-17 RX ADMIN — MIDODRINE HYDROCHLORIDE 10 MG: 5 TABLET ORAL at 10:16

## 2022-09-17 RX ADMIN — SODIUM CHLORIDE, PRESERVATIVE FREE 10 ML: 5 INJECTION INTRAVENOUS at 20:40

## 2022-09-17 ASSESSMENT — ENCOUNTER SYMPTOMS
ABDOMINAL DISTENTION: 0
EYE DISCHARGE: 0
APNEA: 0

## 2022-09-17 NOTE — PROGRESS NOTES
[facility] -   Infectious diseases evaluation    Progress Note    admission date 9/10/2022    reason for consultation:   Sepsis     Impression :   Current:  Sepsis with septic shock  Altered mentation acute encephalopathy  CHULA  Bandemia  Lactic acidosis  Elevated procalcitonin 12  Gram-negative and gram-positive bacteremia-[place] [facility]  Proteus R cipro bactrim - S ceftriaxone ampicillin  Staph hominis R ancef   Staph epi R ancef  U cx BVH 9/9 e coli S cipro  ceftriaxone ampicillin  Bilat breasts infected / necrotic wounds - maggots  Sacral sloughed skin from moisture  Hyperglycemia  Morbd obesity-BMI 47  COPD  CHULA - cr 2.22    Other:    Discussion / summary of stay / plan of care   Sepsis of unclear cause, with septic shock and elevated procalcitonin at 12. Very concerning for gram-negative organisms and hence a UTI is of a concern  CT abdomen pelvis at [facility] [place]. [facility] is negative for any bowel obstruction although it was a concern in [facility]  The urine analysis at [facility] is normal but I am not clear as of the UA at [facility]  The bacteremia with gram-negative and gram-positive is also of a concern, pending at [facility]  Recommendations     Rocephin 2 gm Iv q 24 Hr. Stop date 9/24   Source of the proteus septicemia is likely the wounds  Daptomycin completed 9/16  Repeat CK 9/17: pending  Wound care    Neg nasal MRSA swab    Infection Control Recommendations   Towner Precautions  Contact Isolation       Antimicrobial Stewardship Recommendations   Simplification of therapy  Targeted therapy      History of Present Illness:   Initial history:  Emili Galindo is a 38y.o.-year-old female found unresponsive at home was taken to [facility], found to have stools on many of her wounds and maggots.   CT of the abdomen showed concern for small bowel obstruction  She had hyponatremia with a sodium 128, leukocytosis elevated procalcitonin and lactic acid  Any blood culture came back positive for gram-negative rods gram-positive cocci clusters  CHULA  Transferred to us with concern of septic shock and sepsis-General surgery on board due to the concern of small bowel obstruction    After transfer to [facility] [place]. Cristi's CT scan of the abdomen pelvis showed no pneumonia on the lower lobes and no acute abdomen. Patient is on antibiotics, infectious consulted for opinion.   The urine analysis on 9/10 at [facility] is not suggestive of infection-but I do not have the UA or the urine culture off from [address] [place] [address] urinalysis seems to be very contaminated with epithelial cells                          Interval changes  9/17/2022   Patient Vitals for the past 8 hrs:   BP Temp Temp src Pulse Resp SpO2   09/17/22 1300 (!) 110/51 -- -- (!) 122 21 --   09/17/22 1200 (!) 109/51 97.6 °F (36.4 °C) Oral (!) 109 19 100 %   09/17/22 1100 105/61 -- -- (!) 114 20 --   09/17/22 1000 (!) 96/56 -- -- (!) 117 19 98 %     Blood cultures from [facility] are back with Proteus and 2 strains of staph coagulase-negative  U cx over there showed E coli multiS  AB switched accordingly    All the wounds are evaluated with wound care on the bedside, the back has multiple open ulcers but no clear cellulitis at this point    [place] [address] OFF  The breasts had macerated wounds and the necrotic superficial skin is sloughing, no deep induration in both breasts, both being pressure wounds    9/16 extubated and appropriate -lungs clear -  Abd soft non tender  No rash    CURRENT EVALUATION : 9/17/2022    Afebrile  VS stable    Patient feels better  No complaints  No new issues per RN    On nasal 02 @ 2 L/min  RR 22  02 sat 100    Summary of relevant labs: 9/17/2022   Labs:    WBC 10.8 - 9-->7.1  Hb 7.4  Plat 150      Procalcitonin 1.88   improved    Na 128, 136-->140  BUN 28  Cr 2.22 - 1.4-->0.81      Elevated lactic acid 2.8    CRP 203-1 36 improved      Micro:  BC GNR and CPC clusters at the [facility]  UA neg 9/10   MRSA nasal swab negative  blood culture 9/10 : No growth      Imaging:  BREASTS US neg for abscess  CT scan of the abdomen pelvis showed no pneumonia on the lower lobes and no acute abdomen. I have personally reviewed the past medical history, past surgical history, medications, social history, and family history, and I haveupdated the database accordingly. Allergies:   Amoxicillin and Robitussin day-night value lawrence [[id number]]     Review of Systems:     Review of Systems   Constitutional:  Negative for activity change. HENT:  Negative for congestion. Eyes:  Negative for discharge. Respiratory:  Negative for apnea. Cardiovascular:  Negative for chest pain. Gastrointestinal:  Negative for abdominal distention. Endocrine: Negative for polyphagia. Genitourinary:  Negative for dysuria. Musculoskeletal:  Negative for arthralgias. Allergic/Immunologic: Negative for immunocompromised state. Neurological:  Negative for dizziness. Hematological:  Negative for adenopathy. Psychiatric/Behavioral:  Negative for agitation. Physical Examination :       Physical Exam  Constitutional:       General: She is not in acute distress. Appearance: She is obese. She is not ill-appearing or diaphoretic. HENT:      Head: Normocephalic and atraumatic. Nose: Nose normal.      Mouth/Throat:      Mouth: Mucous membranes are moist.   Eyes:      General: No scleral icterus. Conjunctiva/sclera: Conjunctivae normal.   Cardiovascular:      Rate and Rhythm: Normal rate and regular rhythm. Heart sounds: Normal heart sounds. No murmur heard. No friction rub. Pulmonary:      Effort: No respiratory distress. Breath sounds: Normal breath sounds. Abdominal:      General: There is no distension. Tenderness: There is no abdominal tenderness.    Genitourinary:     Comments: Urine elder  Musculoskeletal:         General: No swelling or deformity. Cervical back: Neck supple. No rigidity or tenderness. Skin:     Coloration: Skin is not pale. Findings: Lesion present. No erythema. Neurological:      General: No focal deficit present. Mental Status: She is alert. Cranial Nerves: No cranial nerve deficit. Sensory: No sensory deficit. Psychiatric:         Mood and Affect: Mood normal.         Thought Content: Thought content normal.       Past Medical History:   No past medical history on file. Past Surgical  History:   No past surgical history on file.     Medications:      lidocaine 1 % injection  5 mL IntraDERmal Once    sodium chloride flush  5-40 mL IntraVENous 2 times per day    midodrine  10 mg Oral q8h    enoxaparin  30 mg SubCUTAneous BID    cefTRIAXone (ROCEPHIN) IV  2,000 mg IntraVENous Q24H    sodium chloride flush  5-40 mL IntraVENous 2 times per day    pantoprazole (PROTONIX) 40 mg injection  40 mg IntraVENous Daily    miconazole   Topical BID    levothyroxine  75 mcg Oral Daily       Social History:     Social History     Socioeconomic History    Marital status:      Spouse name: Not on file    Number of children: Not on file    Years of education: Not on file    Highest education level: Not on file   Occupational History    Not on file   Tobacco Use    Smoking status: Not on file    Smokeless tobacco: Not on file   Substance and Sexual Activity    Alcohol use: Not on file    Drug use: Not on file    Sexual activity: Not on file   Other Topics Concern    Not on file   Social History Narrative    Not on file     Social Determinants of Health     Financial Resource Strain: Not on file   Food Insecurity: Not on file   Transportation Needs: Not on file   Physical Activity: Not on file   Stress: Not on file   Social Connections: Not on file   Intimate Partner Violence: Not on file   Housing Stability: Not on file       Family History:   No family history on file. Medical Decision Making:   I have independently reviewed/ordered the following labs:    CBC with Differential:   Recent Labs     09/16/22  0644 09/17/22  0625   WBC 9.0 7.1   HGB 7.5* 7.4*   HCT 24.3* 23.5*   PLT See Reflexed IPF Result 150   LYMPHOPCT 25 28   MONOPCT 10 11       BMP:  Recent Labs     09/16/22  0644 09/17/22  0625    140   K 5.2 4.1    107   CO2 24 26   BUN 31* 28*   CREATININE 0.96* 0.81   MG 1.8 1.9       Hepatic Function Panel:   Recent Labs     09/16/22  0644 09/17/22  0625   PROT 4.5* 4.7*   LABALBU 1.4* 1.3*   BILIDIR 0.1 0.1   IBILI 0.2 0.1   BILITOT 0.3 0.2*   ALKPHOS 127* 118*   ALT 20 19   AST 38* 29       No results for input(s): RPR in the last 72 hours. No results for input(s): HIV in the last 72 hours. No results for input(s): BC in the last 72 hours. Lab Results   Component Value Date/Time    CREATININE 0.81 09/17/2022 06:25 AM    GLUCOSE 80 09/17/2022 06:25 AM       Detailed results: Thank you for allowing us to participate in the care of this patient. Please call with questions. This note is created with the assistance of a speech recognition program.  While intending to generate adocument that actually reflects the content of the visit, the document can still have some errors including those of syntax and sound a like substitutions which may escape proof reading. It such instances, actual meaningcan be extrapolated by contextual diversion.     Mary De Guzman MD  Office: [phone]  Perfect serve / office [phone]

## 2022-09-17 NOTE — PROGRESS NOTES
Critical Care Team - Daily Progress Note      Date and time: 2022 5:33 PM  Patient's name:  María Esparza  Medical Record Number: [id number]  Patient's account/billing number: [de-identified]  Patient's YOB: 1980  Age: 38 y.o. Date of Admission: 9/10/2022  4:45 PM  Length of stay during current admission: 7      Primary Care Physician: No primary care provider on file. ICU Attending Physician: Dr. Benny Parra    Code Status: Full Code    Reason for ICU admission: Septic shock, intubated requiring mechanical ventilation and pressor support      SUBJECTIVE:     OVERNIGHT EVENTS:     No acute events overnight  Patient was extubated yesterday and tolerated it well  Passed bedside swallow study this morning  Good urine output    Temp (24hrs), Av °F (36.7 °C), Min:97.6 °F (36.4 °C), Max:98.4 °F (50.6 °C)  Systolic (94ACW), ROSALBA:68 , Min:87 , MWU:348   Diastolic (89VFW), HCS:93, Min:43, Max:63  Urine output in the last 24 hours: In: 2219.9 [P.O.:240;  I.V.:1869.9; NG/GT:60]  Out: 1700 [Urine:1700]  Patient Vitals for the past 96 hrs (Last 3 readings):   Weight   22 0542 (!) 318 lb 6.4 oz (144.4 kg)       AWAKE & FOLLOWING COMMANDS:  [] No   [x] Yes    CURRENT VENTILATION STATUS:     [] Ventilator  [] BIPAP  [] Nasal Cannula [x] Room Air      OBJECTIVE:     VITAL SIGNS:  BP (!) 110/51   Pulse (!) 122   Temp 97.6 °F (36.4 °C) (Oral)   Resp 21   Ht 5' 7\" (1.702 m)   Wt (!) 318 lb 6.4 oz (144.4 kg)   SpO2 100%   BMI 49.87 kg/m²   Tmax over 24 hours:  Temp (24hrs), Av °F (36.7 °C), Min:97.6 °F (36.4 °C), Max:98.4 °F (36.9 °C)      Patient Vitals for the past 6 hrs:   BP Temp Temp src Pulse Resp SpO2   22 1300 (!) 110/51 -- -- (!) 122 21 --   22 1200 (!) 109/51 97.6 °F (36.4 °C) Oral (!) 109 19 100 %         Intake/Output Summary (Last 24 hours) at 2022 1733  Last data filed at 2022 1427  Gross per 24 hour   Intake 1557.55 ml   Output 1350 ml   Net 207.55 ml       Wt Readings from Last 2 Encounters:   09/17/22 (!) 318 lb 6.4 oz (144.4 kg)     Body mass index is 49.87 kg/m².         PHYSICAL EXAMINATION :  Constitutional: Resting comfortably  EENT: PERRLA, sclera clear, anicteric, moist mucous membranes  Neck: Supple, symmetrical, trachea midline  Respiratory: Bilateral equal breath sounds  Cardiovascular: Irregularly irregular rhythm  Abdomen: soft, distended,  no masses or organomegaly  Neurological: Awake, no focal neurological deficit  Skin: Extensive wounds on the back, buttocks, bilateral breast  Extremities:  peripheral pulses normal, no pedal edema, no clubbing or cyanosis    MEDICATIONS:    Scheduled Meds:   lidocaine 1 % injection  5 mL IntraDERmal Once    sodium chloride flush  5-40 mL IntraVENous 2 times per day    midodrine  10 mg Oral q8h    enoxaparin  30 mg SubCUTAneous BID    cefTRIAXone (ROCEPHIN) IV  2,000 mg IntraVENous Q24H    sodium chloride flush  5-40 mL IntraVENous 2 times per day    pantoprazole (PROTONIX) 40 mg injection  40 mg IntraVENous Daily    miconazole   Topical BID    levothyroxine  75 mcg Oral Daily     Continuous Infusions:   dextrose 5 % and 0.9 % NaCl 75 mL/hr at 09/17/22 0438    sodium chloride      sodium chloride 10 mL/hr at 09/17/22 0438     PRN Meds:   sodium chloride flush, 5-40 mL, PRN  sodium chloride, 25 mL, PRN  dexmedetomidine, 0.1-1.5 mcg/kg/hr, PRN  fentanNYL, 50 mcg, Q2H PRN  sodium chloride flush, 5-40 mL, PRN  sodium chloride, , PRN  ondansetron, 4 mg, Q8H PRN   Or  ondansetron, 4 mg, Q6H PRN  polyethylene glycol, 17 g, Daily PRN  acetaminophen, 650 mg, Q6H PRN   Or  acetaminophen, 650 mg, Q6H PRN  potassium chloride, 20 mEq, PRN   Or  potassium chloride, 10 mEq, PRN        VENT SETTINGS (Comprehensive) (if applicable):  Vent Information  Ventilator ID: [id number]  Equipment Changed: HME, Expiratory Filter  Ventilator Discontinue: Yes  Vent Mode: AC/PRVC  Additional Respiratory Assessments  Heart Rate: (!) 122  Resp: 21  SpO2: 100 %  End Tidal CO2: 40 (%)  Position: Semi-Gama's  Humidification Source: HME  Circuit Condensation: Not drained  Cuff Pressure (cm H2O):  (mlt)  Skin Barrier Applied: No    ABGs:     Laboratory findings:    Complete Blood Count:   Recent Labs     09/16/22 0644 09/17/22 0625   WBC 9.0 7.1   HGB 7.5* 7.4*   HCT 24.3* 23.5*   PLT See Reflexed IPF Result 150          Last 3 Blood Glucose:   Recent Labs     09/15/22  0735 09/16/22 0644 09/17/22 0625   GLUCOSE 113* 108* 80          PT/INR:    Lab Results   Component Value Date/Time    PROTIME 11.1 09/10/2022 08:38 PM    INR 1.0 09/10/2022 08:38 PM     PTT:    Lab Results   Component Value Date/Time    APTT 25.2 09/10/2022 08:38 PM       Comprehensive Metabolic Profile:   Recent Labs     09/15/22  0735 09/16/22 0644 09/17/22 0625    140 140   K 4.1 5.2 4.1    105 107   CO2 26 24 26   BUN 32* 31* 28*   CREATININE 1.06* 0.96* 0.81   GLUCOSE 113* 108* 80   CALCIUM 6.7* 7.4* 7.1*   PROT 4.3* 4.5* 4.7*   LABALBU 1.3* 1.4* 1.3*   BILITOT 0.3 0.3 0.2*   ALKPHOS 115* 127* 118*   AST 44* 38* 29   ALT 19 20 19        Magnesium:   Lab Results   Component Value Date/Time    MG 1.9 09/17/2022 06:25 AM     Phosphorus: No results found for: PHOS  Ionized Calcium:   Lab Results   Component Value Date/Time    CAION 1.10 09/10/2022 08:38 PM        Urinalysis:     Troponin: No results for input(s): TROPONINI in the last 72 hours.     Microbiology:    Cultures during this admission:     Blood cultures:                 [] None drawn      [x] Negative             []  Positive (Details:  )  Urine Culture:                   [] None drawn      [x] Negative             []  Positive (Details:  )  Sputum Culture:               [x] None drawn       [] Negative             []  Positive (Details:  )   Endotracheal aspirate:     [x] None drawn       [] Negative             []  Positive (Details:  )     Chest Xray (9/17/2022): pending    ASSESSMENT:     Principal Problem:    Systemic inflammatory response syndrome (SIRS) of infectious origin with acute organ failure [facility])  Active Problems:    Altered mental status    Acute kidney injury ([facility] [address]    Bandemia    Cellulitis of sacral region    Breast, fat necrosis    Gram-neg septicemia (HCC)    Gram positive septicemia (HCC)    Acute encephalopathy    Elevated procalcitonin  Resolved Problems:    * No resolved hospital problems. *    PLAN:     Neurologic:   CT head negative  Encephalopathy resolved  Sedation: None    Cardiovascular:  Septic shock-resolved  Target MAP greater than 65  Monitor heart rate, sinus tachycardia  Continue midodrine 10 mg 3 times daily    Pulmonary:  Extubated   Supplemental oxygen as needed    GI/Nutrition  Initial concern of gut malrotation ruled out on CT abdomen and pelvis 9/10  GlycoLax as needed  Ulcer Prophylaxis: Protonix  Diet:ADULT DIET; Regular; 4 carb choices (60 gm/meal)  Passed bedside swallow study. Resume regular diet    Renal/Fluid/Electrolyte  Acute kidney injury -resolved  On D5 half saline. Stop IV fluids once starts taking orally. I/O: In: 2219.9 [P.O.:240; I.V.:1869.9; NG/GT:60]  Out: 1700 [Urine:1700]    ID  Initial presentation with septic shock  Source likely urine/skin  Proteus, staph hominis, staph epidermidis from blood cultures at [facility]  Urine and blood cultures has been negative here at [facility]  Leukocytosis improving  Tmax: Temp (24hrs), Av °F (36.7 °C), Min:97.6 °F (36.4 °C), Max:98.4 °F (36.9 °C)    Antimicrobials: Ceftriaxone till  and daptomycin till  per infectious disease    Hematology:  Recent Labs     22  0644 22  0625   HGB 7.5* 7.4*     Platelet count improving.   Was likely related to sepsis    Endocrine:   glucose controlled  Synthroid 75mcg daily   Recent Labs     09/15/22  0735 22  0644 22  0625   GLUCOSE 113* 108* 80       DVT Prophylaxis   - Heparin SQ  Skin  Multiple skin wounds involving breast, back and thighs  General surgery evaluated and not recommending any surgical intervention  Continue with wound care eval and treat    Dispo: Okay to transfer out of medical ICU    Juliocesar Marie MD           Critical care resident  Department of Internal Medicine/ Critical care  [facility] ([place])             9/17/2022, 5:33 PM  Attending Physician Statement  I have discussed the care of Aj Peña, including pertinent history and exam findings,  with the resident. I have seen and examined the patient and the key elements of all parts of the encounter have been performed by me. I agree with the assessment, plan and orders as documented by the resident with additions . Total critical care time caring for this patient with life threatening, unstable organ failure, including direct patient contact, management of life support systems, review of data including imaging and labs, discussions with other team members and physicians at least 27   Min so far today, excluding procedures. Electronically signed by Zahida Hassan MD on   9/17/22 at 10:56 PM EDT    Please note that this chart was generated using voice recognition Dragon dictation software. Although every effort was made to ensure the accuracy of this automated transcription, some errors in transcription may have occurred.

## 2022-09-17 NOTE — PLAN OF CARE
Problem: Discharge Planning  Goal: Discharge to home or other facility with appropriate resources  9/16/2022 2122 by Barry Tyler RN  Outcome: Progressing  Flowsheets (Taken 9/16/2022 2000)  Discharge to home or other facility with appropriate resources:   Identify barriers to discharge with patient and caregiver   Arrange for needed discharge resources and transportation as appropriate   Identify discharge learning needs (meds, wound care, etc)  9/16/2022 1845 by Blanca Poole RN  Outcome: Progressing  Flowsheets (Taken 9/16/2022 0800)  Discharge to home or other facility with appropriate resources:   Identify barriers to discharge with patient and caregiver   Identify discharge learning needs (meds, wound care, etc)   Arrange for needed discharge resources and transportation as appropriate     Problem: Respiratory - Adult  Goal: Achieves optimal ventilation and oxygenation  9/16/2022 2122 by Barry Tyler RN  Outcome: Progressing  Flowsheets (Taken 9/16/2022 2000)  Achieves optimal ventilation and oxygenation:   Assess for changes in respiratory status   Assess for changes in mentation and behavior   Position to facilitate oxygenation and minimize respiratory effort   Oxygen supplementation based on oxygen saturation or arterial blood gases  9/16/2022 1845 by Blanca Poole RN  Outcome: Progressing  9/16/2022 0919 by Dianne Guy RCP  Outcome: Progressing  Flowsheets (Taken 9/16/2022 0800 by Blanca Poole RN)  Achieves optimal ventilation and oxygenation:   Assess for changes in respiratory status   Assess for changes in mentation and behavior   Position to facilitate oxygenation and minimize respiratory effort   Oxygen supplementation based on oxygen saturation or arterial blood gases     Problem: Safety - Medical Restraint  Goal: Remains free of injury from restraints (Restraint for Interference with Medical Device)  Description: INTERVENTIONS:  1.  Determine that other, less restrictive measures have been tried or would not be effective before applying the restraint  2. Evaluate the patient's condition at the time of restraint application  3. Inform patient/family regarding the reason for restraint  4.  Q2H: Monitor safety, psychosocial status, comfort, nutrition and hydration  9/16/2022 2122 by Zenobia Cartagena RN  Outcome: Progressing  9/16/2022 1845 by Livingston Saint, RN  Outcome: Progressing  Flowsheets  Taken 9/16/2022 0800 by Livingston Saint, RN  Remains free of injury from restraints (restraint for interference with medical device):   Determine that other, less restrictive measures have been tried or would not be effective before applying the restraint   Evaluate the patient's condition at the time of restraint application   Inform patient/family regarding the reason for restraint   Every 2 hours: Monitor safety, psychosocial status, comfort, nutrition and hydration  Taken 9/16/2022 0600 by Chrissy Pearce RN  Remains free of injury from restraints (restraint for interference with medical device):   Determine that other, less restrictive measures have been tried or would not be effective before applying the restraint   Evaluate the patient's condition at the time of restraint application   Inform patient/family regarding the reason for restraint   Every 2 hours: Monitor safety, psychosocial status, comfort, nutrition and hydration     Problem: Pain  Goal: Verbalizes/displays adequate comfort level or baseline comfort level  9/16/2022 2122 by Zenobia Cartagena RN  Outcome: Progressing  Flowsheets (Taken 9/16/2022 2000)  Verbalizes/displays adequate comfort level or baseline comfort level:   Encourage patient to monitor pain and request assistance   Assess pain using appropriate pain scale   Implement non-pharmacological measures as appropriate and evaluate response  9/16/2022 1845 by Livingston Saint, RN  Outcome: Progressing  Flowsheets (Taken 9/16/2022 0700)  Verbalizes/displays adequate comfort level or baseline comfort level:   Encourage patient to monitor pain and request assistance   Assess pain using appropriate pain scale   Administer analgesics based on type and severity of pain and evaluate response   Implement non-pharmacological measures as appropriate and evaluate response     Problem: Confusion  Goal: Confusion, delirium, dementia, or psychosis is improved or at baseline  Description: INTERVENTIONS:  1. Assess for possible contributors to thought disturbance, including medications, impaired vision or hearing, underlying metabolic abnormalities, dehydration, psychiatric diagnoses, and notify attending LIP  2. Muddy high risk fall precautions, as indicated  3. Provide frequent short contacts to provide reality reorientation, refocusing and direction  4. Decrease environmental stimuli, including noise as appropriate  5. Monitor and intervene to maintain adequate nutrition, hydration, elimination, sleep and activity  6. If unable to ensure safety without constant attention obtain sitter and review sitter guidelines with assigned personnel  7. Initiate Psychosocial CNS and Spiritual Care consult, as indicated  9/16/2022 2122 by Joi Donis RN  Outcome: Progressing  9/16/2022 1845 by Angeles Matute RN  Outcome: Progressing     Problem: Nutrition Deficit:  Goal: Optimize nutritional status  9/16/2022 2122 by Joi Donis RN  Outcome: Progressing  9/16/2022 1845 by Angeles Matute RN  Outcome: Progressing     Problem: Skin/Tissue Integrity  Goal: Absence of new skin breakdown  Description: 1. Monitor for areas of redness and/or skin breakdown  2. Assess vascular access sites hourly  3. Every 4-6 hours minimum:  Change oxygen saturation probe site  4. Every 4-6 hours:  If on nasal continuous positive airway pressure, respiratory therapy assess nares and determine need for appliance change or resting period.   9/16/2022 2122 by Joi Donis RN  Outcome: Progressing  9/16/2022 1845 by Cal Jones RN  Outcome: Progressing     Problem: Safety - Adult  Goal: Free from fall injury  9/16/2022 2122 by Sherri Chapman RN  Outcome: Hermon Prey (Taken 9/16/2022 2121)  Free From Fall Injury: Instruct family/caregiver on patient safety  9/16/2022 1845 by Cal Jones RN  Outcome: Progressing     Problem: ABCDS Injury Assessment  Goal: Absence of physical injury  9/16/2022 2122 by Sherri Chapman RN  Outcome: Progressing  Flowsheets (Taken 9/16/2022 2121)  Absence of Physical Injury: Implement safety measures based on patient assessment  9/16/2022 1845 by Cal Jones RN  Outcome: Progressing

## 2022-09-18 ENCOUNTER — APPOINTMENT (OUTPATIENT)
Dept: GENERAL RADIOLOGY | Age: 42
DRG: 870 | End: 2022-09-18
Attending: INTERNAL MEDICINE
Payer: COMMERCIAL

## 2022-09-18 PROBLEM — D64.9 CHRONIC ANEMIA: Status: ACTIVE | Noted: 2022-09-18

## 2022-09-18 PROBLEM — A41.9 SYSTEMIC INFLAMMATORY RESPONSE SYNDROME (SIRS) OF INFECTIOUS ORIGIN WITH ACUTE ORGAN FAILURE (HCC): Status: ACTIVE | Noted: 2022-09-18

## 2022-09-18 PROBLEM — E88.09 HYPOALBUMINEMIA: Status: ACTIVE | Noted: 2022-09-18

## 2022-09-18 PROBLEM — J41.0 SIMPLE CHRONIC BRONCHITIS (HCC): Status: ACTIVE | Noted: 2022-09-18

## 2022-09-18 PROBLEM — J96.01 ACUTE RESPIRATORY FAILURE WITH HYPOXIA (HCC): Status: ACTIVE | Noted: 2022-09-18

## 2022-09-18 PROBLEM — E43 SEVERE PROTEIN-CALORIE MALNUTRITION (HCC): Status: ACTIVE | Noted: 2022-09-18

## 2022-09-18 PROBLEM — E03.9 HYPOTHYROID: Status: ACTIVE | Noted: 2022-09-18

## 2022-09-18 PROBLEM — R65.20 SYSTEMIC INFLAMMATORY RESPONSE SYNDROME (SIRS) OF INFECTIOUS ORIGIN WITH ACUTE ORGAN FAILURE (HCC): Status: ACTIVE | Noted: 2022-09-18

## 2022-09-18 LAB
ABSOLUTE EOS #: 0.41 K/UL (ref 0–0.44)
ABSOLUTE IMMATURE GRANULOCYTE: 0.13 K/UL (ref 0–0.3)
ABSOLUTE LYMPH #: 2.51 K/UL (ref 1.1–3.7)
ABSOLUTE MONO #: 0.82 K/UL (ref 0.1–1.2)
ALBUMIN SERPL-MCNC: 1.5 G/DL (ref 3.5–5.2)
ALBUMIN/GLOBULIN RATIO: 0.4 (ref 1–2.5)
ALP BLD-CCNC: 123 U/L (ref 35–104)
ALT SERPL-CCNC: 19 U/L (ref 5–33)
ANION GAP SERPL CALCULATED.3IONS-SCNC: 9 MMOL/L (ref 9–17)
AST SERPL-CCNC: 27 U/L
BASOPHILS # BLD: 0 % (ref 0–2)
BASOPHILS ABSOLUTE: 0.04 K/UL (ref 0–0.2)
BILIRUB SERPL-MCNC: 0.3 MG/DL (ref 0.3–1.2)
BILIRUBIN DIRECT: 0.1 MG/DL
BILIRUBIN, INDIRECT: 0.2 MG/DL (ref 0–1)
BUN BLDV-MCNC: 24 MG/DL (ref 6–20)
CALCIUM SERPL-MCNC: 7.2 MG/DL (ref 8.6–10.4)
CHLORIDE BLD-SCNC: 102 MMOL/L (ref 98–107)
CO2: 24 MMOL/L (ref 20–31)
CREAT SERPL-MCNC: 0.96 MG/DL (ref 0.5–0.9)
EOSINOPHILS RELATIVE PERCENT: 4 % (ref 1–4)
GFR AFRICAN AMERICAN: >60 ML/MIN
GFR NON-AFRICAN AMERICAN: >60 ML/MIN
GFR SERPL CREATININE-BSD FRML MDRD: ABNORMAL ML/MIN/{1.73_M2}
GLUCOSE BLD-MCNC: 77 MG/DL (ref 65–105)
GLUCOSE BLD-MCNC: 81 MG/DL (ref 70–99)
GLUCOSE BLD-MCNC: 98 MG/DL (ref 65–105)
HCT VFR BLD CALC: 22.8 % (ref 36.3–47.1)
HEMOGLOBIN: 7.4 G/DL (ref 11.9–15.1)
IMMATURE GRANULOCYTES: 1 %
LYMPHOCYTES # BLD: 27 % (ref 24–43)
MAGNESIUM: 1.6 MG/DL (ref 1.6–2.6)
MCH RBC QN AUTO: 28.7 PG (ref 25.2–33.5)
MCHC RBC AUTO-ENTMCNC: 32.5 G/DL (ref 28.4–34.8)
MCV RBC AUTO: 88.4 FL (ref 82.6–102.9)
MONOCYTES # BLD: 9 % (ref 3–12)
NRBC AUTOMATED: 0.2 PER 100 WBC
PDW BLD-RTO: 16.8 % (ref 11.8–14.4)
PLATELET # BLD: 179 K/UL (ref 138–453)
PMV BLD AUTO: 10.6 FL (ref 8.1–13.5)
POTASSIUM SERPL-SCNC: 4.1 MMOL/L (ref 3.7–5.3)
PRO-BNP: 2687 PG/ML
RBC # BLD: 2.58 M/UL (ref 3.95–5.11)
RBC # BLD: ABNORMAL 10*6/UL
SEG NEUTROPHILS: 58 % (ref 36–65)
SEGMENTED NEUTROPHILS ABSOLUTE COUNT: 5.31 K/UL (ref 1.5–8.1)
SODIUM BLD-SCNC: 135 MMOL/L (ref 135–144)
TOTAL PROTEIN: 5 G/DL (ref 6.4–8.3)
WBC # BLD: 9.2 K/UL (ref 3.5–11.3)

## 2022-09-18 PROCEDURE — 1200000000 HC SEMI PRIVATE

## 2022-09-18 PROCEDURE — 2580000003 HC RX 258: Performed by: STUDENT IN AN ORGANIZED HEALTH CARE EDUCATION/TRAINING PROGRAM

## 2022-09-18 PROCEDURE — 94640 AIRWAY INHALATION TREATMENT: CPT

## 2022-09-18 PROCEDURE — 99232 SBSQ HOSP IP/OBS MODERATE 35: CPT | Performed by: INTERNAL MEDICINE

## 2022-09-18 PROCEDURE — 6370000000 HC RX 637 (ALT 250 FOR IP)

## 2022-09-18 PROCEDURE — 94761 N-INVAS EAR/PLS OXIMETRY MLT: CPT

## 2022-09-18 PROCEDURE — 80048 BASIC METABOLIC PNL TOTAL CA: CPT

## 2022-09-18 PROCEDURE — 99223 1ST HOSP IP/OBS HIGH 75: CPT | Performed by: STUDENT IN AN ORGANIZED HEALTH CARE EDUCATION/TRAINING PROGRAM

## 2022-09-18 PROCEDURE — 6360000002 HC RX W HCPCS: Performed by: INTERNAL MEDICINE

## 2022-09-18 PROCEDURE — 85025 COMPLETE CBC W/AUTO DIFF WBC: CPT

## 2022-09-18 PROCEDURE — 6360000002 HC RX W HCPCS: Performed by: STUDENT IN AN ORGANIZED HEALTH CARE EDUCATION/TRAINING PROGRAM

## 2022-09-18 PROCEDURE — 83735 ASSAY OF MAGNESIUM: CPT

## 2022-09-18 PROCEDURE — 2500000003 HC RX 250 WO HCPCS: Performed by: INTERNAL MEDICINE

## 2022-09-18 PROCEDURE — 83880 ASSAY OF NATRIURETIC PEPTIDE: CPT

## 2022-09-18 PROCEDURE — 36415 COLL VENOUS BLD VENIPUNCTURE: CPT

## 2022-09-18 PROCEDURE — C9113 INJ PANTOPRAZOLE SODIUM, VIA: HCPCS | Performed by: STUDENT IN AN ORGANIZED HEALTH CARE EDUCATION/TRAINING PROGRAM

## 2022-09-18 PROCEDURE — 82947 ASSAY GLUCOSE BLOOD QUANT: CPT

## 2022-09-18 PROCEDURE — 6360000002 HC RX W HCPCS: Performed by: EMERGENCY MEDICINE

## 2022-09-18 PROCEDURE — 80076 HEPATIC FUNCTION PANEL: CPT

## 2022-09-18 PROCEDURE — 6370000000 HC RX 637 (ALT 250 FOR IP): Performed by: EMERGENCY MEDICINE

## 2022-09-18 PROCEDURE — 2580000003 HC RX 258: Performed by: EMERGENCY MEDICINE

## 2022-09-18 PROCEDURE — 6370000000 HC RX 637 (ALT 250 FOR IP): Performed by: STUDENT IN AN ORGANIZED HEALTH CARE EDUCATION/TRAINING PROGRAM

## 2022-09-18 PROCEDURE — 71045 X-RAY EXAM CHEST 1 VIEW: CPT

## 2022-09-18 RX ORDER — FUROSEMIDE 10 MG/ML
20 INJECTION INTRAMUSCULAR; INTRAVENOUS ONCE
Status: COMPLETED | OUTPATIENT
Start: 2022-09-18 | End: 2022-09-18

## 2022-09-18 RX ORDER — BUDESONIDE AND FORMOTEROL FUMARATE DIHYDRATE 80; 4.5 UG/1; UG/1
1 AEROSOL RESPIRATORY (INHALATION) 2 TIMES DAILY
Status: DISCONTINUED | OUTPATIENT
Start: 2022-09-18 | End: 2022-10-05 | Stop reason: HOSPADM

## 2022-09-18 RX ADMIN — ANTI-FUNGAL POWDER MICONAZOLE NITRATE TALC FREE: 1.42 POWDER TOPICAL at 20:30

## 2022-09-18 RX ADMIN — SODIUM CHLORIDE, PRESERVATIVE FREE 10 ML: 5 INJECTION INTRAVENOUS at 20:28

## 2022-09-18 RX ADMIN — ONDANSETRON 4 MG: 2 INJECTION INTRAMUSCULAR; INTRAVENOUS at 20:23

## 2022-09-18 RX ADMIN — BUDESONIDE AND FORMOTEROL FUMARATE DIHYDRATE 1 PUFF: 80; 4.5 AEROSOL RESPIRATORY (INHALATION) at 20:23

## 2022-09-18 RX ADMIN — LEVOTHYROXINE SODIUM 75 MCG: 75 TABLET ORAL at 08:14

## 2022-09-18 RX ADMIN — SODIUM CHLORIDE, PRESERVATIVE FREE 10 ML: 5 INJECTION INTRAVENOUS at 20:29

## 2022-09-18 RX ADMIN — SODIUM CHLORIDE, PRESERVATIVE FREE 10 ML: 5 INJECTION INTRAVENOUS at 08:15

## 2022-09-18 RX ADMIN — MIDODRINE HYDROCHLORIDE 10 MG: 5 TABLET ORAL at 17:28

## 2022-09-18 RX ADMIN — FUROSEMIDE 20 MG: 10 INJECTION, SOLUTION INTRAMUSCULAR; INTRAVENOUS at 14:12

## 2022-09-18 RX ADMIN — ENOXAPARIN SODIUM 30 MG: 100 INJECTION SUBCUTANEOUS at 20:30

## 2022-09-18 RX ADMIN — CEFTRIAXONE SODIUM 2000 MG: 10 INJECTION, POWDER, FOR SOLUTION INTRAVENOUS at 10:48

## 2022-09-18 RX ADMIN — MIDODRINE HYDROCHLORIDE 10 MG: 5 TABLET ORAL at 10:46

## 2022-09-18 RX ADMIN — ANTI-FUNGAL POWDER MICONAZOLE NITRATE TALC FREE: 1.42 POWDER TOPICAL at 08:15

## 2022-09-18 RX ADMIN — FENTANYL CITRATE 50 MCG: 50 INJECTION, SOLUTION INTRAMUSCULAR; INTRAVENOUS at 00:28

## 2022-09-18 RX ADMIN — ENOXAPARIN SODIUM 30 MG: 100 INJECTION SUBCUTANEOUS at 08:14

## 2022-09-18 RX ADMIN — DEXTROSE AND SODIUM CHLORIDE: 5; 900 INJECTION, SOLUTION INTRAVENOUS at 00:25

## 2022-09-18 RX ADMIN — SODIUM CHLORIDE, PRESERVATIVE FREE 40 MG: 5 INJECTION INTRAVENOUS at 08:14

## 2022-09-18 ASSESSMENT — ENCOUNTER SYMPTOMS
EYE DISCHARGE: 0
COLOR CHANGE: 0
SHORTNESS OF BREATH: 1
DIARRHEA: 1
COUGH: 0
CONSTIPATION: 0
BACK PAIN: 0
APNEA: 0
ABDOMINAL DISTENTION: 0
ABDOMINAL PAIN: 0

## 2022-09-18 ASSESSMENT — PAIN DESCRIPTION - DESCRIPTORS
DESCRIPTORS: SORE;BURNING
DESCRIPTORS: SORE;BURNING

## 2022-09-18 ASSESSMENT — PAIN SCALES - GENERAL
PAINLEVEL_OUTOF10: 0
PAINLEVEL_OUTOF10: 9
PAINLEVEL_OUTOF10: 0
PAINLEVEL_OUTOF10: 8
PAINLEVEL_OUTOF10: 9

## 2022-09-18 ASSESSMENT — PAIN DESCRIPTION - ORIENTATION: ORIENTATION: RIGHT;LEFT;POSTERIOR

## 2022-09-18 ASSESSMENT — PAIN DESCRIPTION - FREQUENCY: FREQUENCY: CONTINUOUS

## 2022-09-18 ASSESSMENT — PAIN DESCRIPTION - PAIN TYPE: TYPE: CHRONIC PAIN

## 2022-09-18 ASSESSMENT — PAIN DESCRIPTION - ONSET: ONSET: ON-GOING

## 2022-09-18 NOTE — PROGRESS NOTES
[facility] -   Infectious diseases evaluation    Progress Note    admission date 9/10/2022    reason for consultation:   Sepsis     Impression :   Current:  Sepsis with septic shock  Altered mentation acute encephalopathy  CHULA  Bandemia  Lactic acidosis  Elevated procalcitonin 12  Gram-negative and gram-positive bacteremia-[place] [facility]  Proteus R cipro bactrim - S ceftriaxone ampicillin  Staph hominis R ancef   Staph epi R ancef  U cx BVH 9/9 e coli S cipro  ceftriaxone ampicillin  Bilat breasts infected / necrotic wounds - maggots  Sacral sloughed skin from moisture  Hyperglycemia  Morbd obesity-BMI 47  COPD  CHULA - cr 2.22    Other:    Discussion / summary of stay / plan of care   Sepsis of unclear cause, with septic shock and elevated procalcitonin at 12. Very concerning for gram-negative organisms and hence a UTI is of a concern  CT abdomen pelvis at [facility]. [facility] is negative for any bowel obstruction although it was a concern in [facility]  The urine analysis at [address] is normal but I am not clear as of the UA at [facility]  The bacteremia with gram-negative and gram-positive is also of a concern, pending at [facility]  Recommendations     Rocephin 2 gm Iv q 24 Hr. Stop date 9/24   Source of the proteus septicemia is likely the wounds  Daptomycin completed 9/16  Repeat CK 9/17: pending  Wound care    Neg nasal MRSA swab    Infection Control Recommendations   [place] Precautions  Contact Isolation       Antimicrobial Stewardship Recommendations   Simplification of therapy  Targeted therapy      History of Present Illness:   Initial history:  Marlys Alamo is a 38y.o.-year-old female found unresponsive at home was taken to [facility], found to have stools on many of her wounds and maggots.   CT of the abdomen showed concern for small bowel obstruction  She had hyponatremia with a sodium 128, leukocytosis elevated procalcitonin and lactic acid  Any blood culture came back positive for gram-negative rods gram-positive cocci clusters  CHULA  Transferred to us with concern of septic shock and sepsis-General surgery on board due to the concern of small bowel obstruction    After transfer to [facility]. Cristi's CT scan of the abdomen pelvis showed no pneumonia on the lower lobes and no acute abdomen. Patient is on antibiotics, infectious consulted for opinion.   The urine analysis on 9/10 at [facility] is not suggestive of infection-but I do not have the UA or the urine culture off from [address] [place] [address] urinalysis seems to be very contaminated with epithelial cells                          Interval changes  9/18/2022   Patient Vitals for the past 8 hrs:   BP Temp Temp src Pulse Resp SpO2   09/18/22 1611 (!) 109/57 98.7 °F (37.1 °C) Oral (!) 107 29 99 %   09/18/22 1138 (!) 99/56 97.9 °F (36.6 °C) Oral (!) 115 22 100 %     Blood cultures from [facility] are back with Proteus and 2 strains of staph coagulase-negative  U cx over there showed E coli multiS  AB switched accordingly    All the wounds are evaluated with wound care on the bedside, the back has multiple open ulcers but no clear cellulitis at this point    [address] OFF  The breasts had macerated wounds and the necrotic superficial skin is sloughing, no deep induration in both breasts, both being pressure wounds    9/16 extubated and appropriate -lungs clear -  Abd soft non tender  No rash    CURRENT EVALUATION : 9/18/2022    Afebrile  VS stable    Patient feels better  No complaints  No new issues per RN      On nasal 02 @ 2 L/min  RR 22  02 sat 100    Summary of relevant labs: 9/18/2022   Labs:    WBC 10.8 - 9-->7.1  Hb 7.4  Plat 150      Procalcitonin 1.88   improved    Na 128, 136-->140  BUN 28  Cr 2.22 - 1.4-->0.81      Elevated lactic acid 2.8    -1 36 improved      Micro:  BC GNR and CPC clusters at the [facility]  UA neg 9/10   MRSA nasal swab negative  blood culture 9/10 : No growth      Imaging:  BREASTS US neg for abscess  CT scan of the abdomen pelvis showed no pneumonia on the lower lobes and no acute abdomen. I have personally reviewed the past medical history, past surgical history, medications, social history, and family history, and I haveupdated the database accordingly. Allergies:   Amoxicillin and Robitussin day-night value lawrence [[id number]]     Review of Systems:     Review of Systems   Constitutional:  Negative for activity change. HENT:  Negative for congestion. Eyes:  Negative for discharge. Respiratory:  Negative for apnea. Cardiovascular:  Negative for chest pain. Gastrointestinal:  Negative for abdominal distention. Endocrine: Negative for polyphagia. Genitourinary:  Negative for dysuria. Musculoskeletal:  Negative for arthralgias. Allergic/Immunologic: Negative for immunocompromised state. Neurological:  Negative for dizziness. Hematological:  Negative for adenopathy. Psychiatric/Behavioral:  Negative for agitation. Physical Examination :       Physical Exam  Constitutional:       General: She is not in acute distress. Appearance: She is obese. She is not ill-appearing or diaphoretic. HENT:      Head: Normocephalic and atraumatic. Nose: Nose normal.      Mouth/Throat:      Mouth: Mucous membranes are moist.   Eyes:      General: No scleral icterus. Conjunctiva/sclera: Conjunctivae normal.   Cardiovascular:      Rate and Rhythm: Normal rate and regular rhythm. Heart sounds: Normal heart sounds. No murmur heard. No friction rub. Pulmonary:      Effort: No respiratory distress. Breath sounds: Normal breath sounds. Abdominal:      General: There is no distension. Tenderness: There is no abdominal tenderness. Genitourinary:     Comments: Urine elder  Musculoskeletal:         General: No swelling or deformity. Cervical back: Neck supple. No rigidity or tenderness. Skin:     Coloration: Skin is not pale. Findings: Lesion present. No erythema. Neurological:      General: No focal deficit present. Mental Status: She is alert. Cranial Nerves: No cranial nerve deficit. Sensory: No sensory deficit. Psychiatric:         Mood and Affect: Mood normal.         Thought Content: Thought content normal.       Past Medical History:   No past medical history on file. Past Surgical  History:   No past surgical history on file. Medications:      budesonide-formoterol  1 puff Inhalation BID    lidocaine 1 % injection  5 mL IntraDERmal Once    sodium chloride flush  5-40 mL IntraVENous 2 times per day    midodrine  10 mg Oral q8h    enoxaparin  30 mg SubCUTAneous BID    cefTRIAXone (ROCEPHIN) IV  2,000 mg IntraVENous Q24H    sodium chloride flush  5-40 mL IntraVENous 2 times per day    pantoprazole (PROTONIX) 40 mg injection  40 mg IntraVENous Daily    miconazole   Topical BID    levothyroxine  75 mcg Oral Daily       Social History:     Social History     Socioeconomic History    Marital status:      Spouse name: Not on file    Number of children: Not on file    Years of education: Not on file    Highest education level: Not on file   Occupational History    Not on file   Tobacco Use    Smoking status: Not on file    Smokeless tobacco: Not on file   Substance and Sexual Activity    Alcohol use: Not on file    Drug use: Not on file    Sexual activity: Not on file   Other Topics Concern    Not on file   Social History Narrative    Not on file     Social Determinants of Health     Financial Resource Strain: Not on file   Food Insecurity: Not on file   Transportation Needs: Not on file   Physical Activity: Not on file   Stress: Not on file   Social Connections: Not on file   Intimate Partner Violence: Not on file   Housing Stability: Not on file       Family History:   No family history on file.    Medical Decision Making:   I have independently reviewed/ordered the following labs:    CBC with Differential:   Recent Labs     09/17/22  0625 09/18/22  0552   WBC 7.1 9.2   HGB 7.4* 7.4*   HCT 23.5* 22.8*    179   LYMPHOPCT 28 27   MONOPCT 11 9       BMP:  Recent Labs     09/17/22  0625 09/18/22  0552    135   K 4.1 4.1    102   CO2 26 24   BUN 28* 24*   CREATININE 0.81 0.96*   MG 1.9 1.6       Hepatic Function Panel:   Recent Labs     09/17/22  0625 09/18/22  0552   PROT 4.7* 5.0*   LABALBU 1.3* 1.5*   BILIDIR 0.1 0.1   IBILI 0.1 0.2   BILITOT 0.2* 0.3   ALKPHOS 118* 123*   ALT 19 19   AST 29 27       No results for input(s): RPR in the last 72 hours. No results for input(s): HIV in the last 72 hours. No results for input(s): BC in the last 72 hours. Lab Results   Component Value Date/Time    CREATININE 0.96 09/18/2022 05:52 AM    GLUCOSE 81 09/18/2022 05:52 AM       Detailed results: Thank you for allowing us to participate in the care of this patient. Please call with questions. This note is created with the assistance of a speech recognition program.  While intending to generate adocument that actually reflects the content of the visit, the document can still have some errors including those of syntax and sound a like substitutions which may escape proof reading. It such instances, actual meaningcan be extrapolated by contextual diversion.     Isaac Xiong MD  Office: [phone]  Perfect serve / office [phone]

## 2022-09-18 NOTE — PLAN OF CARE
Problem: Discharge Planning  Goal: Discharge to home or other facility with appropriate resources  Outcome: Progressing  Flowsheets (Taken 9/18/2022 0030)  Discharge to home or other facility with appropriate resources:   Identify barriers to discharge with patient and caregiver   Arrange for needed discharge resources and transportation as appropriate   Identify discharge learning needs (meds, wound care, etc)     Problem: Respiratory - Adult  Goal: Achieves optimal ventilation and oxygenation  Outcome: Progressing     Problem: Safety - Medical Restraint  Goal: Remains free of injury from restraints (Restraint for Interference with Medical Device)  Description: INTERVENTIONS:  1. Determine that other, less restrictive measures have been tried or would not be effective before applying the restraint  2. Evaluate the patient's condition at the time of restraint application  3. Inform patient/family regarding the reason for restraint  4. Q2H: Monitor safety, psychosocial status, comfort, nutrition and hydration  Outcome: Progressing     Problem: Pain  Goal: Verbalizes/displays adequate comfort level or baseline comfort level  Outcome: Progressing     Problem: Confusion  Goal: Confusion, delirium, dementia, or psychosis is improved or at baseline  Description: INTERVENTIONS:  1. Assess for possible contributors to thought disturbance, including medications, impaired vision or hearing, underlying metabolic abnormalities, dehydration, psychiatric diagnoses, and notify attending LIP  2. [place] high risk fall precautions, as indicated  3. Provide frequent short contacts to provide reality reorientation, refocusing and direction  4. Decrease environmental stimuli, including noise as appropriate  5. Monitor and intervene to maintain adequate nutrition, hydration, elimination, sleep and activity  6. If unable to ensure safety without constant attention obtain sitter and review sitter guidelines with assigned personnel  7. Initiate Psychosocial CNS and Spiritual Care consult, as indicated  Outcome: Progressing     Problem: Nutrition Deficit:  Goal: Optimize nutritional status  Outcome: Progressing     Problem: Skin/Tissue Integrity  Goal: Absence of new skin breakdown  Description: 1. Monitor for areas of redness and/or skin breakdown  2. Assess vascular access sites hourly  3. Every 4-6 hours minimum:  Change oxygen saturation probe site  4. Every 4-6 hours:  If on nasal continuous positive airway pressure, respiratory therapy assess nares and determine need for appliance change or resting period.   Outcome: Progressing     Problem: Safety - Adult  Goal: Free from fall injury  Outcome: Progressing     Problem: ABCDS Injury Assessment  Goal: Absence of physical injury  Outcome: Progressing

## 2022-09-18 NOTE — PROGRESS NOTES
Critical care team - Resident sign-out to medicine service      Date and time: 9/17/2022 8:38 PM  Patient's name:  Aretha Bourgeois  Medical Record Number: [id number]  Patient's account/billing number: [de-identified]  Patient's YOB: 1980  Age: 38 y.o. Date of Admission: 9/10/2022  4:45 PM  Length of stay during current admission: 7    Primary Care Physician: No primary care provider on file. Code Status: Full Code    Mode of physician to physician communication:        [x] Via telephone   [] In person     Date and time of sign-out: 9/17/2022 8:38 PM      Accepting Medicine team: IM Team Intermed    Accepting team's attending: Dr. Dr Lee Marrufo    Patient's current ICU Bed:  9275     Patient's assigned bed on floor:  2011        [x] Med-Surg Monitored [] Step-down       [] Psychiatry ICU       [] Psych floor     Reason for ICU admission:     Septic shock    ICU course summary:     38 y.o.  female who presented to [facility] ED for unresponsiveness for an unknown period of time. Patient admitted with septic shock, small bowel obstruction and altered mental status. Patient intubated at outside facility. Extubated 9/16/2022. CT scan of the abdomen showed malposition of the jejunum with small bowel obstruction and possibly hepatic steatosis. general surgery consultation (the CT scan of the abdomen done at [facility]. [place] did not show any obstruction)    Patient also has hyponatremia, hyperkalemia, hypoalbuminemia with elevated procalcitonin and lactate levels. The electrolyte disorders are likely related to the acute kidney injury. Patient septic shock. Gram-negative and gram-positive bacteremia- Bilat breasts infected / necrotic wounds - maggots   on Ceftriaxone till 9/24 and daptomycin till 9/17 per infectious disease  Also had ampi, cipro and ancef.      In view of the renal dysfunction, will hold vancomycin and start the patient on Zyvox and obtain ID consultation     Acute toxic metabolic encephalopathy secondary to infection and shock-continue to monitor with and aspiration precautions as the primary disorders are treated    Morbidly obese-weight loss we recommended    Hyperglycemia-monitor blood sugars and cover with insulin    Anemia. Hb 7.4, stable This is normochromic and normocytic anemia and could be related to inflammation from all the wounds-continue to trend    Hypothyroidism-on thyroid supplementation    History of gastric fundoplication-continue to monitor    GERD-patient on Protonix         Procedures during patient's ICU stay:     None    Current Vitals:     /66   Pulse (!) 114   Temp 97.6 °F (36.4 °C) (Oral)   Resp 30   Ht 5' 7\" (1.702 m)   Wt (!) 318 lb 6.4 oz (144.4 kg)   SpO2 100%   BMI 49.87 kg/m²       Cultures:     Blood cultures:                 [] None drawn      [] Negative             [x]  Positive (Details:  )  Urine Culture:                   [] None drawn      [] Negative             [x]  Positive (Details:  )  Sputum Culture:               [] None drawn       [] Negative             []  Positive (Details:  )   Endotracheal aspirate:     [] None drawn       [] Negative             []  Positive (Details:  )       Consults:     1. Infectious disease  2.  General SUrgery    Assessment:     Patient Active Problem List    Diagnosis Date Noted    Systemic inflammatory response syndrome (SIRS) of infectious origin with acute organ failure ([address] 09/11/2022    Acute kidney injury ([address] 09/11/2022    Bandemia 09/11/2022    Cellulitis of sacral region 09/11/2022    Breast, fat necrosis 09/11/2022    Gram-neg septicemia ([address] 09/11/2022    Gram positive septicemia ([address] 09/11/2022    Acute encephalopathy 09/11/2022    Elevated procalcitonin 09/11/2022    Altered mental status 09/10/2022       Additional assessment:        Recommended Follow-up:     Anemia  Antibiotics  wounds        Above mentioned assessment and plan was discussed by me with the admitting medicine resident. The medicine team assigned to the patient by medicine admitting resident will be following up the patient from now onwards on the floor.      Jean-Paul Burden MD,   Critical care resident  Department of Internal Medicine/ Critical care  [facility]             9/17/2022, 8:38 PM

## 2022-09-18 NOTE — TRANSFER CENTER NOTE
I have received formal sign out from ICU resident. Plans for patient to be downgraded to medical services. Plans to review chart in completion and assess at bedside when able to. Patient reported as stable at this time.

## 2022-09-18 NOTE — PLAN OF CARE
Problem: Discharge Planning  Goal: Discharge to home or other facility with appropriate resources  9/18/2022 0845 by John Johnson RN  Outcome: Progressing  9/18/2022 0117 by Rob Rangel RN  Outcome: Progressing  Flowsheets (Taken 9/18/2022 0030)  Discharge to home or other facility with appropriate resources:   Identify barriers to discharge with patient and caregiver   Arrange for needed discharge resources and transportation as appropriate   Identify discharge learning needs (meds, wound care, etc)     Problem: Respiratory - Adult  Goal: Achieves optimal ventilation and oxygenation  9/18/2022 0845 by John Johnson RN  Outcome: Progressing  9/18/2022 0117 by Rob Rangel RN  Outcome: Progressing     Problem: Safety - Medical Restraint  Goal: Remains free of injury from restraints (Restraint for Interference with Medical Device)  Description: INTERVENTIONS:  1. Determine that other, less restrictive measures have been tried or would not be effective before applying the restraint  2. Evaluate the patient's condition at the time of restraint application  3. Inform patient/family regarding the reason for restraint  4. Q2H: Monitor safety, psychosocial status, comfort, nutrition and hydration  9/18/2022 0845 by John Johnson RN  Outcome: Progressing  9/18/2022 0117 by Rob Rangel RN  Outcome: Progressing     Problem: Pain  Goal: Verbalizes/displays adequate comfort level or baseline comfort level  9/18/2022 0845 by John Johnson RN  Outcome: Progressing  9/18/2022 0117 by Rob Rangel RN  Outcome: Progressing     Problem: Confusion  Goal: Confusion, delirium, dementia, or psychosis is improved or at baseline  Description: INTERVENTIONS:  1. Assess for possible contributors to thought disturbance, including medications, impaired vision or hearing, underlying metabolic abnormalities, dehydration, psychiatric diagnoses, and notify attending LIP  2.  [place] high risk fall precautions, as indicated  3. Provide frequent short contacts to provide reality reorientation, refocusing and direction  4. Decrease environmental stimuli, including noise as appropriate  5. Monitor and intervene to maintain adequate nutrition, hydration, elimination, sleep and activity  6. If unable to ensure safety without constant attention obtain sitter and review sitter guidelines with assigned personnel  7. Initiate Psychosocial CNS and Spiritual Care consult, as indicated  9/18/2022 0845 by Ky Herrera RN  Outcome: Progressing  9/18/2022 0117 by Pedro Williamson RN  Outcome: Progressing     Problem: Nutrition Deficit:  Goal: Optimize nutritional status  9/18/2022 0845 by Ky Herrera RN  Outcome: Progressing  9/18/2022 0117 by Pedro Williamson RN  Outcome: Progressing     Problem: Skin/Tissue Integrity  Goal: Absence of new skin breakdown  Description: 1. Monitor for areas of redness and/or skin breakdown  2. Assess vascular access sites hourly  3. Every 4-6 hours minimum:  Change oxygen saturation probe site  4. Every 4-6 hours:  If on nasal continuous positive airway pressure, respiratory therapy assess nares and determine need for appliance change or resting period.   9/18/2022 0845 by Ky Herrera RN  Outcome: Progressing  9/18/2022 0117 by Pedro Williamson RN  Outcome: Progressing     Problem: Safety - Adult  Goal: Free from fall injury  9/18/2022 0845 by Ky Herrera RN  Outcome: Progressing  9/18/2022 0117 by Pedro Williamson RN  Outcome: Progressing     Problem: ABCDS Injury Assessment  Goal: Absence of physical injury  9/18/2022 0845 by Ky Herrera RN  Outcome: Progressing  9/18/2022 0117 by Pedro Williamson RN  Outcome: Progressing

## 2022-09-18 NOTE — H&P
[facility]  Office: [address], DO, Melyssa Hutchison, DO, Bharat Mcmanus, DO, Cecillemp Annavega Valderrama, DO, Jose Phillip MD, Rah Reddy MD, Cathy Stephens MD, Josesito Jung MD,  Matthew Boss MD, Mahogany Silva MD, Anju Marie DO, Eloina Valadez MD,  Sanjeev Odell MD, Maral Colón MD, Franci Viera DO, Any Thompson MD, Mohan Gonzalez MD, Filiberto Childers MD, Jonas Haney MD, Radha Bourgeois MD, Thee Pringle MD, Con Juares DO, Nika Martinez MD, Ana Iqbal MD, Usman Miranda, CNP,  Leydi Bazan, CNP, Severiano Baptist, [facility], Shasta Sims, CNP,  Brandy Farrar, DNP, Tammy Sawyer, CNP, Slime Edwards, CNP, Aylin Gonzalez, CNP, Sandra Mai, CNP, Cristy Egan, [facility], Rebecca Hadley PABLANQUITA, Marian Ambriz, CNS, Grace Joseph, [facility], Evelio Ventura, CNP, Ana Torres, CNP, Kade Singh, [facility]    HISTORY AND PHYSICAL EXAMINATION            Date:   9/18/2022  Patient name:  Ana Fuentes  Date of admission:  9/10/2022  4:45 PM  MRN:   [id number]  Account:  [de-identified]  YOB: 1980  PCP:    No primary care provider on file. Room:   2011/2011-01  Code Status:    Full Code    Chief Complaint:     Altered mental status    History Obtained From:     patient    History of Present Illness:     Ana Fuentes is a 38 y.o. Non- / non  female who presents with    and is admitted to the hospital for the management of Septic shock ([facility] [address] 67-year-old female with known medical history of morbid obesity, hypothyroidism, history of gastric fundoplication, COPD presents to the hospital from [facility]. Patient was found unresponsive at home covered in the urine and stools with several scattered wounds and maggots in the wounds.   Patient was noted to have acute kidney injury and CT scan of abdomen showed malposition of jejunal with small bowel obstruction and possible hepatic steatosis. Patient had abnormal labs including hyponatremia, hyperkalemia, elevated procalcitonin. Patient was transferred to [facility] ICU for sepsis, leukocytosis, UTI. Patient was started on broad-spectrum antibiotics, infectious disease was consulted. Positive blood cultures shows gram-negative rods and gram-positive cocci in clusters. A CT scan in [facility] did not show concern for small bowel obstruction. Patient was intubated due to metabolic encephalopathy. Patient remained intubated from 9/17. Patient was also on pressor support with Levophed. Patient had sepsis with Proteus, staph hominis, staph epidermidis. Urine cultures showed E. coli. Patient on ceftriaxone for 2 weeks till 9/24 per infectious disease, also do short course of daptomycin, already completed till 9/17. Patient noted on my examination, states that she has increasing swelling in the lower extremities, short of breath when she lies straight in the bed. No cough, no fever, no chills. Appetite is slowly improving. Patient is advised dressing changes for her wounds. Past Medical History: Morbid obesity  Lymphedema  COPD  History of smoking    Past Surgical History:     Gastric surgery    Medications Prior to Admission:     Prior to Admission medications    Medication Sig Start Date End Date Taking? Authorizing Provider   metFORMIN (GLUCOPHAGE) 500 MG tablet Take 500 mg by mouth 2 times daily (with meals)   Yes Historical Provider, MD   oxybutynin (DITROPAN XL) 15 MG extended release tablet Take 15 mg by mouth daily   Yes Historical Provider, MD   ciprofloxacin (CIPRO) 250 MG tablet Take 250 mg by mouth in the morning and 250 mg at noon and 250 mg in the evening.    Yes Historical Provider, MD        Allergies:     Amoxicillin and Robitussin day-night value lawrence [[id number]]    Social History:   Quit smoking many years go  No alcohol    Family History:   Family history cancer ovarian- maternal aunt    Review of Systems:     Positive and Negative as described in HPI. Review of Systems   Constitutional:  Positive for activity change, appetite change and fatigue. Negative for fever. HENT:  Negative for congestion and drooling. Respiratory:  Positive for shortness of breath. Negative for cough. Cardiovascular:  Positive for leg swelling. Negative for chest pain and palpitations. Gastrointestinal:  Positive for diarrhea. Negative for abdominal distention, abdominal pain and constipation. Genitourinary:  Negative for difficulty urinating and frequency. Musculoskeletal:  Negative for arthralgias and back pain. Skin:  Positive for pallor and wound. Negative for color change. Neurological:  Negative for dizziness and facial asymmetry. Psychiatric/Behavioral:  Negative for agitation and behavioral problems. Physical Exam:   BP (!) 99/56   Pulse (!) 115   Temp 97.9 °F (36.6 °C) (Oral)   Resp 22   Ht 5' 7\" (1.702 m)   Wt (!) 326 lb (147.9 kg)   SpO2 100%   BMI 51.06 kg/m²   Temp (24hrs), Av.3 °F (36.8 °C), Min:97.6 °F (36.4 °C), Max:99 °F (37.2 °C)    Recent Labs     22  1339 22  1653 22  2303 22  0612   POCGLU 130* 98 78 77       Intake/Output Summary (Last 24 hours) at 2022 1345  Last data filed at 2022 0657  Gross per 24 hour   Intake 1075 ml   Output 400 ml   Net 675 ml       Physical Exam  Constitutional:       Appearance: She is ill-appearing. She is not toxic-appearing. HENT:      Head: Normocephalic. Nose: Nose normal.      Mouth/Throat:      Mouth: Mucous membranes are moist.   Eyes:      Pupils: Pupils are equal, round, and reactive to light. Cardiovascular:      Rate and Rhythm: Regular rhythm. Tachycardia present. Pulmonary:      Breath sounds: Rales present. No wheezing. Comments: Decreased breath sounds bilaterally  Abdominal:      General: Bowel sounds are normal. There is distension. Comments: Distended due to obesity   Musculoskeletal:      Right lower leg: Edema present. Left lower leg: Edema present. Comments: 4+ edema lower extremities   Skin:     General: Skin is warm. Neurological:      Mental Status: She is alert and oriented to person, place, and time.       Comments: Bed bound for 8 yrs       Wound present under the breasts  Wounds on sacral area  Pictures in chart  Investigations:      Laboratory Testing:  Recent Results (from the past 24 hour(s))   POC Glucose Fingerstick    Collection Time: 09/17/22  4:53 PM   Result Value Ref Range    POC Glucose 98 65 - 105 mg/dL   POC Glucose Fingerstick    Collection Time: 09/17/22 11:03 PM   Result Value Ref Range    POC Glucose 78 65 - 105 mg/dL   Basic Metabolic Panel w/ Reflex to MG    Collection Time: 09/18/22  5:52 AM   Result Value Ref Range    Glucose 81 70 - 99 mg/dL    BUN 24 (H) 6 - 20 mg/dL    Creatinine 0.96 (H) 0.50 - 0.90 mg/dL    Calcium 7.2 (L) 8.6 - 10.4 mg/dL    Sodium 135 135 - 144 mmol/L    Potassium 4.1 3.7 - 5.3 mmol/L    Chloride 102 98 - 107 mmol/L    CO2 24 20 - 31 mmol/L    Anion Gap 9 9 - 17 mmol/L    GFR Non-African American >60 >60 mL/min    GFR African American >60 >60 mL/min    GFR Comment         CBC with Auto Differential    Collection Time: 09/18/22  5:52 AM   Result Value Ref Range    WBC 9.2 3.5 - 11.3 k/uL    RBC 2.58 (L) 3.95 - 5.11 m/uL    Hemoglobin 7.4 (L) 11.9 - 15.1 g/dL    Hematocrit 22.8 (L) 36.3 - 47.1 %    MCV 88.4 82.6 - 102.9 fL    MCH 28.7 25.2 - 33.5 pg    MCHC 32.5 28.4 - 34.8 g/dL    RDW 16.8 (H) 11.8 - 14.4 %    Platelets 431 341 - 810 k/uL    MPV 10.6 8.1 - 13.5 fL    NRBC Automated 0.2 (H) 0.0 per 100 WBC    RBC Morphology ANISOCYTOSIS PRESENT     Seg Neutrophils 58 36 - 65 %    Lymphocytes 27 24 - 43 %    Monocytes 9 3 - 12 %    Eosinophils % 4 1 - 4 %    Basophils 0 0 - 2 %    Immature Granulocytes 1 (H) 0 %    Segs Absolute 5.31 1.50 - 8.10 k/uL    Absolute Lymph # 2.51 1.10 - 3.70 k/uL    Absolute Mono # 0.82 0.10 - 1.20 k/uL    Absolute Eos # 0.41 0.00 - 0.44 k/uL    Basophils Absolute 0.04 0.00 - 0.20 k/uL    Absolute Immature Granulocyte 0.13 0.00 - 0.30 k/uL   Hepatic function panel    Collection Time: 09/18/22  5:52 AM   Result Value Ref Range    Albumin 1.5 (L) 3.5 - 5.2 g/dL    Alkaline Phosphatase 123 (H) 35 - 104 U/L    ALT 19 5 - 33 U/L    AST 27 <32 U/L    Total Bilirubin 0.3 0.3 - 1.2 mg/dL    Bilirubin, Direct 0.1 <0.31 mg/dL    Bilirubin, Indirect 0.2 0.00 - 1.00 mg/dL    Total Protein 5.0 (L) 6.4 - 8.3 g/dL    Albumin/Globulin Ratio 0.4 (L) 1.0 - 2.5   Magnesium    Collection Time: 09/18/22  5:52 AM   Result Value Ref Range    Magnesium 1.6 1.6 - 2.6 mg/dL   Brain Natriuretic Peptide    Collection Time: 09/18/22  5:52 AM   Result Value Ref Range    Pro-BNP 2,687 (H) <300 pg/mL   POC Glucose Fingerstick    Collection Time: 09/18/22  6:12 AM   Result Value Ref Range    POC Glucose 77 65 - 105 mg/dL       Imaging/Diagnostics:  CT HEAD WO CONTRAST    Result Date: 9/15/2022  No acute intracranial abnormality. XR CHEST PORTABLE    Result Date: 9/16/2022  No acute process. XR CHEST PORTABLE    Result Date: 9/15/2022  No significant interval change. Support lines and tubes in place with no acute findings in the chest.     XR CHEST PORTABLE    Result Date: 9/14/2022  Stable chest.     XR CHEST PORTABLE    Result Date: 9/13/2022  Support lines and tubes remain in place. Improved aeration at the lung bases. XR CHEST PORTABLE    Result Date: 9/12/2022  1. Stable support lines and tubes. 2.  Mild increased interstitial prominence at the lung bases, possibly related to edema, atelectasis, or developing pneumonia. US BREAST LIMITED LEFT    Result Date: 9/11/2022  No evidence of abscess in either breast. BIRADS: BIRADS - CATEGORY 1 Negative. Normal interval follow-up is recommended in 12 months.  OVERALL ASSESSMENT - NEGATIVE A letter of notification will be sent to the patient regarding the results. [employer] recommends annual mammograms for women 40 years and older. US BREAST LIMITED RIGHT    Result Date: 9/11/2022  No evidence of abscess in either breast. BIRADS: BIRADS - CATEGORY 1 Negative. Normal interval follow-up is recommended in 12 months. OVERALL ASSESSMENT - NEGATIVE A letter of notification will be sent to the patient regarding the results. [employer] recommends annual mammograms for women 40 years and older. Assessment :      Hospital Problems             Last Modified POA    * (Principal) Septic shock ([address] 9/17/2022 Yes    Altered mental status 9/11/2022 Yes    Acute kidney injury ([address] 9/11/2022 Yes    Bandemia 9/11/2022 Yes    Cellulitis of sacral region 9/11/2022 Yes    Breast, fat necrosis 9/11/2022 Yes    Gram-neg septicemia ([address] 9/11/2022 Yes    Gram positive septicemia ([address] 9/11/2022 Yes    Acute encephalopathy 9/11/2022 Yes    Elevated procalcitonin 9/11/2022 Yes    Chronic anemia 9/18/2022 Yes    Acute respiratory failure with hypoxia ([address] 9/18/2022 Yes    Severe protein-calorie malnutrition ([address] 9/18/2022 Yes    Simple chronic bronchitis ([address] 9/18/2022 Yes    Hypothyroid 9/18/2022 Yes    Hypoalbuminemia 9/18/2022 Yes       Plan:     Patient status inpatient in the Med/Surge    Sepsis with septic shock-resolving, had gram-negative and gram-positive bacteremia with Proteus, staph hominis, staph epidermidis, completed daptomycin till 9/17, on ceftriaxone till 9/24. Likely skin is the source of sepsis.   Patient on midodrine for blood pressure support    Tachycardia-obtain EKG, stop IV fluids, obtain echocardiogram, elevated, proBNP, check chest x-ray to rule out congestion, will give [id number] iv once, watch bp, patient is 11lt+, watch for response    Multiple wounds on breast, buttocks-had maggots, continue wound care, continues on Rocephin    Anemia- hb dropped from 9 on admission to 7.4, slow downtrend but patient is 11lt+, continue to monitor    Hypothyroidism on Synthyroid    COPD-resume home Symbicort    CHULA-resolved    Hypoalbuminemia with severe malnutrition-consult dietitian for supplement recommendations    Encephalopathy- resolved    Consultations:   [address] CONSULT TO INFECTIOUS DISEASES  IP CONSULT TO SOCIAL WORK  IP CONSULT TO DIETITIAN     Patient is admitted as inpatient status because of co-morbidities listed above, severity of signs and symptoms as outlined, requirement for current medical therapies and most importantly because of direct risk to patient if care not provided in a hospital setting. Expected length of stay > 48 hours. Kristian Hay MD  9/18/2022  1:45 PM    Copy sent to Dr. Ugarte primary care provider on file.

## 2022-09-19 LAB
ABSOLUTE EOS #: 0.34 K/UL (ref 0–0.44)
ABSOLUTE IMMATURE GRANULOCYTE: 0.08 K/UL (ref 0–0.3)
ABSOLUTE LYMPH #: 2.23 K/UL (ref 1.1–3.7)
ABSOLUTE MONO #: 0.68 K/UL (ref 0.1–1.2)
ALBUMIN SERPL-MCNC: 1.3 G/DL (ref 3.5–5.2)
ALBUMIN/GLOBULIN RATIO: 0.3 (ref 1–2.5)
ALP BLD-CCNC: 116 U/L (ref 35–104)
ALT SERPL-CCNC: 18 U/L (ref 5–33)
ANION GAP SERPL CALCULATED.3IONS-SCNC: 9 MMOL/L (ref 9–17)
AST SERPL-CCNC: 23 U/L
BASOPHILS # BLD: 1 % (ref 0–2)
BASOPHILS ABSOLUTE: 0.05 K/UL (ref 0–0.2)
BILIRUB SERPL-MCNC: 0.3 MG/DL (ref 0.3–1.2)
BILIRUBIN DIRECT: 0.1 MG/DL
BILIRUBIN, INDIRECT: 0.2 MG/DL (ref 0–1)
BUN BLDV-MCNC: 22 MG/DL (ref 6–20)
CALCIUM SERPL-MCNC: 7.2 MG/DL (ref 8.6–10.4)
CHLORIDE BLD-SCNC: 101 MMOL/L (ref 98–107)
CO2: 24 MMOL/L (ref 20–31)
CREAT SERPL-MCNC: 1.06 MG/DL (ref 0.5–0.9)
EOSINOPHILS RELATIVE PERCENT: 4 % (ref 1–4)
ESTIMATED AVERAGE GLUCOSE: 120 MG/DL
GFR AFRICAN AMERICAN: >60 ML/MIN
GFR NON-AFRICAN AMERICAN: 56 ML/MIN
GFR SERPL CREATININE-BSD FRML MDRD: ABNORMAL ML/MIN/{1.73_M2}
GLUCOSE BLD-MCNC: 68 MG/DL (ref 65–105)
GLUCOSE BLD-MCNC: 68 MG/DL (ref 70–99)
GLUCOSE BLD-MCNC: 84 MG/DL (ref 65–105)
GLUCOSE BLD-MCNC: 87 MG/DL (ref 65–105)
GLUCOSE BLD-MCNC: 94 MG/DL (ref 65–105)
HBA1C MFR BLD: 5.8 % (ref 4–6)
HCT VFR BLD CALC: 25.4 % (ref 36.3–47.1)
HEMOGLOBIN: 7.9 G/DL (ref 11.9–15.1)
IMMATURE GRANULOCYTES: 1 %
LYMPHOCYTES # BLD: 24 % (ref 24–43)
MAGNESIUM: 1.5 MG/DL (ref 1.6–2.6)
MCH RBC QN AUTO: 27.9 PG (ref 25.2–33.5)
MCHC RBC AUTO-ENTMCNC: 31.1 G/DL (ref 28.4–34.8)
MCV RBC AUTO: 89.8 FL (ref 82.6–102.9)
MONOCYTES # BLD: 7 % (ref 3–12)
NRBC AUTOMATED: 0 PER 100 WBC
PDW BLD-RTO: 17.1 % (ref 11.8–14.4)
PLATELET # BLD: 216 K/UL (ref 138–453)
PMV BLD AUTO: 10.9 FL (ref 8.1–13.5)
POTASSIUM SERPL-SCNC: 4 MMOL/L (ref 3.7–5.3)
RBC # BLD: 2.83 M/UL (ref 3.95–5.11)
RBC # BLD: ABNORMAL 10*6/UL
SEG NEUTROPHILS: 63 % (ref 36–65)
SEGMENTED NEUTROPHILS ABSOLUTE COUNT: 5.83 K/UL (ref 1.5–8.1)
SODIUM BLD-SCNC: 134 MMOL/L (ref 135–144)
TOTAL CK: 98 U/L (ref 26–192)
TOTAL PROTEIN: 5.1 G/DL (ref 6.4–8.3)
WBC # BLD: 9.2 K/UL (ref 3.5–11.3)

## 2022-09-19 PROCEDURE — 80048 BASIC METABOLIC PNL TOTAL CA: CPT

## 2022-09-19 PROCEDURE — 99232 SBSQ HOSP IP/OBS MODERATE 35: CPT | Performed by: INTERNAL MEDICINE

## 2022-09-19 PROCEDURE — 2580000003 HC RX 258: Performed by: EMERGENCY MEDICINE

## 2022-09-19 PROCEDURE — 36415 COLL VENOUS BLD VENIPUNCTURE: CPT

## 2022-09-19 PROCEDURE — 85025 COMPLETE CBC W/AUTO DIFF WBC: CPT

## 2022-09-19 PROCEDURE — 6370000000 HC RX 637 (ALT 250 FOR IP): Performed by: STUDENT IN AN ORGANIZED HEALTH CARE EDUCATION/TRAINING PROGRAM

## 2022-09-19 PROCEDURE — 1200000000 HC SEMI PRIVATE

## 2022-09-19 PROCEDURE — 2580000003 HC RX 258: Performed by: STUDENT IN AN ORGANIZED HEALTH CARE EDUCATION/TRAINING PROGRAM

## 2022-09-19 PROCEDURE — 6360000002 HC RX W HCPCS: Performed by: INTERNAL MEDICINE

## 2022-09-19 PROCEDURE — 6360000002 HC RX W HCPCS: Performed by: STUDENT IN AN ORGANIZED HEALTH CARE EDUCATION/TRAINING PROGRAM

## 2022-09-19 PROCEDURE — 6370000000 HC RX 637 (ALT 250 FOR IP)

## 2022-09-19 PROCEDURE — 83735 ASSAY OF MAGNESIUM: CPT

## 2022-09-19 PROCEDURE — C9113 INJ PANTOPRAZOLE SODIUM, VIA: HCPCS | Performed by: STUDENT IN AN ORGANIZED HEALTH CARE EDUCATION/TRAINING PROGRAM

## 2022-09-19 PROCEDURE — 6370000000 HC RX 637 (ALT 250 FOR IP): Performed by: EMERGENCY MEDICINE

## 2022-09-19 PROCEDURE — 99232 SBSQ HOSP IP/OBS MODERATE 35: CPT | Performed by: FAMILY MEDICINE

## 2022-09-19 PROCEDURE — 80076 HEPATIC FUNCTION PANEL: CPT

## 2022-09-19 PROCEDURE — 94640 AIRWAY INHALATION TREATMENT: CPT

## 2022-09-19 PROCEDURE — 82947 ASSAY GLUCOSE BLOOD QUANT: CPT

## 2022-09-19 PROCEDURE — A4216 STERILE WATER/SALINE, 10 ML: HCPCS | Performed by: STUDENT IN AN ORGANIZED HEALTH CARE EDUCATION/TRAINING PROGRAM

## 2022-09-19 PROCEDURE — 83036 HEMOGLOBIN GLYCOSYLATED A1C: CPT

## 2022-09-19 PROCEDURE — 99213 OFFICE O/P EST LOW 20 MIN: CPT

## 2022-09-19 PROCEDURE — 6360000002 HC RX W HCPCS: Performed by: EMERGENCY MEDICINE

## 2022-09-19 PROCEDURE — 93970 EXTREMITY STUDY: CPT

## 2022-09-19 PROCEDURE — 2500000003 HC RX 250 WO HCPCS: Performed by: INTERNAL MEDICINE

## 2022-09-19 PROCEDURE — 82550 ASSAY OF CK (CPK): CPT

## 2022-09-19 RX ADMIN — MIDODRINE HYDROCHLORIDE 10 MG: 5 TABLET ORAL at 18:20

## 2022-09-19 RX ADMIN — SODIUM CHLORIDE, PRESERVATIVE FREE 10 ML: 5 INJECTION INTRAVENOUS at 10:17

## 2022-09-19 RX ADMIN — FENTANYL CITRATE 50 MCG: 50 INJECTION, SOLUTION INTRAMUSCULAR; INTRAVENOUS at 05:14

## 2022-09-19 RX ADMIN — CEFTRIAXONE SODIUM 2000 MG: 10 INJECTION, POWDER, FOR SOLUTION INTRAVENOUS at 11:05

## 2022-09-19 RX ADMIN — SODIUM CHLORIDE, PRESERVATIVE FREE 40 MG: 5 INJECTION INTRAVENOUS at 10:16

## 2022-09-19 RX ADMIN — MIDODRINE HYDROCHLORIDE 10 MG: 5 TABLET ORAL at 10:16

## 2022-09-19 RX ADMIN — LEVOTHYROXINE SODIUM 75 MCG: 75 TABLET ORAL at 10:16

## 2022-09-19 RX ADMIN — ENOXAPARIN SODIUM 30 MG: 100 INJECTION SUBCUTANEOUS at 10:16

## 2022-09-19 RX ADMIN — ONDANSETRON 4 MG: 4 TABLET, ORALLY DISINTEGRATING ORAL at 22:43

## 2022-09-19 RX ADMIN — ENOXAPARIN SODIUM 30 MG: 100 INJECTION SUBCUTANEOUS at 21:29

## 2022-09-19 RX ADMIN — BUDESONIDE AND FORMOTEROL FUMARATE DIHYDRATE 1 PUFF: 80; 4.5 AEROSOL RESPIRATORY (INHALATION) at 08:49

## 2022-09-19 RX ADMIN — SODIUM CHLORIDE, PRESERVATIVE FREE 10 ML: 5 INJECTION INTRAVENOUS at 21:30

## 2022-09-19 RX ADMIN — BUDESONIDE AND FORMOTEROL FUMARATE DIHYDRATE 1 PUFF: 80; 4.5 AEROSOL RESPIRATORY (INHALATION) at 19:44

## 2022-09-19 RX ADMIN — ANTI-FUNGAL POWDER MICONAZOLE NITRATE TALC FREE: 1.42 POWDER TOPICAL at 21:29

## 2022-09-19 RX ADMIN — ANTI-FUNGAL POWDER MICONAZOLE NITRATE TALC FREE: 1.42 POWDER TOPICAL at 10:17

## 2022-09-19 ASSESSMENT — PAIN DESCRIPTION - DESCRIPTORS: DESCRIPTORS: ACHING;SORE

## 2022-09-19 ASSESSMENT — PAIN SCALES - GENERAL
PAINLEVEL_OUTOF10: 8
PAINLEVEL_OUTOF10: 8
PAINLEVEL_OUTOF10: 1
PAINLEVEL_OUTOF10: 0

## 2022-09-19 ASSESSMENT — ENCOUNTER SYMPTOMS
SORE THROAT: 0
SHORTNESS OF BREATH: 0
WHEEZING: 0
COUGH: 0
VOMITING: 0
NAUSEA: 0
CONSTIPATION: 0
BLOOD IN STOOL: 0
VOICE CHANGE: 0
SINUS PRESSURE: 0
ABDOMINAL PAIN: 0
DIARRHEA: 0

## 2022-09-19 ASSESSMENT — PAIN DESCRIPTION - ORIENTATION: ORIENTATION: RIGHT;LEFT;LOWER

## 2022-09-19 NOTE — CARE COORDINATION
Met with patient to discuss transitional planning. She would like to go to SNF in [facility]. [place] of choice provided.  Referrals sent to [facility] and [facility]

## 2022-09-19 NOTE — PROGRESS NOTES
[place] Wound Ostomy Continence Nurse  Follow Up      NAME:  Giovana Metcalf  MEDICAL RECORD NUMBER:  [id number]  AGE: 38 y.o. GENDER: female  : 1980  TODAY'S DATE:  2022    Subjective:   Reason for WOCN Evaluation and Assessment: \"multiple wounds to back, breasts\"      Ana Fuentes is a 38 y.o. female referred by:   [x] Physician  [] Nursing  [] Other:      Wound Identification:  Wound Type: pressure and caustic dermatitis  Contributing Factors: chronic pressure, decreased mobility, shear force, obesity, incontinence of stool, incontinence of urine, poor hygiene, and non-adherence        Patient was found to be obtunded. Down time in bed prolonged but unknown exact amount. Severe buttock, groin, posterior thigh incontinence associated dermatitis, torso and extremity fold intertrigo, pressure injures of the breasts, left upper arm, and bilateral buttock/ischial tuberosities.              Objective:      BP (!) 126/47   Pulse (!) 118   Temp 98.3 °F (36.8 °C) (Oral)   Resp 22   Ht 5' 7\" (1.702 m)   Wt (!) 320 lb (145.2 kg)   SpO2 99%   BMI 50.12 kg/m²   Facundo Risk Score: Facundo Scale Score: 13    LABS    CBC:   Lab Results   Component Value Date/Time    WBC 9.2 2022 06:44 AM    RBC 2.83 2022 06:44 AM    HGB 7.9 2022 06:44 AM     CMP:  Albumin:    Lab Results   Component Value Date/Time    LABALBU 1.3 2022 06:44 AM     PT/INR:    Lab Results   Component Value Date/Time    PROTIME 11.1 09/10/2022 08:38 PM    INR 1.0 09/10/2022 08:38 PM     HgBA1c:  No results found for: LABA1C  PTT: No components found for: LABPTT      Assessment:       Measurements:        22 1130   Wound 09/10/22 Breast Left   Date First Assessed/Time First Assessed: 09/10/22 1730   Present on Hospital Admission: Yes  Primary Wound Type: Pressure Injury  Location: Breast  Wound Location Orientation: Left   Wound Image     Wound Etiology Pressure Stage 3   Dressing Status New dressing applied   Wound Cleansed Soap and water   Dressing/Treatment Hydrofiber Ag; Foam   Dressing Change Due 09/20/22   Wound Assessment Subcutaneous;Pink/red;Eschar less than 20%   Drainage Amount Moderate   Drainage Description Serosanguinous   Odor None   Felicia-wound Assessment Intact   Wound 09/10/22 Other (Comment) mammary folds bilaterally   Date First Assessed/Time First Assessed: 09/10/22 1730   Present on Hospital Admission: Yes  Primary Wound Type: Other (comment)  Location: Other (Comment)  Wound Description (Comments): mammary folds bilaterally   Wound Image    Wound Etiology Other   Dressing Status New dressing applied   Wound Cleansed Soap and water   Dressing/Treatment Hydrofiber Ag;Foam;Interdry Ag/wicking fabric with Ag   Dressing Change Due 09/20/22   Wound Assessment Subcutaneous;Pink/red   Drainage Amount Moderate   Drainage Description Serosanguinous   Odor None   Felicia-wound Assessment Intact   Wound 09/10/22 Breast Right   Date First Assessed/Time First Assessed: 09/10/22 1730   Present on Hospital Admission: Yes  Primary Wound Type: Pressure Injury  Location: Breast  Wound Location Orientation: Right   Wound Image    Wound Etiology Pressure Stage 3   Dressing Status New dressing applied   Wound Cleansed Soap and water   Dressing/Treatment Hydrofiber Ag; Foam   Dressing Change Due 09/20/22   Wound Assessment Subcutaneous;Pink/red   Drainage Amount Moderate   Drainage Description Serosanguinous   Odor None   Felicia-wound Assessment Intact   Wound 09/10/22 Rib Cage Left;Lateral   Date First Assessed/Time First Assessed: 09/10/22 1530   Present on Hospital Admission: Yes  Primary Wound Type: Pressure Injury  Location: (c) Rib Cage  Wound Location Orientation: Left;Lateral   Wound Etiology Other   Dressing Status New dressing applied   Wound Cleansed Soap and water   Dressing/Treatment Triad hydro/zinc oxide-based hydrophilic paste   Dressing Change Due 09/20/22   Wound Assessment Pink/red;Denuded;Fibrinous   Drainage Amount patient. Plan:      Plan of Care: Turn every 2 hours  Float heels off of bed with pillows under calves  Use lift sling to reposition patient to minimize potential for shear injury. Breasts: Opticell Ag gelling fiber to the wound beds, cover with Mepilex Sacrum Foam (large) Change daily  Breast folds/chest: Opticell Ag gelling fiber to the wound beds, separate the inframammary folds with DriGo HP cloths. Change daily and prn soilage  Abdominal fold: DriGo HP cloths. Change at least every 5 days and prn solage  Medial thighs: Left medial thigh cover with Opticell Ag gelling fiber, secure with a Mepilex silicone foam. Change daily. Buttocks, posterior thighs, low back: Triad Hydrophiic Wound Dressing paste daily and prn soilage. Specialty Bed Required : Yes   [x] Low Air Loss   [x] Pressure Redistribution  [] Fluid Immersion  [x] Bariatric  [] Total Pressure Relief  [] Other:      Discharge Plan:  tbd     Patient/Caregiver Teaching:        [] Indicates understanding                [] Needs reinforcement  [] Unsuccessful                                  [] Verbal Understanding  [] Demonstrated understanding        [x] No evidence of learning  [] Refused teaching                           [] N/A     Contact the Wound Ostomy RN on-call Monday-Friday [phone] via [employer] by searching \"wound\" under \"groups\" and selecting the on-call clinician.              Electronically signed by Yoseph Zelaya RN, [address] [place] [id number] on 9/19/2022 at 1:27 PM

## 2022-09-19 NOTE — PROGRESS NOTES
Comprehensive Nutrition Assessment    Type and Reason for Visit:  Reassess    Nutrition Recommendations/Plan:   Continue current diet with Ensure Enlive ONS at all meals. Encourage/monitor PO intakes as tolerated. Will monitor labs, weights, and plan of care. Malnutrition Assessment:  Malnutrition Status: At risk for malnutrition (Comment) (09/19/22 1245)    Context:  Acute Illness     Findings of the 6 clinical characteristics of malnutrition:  Energy Intake:  Mild decrease in energy intake  Weight Loss:  No significant weight loss     Body Fat Loss:  No significant body fat loss   Muscle Mass Loss:  No significant muscle mass loss  Fluid Accumulation:  Moderate to Severe Extremities, Generalized   Strength:  Not Performed    Nutrition Assessment:    Pt continues to tolerate an oral diet with PO intakes of % of meals. Noted Ensure supplements ordered yesterday - encouraged pt to drink for additional nutrition and protein. Pt was extubated on 9/16. Pt reports her appetite is slowly improving. Last BM 9/19. Weight fluctuations noted. Labs reviewed: Na 134 mmol/L, Ca 7.2 mg/dL, Mg 1.5 mg/dL, Glucose 68-94 mg/dL. Meds reviewed. Nutrition Related Findings:    Labs/Meds reviewed. +3 pitting generalized and extremity edema. Last BM 9/19. Wound Type: Multiple, Pressure Injury, Stage III (to left/right breast, left proximal arm)       Current Nutrition Intake & Therapies:    Average Meal Intake: 51-75%, %  Average Supplements Intake: 1-25%, 26-50%  ADULT DIET; Regular; 4 carb choices (60 gm/meal)  ADULT ORAL NUTRITION SUPPLEMENT; Breakfast, Dinner, Lunch; Standard High Calorie/High Protein Oral Supplement    Anthropometric Measures:  Height: 5' 7\" (170.2 cm)  Ideal Body Weight (IBW): 135 lbs (61 kg)    Admission Body Weight: 307 lb (139.3 kg)  Current Body Weight: 320 lb (145.2 kg), 237 % IBW.  Weight Source: Bed Scale  Current BMI (kg/m2): 50.1                          BMI Categories: Obese Class 3 (BMI 40.0 or greater)    Estimated Daily Nutrient Needs:  Energy Requirements Based On: Formula  Weight Used for Energy Requirements: Current  Energy (kcal/day): 6083-5695 kcals/day  Weight Used for Protein Requirements: Ideal  Protein (g/day):  g pro/day  Fluid (ml/day): per MD    Nutrition Diagnosis:   Inadequate oral intake related to  (recent extubation; current condition; appetite) as evidenced by intake 51-75% (need for ONS; variable PO intakes)    Nutrition Interventions:   Food and/or Nutrient Delivery: Continue Current Diet, Continue Oral Nutrition Supplement  Nutrition Education/Counseling: No recommendation at this time  Coordination of Nutrition Care: Continue to monitor while inpatient       Goals:  Previous Goal Met: Progressing toward Goal(s)  Goals: Meet at least 75% of estimated needs, within 7 days       Nutrition Monitoring and Evaluation:   Behavioral-Environmental Outcomes: None Identified  Food/Nutrient Intake Outcomes: Food and Nutrient Intake, Supplement Intake  Physical Signs/Symptoms Outcomes: Biochemical Data, GI Status, Fluid Status or Edema, Nutrition Focused Physical Findings, Skin, Weight    Discharge Planning:    Continue current diet, Continue Oral Nutrition Supplement     Anabel Alfaro, [address] [place] [address],   Contact: [phone]

## 2022-09-19 NOTE — PROGRESS NOTES
[facility]  Office: [address], DO, Lydia Stain, DO, Wally Bal, DO, Adonay Abreu Blood, DO, Jasmin Pedroza MD, Alla Judd MD, Antonia Cuellar MD, Dora Serrano MD,  Hallie Abrams MD, Doreen Barney MD, Alton Goodson, DO, Myra Mao MD,  Joan Canales MD, Dawn Hernandez MD, Danika Rosario, DO, Delmi Colbert MD, Umm Wolf MD, Faisal Monzon MD, Brenton Bajwa MD, Harrison Kennedy MD, Kim Guallpa MD, Sheri Medrano, DO, Brianna Gomez MD, Adam Gómez MD, Ladi Dennison, CNP,  Rui Garcia, CNP, Isabel Juarez, CNP, Ilene Phan, CNP,  Hipolito Posadas, DNP, Regina Whitlock, CNP, Luiz Liu, CNP, Ashley Rowe, CNP, David Strauss, [facility], [facility], [facility], Arline Rangel PA-C, Parrish Haddad, CNS, Mynor Mackey, DNP, Dianna Haydne, [facility], Aida Reyes, [facility], Dewayne Cabrera, [address] [facility]      Daily Progress Note     Admit Date: 9/10/2022  Bed/Room No.    Admitting Physician : Antonia Cuellar MD  Code Status :Full Code  Hospital Day:  LOS: 9 days   Chief Complaint:     Altered mental status    Principal Problem:    Septic shock [facility])  Active Problems:    Altered mental status    Acute kidney injury ([address]    Bandemia    Cellulitis of sacral region    Breast, fat necrosis    Gram-neg septicemia ([address]    Gram positive septicemia ([address]    Acute encephalopathy    Elevated procalcitonin    Chronic anemia    Acute respiratory failure with hypoxia (HCC)    Severe protein-calorie malnutrition ([address]    Simple chronic bronchitis (HCC)    Hypothyroid    Hypoalbuminemia    Systemic inflammatory response syndrome (SIRS) of infectious origin with acute organ failure ([address]  Resolved Problems:    * No resolved hospital problems. *    Subjective : Interval History/Significant events :  22    Patient is oriented to person, place, time. She was getting wound care and dressing changes during rounding.   Patient has pain on wound site. Patient was breathing on room air. He has been having bowel movements. Denies any abdominal pain, nausea, vomiting. Vitals - Stable afebrile tachycardia,  Labs -serum sodium 134, BUN 22, creatinine 1.06, albumin 1.3. Glucose 68. Nursing notes , Consults notes reviewed. Overnight events and updates discussed with Nursing staff . Background History:         Joaquin Robbins is 38 y.o. female  Who was admitted to the hospital on 9/10/2022 for treatment of Septic shock ([facility] Patient was admitted to the hospital on 9/10/2022. she was transferred to our facility from [facility] with altered mental status. Patient was found unresponsive at home soiled with feces and urine and multiple wounds and maggots in the wounds. Patient was found to have acute kidney injury. She lives at home with her  who travels frequently for living and is not home much. . Patient had been bedbound for a year. CT abdomen pelvis showed malposition of jejunal with small bowel obstruction and possible hepatic steatosis. She has known history of morbid obesity hypothyroidism, gastric fundoplication. Lab work showed hyponatremia, hyperkalemia, elevated procalcitonin. Patient was referred to our facility for concern for sepsis. Blood culture was positive for gram-negative rods and gram-positive cocci in clusters. CT abdomen was repeated and did not show any concern for bowel obstruction. Patient was intubated due to metabolic encephalopathy 14/29/5188. She was also treated with pressors. Urine culture positive for E. coli. Patient was treated with ceftriaxone and recommended to continue till 9/24/2022 to complete 2-week course. Patient also treated with a short course of daptomycin until 9/17/2022. PMH:  No past medical history on file. Allergies:    Allergies   Allergen Reactions    Amoxicillin Hives    Robitussin Day-Night Value Nickolas [Phenylephrine-Diphenhyd-Dm-Gg] Hives and Rash Medications :  budesonide-formoterol, 1 puff, Inhalation, BID  lidocaine 1 % injection, 5 mL, IntraDERmal, Once  sodium chloride flush, 5-40 mL, IntraVENous, 2 times per day  midodrine, 10 mg, Oral, q8h  enoxaparin, 30 mg, SubCUTAneous, BID  cefTRIAXone (ROCEPHIN) IV, 2,000 mg, IntraVENous, Q24H  sodium chloride flush, 5-40 mL, IntraVENous, 2 times per day  pantoprazole (PROTONIX) 40 mg injection, 40 mg, IntraVENous, Daily  miconazole, , Topical, BID  levothyroxine, 75 mcg, Oral, Daily        Review of Systems   Review of Systems   Constitutional:  Positive for activity change. Negative for appetite change, chills, fatigue, fever and unexpected weight change. HENT:  Negative for congestion, mouth sores, postnasal drip, sinus pressure, sore throat and voice change. Eyes:  Negative for visual disturbance. Respiratory:  Negative for cough, shortness of breath and wheezing. Cardiovascular:  Negative for chest pain and palpitations. Gastrointestinal:  Negative for abdominal pain, blood in stool, constipation, diarrhea, nausea and vomiting. Endocrine: Negative for polyuria. Genitourinary:  Negative for difficulty urinating, dysuria, frequency and urgency. Musculoskeletal:  Negative for arthralgias, joint swelling and myalgias. Skin:  Positive for wound. Neurological:  Negative for dizziness, tremors, speech difficulty, light-headedness and headaches.    Objective :      Current Vitals : Temp: 98.3 °F (36.8 °C),  Heart Rate: (!) 118, Resp: 22, BP: (!) 126/47, SpO2: 99 %  Last 24 Hrs Vitals   Patient Vitals for the past 24 hrs:   BP Temp Temp src Pulse Resp SpO2 Weight   09/19/22 0705 (!) 126/47 98.3 °F (36.8 °C) Oral (!) 118 22 99 % --   09/19/22 0544 -- -- -- -- 20 -- --   09/19/22 0439 (!) 104/46 98.7 °F (37.1 °C) Oral (!) 107 22 99 % (!) 320 lb (145.2 kg)   09/18/22 2342 (!) 101/53 98 °F (36.7 °C) Oral (!) 102 18 99 % --   09/18/22 2000 -- -- -- (!) 103 -- -- --   09/18/22 1944 113/60 99.2 °F (37.3 °C) Oral (!) 105 22 100 % --   09/18/22 1611 (!) 109/57 98.7 °F (37.1 °C) Oral (!) 107 29 99 % --   09/18/22 1138 (!) 99/56 97.9 °F (36.6 °C) Oral (!) 115 22 100 % --     Intake / output   09/17 1901 - 09/19 0700  In: 1824.4 [P.O.:850; I.V.:974.4]  Out: 1300 [Urine:1300]  Physical Exam:  Physical Exam  Vitals and nursing note reviewed. Constitutional:       General: She is not in acute distress. Appearance: She is morbidly obese. She is not diaphoretic. HENT:      Head: Normocephalic and atraumatic. Nose:      Right Sinus: No maxillary sinus tenderness or frontal sinus tenderness. Left Sinus: No maxillary sinus tenderness or frontal sinus tenderness. Mouth/Throat:      Pharynx: No oropharyngeal exudate. Eyes:      General: No scleral icterus. Conjunctiva/sclera: Conjunctivae normal.      Pupils: Pupils are equal, round, and reactive to light. Neck:      Thyroid: No thyromegaly. Vascular: No JVD. Cardiovascular:      Rate and Rhythm: Normal rate and regular rhythm. Pulses:           Dorsalis pedis pulses are 2+ on the right side and 2+ on the left side. Heart sounds: Normal heart sounds. No murmur heard. Pulmonary:      Effort: Pulmonary effort is normal.      Breath sounds: Normal breath sounds. No wheezing or rales. Abdominal:      Palpations: Abdomen is soft. There is no mass. Tenderness: There is no abdominal tenderness. Musculoskeletal:      Cervical back: Full passive range of motion without pain and neck supple. Comments: Notable wounds in bilateral buttocks, left breast, right breast   Lymphadenopathy:      Head:      Right side of head: No submandibular adenopathy. Left side of head: No submandibular adenopathy. Cervical: No cervical adenopathy. Skin:     General: Skin is warm. Neurological:      Mental Status: She is alert and oriented to person, place, and time. Motor: No tremor.    Psychiatric:         Behavior: Behavior is cooperative. Laboratory findings:    Recent Labs     09/17/22  0625 09/18/22  0552 09/19/22  0644   WBC 7.1 9.2 9.2   HGB 7.4* 7.4* 7.9*   HCT 23.5* 22.8* 25.4*    179 216     Recent Labs     09/17/22  0625 09/18/22  0552 09/19/22  0644    135 134*   K 4.1 4.1 4.0    102 101   CO2 26 24 24   GLUCOSE 80 81 68*   BUN 28* 24* 22*   CREATININE 0.81 0.96* 1.06*   MG 1.9 1.6 1.5*   CALCIUM 7.1* 7.2* 7.2*     Recent Labs     09/17/22  0625 09/18/22  0552 09/19/22  0644   PROT 4.7* 5.0* 5.1*   LABALBU 1.3* 1.5* 1.3*   AST 29 27 23   ALT 19 19 18   ALKPHOS 118* 123* 116*   BILITOT 0.2* 0.3 0.3   BILIDIR 0.1 0.1 0.1          Specific Gravity, UA   Date Value Ref Range Status   09/10/2022 1.022 1.005 - 1.030 Final     Protein, UA   Date Value Ref Range Status   09/10/2022 1+ (A) NEGATIVE Final     RBC, UA   Date Value Ref Range Status   09/10/2022 5 TO 10 0 - 4 /HPF Final     Comment:     Reference range defined for non-centrifuged specimen. Bacteria, UA   Date Value Ref Range Status   09/10/2022 FEW (A) None Final     Nitrite, Urine   Date Value Ref Range Status   09/10/2022 NEGATIVE NEGATIVE Final     WBC, UA   Date Value Ref Range Status   09/10/2022 20 TO 50 0 - 5 /HPF Final     Leukocyte Esterase, Urine   Date Value Ref Range Status   09/10/2022 TRACE (A) NEGATIVE Final       Imaging / Clinical Data :-   CT HEAD WO CONTRAST    Result Date: 9/15/2022  No acute intracranial abnormality. XR CHEST PORTABLE    Result Date: 9/18/2022  No acute abnormality identified. Status post extubation. XR CHEST PORTABLE    Result Date: 9/16/2022  No acute process. XR CHEST PORTABLE    Result Date: 9/15/2022  No significant interval change. Support lines and tubes in place with no acute findings in the chest.     XR CHEST PORTABLE    Result Date: 9/14/2022  Stable chest.     XR CHEST PORTABLE    Result Date: 9/13/2022  Support lines and tubes remain in place.   Improved aeration at the lung bases. Clinical Course : gradually improving  Assessment and Plan  :        Sepsis with septic shock secondary to Gram negative and gram-positive bacteremia(Proteus, staph hominis, staph epidermidis -Rocephin with stop date 9/24/2022 -ID following. Continue local wound care. Multiple wounds with maggots  Morbid obesity BMI 50 -   Bedbound for > 6 months -we will need aggressive rehab at discharge. Hypothyroidism -Synthroid. CHULA resolved -avoid nephrotoxic agents  Metabolic encephalopathy - resolved  Severe malnutrition     Will need SNF at discharge   Stop precedex   PT OT     Continue to monitor vitals , Intake / output ,  Cell count , HGB , Kidney function, oxygenation  as indicated . Plan and updates discussed with patient ,  answers  explained to satisfaction.    Plan discussed with Staff  RN     (Please note that portions of this note were completed with a voice recognition program. Efforts were made to edit the dictations but occasionally words are mis-transcribed.)      Lars Oden MD  9/19/2022

## 2022-09-19 NOTE — PLAN OF CARE
Problem: Discharge Planning  Goal: Discharge to home or other facility with appropriate resources  Outcome: Progressing     Problem: Respiratory - Adult  Goal: Achieves optimal ventilation and oxygenation  Outcome: Progressing     Problem: Safety - Medical Restraint  Goal: Remains free of injury from restraints (Restraint for Interference with Medical Device)  Description: INTERVENTIONS:  1. Determine that other, less restrictive measures have been tried or would not be effective before applying the restraint  2. Evaluate the patient's condition at the time of restraint application  3. Inform patient/family regarding the reason for restraint  4. Q2H: Monitor safety, psychosocial status, comfort, nutrition and hydration  Outcome: Progressing     Problem: Pain  Goal: Verbalizes/displays adequate comfort level or baseline comfort level  Outcome: Progressing     Problem: Confusion  Goal: Confusion, delirium, dementia, or psychosis is improved or at baseline  Description: INTERVENTIONS:  1. Assess for possible contributors to thought disturbance, including medications, impaired vision or hearing, underlying metabolic abnormalities, dehydration, psychiatric diagnoses, and notify attending LIP  2. [place] high risk fall precautions, as indicated  3. Provide frequent short contacts to provide reality reorientation, refocusing and direction  4. Decrease environmental stimuli, including noise as appropriate  5. Monitor and intervene to maintain adequate nutrition, hydration, elimination, sleep and activity  6. If unable to ensure safety without constant attention obtain sitter and review sitter guidelines with assigned personnel  7. Initiate Psychosocial CNS and Spiritual Care consult, as indicated  Outcome: Progressing     Problem: Nutrition Deficit:  Goal: Optimize nutritional status  Outcome: Progressing     Problem: Skin/Tissue Integrity  Goal: Absence of new skin breakdown  Description: 1.   Monitor for areas of redness and/or skin breakdown  2. Assess vascular access sites hourly  3. Every 4-6 hours minimum:  Change oxygen saturation probe site  4. Every 4-6 hours:  If on nasal continuous positive airway pressure, respiratory therapy assess nares and determine need for appliance change or resting period.   Outcome: Progressing     Problem: Safety - Adult  Goal: Free from fall injury  Outcome: Progressing     Problem: ABCDS Injury Assessment  Goal: Absence of physical injury  Outcome: Progressing

## 2022-09-19 NOTE — PLAN OF CARE
Problem: Discharge Planning  Goal: Discharge to home or other facility with appropriate resources  Outcome: Progressing     Problem: Respiratory - Adult  Goal: Achieves optimal ventilation and oxygenation  Outcome: Progressing     Problem: Safety - Medical Restraint  Goal: Remains free of injury from restraints (Restraint for Interference with Medical Device)  Description: INTERVENTIONS:  1. Determine that other, less restrictive measures have been tried or would not be effective before applying the restraint  2. Evaluate the patient's condition at the time of restraint application  3. Inform patient/family regarding the reason for restraint  4. Q2H: Monitor safety, psychosocial status, comfort, nutrition and hydration  Outcome: Progressing     Problem: Pain  Goal: Verbalizes/displays adequate comfort level or baseline comfort level  Outcome: Progressing     Problem: Confusion  Goal: Confusion, delirium, dementia, or psychosis is improved or at baseline  Description: INTERVENTIONS:  1. Assess for possible contributors to thought disturbance, including medications, impaired vision or hearing, underlying metabolic abnormalities, dehydration, psychiatric diagnoses, and notify attending LIP  2. [place] high risk fall precautions, as indicated  3. Provide frequent short contacts to provide reality reorientation, refocusing and direction  4. Decrease environmental stimuli, including noise as appropriate  5. Monitor and intervene to maintain adequate nutrition, hydration, elimination, sleep and activity  6. If unable to ensure safety without constant attention obtain sitter and review sitter guidelines with assigned personnel  7.  Initiate Psychosocial CNS and Spiritual Care consult, as indicated  Outcome: Progressing     Problem: Nutrition Deficit:  Goal: Optimize nutritional status  9/19/2022 1746 by Lukas Ray RN  Outcome: Progressing  9/19/2022 1247 by Yazmin Hawthorne RD, LD  Flowsheets (Taken 9/19/2022 0334)  Nutrient intake appropriate for improving, restoring, or maintaining nutritional needs:   Assess nutritional status and recommend course of action   Monitor oral intake, labs, and treatment plans   Recommend appropriate diets, oral nutritional supplements, and vitamin/mineral supplements     Problem: Skin/Tissue Integrity  Goal: Absence of new skin breakdown  Description: 1. Monitor for areas of redness and/or skin breakdown  2. Assess vascular access sites hourly  3. Every 4-6 hours minimum:  Change oxygen saturation probe site  4. Every 4-6 hours:  If on nasal continuous positive airway pressure, respiratory therapy assess nares and determine need for appliance change or resting period.   Outcome: Progressing     Problem: Safety - Adult  Goal: Free from fall injury  Outcome: Progressing  Flowsheets (Taken 9/19/2022 0800)  Free From Fall Injury: Instruct family/caregiver on patient safety     Problem: ABCDS Injury Assessment  Goal: Absence of physical injury  Outcome: Progressing  Flowsheets (Taken 9/19/2022 0800)  Absence of Physical Injury: Implement safety measures based on patient assessment

## 2022-09-19 NOTE — PROGRESS NOTES
[facility] -   Infectious diseases evaluation  admission date 9/10/2022     reason for consultation:   Sepsis      Impression :   Current:  Sepsis with septic shock  Altered mentation acute encephalopathy: Resolved  CHULA: Resolved  Bandemia  Lactic acidosis  Elevated procalcitonin   Gram-negative and gram-positive bacteremia-[place] [facility]  Proteus R cipro bactrim - S ceftriaxone ampicillin  Staph hominis R ancef   Staph epi R ancef  U cx BVH 9/9 e coli S cipro  ceftriaxone ampicillin  Bilat breasts infected / necrotic wounds - maggots  Sacral sloughed skin from moisture  Hyperglycemia  Morbd obesity-BMI 50  COPD  CHULA -creatinine: 1.06     Discussion / summary of stay / plan of care   Sepsis of unclear cause, with septic shock and elevated procalcitonin at 12. Very concerning for gram-negative organisms and hence a UTI is of a concern  CT abdomen pelvis at [facility]. [facility] is negative for any bowel obstruction although it was a concern in [facility]  The urine analysis at [facility] is normal but I am not clear as of the UA at [facility]  The bacteremia with gram-negative and gram-positive is also of a concern, pending at [facility]  Recommendations      - Rocephin: 2 gm Iv q 24 Hr. Stop date 9/24   Source of the proteus septicemia is likely the wounds  - Daptomycin: Completed 9/16  Repeat CK 9/17: pending  - Wound care     - Neg nasal MRSA swab     Infection Control Recommendations   [place] Precautions  Contact Isolation         Antimicrobial Stewardship Recommendations   Simplification of therapy  Targeted therapy        History of Present Illness:   Initial history:  Leighann North is a 38y.o.-year-old female found unresponsive at home was taken to [facility], found to have stools on many of her wounds and maggots.   CT of the abdomen showed concern for small bowel obstruction  She had hyponatremia with a sodium 128, leukocytosis elevated procalcitonin and lactic acid  Any blood culture came back positive for gram-negative rods gram-positive cocci clusters  CHULA  Transferred to us with concern of septic shock and sepsis-General surgery on board due to the concern of small bowel obstruction     After transfer to [facility]. Cristi's CT scan of the abdomen pelvis showed no pneumonia on the lower lobes and no acute abdomen. Patient is on antibiotics, infectious consulted for opinion. The urine analysis on 9/10 at [facility] is not suggestive of infection-but I do not have the UA or the urine culture off from [address] [place] [address] urinalysis seems to be very contaminated with epithelial cells                          Interval changes  9/19/2022   Patient Vitals for the past 8 hrs:    Height   09/19/22 1235 5' 7\" (1.702 m)      Blood cultures from [facility] are back with Proteus and 2 strains of staph coagulase-negative  U cx over there showed E coli multiS  AB switched accordingly     All the wounds are evaluated with wound care on the bedside, the back has multiple open ulcers but no clear cellulitis at this point     [place] [address] OFF  The breasts had macerated wounds and the necrotic superficial skin is sloughing, no deep induration in both breasts, both being pressure wounds     9/16 extubated and appropriate -lungs clear -  Abd soft non tender  No rash    9/19 patient is currently resting in bed post wound care. After talking with wound care they said that the wounds are improving. Abdomen was soft nontender. Patient did complain of diarrhea today. WBC: 9.2, overall trend is down.   Total CK: 314, on 9/14/2022  Procalcitonin 1.88   improved        Summary of relevant labs:  Labs:  sodium 134  leukocytosis 9.2  elevated procalcitonin   Elevated lactic acid 2.5, 9/11/2022  Creat: 1.06  -136 improved  Micro:  BC GNR and CPC clusters at the [facility]  UA neg 9/10   MRSA nasal swab negative  blood culture 9/10      Imaging:  BREASTS US neg for abscess  CT scan of the abdomen pelvis showed no pneumonia on the lower lobes and no acute abdomen. I have personally reviewed the past medical history, past surgical history, medications, social history, and family history, and I haveupdated the database accordingly. Allergies:   Amoxicillin and Robitussin day-night value lawrence [[id number]]      Review of Systems:      Review of Systems   Constitutional:  Positive for activity change. Negative for appetite change. HENT:  Negative for congestion. Eyes:  Negative for discharge. Respiratory:  Negative for chest tightness and shortness of breath. Cardiovascular:  Negative for chest pain. Gastrointestinal:  Positive for diarrhea. Negative for abdominal distention and abdominal pain. Endocrine: Negative for cold intolerance and polyphagia. Genitourinary:  Negative for difficulty urinating and dysuria. Musculoskeletal:  Negative for arthralgias and myalgias. Skin:  Negative for color change. Allergic/Immunologic: Negative for immunocompromised state. Neurological:  Negative for dizziness. Hematological:  Negative for adenopathy. Psychiatric/Behavioral:  Negative for agitation. Physical Examination :         Physical Exam  Constitutional:       General: She is not in acute distress. Appearance: She is obese. She is not ill-appearing or diaphoretic. HENT:      Head: Normocephalic and atraumatic. Right Ear: External ear normal.      Left Ear: External ear normal.      Nose: Nose normal.      Mouth/Throat:      Mouth: Mucous membranes are moist.      Pharynx: Oropharynx is clear. Eyes:      General: No scleral icterus. Extraocular Movements: Extraocular movements intact. Conjunctiva/sclera: Conjunctivae normal.      Pupils: Pupils are equal, round, and reactive to light. Cardiovascular:      Rate and Rhythm: Normal rate and regular rhythm. Pulses: Normal pulses. Heart sounds: Normal heart sounds. No murmur heard. No friction rub. Pulmonary:      Effort: Pulmonary effort is normal. No respiratory distress. Breath sounds: Normal breath sounds. Abdominal:      General: Bowel sounds are normal. There is no distension. Palpations: Abdomen is soft. Tenderness: There is no abdominal tenderness. Genitourinary:     Comments: Urine elder  Musculoskeletal:         General: No swelling or deformity. Cervical back: Neck supple. No rigidity or tenderness. Skin:     General: Skin is warm and dry. Findings: Lesion present. No bruising. Comments: Patient has wounds on bilateral breasts as well as buttocks. Neurological:      General: No focal deficit present. Mental Status: She is alert and oriented to person, place, and time. Cranial Nerves: No cranial nerve deficit. Sensory: No sensory deficit. Psychiatric:         Mood and Affect: Mood normal.         Behavior: Behavior normal.         Past Medical History:   Past Medical History   No past medical history on file. Past Surgical  History:   Past Surgical History   No past surgical history on file.         Medications:      Scheduled Medications    budesonide-formoterol  1 puff Inhalation BID    lidocaine 1 % injection  5 mL IntraDERmal Once    sodium chloride flush  5-40 mL IntraVENous 2 times per day    midodrine  10 mg Oral q8h    enoxaparin  30 mg SubCUTAneous BID    cefTRIAXone (ROCEPHIN) IV  2,000 mg IntraVENous Q24H    sodium chloride flush  5-40 mL IntraVENous 2 times per day    pantoprazole (PROTONIX) 40 mg injection  40 mg IntraVENous Daily    miconazole   Topical BID    levothyroxine  75 mcg Oral Daily            Social History:      Social History               Socioeconomic History    Marital status:        Spouse name: Not on file    Number of children: Not on file    Years of education: Not on file    Highest education level: Not on file   Occupational History    Not on file   Tobacco Use    Smoking status: Not on file    Smokeless tobacco: Not on file   Substance and Sexual Activity    Alcohol use: Not on file    Drug use: Not on file    Sexual activity: Not on file   Other Topics Concern    Not on file   Social History Narrative    Not on file      Social Determinants of Health      Financial Resource Strain: Not on file   Food Insecurity: Not on file   Transportation Needs: Not on file   Physical Activity: Not on file   Stress: Not on file   Social Connections: Not on file   Intimate Partner Violence: Not on file   Housing Stability: Not on file            Family History:   Family History   No family history on file. Medical Decision Making:   I have independently reviewed/ordered the following labs:     CBC with Differential:        Recent Labs     09/18/22  0552 09/19/22  0644   WBC 9.2 9.2   HGB 7.4* 7.9*   HCT 22.8* 25.4*    216   LYMPHOPCT 27 24   MONOPCT 9 7         BMP:       Recent Labs     09/18/22  0552 09/19/22  0644    134*   K 4.1 4.0    101   CO2 24 24   BUN 24* 22*   CREATININE 0.96* 1.06*   MG 1.6 1.5*         Hepatic Function Panel:        Recent Labs     09/18/22  0552 09/19/22  0644   PROT 5.0* 5.1*   LABALBU 1.5* 1.3*   BILIDIR 0.1 0.1   IBILI 0.2 0.2   BILITOT 0.3 0.3   ALKPHOS 123* 116*   ALT 19 18   AST 27 23         No results for input(s): RPR in the last 72 hours. No results for input(s): HIV in the last 72 hours. No results for input(s): BC in the last 72 hours. Lab Results   Component Value Date/Time     CREATININE 1.06 09/19/2022 06:44 AM     GLUCOSE 68 09/19/2022 06:44 AM         Detailed results: Thank you for allowing us to participate in the care of this patient. Please call with questions.      This note is created with the assistance of a speech recognition program.  While intending to generate adocument that actually reflects the content of the visit, the document can still have some errors including those of syntax and sound a like substitutions which may escape proof reading. It such instances, actual meaningcan be extrapolated by contextual diversion. Rosa Obrien MD  Office: [phone]  Perfect serve / office [phone]       I have discussed the care of the patient, including pertinent history and exam findings,  with the resident. I have seen and examined the patient and the key elements of all parts of the encounter have been performed by me. I agree with the assessment, plan and orders as documented by the resident.     Angélica García, Infectious Diseases

## 2022-09-19 NOTE — CARE COORDINATION
Met with pt today to discuss home concerns. Pt states that she lives at home with her  and 21 y.o daughter. She states that her  is a  and is often gone weeks at a time and that her 21 y.o daughter was supposed to help take care of her. She claims that she was weak  and unable to get off the couch for approximately the last 8 months. She states that she wears briefs and changes them herself. Patients daughter assisted her with meals but pt attempted all other care on her own and states was to weak to get to the shower. Pt states she knew she was having pain but had no idea that she had wounds. Patients family was aware that she spent her days on the couch but also did not know about wounds. Due to patients young age case is not appropriate for APS referral.  Pt is agreeable to SNF placement and working with case management to chose facility.

## 2022-09-20 LAB
ABSOLUTE EOS #: 0.29 K/UL (ref 0–0.44)
ABSOLUTE IMMATURE GRANULOCYTE: 0.08 K/UL (ref 0–0.3)
ABSOLUTE LYMPH #: 1.86 K/UL (ref 1.1–3.7)
ABSOLUTE MONO #: 0.48 K/UL (ref 0.1–1.2)
ALBUMIN SERPL-MCNC: 1.3 G/DL (ref 3.5–5.2)
ALBUMIN/GLOBULIN RATIO: 0.4 (ref 1–2.5)
ALP BLD-CCNC: 113 U/L (ref 35–104)
ALT SERPL-CCNC: 16 U/L (ref 5–33)
ANION GAP SERPL CALCULATED.3IONS-SCNC: 8 MMOL/L (ref 9–17)
AST SERPL-CCNC: 21 U/L
BASOPHILS # BLD: 1 % (ref 0–2)
BASOPHILS ABSOLUTE: 0.05 K/UL (ref 0–0.2)
BILIRUB SERPL-MCNC: 0.3 MG/DL (ref 0.3–1.2)
BILIRUBIN DIRECT: 0.1 MG/DL
BILIRUBIN, INDIRECT: 0.2 MG/DL (ref 0–1)
BUN BLDV-MCNC: 19 MG/DL (ref 6–20)
CALCIUM SERPL-MCNC: 7.2 MG/DL (ref 8.6–10.4)
CHLORIDE BLD-SCNC: 103 MMOL/L (ref 98–107)
CO2: 23 MMOL/L (ref 20–31)
CREAT SERPL-MCNC: 0.96 MG/DL (ref 0.5–0.9)
EOSINOPHILS RELATIVE PERCENT: 5 % (ref 1–4)
FERRITIN: 357 NG/ML (ref 13–150)
GFR AFRICAN AMERICAN: >60 ML/MIN
GFR NON-AFRICAN AMERICAN: >60 ML/MIN
GFR SERPL CREATININE-BSD FRML MDRD: ABNORMAL ML/MIN/{1.73_M2}
GLUCOSE BLD-MCNC: 62 MG/DL (ref 65–105)
GLUCOSE BLD-MCNC: 64 MG/DL (ref 65–105)
GLUCOSE BLD-MCNC: 69 MG/DL (ref 70–99)
GLUCOSE BLD-MCNC: 75 MG/DL (ref 65–105)
GLUCOSE BLD-MCNC: 77 MG/DL (ref 65–105)
HCT VFR BLD CALC: 22.3 % (ref 36.3–47.1)
HEMOGLOBIN: 7.1 G/DL (ref 11.9–15.1)
IMMATURE GRANULOCYTES: 1 %
IRON SATURATION: 27 % (ref 20–55)
IRON: 23 UG/DL (ref 37–145)
LYMPHOCYTES # BLD: 29 % (ref 24–43)
MAGNESIUM: 1.4 MG/DL (ref 1.6–2.6)
MCH RBC QN AUTO: 28.5 PG (ref 25.2–33.5)
MCHC RBC AUTO-ENTMCNC: 31.8 G/DL (ref 28.4–34.8)
MCV RBC AUTO: 89.6 FL (ref 82.6–102.9)
MONOCYTES # BLD: 7 % (ref 3–12)
NRBC AUTOMATED: 0 PER 100 WBC
PDW BLD-RTO: 17.2 % (ref 11.8–14.4)
PLATELET # BLD: 264 K/UL (ref 138–453)
PMV BLD AUTO: 10.7 FL (ref 8.1–13.5)
POTASSIUM SERPL-SCNC: 4.1 MMOL/L (ref 3.7–5.3)
RBC # BLD: 2.49 M/UL (ref 3.95–5.11)
RBC # BLD: ABNORMAL 10*6/UL
SEG NEUTROPHILS: 57 % (ref 36–65)
SEGMENTED NEUTROPHILS ABSOLUTE COUNT: 3.7 K/UL (ref 1.5–8.1)
SODIUM BLD-SCNC: 134 MMOL/L (ref 135–144)
TOTAL IRON BINDING CAPACITY: 84 UG/DL (ref 250–450)
TOTAL PROTEIN: 4.7 G/DL (ref 6.4–8.3)
UNSATURATED IRON BINDING CAPACITY: 61 UG/DL (ref 112–347)
WBC # BLD: 6.5 K/UL (ref 3.5–11.3)

## 2022-09-20 PROCEDURE — 6370000000 HC RX 637 (ALT 250 FOR IP): Performed by: EMERGENCY MEDICINE

## 2022-09-20 PROCEDURE — 36415 COLL VENOUS BLD VENIPUNCTURE: CPT

## 2022-09-20 PROCEDURE — 6360000002 HC RX W HCPCS: Performed by: INTERNAL MEDICINE

## 2022-09-20 PROCEDURE — 83550 IRON BINDING TEST: CPT

## 2022-09-20 PROCEDURE — 6370000000 HC RX 637 (ALT 250 FOR IP)

## 2022-09-20 PROCEDURE — 2580000003 HC RX 258: Performed by: EMERGENCY MEDICINE

## 2022-09-20 PROCEDURE — 83735 ASSAY OF MAGNESIUM: CPT

## 2022-09-20 PROCEDURE — 6360000002 HC RX W HCPCS: Performed by: STUDENT IN AN ORGANIZED HEALTH CARE EDUCATION/TRAINING PROGRAM

## 2022-09-20 PROCEDURE — 99232 SBSQ HOSP IP/OBS MODERATE 35: CPT | Performed by: INTERNAL MEDICINE

## 2022-09-20 PROCEDURE — C9113 INJ PANTOPRAZOLE SODIUM, VIA: HCPCS | Performed by: STUDENT IN AN ORGANIZED HEALTH CARE EDUCATION/TRAINING PROGRAM

## 2022-09-20 PROCEDURE — 94640 AIRWAY INHALATION TREATMENT: CPT

## 2022-09-20 PROCEDURE — 6370000000 HC RX 637 (ALT 250 FOR IP): Performed by: STUDENT IN AN ORGANIZED HEALTH CARE EDUCATION/TRAINING PROGRAM

## 2022-09-20 PROCEDURE — 6360000002 HC RX W HCPCS: Performed by: FAMILY MEDICINE

## 2022-09-20 PROCEDURE — 80076 HEPATIC FUNCTION PANEL: CPT

## 2022-09-20 PROCEDURE — 80048 BASIC METABOLIC PNL TOTAL CA: CPT

## 2022-09-20 PROCEDURE — 87493 C DIFF AMPLIFIED PROBE: CPT

## 2022-09-20 PROCEDURE — 2500000003 HC RX 250 WO HCPCS: Performed by: INTERNAL MEDICINE

## 2022-09-20 PROCEDURE — 85025 COMPLETE CBC W/AUTO DIFF WBC: CPT

## 2022-09-20 PROCEDURE — 82947 ASSAY GLUCOSE BLOOD QUANT: CPT

## 2022-09-20 PROCEDURE — 99232 SBSQ HOSP IP/OBS MODERATE 35: CPT | Performed by: FAMILY MEDICINE

## 2022-09-20 PROCEDURE — 2580000003 HC RX 258: Performed by: FAMILY MEDICINE

## 2022-09-20 PROCEDURE — 2580000003 HC RX 258: Performed by: STUDENT IN AN ORGANIZED HEALTH CARE EDUCATION/TRAINING PROGRAM

## 2022-09-20 PROCEDURE — A4216 STERILE WATER/SALINE, 10 ML: HCPCS | Performed by: STUDENT IN AN ORGANIZED HEALTH CARE EDUCATION/TRAINING PROGRAM

## 2022-09-20 PROCEDURE — 83540 ASSAY OF IRON: CPT

## 2022-09-20 PROCEDURE — 1200000000 HC SEMI PRIVATE

## 2022-09-20 PROCEDURE — 82728 ASSAY OF FERRITIN: CPT

## 2022-09-20 RX ORDER — PANTOPRAZOLE SODIUM 40 MG/1
40 TABLET, DELAYED RELEASE ORAL
Status: DISCONTINUED | OUTPATIENT
Start: 2022-09-21 | End: 2022-10-04

## 2022-09-20 RX ORDER — SODIUM CHLORIDE 9 MG/ML
INJECTION, SOLUTION INTRAVENOUS CONTINUOUS
Status: DISCONTINUED | OUTPATIENT
Start: 2022-09-20 | End: 2022-09-25

## 2022-09-20 RX ORDER — LACTOBACILLUS RHAMNOSUS GG 10B CELL
1 CAPSULE ORAL
Status: DISCONTINUED | OUTPATIENT
Start: 2022-09-21 | End: 2022-09-22

## 2022-09-20 RX ORDER — MAGNESIUM SULFATE 1 G/100ML
1000 INJECTION INTRAVENOUS PRN
Status: DISCONTINUED | OUTPATIENT
Start: 2022-09-20 | End: 2022-10-05 | Stop reason: HOSPADM

## 2022-09-20 RX ADMIN — SODIUM CHLORIDE, PRESERVATIVE FREE 40 MG: 5 INJECTION INTRAVENOUS at 10:10

## 2022-09-20 RX ADMIN — SODIUM CHLORIDE, PRESERVATIVE FREE 10 ML: 5 INJECTION INTRAVENOUS at 10:09

## 2022-09-20 RX ADMIN — CEFTRIAXONE SODIUM 2000 MG: 10 INJECTION, POWDER, FOR SOLUTION INTRAVENOUS at 11:17

## 2022-09-20 RX ADMIN — BUDESONIDE AND FORMOTEROL FUMARATE DIHYDRATE 1 PUFF: 80; 4.5 AEROSOL RESPIRATORY (INHALATION) at 20:25

## 2022-09-20 RX ADMIN — MIDODRINE HYDROCHLORIDE 10 MG: 5 TABLET ORAL at 11:10

## 2022-09-20 RX ADMIN — ANTI-FUNGAL POWDER MICONAZOLE NITRATE TALC FREE: 1.42 POWDER TOPICAL at 10:09

## 2022-09-20 RX ADMIN — SODIUM CHLORIDE, PRESERVATIVE FREE 10 ML: 5 INJECTION INTRAVENOUS at 21:03

## 2022-09-20 RX ADMIN — LEVOTHYROXINE SODIUM 75 MCG: 75 TABLET ORAL at 05:47

## 2022-09-20 RX ADMIN — ENOXAPARIN SODIUM 30 MG: 100 INJECTION SUBCUTANEOUS at 21:03

## 2022-09-20 RX ADMIN — BUDESONIDE AND FORMOTEROL FUMARATE DIHYDRATE 1 PUFF: 80; 4.5 AEROSOL RESPIRATORY (INHALATION) at 08:57

## 2022-09-20 RX ADMIN — SODIUM CHLORIDE: 9 INJECTION, SOLUTION INTRAVENOUS at 13:51

## 2022-09-20 RX ADMIN — MIDODRINE HYDROCHLORIDE 10 MG: 5 TABLET ORAL at 18:06

## 2022-09-20 RX ADMIN — MIDODRINE HYDROCHLORIDE 10 MG: 5 TABLET ORAL at 03:33

## 2022-09-20 RX ADMIN — MAGNESIUM SULFATE HEPTAHYDRATE 1000 MG: 1 INJECTION, SOLUTION INTRAVENOUS at 12:52

## 2022-09-20 RX ADMIN — SODIUM CHLORIDE, PRESERVATIVE FREE 5 ML: 5 INJECTION INTRAVENOUS at 21:09

## 2022-09-20 RX ADMIN — ANTI-FUNGAL POWDER MICONAZOLE NITRATE TALC FREE: 1.42 POWDER TOPICAL at 21:03

## 2022-09-20 RX ADMIN — ENOXAPARIN SODIUM 30 MG: 100 INJECTION SUBCUTANEOUS at 10:11

## 2022-09-20 RX ADMIN — SODIUM CHLORIDE, PRESERVATIVE FREE 10 ML: 5 INJECTION INTRAVENOUS at 10:10

## 2022-09-20 ASSESSMENT — ENCOUNTER SYMPTOMS
WHEEZING: 0
BLOOD IN STOOL: 0
ABDOMINAL PAIN: 0
COUGH: 0
SINUS PRESSURE: 0
NAUSEA: 0
VOICE CHANGE: 0
VOMITING: 0
CONSTIPATION: 0
SORE THROAT: 0
SHORTNESS OF BREATH: 0
DIARRHEA: 0

## 2022-09-20 ASSESSMENT — PAIN SCALES - GENERAL
PAINLEVEL_OUTOF10: 0
PAINLEVEL_OUTOF10: 0

## 2022-09-20 NOTE — CARE COORDINATION
Spoke to Marcella Espinal at [facility]. She does not have access to Epic. Full referral faxed    [phone] spoke to Saurabh Greer at [facility].  She will review referral

## 2022-09-20 NOTE — PLAN OF CARE
Problem: Discharge Planning  Goal: Discharge to home or other facility with appropriate resources  9/19/2022 2128 by Pat Hawthorne RN  Outcome: Progressing  9/19/2022 1746 by Humaira Arechiga RN  Outcome: Progressing     Problem: Respiratory - Adult  Goal: Achieves optimal ventilation and oxygenation  9/19/2022 2128 by Pat Hawthorne RN  Outcome: Progressing  9/19/2022 2110 by Marisol Melgar RCP  Outcome: Progressing  9/19/2022 1746 by Humaira Arechiga RN  Outcome: Progressing     Problem: Safety - Medical Restraint  Goal: Remains free of injury from restraints (Restraint for Interference with Medical Device)  Description: INTERVENTIONS:  1. Determine that other, less restrictive measures have been tried or would not be effective before applying the restraint  2. Evaluate the patient's condition at the time of restraint application  3. Inform patient/family regarding the reason for restraint  4. Q2H: Monitor safety, psychosocial status, comfort, nutrition and hydration  9/19/2022 2128 by Pat Hawthorne RN  Outcome: Progressing  9/19/2022 1746 by Humaira Arechiga RN  Outcome: Progressing     Problem: Pain  Goal: Verbalizes/displays adequate comfort level or baseline comfort level  9/19/2022 2128 by Pat Hawthorne RN  Outcome: Progressing  9/19/2022 1746 by Humaira Arechiga RN  Outcome: Progressing     Problem: Confusion  Goal: Confusion, delirium, dementia, or psychosis is improved or at baseline  Description: INTERVENTIONS:  1. Assess for possible contributors to thought disturbance, including medications, impaired vision or hearing, underlying metabolic abnormalities, dehydration, psychiatric diagnoses, and notify attending LIP  2. [place] high risk fall precautions, as indicated  3. Provide frequent short contacts to provide reality reorientation, refocusing and direction  4. Decrease environmental stimuli, including noise as appropriate  5.  Monitor and intervene to maintain adequate nutrition, hydration, elimination, sleep and activity  6. If unable to ensure safety without constant attention obtain sitter and review sitter guidelines with assigned personnel  7. Initiate Psychosocial CNS and Spiritual Care consult, as indicated  9/19/2022 2128 by Angie Francis RN  Outcome: Progressing  9/19/2022 1746 by Mauro Matute RN  Outcome: Progressing     Problem: Nutrition Deficit:  Goal: Optimize nutritional status  9/19/2022 2128 by Angie Francis RN  Outcome: Progressing  9/19/2022 1746 by Mauro Matute RN  Outcome: Progressing  9/19/2022 1247 by Rosa Lima RD, LD  Flowsheets (Taken 9/19/2022 1235)  Nutrient intake appropriate for improving, restoring, or maintaining nutritional needs:   Assess nutritional status and recommend course of action   Monitor oral intake, labs, and treatment plans   Recommend appropriate diets, oral nutritional supplements, and vitamin/mineral supplements     Problem: Skin/Tissue Integrity  Goal: Absence of new skin breakdown  Description: 1. Monitor for areas of redness and/or skin breakdown  2. Assess vascular access sites hourly  3. Every 4-6 hours minimum:  Change oxygen saturation probe site  4. Every 4-6 hours:  If on nasal continuous positive airway pressure, respiratory therapy assess nares and determine need for appliance change or resting period.   9/19/2022 2128 by Angie Francis RN  Outcome: Progressing  9/19/2022 1746 by Mauro Matute RN  Outcome: Progressing     Problem: Safety - Adult  Goal: Free from fall injury  9/19/2022 2128 by Angie Francis RN  Outcome: Progressing  9/19/2022 1746 by Mauro Matute RN  Outcome: Progressing  Flowsheets (Taken 9/19/2022 0800)  Free From Fall Injury: Instruct family/caregiver on patient safety     Problem: ABCDS Injury Assessment  Goal: Absence of physical injury  9/19/2022 2128 by Angie Francis RN  Outcome: Progressing  9/19/2022 1746 by Mauro Matute RN  Outcome: Progressing  Flowsheets (Taken 9/19/2022 0800)  Absence of Physical Injury: Implement safety measures based on patient assessment

## 2022-09-20 NOTE — PROGRESS NOTES
[facility] -   Infectious diseases evaluation  admission date 9/10/2022     reason for consultation:   Sepsis      Impression :   Current:  Sepsis with septic shock  Altered mentation acute encephalopathy: Resolved  CHULA: Resolved  Bandemia  Lactic acidosis  Elevated procalcitonin   Gram-negative and gram-positive bacteremia-[place] [facility]  Proteus R cipro bactrim - S ceftriaxone ampicillin  Staph hominis R ancef   Staph epi R ancef  U cx BVH 9/9 e coli S cipro  ceftriaxone ampicillin  Bilat breasts infected / necrotic wounds - maggots  Sacral sloughed skin from moisture  Hyperglycemia  Morbd obesity-BMI 50  COPD  CHULA -creatinine: 1.06-0.96     Discussion / summary of stay / plan of care   Sepsis of unclear cause, with septic shock and elevated procalcitonin at 12. Very concerning for gram-negative organisms and hence a UTI is of a concern  CT abdomen pelvis at [facility]. [facility] is negative for any bowel obstruction although it was a concern in [facility]  The urine analysis at [facility] is normal but I am not clear as of the UA at [facility]  The bacteremia with gram-negative and gram-positive is also of a concern, pending at [facility]   currently working on placement. Recommendations      - Rocephin: 2 gm Iv q 24 Hr. Stop date 9/24   Source of the proteus septicemia is likely the wounds  - Daptomycin: Completed 9/16- ck elevated then back to normal  - Wound care     - Neg nasal MRSA swab     Infection Control Recommendations   [place] Precautions  Contact Isolation         Antimicrobial Stewardship Recommendations   Simplification of therapy  Targeted therapy        History of Present Illness:   Initial history:  Temi Lorenzo is a 38y.o.-year-old female found unresponsive at home was taken to [facility], found to have stools on many of her wounds and maggots.   CT of the abdomen showed concern for small bowel obstruction  She had hyponatremia with a sodium 128, leukocytosis elevated procalcitonin and lactic acid  Any blood culture came back positive for gram-negative rods gram-positive cocci clusters  CHULA  Transferred to us with concern of septic shock and sepsis-General surgery on board due to the concern of small bowel obstruction     After transfer to [facility]. Cristi's CT scan of the abdomen pelvis showed no pneumonia on the lower lobes and no acute abdomen. Patient is on antibiotics, infectious consulted for opinion. The urine analysis on 9/10 at [facility] is not suggestive of infection-but I do not have the UA or the urine culture off from [address] [place] [address] urinalysis seems to be very contaminated with epithelial cells                          Interval changes  9/19/2022   Patient Vitals for the past 8 hrs:    Height   09/19/22 1235 5' 7\" (1.702 m)      Blood cultures from [facility] are back with Proteus and 2 strains of staph coagulase-negative  U cx over there showed E coli multiS  AB switched accordingly     All the wounds are evaluated with wound care on the bedside, the back has multiple open ulcers but no clear cellulitis at this point     [place] [address] OFF  The breasts had macerated wounds and the necrotic superficial skin is sloughing, no deep induration in both breasts, both being pressure wounds     9/16 extubated and appropriate -lungs clear -  Abd soft non tender  No rash    9/19 patient is currently resting in bed post wound care. After talking with wound care they said that the wounds are improving. Abdomen was soft nontender. Patient did complain of diarrhea today. WBC: 6.5, overall trend is down. Total CK: 314, on 9/14/2022  Procalcitonin 1.88   improved    9/20 patient is currently resting in bed. Patient states that she has had intermittent diarrhea for the last couple of days.   Does say that her pain is getting better with her wounds and that is also concurrent with wound care. Patient had no acute concerns at this time.  is currently working on placement. Summary of relevant labs:  Labs:  sodium 134  leukocytosis 6.5  elevated procalcitonin   Elevated lactic acid 2.5, 9/11/2022  Creat: 0.96  -136 improved  Micro:  BC GNR and CPC clusters at the [facility]  UA neg 9/10   MRSA nasal swab negative  blood culture 9/10      Imaging:  BREASTS US neg for abscess  CT scan of the abdomen pelvis showed no pneumonia on the lower lobes and no acute abdomen. I have personally reviewed the past medical history, past surgical history, medications, social history, and family history, and I haveupdated the database accordingly. Allergies:   Amoxicillin and Robitussin day-night value lawrence [[id number]]      Review of Systems:     Constitutional: Patient is resting in bed no acute distress  Eyes: No visual abnormalities, photophobia, visual disturbances  HENT: Negative for congestion, rhinorrhea, sore throat  Respiratory: Negative for shortness of breath, wheezing, rhonchi  Cardiovascular: negative for chest pain,  GI: Positive for diarrhea, nonbloody  : Urine color still mildly elder  Musculoskeletal: Negative for arthralgia  Neurologic: No focal neurological deficits, no weakness, numbness, tingling  Psychiatric: Negative for immediate mood disturbances     Physical Examination :         Physical Exam  Constitutional:       General: She is not in acute distress, resting in bed     Appearance: She is obese. Negative fevers or chills  HENT:      Head: Atraumatic normocephalic no lesions     No ear drainage     Nose: No rhinorrhea, congestion     Mouth/Throat: Mucous membranes are moist  Eyes:      PERRLA, conjunctive neuro normal, extraocular movements intact  Cardiovascular:   Normal rate rhythm, normal pulses, peripheral edema bilaterally.   Pulmonary:      Effort: Pulmonary effort is normal, no respiratory distress, file   Stress: Not on file   Social Connections: Not on file   Intimate Partner Violence: Not on file   Housing Stability: Not on file            Family History:   Family History   No family history on file. Medical Decision Making:   I have independently reviewed/ordered the following labs:     CBC with Differential:        Recent Labs     09/18/22  0552 09/19/22  0644   WBC 9.2 9.2   HGB 7.4* 7.9*   HCT 22.8* 25.4*    216   LYMPHOPCT 27 24   MONOPCT 9 7         BMP:       Recent Labs     09/18/22  0552 09/19/22  0644    134*   K 4.1 4.0    101   CO2 24 24   BUN 24* 22*   CREATININE 0.96* 1.06*   MG 1.6 1.5*         Hepatic Function Panel:        Recent Labs     09/18/22  0552 09/19/22  0644   PROT 5.0* 5.1*   LABALBU 1.5* 1.3*   BILIDIR 0.1 0.1   IBILI 0.2 0.2   BILITOT 0.3 0.3   ALKPHOS 123* 116*   ALT 19 18   AST 27 23         No results for input(s): RPR in the last 72 hours. No results for input(s): HIV in the last 72 hours. No results for input(s): BC in the last 72 hours. Lab Results   Component Value Date/Time     CREATININE 1.06 09/19/2022 06:44 AM     GLUCOSE 68 09/19/2022 06:44 AM         Detailed results: Thank you for allowing us to participate in the care of this patient. Please call with questions. This note is created with the assistance of a speech recognition program.  While intending to generate adocument that actually reflects the content of the visit, the document can still have some errors including those of syntax and sound a like substitutions which may escape proof reading. It such instances, actual meaningcan be extrapolated by contextual diversion. Jarod Echols MD  Office: [phone]  Perfect serve / office [phone]       I have discussed the care of the patient, including pertinent history and exam findings,  with the resident.  I have seen and examined the patient and the key elements of all parts of the encounter have been performed by me. I agree with the assessment, plan and orders as documented by the resident.     Angélica García, Infectious Diseases

## 2022-09-20 NOTE — PROGRESS NOTES
[facility]  Office: [address], DO, Jerry Jay, DO, Liss Delacruz, DO, Josh Casas Blood, DO, Liban Douglas MD, Emmy Denny MD, Lars Oden MD, Bhumika Soriano MD,  Myla Byrne MD, Elias Cifuentes MD, Angi Saeed DO, Sri Rees MD,  Caty Johnson MD, Rohit Warren MD, Carlos A Fuentes DO, Francisco J Rodriguez MD, Ashley Casas MD, Monroe Villavicencio MD, Eula Holm MD, Sergei Orlando MD, Mray Corbin MD, Fatimah Waite, DO, Jacqueline Velez MD, Stephanie Olmos MD, Donis Hogue, [facility],  Adri Reed, CNP, Dilcia Galeano, CNP, Ethel Mahoney, CNP,  Franki Lundborg, [facility], Will Weathers, CNP, Earl Alfaro, CNP, Noemi Everett, CNP, Zarina Dorman, [facility], [facility], [facility], Sharmin Bond PA-C, Ezequiel Boyd, CNS, Olivia Marroquin, [facility], Pleas Friends, CNP, Aida Reyes, CNP, Marleny Alanis, [facility]      Daily Progress Note     Admit Date: 9/10/2022  Bed/Room No.  2011/2011-01  Admitting Physician : Lars Oden MD  Code Status :Full Code  Hospital Day:  LOS: 10 days   Chief Complaint:     Altered mental status    Principal Problem:    Septic shock [facility])  Active Problems:    Altered mental status    Acute kidney injury ([address]    Bandemia    Cellulitis of sacral region    Breast, fat necrosis    Gram-neg septicemia ([address]    Gram positive septicemia ([address]    Acute encephalopathy    Elevated procalcitonin    Chronic anemia    Acute respiratory failure with hypoxia (HCC)    Severe protein-calorie malnutrition ([address]    Simple chronic bronchitis (HCC)    Hypothyroid    Hypoalbuminemia    Systemic inflammatory response syndrome (SIRS) of infectious origin with acute organ failure ([address]  Resolved Problems:    * No resolved hospital problems. *    Subjective : Interval History/Significant events :  09/20/22    Patient is having fatigue, generalized weakness, increased sleepiness.   Patient also reported multiple loose bowel movements. She denies any fever, chills. Patient breathing on room air  Vitals - Stable afebrile tachycardia,  Labs -serum sodium 134, BUN 23, creatinine 0.96, albumin 1.3. Glucose 75. Nursing notes , Consults notes reviewed. Overnight events and updates discussed with Nursing staff . Background History:         Chris Rivas is 38 y.o. female  Who was admitted to the hospital on 9/10/2022 for treatment of Septic shock ([address] Patient was admitted to the hospital on 9/10/2022. she was transferred to our facility from [facility] with altered mental status. Patient was found unresponsive at home soiled with feces and urine and multiple wounds and maggots in the wounds. Patient was found to have acute kidney injury. She lives at home with her  who travels frequently for living and is not home much.  was away from home for 1 week. Patient reported that she did not feel like going to the restroom as it was taxing and decided to urinate and defecate on bed. She progressively got worse and got confused. She did not call for any help. . Patient had poor mobility for a year. CT abdomen pelvis showed malposition of jejunal with small bowel obstruction and possible hepatic steatosis. She has known history of morbid obesity hypothyroidism, gastric fundoplication. Lab work showed hyponatremia, hyperkalemia, elevated procalcitonin. Patient was referred to our facility for concern for sepsis. Blood culture was positive for gram-negative rods and gram-positive cocci in clusters. CT abdomen was repeated and did not show any concern for bowel obstruction. Patient was intubated due to metabolic encephalopathy 09/30/9234. She was also treated with pressors. Urine culture positive for E. coli. Patient was treated with ceftriaxone and recommended to continue till 9/24/2022 to complete 2-week course. Patient also treated with a short course of daptomycin until 9/17/2022.     PMH:  No past medical history on file. Allergies: Allergies   Allergen Reactions    Amoxicillin Hives    Robitussin Day-Night Value Nickolas [Phenylephrine-Diphenhyd-Dm-Gg] Hives and Rash      Medications :  budesonide-formoterol, 1 puff, Inhalation, BID  lidocaine 1 % injection, 5 mL, IntraDERmal, Once  sodium chloride flush, 5-40 mL, IntraVENous, 2 times per day  midodrine, 10 mg, Oral, q8h  enoxaparin, 30 mg, SubCUTAneous, BID  cefTRIAXone (ROCEPHIN) IV, 2,000 mg, IntraVENous, Q24H  sodium chloride flush, 5-40 mL, IntraVENous, 2 times per day  pantoprazole (PROTONIX) 40 mg injection, 40 mg, IntraVENous, Daily  miconazole, , Topical, BID  levothyroxine, 75 mcg, Oral, Daily      Review of Systems   Review of Systems   Constitutional:  Positive for activity change. Negative for appetite change, chills, fatigue, fever and unexpected weight change. HENT:  Negative for congestion, mouth sores, postnasal drip, sinus pressure, sore throat and voice change. Eyes:  Negative for visual disturbance. Respiratory:  Negative for cough, shortness of breath and wheezing. Cardiovascular:  Negative for chest pain and palpitations. Gastrointestinal:  Negative for abdominal pain, blood in stool, constipation, diarrhea, nausea and vomiting. Endocrine: Negative for polyuria. Genitourinary:  Negative for difficulty urinating, dysuria, frequency and urgency. Musculoskeletal:  Negative for arthralgias, joint swelling and myalgias. Skin:  Positive for wound. Neurological:  Negative for dizziness, tremors, speech difficulty, light-headedness and headaches.    Objective :      Current Vitals : Temp: 98.2 °F (36.8 °C),  Heart Rate: (!) 105, Resp: 21, BP: (!) 103/45, SpO2: 98 %  Last 24 Hrs Vitals   Patient Vitals for the past 24 hrs:   BP Temp Temp src Pulse Resp SpO2 Height Weight   09/20/22 0334 -- -- -- -- -- -- -- (!) 320 lb (145.2 kg)   09/20/22 0330 (!) 103/45 98.2 °F (36.8 °C) Oral (!) 105 21 98 % -- --   09/20/22 0000 (!) 95/54 -- -- -- -- -- -- --   09/19/22 2345 (!) 88/46 98.1 °F (36.7 °C) Oral (!) 110 14 99 % -- --   09/19/22 2115 (!) 98/48 98.7 °F (37.1 °C) Oral (!) 103 25 99 % -- --   09/19/22 1800 -- -- -- (!) 115 22 -- -- --   09/19/22 1700 -- -- -- (!) 111 23 -- -- --   09/19/22 1650 (!) 112/52 98.3 °F (36.8 °C) Oral (!) 108 26 99 % -- --   09/19/22 1600 -- -- -- (!) 111 24 -- -- --   09/19/22 1500 -- -- -- (!) 112 16 -- -- --   09/19/22 1400 -- -- -- (!) 116 23 -- -- --   09/19/22 1235 -- -- -- -- -- -- 5' 7\" (1.702 m) --   09/19/22 1200 -- -- -- (!) 115 16 -- -- --   09/19/22 1100 -- -- -- (!) 110 (!) 31 -- -- --   09/19/22 1000 -- -- -- (!) 115 21 -- -- --   09/19/22 0900 -- -- -- (!) 115 23 -- -- --   09/19/22 0800 -- -- -- (!) 125 (!) 31 -- -- --       Intake / output   09/18 1901 - 09/20 0700  In: 1169.4 [P.O.:720; I.V.:449.4]  Out: 1000 [Urine:1000]  Physical Exam:  Physical Exam  Vitals and nursing note reviewed. Constitutional:       General: She is not in acute distress. Appearance: She is morbidly obese. She is not diaphoretic. HENT:      Head: Normocephalic and atraumatic. Nose:      Right Sinus: No maxillary sinus tenderness or frontal sinus tenderness. Left Sinus: No maxillary sinus tenderness or frontal sinus tenderness. Mouth/Throat:      Pharynx: No oropharyngeal exudate. Eyes:      General: No scleral icterus. Conjunctiva/sclera: Conjunctivae normal.      Pupils: Pupils are equal, round, and reactive to light. Neck:      Thyroid: No thyromegaly. Vascular: No JVD. Cardiovascular:      Rate and Rhythm: Normal rate and regular rhythm. Pulses:           Dorsalis pedis pulses are 2+ on the right side and 2+ on the left side. Heart sounds: Normal heart sounds. No murmur heard. Pulmonary:      Effort: Pulmonary effort is normal.      Breath sounds: Normal breath sounds. No wheezing or rales. Abdominal:      Palpations: Abdomen is soft. There is no mass. Tenderness: There is no abdominal tenderness. Musculoskeletal:      Cervical back: Full passive range of motion without pain and neck supple. Comments: Notable wounds in bilateral buttocks, left breast, right breast   Lymphadenopathy:      Head:      Right side of head: No submandibular adenopathy. Left side of head: No submandibular adenopathy. Cervical: No cervical adenopathy. Skin:     General: Skin is warm. Neurological:      Mental Status: She is alert and oriented to person, place, and time. Motor: No tremor. Psychiatric:         Behavior: Behavior is cooperative. Laboratory findings:    Recent Labs     09/18/22  0552 09/19/22  0644 09/20/22  0542   WBC 9.2 9.2 6.5   HGB 7.4* 7.9* 7.1*   HCT 22.8* 25.4* 22.3*    216 264       Recent Labs     09/18/22  0552 09/19/22  0644 09/20/22  0542    134* 134*   K 4.1 4.0 4.1    101 103   CO2 24 24 23   GLUCOSE 81 68* 69*   BUN 24* 22* 19   CREATININE 0.96* 1.06* 0.96*   MG 1.6 1.5* 1.4*   CALCIUM 7.2* 7.2* 7.2*       Recent Labs     09/18/22  0552 09/19/22  0644 09/20/22  0542   PROT 5.0* 5.1* 4.7*   LABALBU 1.5* 1.3* 1.3*   LABA1C  --  5.8  --    AST 27 23 21   ALT 19 18 16   ALKPHOS 123* 116* 113*   BILITOT 0.3 0.3 0.3   BILIDIR 0.1 0.1 0.1   CKTOTAL  --  98  --             Specific Gravity, UA   Date Value Ref Range Status   09/10/2022 1.022 1.005 - 1.030 Final     Protein, UA   Date Value Ref Range Status   09/10/2022 1+ (A) NEGATIVE Final     RBC, UA   Date Value Ref Range Status   09/10/2022 5 TO 10 0 - 4 /HPF Final     Comment:     Reference range defined for non-centrifuged specimen.      Bacteria, UA   Date Value Ref Range Status   09/10/2022 FEW (A) None Final     Nitrite, Urine   Date Value Ref Range Status   09/10/2022 NEGATIVE NEGATIVE Final     WBC, UA   Date Value Ref Range Status   09/10/2022 20 TO 50 0 - 5 /HPF Final     Leukocyte Esterase, Urine   Date Value Ref Range Status   09/10/2022 TRACE (A) NEGATIVE Final       Imaging / Clinical Data :-   CT HEAD WO CONTRAST    Result Date: 9/15/2022  No acute intracranial abnormality. XR CHEST PORTABLE    Result Date: 9/18/2022  No acute abnormality identified. Status post extubation. XR CHEST PORTABLE    Result Date: 9/16/2022  No acute process. XR CHEST PORTABLE    Result Date: 9/15/2022  No significant interval change. Support lines and tubes in place with no acute findings in the chest.     XR CHEST PORTABLE    Result Date: 9/14/2022  Stable chest.     XR CHEST PORTABLE    Result Date: 9/13/2022  Support lines and tubes remain in place. Improved aeration at the lung bases. Clinical Course : gradually improving  Assessment and Plan  :        Sepsis with septic shock secondary to Gram negative and gram-positive bacteremia(Proteus, staph hominis, staph epidermidis -Rocephin with stop date 9/24/2022 -ID following. Continue local wound care. Multiple wounds with maggots -   Morbid obesity BMI 50 -   Bedbound for > 6 months -we will need aggressive rehab at discharge. Hypothyroidism -Synthroid. CHULA resolved -avoid nephrotoxic agents  Metabolic encephalopathy - resolved  Severe malnutrition -  Diarrhea likely antibiotic associated-check stool for C. difficile due to high risk from hospitalization and antibiotic use. Will need SNF at discharge   Stop precedex   PT OT     Continue to monitor vitals , Intake / output ,  Cell count , HGB , Kidney function, oxygenation  as indicated . Plan and updates discussed with patient ,  answers  explained to satisfaction.    Plan discussed with Staff  RN     (Please note that portions of this note were completed with a voice recognition program. Efforts were made to edit the dictations but occasionally words are mis-transcribed.)      May Campos MD  9/20/2022

## 2022-09-20 NOTE — PLAN OF CARE
Problem: Respiratory - Adult  Goal: Achieves optimal ventilation and oxygenation  9/19/2022 2110 by Samia Soni RCP  Outcome: Progressing

## 2022-09-21 LAB
C DIFFICILE TOXINS, PCR: NORMAL
GLUCOSE BLD-MCNC: 124 MG/DL (ref 65–105)
GLUCOSE BLD-MCNC: 61 MG/DL (ref 65–105)
GLUCOSE BLD-MCNC: 64 MG/DL (ref 65–105)
GLUCOSE BLD-MCNC: 85 MG/DL (ref 65–105)
GLUCOSE BLD-MCNC: 97 MG/DL (ref 65–105)
MAGNESIUM: 1.5 MG/DL (ref 1.6–2.6)
SPECIMEN DESCRIPTION: NORMAL

## 2022-09-21 PROCEDURE — 6360000002 HC RX W HCPCS: Performed by: STUDENT IN AN ORGANIZED HEALTH CARE EDUCATION/TRAINING PROGRAM

## 2022-09-21 PROCEDURE — 82947 ASSAY GLUCOSE BLOOD QUANT: CPT

## 2022-09-21 PROCEDURE — 1200000000 HC SEMI PRIVATE

## 2022-09-21 PROCEDURE — 94640 AIRWAY INHALATION TREATMENT: CPT

## 2022-09-21 PROCEDURE — 6370000000 HC RX 637 (ALT 250 FOR IP): Performed by: FAMILY MEDICINE

## 2022-09-21 PROCEDURE — 6370000000 HC RX 637 (ALT 250 FOR IP)

## 2022-09-21 PROCEDURE — 2500000003 HC RX 250 WO HCPCS: Performed by: INTERNAL MEDICINE

## 2022-09-21 PROCEDURE — 83735 ASSAY OF MAGNESIUM: CPT

## 2022-09-21 PROCEDURE — 6370000000 HC RX 637 (ALT 250 FOR IP): Performed by: STUDENT IN AN ORGANIZED HEALTH CARE EDUCATION/TRAINING PROGRAM

## 2022-09-21 PROCEDURE — 6360000002 HC RX W HCPCS: Performed by: FAMILY MEDICINE

## 2022-09-21 PROCEDURE — 2580000003 HC RX 258: Performed by: FAMILY MEDICINE

## 2022-09-21 PROCEDURE — 97110 THERAPEUTIC EXERCISES: CPT

## 2022-09-21 PROCEDURE — 97530 THERAPEUTIC ACTIVITIES: CPT

## 2022-09-21 PROCEDURE — 6360000002 HC RX W HCPCS: Performed by: EMERGENCY MEDICINE

## 2022-09-21 PROCEDURE — 99232 SBSQ HOSP IP/OBS MODERATE 35: CPT | Performed by: INTERNAL MEDICINE

## 2022-09-21 PROCEDURE — 6360000002 HC RX W HCPCS: Performed by: INTERNAL MEDICINE

## 2022-09-21 PROCEDURE — 2500000003 HC RX 250 WO HCPCS: Performed by: STUDENT IN AN ORGANIZED HEALTH CARE EDUCATION/TRAINING PROGRAM

## 2022-09-21 PROCEDURE — 2580000003 HC RX 258: Performed by: STUDENT IN AN ORGANIZED HEALTH CARE EDUCATION/TRAINING PROGRAM

## 2022-09-21 PROCEDURE — 36415 COLL VENOUS BLD VENIPUNCTURE: CPT

## 2022-09-21 PROCEDURE — 6370000000 HC RX 637 (ALT 250 FOR IP): Performed by: EMERGENCY MEDICINE

## 2022-09-21 PROCEDURE — 94761 N-INVAS EAR/PLS OXIMETRY MLT: CPT

## 2022-09-21 PROCEDURE — 99232 SBSQ HOSP IP/OBS MODERATE 35: CPT | Performed by: FAMILY MEDICINE

## 2022-09-21 RX ORDER — 0.9 % SODIUM CHLORIDE 0.9 %
500 INTRAVENOUS SOLUTION INTRAVENOUS ONCE
Status: DISCONTINUED | OUTPATIENT
Start: 2022-09-21 | End: 2022-10-05 | Stop reason: HOSPADM

## 2022-09-21 RX ORDER — LOPERAMIDE HYDROCHLORIDE 2 MG/1
2 CAPSULE ORAL 4 TIMES DAILY PRN
Status: DISCONTINUED | OUTPATIENT
Start: 2022-09-21 | End: 2022-09-27

## 2022-09-21 RX ADMIN — ONDANSETRON 4 MG: 2 INJECTION INTRAMUSCULAR; INTRAVENOUS at 02:08

## 2022-09-21 RX ADMIN — ACETAMINOPHEN 650 MG: 325 TABLET ORAL at 19:51

## 2022-09-21 RX ADMIN — FENTANYL CITRATE 50 MCG: 50 INJECTION, SOLUTION INTRAMUSCULAR; INTRAVENOUS at 22:32

## 2022-09-21 RX ADMIN — SODIUM CHLORIDE, PRESERVATIVE FREE 10 ML: 5 INJECTION INTRAVENOUS at 22:31

## 2022-09-21 RX ADMIN — LOPERAMIDE HYDROCHLORIDE 2 MG: 2 CAPSULE ORAL at 15:05

## 2022-09-21 RX ADMIN — ONDANSETRON 4 MG: 2 INJECTION INTRAMUSCULAR; INTRAVENOUS at 11:37

## 2022-09-21 RX ADMIN — MAGNESIUM SULFATE HEPTAHYDRATE 1000 MG: 1 INJECTION, SOLUTION INTRAVENOUS at 08:15

## 2022-09-21 RX ADMIN — SODIUM CHLORIDE: 9 INJECTION, SOLUTION INTRAVENOUS at 11:40

## 2022-09-21 RX ADMIN — ANTI-FUNGAL POWDER MICONAZOLE NITRATE TALC FREE: 1.42 POWDER TOPICAL at 22:43

## 2022-09-21 RX ADMIN — BUDESONIDE AND FORMOTEROL FUMARATE DIHYDRATE 1 PUFF: 80; 4.5 AEROSOL RESPIRATORY (INHALATION) at 21:31

## 2022-09-21 RX ADMIN — SODIUM CHLORIDE: 9 INJECTION, SOLUTION INTRAVENOUS at 22:38

## 2022-09-21 RX ADMIN — PANTOPRAZOLE SODIUM 40 MG: 40 TABLET, DELAYED RELEASE ORAL at 06:07

## 2022-09-21 RX ADMIN — ENOXAPARIN SODIUM 30 MG: 100 INJECTION SUBCUTANEOUS at 22:32

## 2022-09-21 RX ADMIN — MAGNESIUM SULFATE HEPTAHYDRATE 1000 MG: 1 INJECTION, SOLUTION INTRAVENOUS at 06:05

## 2022-09-21 RX ADMIN — MIDODRINE HYDROCHLORIDE 10 MG: 5 TABLET ORAL at 08:15

## 2022-09-21 RX ADMIN — Medication 1 CAPSULE: at 08:16

## 2022-09-21 RX ADMIN — LEVOTHYROXINE SODIUM 75 MCG: 75 TABLET ORAL at 06:07

## 2022-09-21 RX ADMIN — MIDODRINE HYDROCHLORIDE 10 MG: 5 TABLET ORAL at 02:08

## 2022-09-21 RX ADMIN — ENOXAPARIN SODIUM 30 MG: 100 INJECTION SUBCUTANEOUS at 08:18

## 2022-09-21 RX ADMIN — BUDESONIDE AND FORMOTEROL FUMARATE DIHYDRATE 1 PUFF: 80; 4.5 AEROSOL RESPIRATORY (INHALATION) at 07:37

## 2022-09-21 RX ADMIN — MIDODRINE HYDROCHLORIDE 10 MG: 5 TABLET ORAL at 17:42

## 2022-09-21 RX ADMIN — CEFTRIAXONE SODIUM 2000 MG: 10 INJECTION, POWDER, FOR SOLUTION INTRAVENOUS at 11:37

## 2022-09-21 RX ADMIN — ANTI-FUNGAL POWDER MICONAZOLE NITRATE TALC FREE: 1.42 POWDER TOPICAL at 08:16

## 2022-09-21 RX ADMIN — Medication 500 ML: at 20:20

## 2022-09-21 ASSESSMENT — PAIN SCALES - GENERAL: PAINLEVEL_OUTOF10: 7

## 2022-09-21 ASSESSMENT — ENCOUNTER SYMPTOMS
WHEEZING: 0
CHEST TIGHTNESS: 0
PHOTOPHOBIA: 0
SHORTNESS OF BREATH: 0
BLOOD IN STOOL: 0
EYE DISCHARGE: 0
NAUSEA: 0
SORE THROAT: 0
SINUS PRESSURE: 0
ABDOMINAL PAIN: 0
EYE PAIN: 0
ABDOMINAL DISTENTION: 0
COUGH: 0
CONSTIPATION: 0
RHINORRHEA: 0
DIARRHEA: 0
VOMITING: 0
VOICE CHANGE: 0

## 2022-09-21 NOTE — PLAN OF CARE
Problem: Discharge Planning  Goal: Discharge to home or other facility with appropriate resources  9/21/2022 1200 by Nilo Jean Baptiste RN  Outcome: Progressing  9/20/2022 2338 by Pat Hawthorne RN  Outcome: Progressing     Problem: Respiratory - Adult  Goal: Achieves optimal ventilation and oxygenation  9/21/2022 1200 by Nilo Jean Baptiste RN  Outcome: Progressing  9/20/2022 2338 by Pat Hawthorne RN  Outcome: Progressing     Problem: Safety - Medical Restraint  Goal: Remains free of injury from restraints (Restraint for Interference with Medical Device)  Description: INTERVENTIONS:  1. Determine that other, less restrictive measures have been tried or would not be effective before applying the restraint  2. Evaluate the patient's condition at the time of restraint application  3. Inform patient/family regarding the reason for restraint  4. Q2H: Monitor safety, psychosocial status, comfort, nutrition and hydration  9/21/2022 1200 by Nilo Jean Baptiste RN  Outcome: Progressing  9/20/2022 2338 by Pat Hawthorne RN  Outcome: Progressing     Problem: Pain  Goal: Verbalizes/displays adequate comfort level or baseline comfort level  9/21/2022 1200 by Nilo Jean Baptiste RN  Outcome: Progressing  9/20/2022 2338 by Pat Hawthorne RN  Outcome: Progressing     Problem: Confusion  Goal: Confusion, delirium, dementia, or psychosis is improved or at baseline  Description: INTERVENTIONS:  1. Assess for possible contributors to thought disturbance, including medications, impaired vision or hearing, underlying metabolic abnormalities, dehydration, psychiatric diagnoses, and notify attending LIP  2. [place] high risk fall precautions, as indicated  3. Provide frequent short contacts to provide reality reorientation, refocusing and direction  4. Decrease environmental stimuli, including noise as appropriate  5. Monitor and intervene to maintain adequate nutrition, hydration, elimination, sleep and activity  6.  If unable to ensure safety without constant attention obtain sitter and review sitter guidelines with assigned personnel  7. Initiate Psychosocial CNS and Spiritual Care consult, as indicated  9/21/2022 1200 by Esteban Pizarro RN  Outcome: Progressing  9/20/2022 2338 by Carina Dang RN  Outcome: Progressing     Problem: Nutrition Deficit:  Goal: Optimize nutritional status  9/21/2022 1200 by Esteban Pizarro RN  Outcome: Progressing  9/20/2022 2338 by Carina Dang RN  Outcome: Progressing     Problem: Skin/Tissue Integrity  Goal: Absence of new skin breakdown  Description: 1. Monitor for areas of redness and/or skin breakdown  2. Assess vascular access sites hourly  3. Every 4-6 hours minimum:  Change oxygen saturation probe site  4. Every 4-6 hours:  If on nasal continuous positive airway pressure, respiratory therapy assess nares and determine need for appliance change or resting period.   9/21/2022 1200 by Esteban Pizarro RN  Outcome: Progressing  9/20/2022 2338 by Carina Dang RN  Outcome: Progressing     Problem: Safety - Adult  Goal: Free from fall injury  9/21/2022 1200 by Esteban Pizarro RN  Outcome: Progressing  9/20/2022 2338 by Carina Dang RN  Outcome: Progressing     Problem: ABCDS Injury Assessment  Goal: Absence of physical injury  9/21/2022 1200 by Esteban Pizarro RN  Outcome: Progressing  9/20/2022 2338 by Carina Dang RN  Outcome: Progressing

## 2022-09-21 NOTE — PROGRESS NOTES
[facility]  Office: [address], DO, Krupa Devine, DO, Tammy Omari, DO, Charlene Luevanomyrna Valderrama, DO, Garrett Pack MD, Eugene Lund MD, Elma Garcia MD, Irineo Clark MD,  Sharmin Vanegas MD, Juli Connell MD, Loco Hernandez DO, Alla Salinas MD,  Fara Hough MD, Hector Talley MD, Saurabh Caro DO, Bola Dalton MD, Magui Meade MD, Tianna Ng MD, Milagro Kaminski MD, Leo Wise MD, Jean Paul Garcia MD, Enedina Rey DO, Tita Davis MD, Taye Block MD, Eloy Robles CNP,  Jane Egan, CNP, Eliana Benitez, CNP, Krysten Stone, CNP,  Gertrudis Davidson, [facility], Jimbo Malagon, CNP, Renata Pittman, CNP, Megha Lin, CNP, Mira Charles, [facility], [facility], CNP, iMchelle Pineda PA-C, Jayleen Cummings, CNS, Catrachito Vera, [facility], Juvencio Yusuf, CNP, Aida Reyes, [facility], [facility], [facility]      Daily Progress Note     Admit Date: 9/10/2022  Bed/Room No.  2011/2011-01  Admitting Physician : Elma Garcia MD  Code Status :Full Code  Hospital Day:  LOS: 11 days   Chief Complaint:     Altered mental status    Principal Problem:    Septic shock [facility])  Active Problems:    Altered mental status    Acute kidney injury ([address]    Bandemia    Cellulitis of sacral region    Breast, fat necrosis    Gram-neg septicemia ([address]    Gram positive septicemia ([address]    Acute encephalopathy    Elevated procalcitonin    Chronic anemia    Acute respiratory failure with hypoxia (HCC)    Severe protein-calorie malnutrition ([address]    Simple chronic bronchitis (HCC)    Hypothyroid    Hypoalbuminemia    Systemic inflammatory response syndrome (SIRS) of infectious origin with acute organ failure ([address]  Resolved Problems:    * No resolved hospital problems. *    Subjective : Interval History/Significant events :  09/21/22    Patient continues to have generalized weakness. She is getting wound care. She is getting wound Care.  Patient has not been able to sit up in bed . Vitals - Stable afebrile tachycardia,  Labs -low Magnesium , low albumin. Low HGB     Nursing notes , Consults notes reviewed. Overnight events and updates discussed with Nursing staff . Background History:         María Esparza is 38 y.o. female  Who was admitted to the hospital on 9/10/2022 for treatment of Septic shock ([facility] Patient was admitted to the hospital on 9/10/2022. she was transferred to our facility from [facility] with altered mental status. Patient was found unresponsive at home soiled with feces and urine and multiple wounds and maggots in the wounds. Patient was found to have acute kidney injury. She lives at home with her  who travels frequently for living and is not home much.  was away from home for 1 week. Patient reported that she did not feel like going to the restroom as it was taxing and decided to urinate and defecate on bed. She progressively got worse and got confused. She did not call for any help. . Patient had poor mobility for a year. CT abdomen pelvis showed malposition of jejunal with small bowel obstruction and possible hepatic steatosis. She has known history of morbid obesity hypothyroidism, gastric fundoplication. Lab work showed hyponatremia, hyperkalemia, elevated procalcitonin. Patient was referred to our facility for concern for sepsis. Blood culture was positive for gram-negative rods and gram-positive cocci in clusters. CT abdomen was repeated and did not show any concern for bowel obstruction. Patient was intubated due to metabolic encephalopathy 35/09/5911. She was also treated with pressors. Urine culture positive for E. coli. Patient was treated with ceftriaxone and recommended to continue till 9/24/2022 to complete 2-week course. Patient also treated with a short course of daptomycin until 9/17/2022. PMH:  No past medical history on file. Allergies:    Allergies   Allergen Reactions    Amoxicillin Hives    Robitussin Day-Night Value Nickolas [Phenylephrine-Diphenhyd-Dm-Gg] Hives and Rash      Medications :  pantoprazole, 40 mg, Oral, QAM AC  lactobacillus, 1 capsule, Oral, Daily with breakfast  budesonide-formoterol, 1 puff, Inhalation, BID  lidocaine 1 % injection, 5 mL, IntraDERmal, Once  sodium chloride flush, 5-40 mL, IntraVENous, 2 times per day  midodrine, 10 mg, Oral, q8h  enoxaparin, 30 mg, SubCUTAneous, BID  cefTRIAXone (ROCEPHIN) IV, 2,000 mg, IntraVENous, Q24H  sodium chloride flush, 5-40 mL, IntraVENous, 2 times per day  miconazole, , Topical, BID  levothyroxine, 75 mcg, Oral, Daily      Review of Systems   Review of Systems   Constitutional:  Positive for activity change. Negative for appetite change, chills, fatigue, fever and unexpected weight change. HENT:  Negative for congestion, mouth sores, postnasal drip, sinus pressure, sore throat and voice change. Eyes:  Negative for visual disturbance. Respiratory:  Negative for cough, shortness of breath and wheezing. Cardiovascular:  Negative for chest pain and palpitations. Gastrointestinal:  Negative for abdominal pain, blood in stool, constipation, diarrhea, nausea and vomiting. Endocrine: Negative for polyuria. Genitourinary:  Negative for difficulty urinating, dysuria, frequency and urgency. Musculoskeletal:  Negative for arthralgias, joint swelling and myalgias. Skin:  Positive for wound. Neurological:  Negative for dizziness, tremors, speech difficulty, light-headedness and headaches.    Objective :      Current Vitals : Temp: 98.2 °F (36.8 °C),  Heart Rate: (!) 109, Resp: 26, BP: (!) 97/41, SpO2: 99 %  Last 24 Hrs Vitals   Patient Vitals for the past 24 hrs:   BP Temp Temp src Pulse Resp SpO2 Weight   09/21/22 1140 (!) 97/41 -- -- -- -- -- --   09/21/22 1050 (!) 143/113 98.2 °F (36.8 °C) Oral (!) 109 26 99 % --   09/21/22 0740 (!) 95/39 98.6 °F (37 °C) Axillary (!) 101 11 99 % --   09/21/22 0738 -- -- -- -- -- 99 % --   09/21/22 0415 (!) 96/51 99 °F (37.2 °C) Oral 99 18 99 % --   09/21/22 0328 -- -- -- -- -- -- (!) 320 lb (145.2 kg)   09/21/22 0000 (!) 98/46 99.3 °F (37.4 °C) Oral (!) 102 20 97 % --   09/20/22 2118 (!) 104/49 99.5 °F (37.5 °C) Oral (!) 102 22 97 % --   09/20/22 1945 (!) 101/48 99 °F (37.2 °C) Oral (!) 109 24 99 % --   09/20/22 1735 (!) 98/49 98.5 °F (36.9 °C) Oral (!) 105 22 99 % --       Intake / output   09/19 1901 - 09/21 0700  In: 240 [P.O.:240]  Out: 300 [Urine:300]  Physical Exam:  Physical Exam  Vitals and nursing note reviewed. Constitutional:       General: She is not in acute distress. Appearance: She is morbidly obese. She is not diaphoretic. HENT:      Head: Normocephalic and atraumatic. Nose:      Right Sinus: No maxillary sinus tenderness or frontal sinus tenderness. Left Sinus: No maxillary sinus tenderness or frontal sinus tenderness. Mouth/Throat:      Pharynx: No oropharyngeal exudate. Eyes:      General: No scleral icterus. Conjunctiva/sclera: Conjunctivae normal.      Pupils: Pupils are equal, round, and reactive to light. Neck:      Thyroid: No thyromegaly. Vascular: No JVD. Cardiovascular:      Rate and Rhythm: Normal rate and regular rhythm. Pulses:           Dorsalis pedis pulses are 2+ on the right side and 2+ on the left side. Heart sounds: Normal heart sounds. No murmur heard. Pulmonary:      Effort: Pulmonary effort is normal.      Breath sounds: Normal breath sounds. No wheezing or rales. Abdominal:      Palpations: Abdomen is soft. There is no mass. Tenderness: There is no abdominal tenderness. Musculoskeletal:      Cervical back: Full passive range of motion without pain and neck supple. Comments: Notable wounds in bilateral buttocks, left breast, right breast   Lymphadenopathy:      Head:      Right side of head: No submandibular adenopathy. Left side of head: No submandibular adenopathy.       Cervical: No cervical adenopathy. Skin:     General: Skin is warm. Neurological:      Mental Status: She is alert and oriented to person, place, and time. Motor: No tremor. Psychiatric:         Behavior: Behavior is cooperative. Laboratory findings:    Recent Labs     09/19/22 0644 09/20/22  0542   WBC 9.2 6.5   HGB 7.9* 7.1*   HCT 25.4* 22.3*    264       Recent Labs     09/19/22  0644 09/20/22  0542 09/21/22  0358   * 134*  --    K 4.0 4.1  --     103  --    CO2 24 23  --    GLUCOSE 68* 69*  --    BUN 22* 19  --    CREATININE 1.06* 0.96*  --    MG 1.5* 1.4* 1.5*   CALCIUM 7.2* 7.2*  --        Recent Labs     09/19/22  0644 09/20/22  0542   PROT 5.1* 4.7*   LABALBU 1.3* 1.3*   LABA1C 5.8  --    AST 23 21   ALT 18 16   ALKPHOS 116* 113*   BILITOT 0.3 0.3   BILIDIR 0.1 0.1   CKTOTAL 98  --             Specific Gravity, UA   Date Value Ref Range Status   09/10/2022 1.022 1.005 - 1.030 Final     Protein, UA   Date Value Ref Range Status   09/10/2022 1+ (A) NEGATIVE Final     RBC, UA   Date Value Ref Range Status   09/10/2022 5 TO 10 0 - 4 /HPF Final     Comment:     Reference range defined for non-centrifuged specimen. Bacteria, UA   Date Value Ref Range Status   09/10/2022 FEW (A) None Final     Nitrite, Urine   Date Value Ref Range Status   09/10/2022 NEGATIVE NEGATIVE Final     WBC, UA   Date Value Ref Range Status   09/10/2022 20 TO 50 0 - 5 /HPF Final     Leukocyte Esterase, Urine   Date Value Ref Range Status   09/10/2022 TRACE (A) NEGATIVE Final       Imaging / Clinical Data :-   CT HEAD WO CONTRAST    Result Date: 9/15/2022  No acute intracranial abnormality. XR CHEST PORTABLE    Result Date: 9/18/2022  No acute abnormality identified. Status post extubation. XR CHEST PORTABLE    Result Date: 9/16/2022  No acute process. XR CHEST PORTABLE    Result Date: 9/15/2022  No significant interval change.   Support lines and tubes in place with no acute findings in the chest. XR CHEST PORTABLE    Result Date: 9/14/2022  Stable chest.     XR CHEST PORTABLE    Result Date: 9/13/2022  Support lines and tubes remain in place. Improved aeration at the lung bases. Clinical Course : gradually improving  Assessment and Plan  :        Sepsis with septic shock secondary to Gram negative and gram-positive bacteremia (Proteus, staph hominis, staph epidermidis -Rocephin with stop date 9/24/2022 -ID following. Continue local wound care. Multiple wounds with maggots -   Morbid obesity BMI 50 -   Bedbound for > 6 months -we will need aggressive rehab at discharge. Hypothyroidism -Synthroid. CHULA resolved -avoid nephrotoxic agents  Metabolic encephalopathy - resolved  Severe malnutrition -  Diarrhea likely antibiotic associated-check stool for C. difficile due to high risk from hospitalization and antibiotic use. Will need SNF at discharge   Stop precedex   Fenatnyl with dressing changes. CDIFF negative   Continue IVF. Imodium as needed . Probiotics  PT OT     Continue to monitor vitals , Intake / output ,  Cell count , HGB , Kidney function, oxygenation  as indicated . Plan and updates discussed with patient ,  answers  explained to satisfaction.    Plan discussed with Staff  RN     (Please note that portions of this note were completed with a voice recognition program. Efforts were made to edit the dictations but occasionally words are mis-transcribed.)      Debbie Isabel MD  9/21/2022

## 2022-09-21 NOTE — PROGRESS NOTES
Physical Therapy  Facility/Department: [facility] CAR 2- STEPDOWN  Daily treatment note    Name: Emili Galindo  : 1980  MRN: [id number]  Date of Service: 2022    Discharge Recommendations:  Patient would benefit from continued therapy after discharge   PT Equipment Recommendations  Equipment Needed: No      Patient Diagnosis(es): There were no encounter diagnoses. Past Medical History:  has no past medical history on file. Past Surgical History:  has no past surgical history on file. Assessment   Body Structures, Functions, Activity Limitations Requiring Skilled Therapeutic Intervention: Decreased functional mobility ; Decreased strength;Decreased endurance;Decreased cognition;Decreased balance; Increased pain  Assessment: Pt is overall Max A x2 for bed mobilitytolerated 6mins at EOB with MA X for sitting balance. Pt demonstrates significantly impaired endurance, BLE strength, trunk control, and safety awareness which places the pt at a high fall risk. Pt requires frequent verbal cues for all aspects of mobility. Pt would be unsafe to return to prior living arrangements upon discharge. Pt would benefit from additional therapy upon discharge to maximize functional independence. Therapy Prognosis: Good  Requires PT Follow-Up: Yes  Activity Tolerance  Activity Tolerance: Patient limited by fatigue;Patient limited by endurance; Patient limited by pain     Plan   Plan  Plan:  (5-6x/wk)  Current Treatment Recommendations: Strengthening, Balance training, Functional mobility training, ROM, Transfer training, Gait training, Endurance training, Therapeutic activities, Equipment evaluation, education, & procurement, Home exercise program, Safety education & training, Patient/Caregiver education & training  Safety Devices  Type of Devices: Left in bed, Call light within reach, Nurse notified, Bed alarm in place, Patient at risk for falls  Restraints  Restraints Initially in Place: No Restrictions  Restrictions/Precautions  Restrictions/Precautions: General Precautions  Required Braces or Orthoses?: No  Position Activity Restriction  Other position/activity restrictions: extubated 9/16     Subjective   General  Response To Previous Treatment: Patient with no complaints from previous session. Family / Caregiver Present: No  Follows Commands: Within Functional Limits  Subjective  Subjective: RN and pt agreeable to PT. Pt supine in bed upon arrival, required max encouragement for particpation with good return. Cognition   Orientation  Overall Orientation Status: Within Functional Limits  Cognition  Overall Cognitive Status: WFL     Objective      Bed mobility  Rolling to Left: Maximum assistance;2 Person assistance  Rolling to Right: Maximum assistance;2 Person assistance  Supine to Sit: 2 Person assistance;Maximum assistance  Sit to Supine: 2 Person assistance;Maximum assistance  Scooting: Maximal assistance  Bed Mobility Comments: HOB elavated , Increase time and effort noted. Rolling x4 for personal care . Significant amount of time and assist required to comeplete care. Pt  Transfers  Sit to Stand: Unable to assess (Limited by patient having a BM and needed to clean.)  Comment: Unsafe to attempt this date secondary to poor sitting balance, increased work of breathing        Balance  Sitting - Static: Poor;+ (Tolerated 6mins at EOB with MAX for sitting balance.)  Sitting - Dynamic: Poor  Comments: sitting balance assessed at the EOB. Pt require MAX -A to maintain sitting Balance. Exercise Treatment:Ankle pumps, heel slide, short arc quads, straight leg raises.  Reps 10  AM-PAC Score  AM-PAC Inpatient Mobility Raw Score : 8 (09/21/22 1237)  AM-PAC Inpatient T-Scale Score : 28.52 (09/21/22 1237)  Mobility Inpatient CMS 0-100% Score: 86.62 (09/21/22 1237)  Mobility Inpatient CMS G-Code Modifier : CM (09/21/22 1237)     Goals  Short Term Goals  Time Frame for Short term goals: 14 visits  Short term goal 1: Pt will perform sit<>stand transfer with min-A. Short term goal 2: Pt will perform bed mobility with min-A. Short term goal 3: Pt will ambulate 30 feet with a RW and CGA. Short term goal 4: Pt will demonstrate fair standing balance to decrease fall risk. Short term goal 5: Pt will tolerate a 35 minute therapy session to promote increased endurance. Additional Goals?: No       Education  Patient Education  Education Given To: Patient  Education Provided: Role of Therapy;Transfer Training;Plan of Care; Fall Prevention Strategies  Education Method: Verbal  Barriers to Learning: None  Education Outcome: Verbalized understanding      Therapy Time   Individual Concurrent Group Co-treatment   Time In 0902         Time Out 0915         [id number]         Timed Code Treatment Minutes: [address], PTA

## 2022-09-21 NOTE — PLAN OF CARE
Problem: Discharge Planning  Goal: Discharge to home or other facility with appropriate resources  Outcome: Progressing     Problem: Respiratory - Adult  Goal: Achieves optimal ventilation and oxygenation  Outcome: Progressing     Problem: Safety - Medical Restraint  Goal: Remains free of injury from restraints (Restraint for Interference with Medical Device)  Description: INTERVENTIONS:  1. Determine that other, less restrictive measures have been tried or would not be effective before applying the restraint  2. Evaluate the patient's condition at the time of restraint application  3. Inform patient/family regarding the reason for restraint  4. Q2H: Monitor safety, psychosocial status, comfort, nutrition and hydration  Outcome: Progressing     Problem: Pain  Goal: Verbalizes/displays adequate comfort level or baseline comfort level  Outcome: Progressing     Problem: Confusion  Goal: Confusion, delirium, dementia, or psychosis is improved or at baseline  Description: INTERVENTIONS:  1. Assess for possible contributors to thought disturbance, including medications, impaired vision or hearing, underlying metabolic abnormalities, dehydration, psychiatric diagnoses, and notify attending LIP  2. [place] high risk fall precautions, as indicated  3. Provide frequent short contacts to provide reality reorientation, refocusing and direction  4. Decrease environmental stimuli, including noise as appropriate  5. Monitor and intervene to maintain adequate nutrition, hydration, elimination, sleep and activity  6. If unable to ensure safety without constant attention obtain sitter and review sitter guidelines with assigned personnel  7. Initiate Psychosocial CNS and Spiritual Care consult, as indicated  Outcome: Progressing     Problem: Nutrition Deficit:  Goal: Optimize nutritional status  Outcome: Progressing     Problem: Skin/Tissue Integrity  Goal: Absence of new skin breakdown  Description: 1.   Monitor for areas of redness and/or skin breakdown  2. Assess vascular access sites hourly  3. Every 4-6 hours minimum:  Change oxygen saturation probe site  4. Every 4-6 hours:  If on nasal continuous positive airway pressure, respiratory therapy assess nares and determine need for appliance change or resting period.   Outcome: Progressing     Problem: Safety - Adult  Goal: Free from fall injury  Outcome: Progressing     Problem: ABCDS Injury Assessment  Goal: Absence of physical injury  Outcome: Progressing

## 2022-09-21 NOTE — CARE COORDINATION
Spoke to Chey at [facility]. They are still reviewing referral but most likely will deny. [facility]    [phone] met with patient to provide update and obtain more SNF choices. Referrals sent to [place] [id number] and [facility] and [facility] per patient's request    [phone] spoke to Reynaldo at [place] [address] and [facility]. They are unable to accept patient d/t not being vaccinated for covid. Spoke to Sunil at [facility]. They have no beds available. Met with patient. Her next SNF choices are [place] and [facility] [place]. Referrals sent    [phone] spoke to patient's , Marilyn Crane. He would like patient at a facility in [facility].  Referral sent to [facility]

## 2022-09-21 NOTE — PROGRESS NOTES
Comprehensive Nutrition Assessment    Type and Reason for Visit:  Reassess    Nutrition Recommendations/Plan:   Continue current diet with Ensure Enlive ONS at all meals. Encourage/monitor PO intakes as tolerated. Will monitor labs, weights, and plan of care. Malnutrition Assessment:  Malnutrition Status: At risk for malnutrition  Context:  Acute Illness       Nutrition Assessment:    Pt continues to have a good appetite and tolerate PO intakes well. For breakfast pt drank 25-50% of an Ensure, 100% milk, 75% strawberry yogurt, 50% of a bagel, and 50% of an omelet. Per chart review, pt has been consuming % of meals. Having loose BM's. Labs reviewed: Mg 1.5 mg/dL, Glucose  mg/dL. Meds reviewed: Probiotics. Nutrition Related Findings:    Labs/Meds reviewed. +3 pitting generalized and extremity edema. Last BM 9/20 (loose). Wound Type: Multiple, Pressure Injury, Stage III (to left/right breast, left proximal arm)       Current Nutrition Intake & Therapies:    Average Meal Intake: 51-75%, %  Average Supplements Intake: 26-50%, 51-75%  ADULT DIET; Regular; 4 carb choices (60 gm/meal)  ADULT ORAL NUTRITION SUPPLEMENT; Breakfast, Dinner, Lunch; Standard High Calorie/High Protein Oral Supplement    Anthropometric Measures:  Height: 5' 7\" (170.2 cm)  Ideal Body Weight (IBW): 135 lbs (61 kg)    Admission Body Weight: 307 lb (139.3 kg)  Current Body Weight: 320 lb (145.2 kg), 237 % IBW.  Weight Source: Bed Scale  Current BMI (kg/m2): 50.1                          BMI Categories: Obese Class 3 (BMI 40.0 or greater)    Estimated Daily Nutrient Needs:  Energy Requirements Based On: Formula  Weight Used for Energy Requirements: Current  Energy (kcal/day): 0858-6284 kcals/day  Weight Used for Protein Requirements: Ideal  Protein (g/day):  g pro/day  Fluid (ml/day): per MD    Nutrition Diagnosis:   Inadequate oral intake related to  (current condition; appetite) as evidenced by intake 51-75% (variable PO intakes; need for ONS)  Increased nutrient needs related to  (healing) as evidenced by wounds (multiple pressure ulcers)    Nutrition Interventions:   Food and/or Nutrient Delivery: Continue Current Diet, Continue Oral Nutrition Supplement  Nutrition Education/Counseling: No recommendation at this time  Coordination of Nutrition Care: Continue to monitor while inpatient  Plan of Care discussed with: Patient    Goals:  Previous Goal Met: Progressing toward Goal(s)  Goals: Meet at least 75% of estimated needs, within 7 days       Nutrition Monitoring and Evaluation:   Behavioral-Environmental Outcomes: None Identified  Food/Nutrient Intake Outcomes: Food and Nutrient Intake, Supplement Intake  Physical Signs/Symptoms Outcomes: Biochemical Data, GI Status, Fluid Status or Edema, Nutrition Focused Physical Findings, Skin, Weight    Discharge Planning:    Continue current diet, Continue Oral Nutrition Supplement     Lana Barrera, [address] [place] [address],   Contact: [phone]

## 2022-09-21 NOTE — PROGRESS NOTES
[facility] -   Infectious diseases evaluation  admission date 9/10/2022     reason for consultation:   Sepsis      Impression :   Current:  Sepsis with septic shock  Altered mentation acute encephalopathy: Resolved  CHULA: Resolved  Bandemia  Lactic acidosis  Elevated procalcitonin   Gram-negative and gram-positive bacteremia-[place] [facility]  Proteus R cipro bactrim - S ceftriaxone ampicillin  Staph hominis R ancef   Staph epi R ancef  U cx BVH 9/9 e coli S cipro  ceftriaxone ampicillin  Bilat breasts infected / necrotic wounds - maggots  Sacral sloughed skin from moisture  Hyperglycemia  Morbd obesity-BMI 50  COPD  CHULA -creatinine: 1.06-0.96     Discussion / summary of stay / plan of care   Sepsis of unclear cause, with septic shock and elevated procalcitonin at 12. Very concerning for gram-negative organisms and hence a UTI is of a concern  CT abdomen pelvis at [facility] is negative for any bowel obstruction although it was a concern in [facility]  The urine analysis at [facility] is normal but I am not clear as of the UA at [facility]  The bacteremia with gram-negative and gram-positive is also of a concern, pending at [facility]   currently working on placement. Recommendations      - Rocephin: 2 gm Iv q 24 Hr. Stop date 9/24   Source of the proteus septicemia is likely the wounds  - Daptomycin: Completed 9/16- ck elevated then back to normal  - Wound care  - diarrhea c diff neg 9/21  - Neg nasal MRSA swab     Infection Control Recommendations   [place] Precautions  Contact Isolation         Antimicrobial Stewardship Recommendations   Simplification of therapy  Targeted therapy        History of Present Illness:   Initial history:  Harsha Tran is a 38y.o.-year-old female found unresponsive at home was taken to [facility], found to have stools on many of her wounds and maggots.   CT of the abdomen showed concern for small bowel obstruction  She had hyponatremia with a sodium 128, leukocytosis elevated procalcitonin and lactic acid  Any blood culture came back positive for gram-negative rods gram-positive cocci clusters  CHULA  Transferred to us with concern of septic shock and sepsis-General surgery on board due to the concern of small bowel obstruction     After transfer to [facility]. Cristi's CT scan of the abdomen pelvis showed no pneumonia on the lower lobes and no acute abdomen. Patient is on antibiotics, infectious consulted for opinion. The urine analysis on 9/10 at [facility] is not suggestive of infection-but I do not have the UA or the urine culture off from [address] [place] [address] urinalysis seems to be very contaminated with epithelial cells                          Interval changes  9/19/2022   Patient Vitals for the past 8 hrs:    Height   09/19/22 1235 5' 7\" (1.702 m)      Blood cultures from [facility] are back with Proteus and 2 strains of staph coagulase-negative  U cx over there showed E coli multiS  AB switched accordingly     All the wounds are evaluated with wound care on the bedside, the back has multiple open ulcers but no clear cellulitis at this point     [place] [address] OFF  The breasts had macerated wounds and the necrotic superficial skin is sloughing, no deep induration in both breasts, both being pressure wounds     9/16 extubated and appropriate -lungs clear -  Abd soft non tender  No rash    9/19 patient is currently resting in bed post wound care. After talking with wound care they said that the wounds are improving. Abdomen was soft nontender. Patient did complain of diarrhea today. WBC: 6.5, overall trend is down. Total CK: 314, on 9/14/2022  Procalcitonin 1.88   improved    9/20 patient is currently resting in bed. Patient states that she has had intermittent diarrhea for the last couple of days.   Does say that her pain is getting better with her wounds and that is also concurrent with wound care. Patient had no acute concerns at this time.  is currently working on placement. 9/21: Patient is currently in bed, vitals are stable patient is a little tachycardic today in the low 100s. However patient was just finishing up with physical therapy. Patient is no longer complaining of diarrhea episodes. Patient states that pain is decreasing and that wounds seem to be healing. Wound care is on.  is still working on referrals to skilled nursing rehab facilities. Summary of relevant labs:    Labs:  sodium 134  leukocytosis 6.5  elevated procalcitonin   Elevated lactic acid 2.5, 9/11/2022  Creat: 0.96  -136 improved  Magnesium: 1.5    Micro:  BC GNR and CPC clusters at the [facility]  UA neg 9/10   MRSA nasal swab negative  blood culture 9/10      Imaging:  BREASTS US neg for abscess  CT scan of the abdomen pelvis showed no pneumonia on the lower lobes and no acute abdomen. I have personally reviewed the past medical history, past surgical history, medications, social history, and family history, and I haveupdated the database accordingly. Allergies:   Amoxicillin and Robitussin day-night value lawrence [[id number]]      Review of Systems:   Review of Systems   Constitutional:  Negative for appetite change, chills and fever. HENT:  Negative for congestion and rhinorrhea. Eyes:  Negative for photophobia, pain, discharge and visual disturbance. Respiratory:  Negative for cough, chest tightness and shortness of breath. Cardiovascular:  Negative for chest pain and leg swelling. Gastrointestinal:  Negative for abdominal distention, abdominal pain, diarrhea and nausea. Patient is no longer having diarrhea episodes. Endocrine: Negative for polyuria. Genitourinary:  Negative for difficulty urinating. Musculoskeletal:  Negative for arthralgias.    Allergic/Immunologic: Negative for immunocompromised state. Neurological:  Negative for dizziness and light-headedness. Hematological:  Negative for adenopathy. Psychiatric/Behavioral:  Negative for agitation. Physical Examination :   Physical Exam  Constitutional:       General: She is not in acute distress. Appearance: She is obese. She is not toxic-appearing. HENT:      Head: Normocephalic and atraumatic. Right Ear: External ear normal.      Left Ear: External ear normal.      Nose: Nose normal. No congestion or rhinorrhea. Mouth/Throat:      Mouth: Mucous membranes are moist.      Pharynx: Oropharynx is clear. Eyes:      Extraocular Movements: Extraocular movements intact. Conjunctiva/sclera: Conjunctivae normal.      Pupils: Pupils are equal, round, and reactive to light. Cardiovascular:      Rate and Rhythm: Regular rhythm. Tachycardia present. Pulses: Normal pulses. Heart sounds: Normal heart sounds. Pulmonary:      Effort: Pulmonary effort is normal. No respiratory distress. Breath sounds: Normal breath sounds. No wheezing. Abdominal:      General: Bowel sounds are normal. There is no distension. Palpations: Abdomen is soft. Tenderness: There is no abdominal tenderness. Musculoskeletal:         General: No swelling or tenderness. Cervical back: Normal range of motion and neck supple. No rigidity. Skin:     General: Skin is warm and dry. Findings: Lesion present. Comments: Patient has bilateral breast wounds and buttocks wound. Wounds are being managed by wound care. Neurological:      General: No focal deficit present. Mental Status: She is alert and oriented to person, place, and time. Cranial Nerves: No cranial nerve deficit. Psychiatric:         Mood and Affect: Mood normal.         Behavior: Behavior normal.          Past Medical History:   Past Medical History   No past medical history on file.         Past Surgical  History:   Past Surgical History   No past surgical history on file. Medications:      Scheduled Medications    budesonide-formoterol  1 puff Inhalation BID    lidocaine 1 % injection  5 mL IntraDERmal Once    sodium chloride flush  5-40 mL IntraVENous 2 times per day    midodrine  10 mg Oral q8h    enoxaparin  30 mg SubCUTAneous BID    cefTRIAXone (ROCEPHIN) IV  2,000 mg IntraVENous Q24H    sodium chloride flush  5-40 mL IntraVENous 2 times per day    pantoprazole (PROTONIX) 40 mg injection  40 mg IntraVENous Daily    miconazole   Topical BID    levothyroxine  75 mcg Oral Daily            Social History:      Social History               Socioeconomic History    Marital status:        Spouse name: Not on file    Number of children: Not on file    Years of education: Not on file    Highest education level: Not on file   Occupational History    Not on file   Tobacco Use    Smoking status: Not on file    Smokeless tobacco: Not on file   Substance and Sexual Activity    Alcohol use: Not on file    Drug use: Not on file    Sexual activity: Not on file   Other Topics Concern    Not on file   Social History Narrative    Not on file      Social Determinants of Health      Financial Resource Strain: Not on file   Food Insecurity: Not on file   Transportation Needs: Not on file   Physical Activity: Not on file   Stress: Not on file   Social Connections: Not on file   Intimate Partner Violence: Not on file   Housing Stability: Not on file            Family History:   Family History   No family history on file.       Medical Decision Making:   I have independently reviewed/ordered the following labs:     CBC with Differential:        Recent Labs     09/18/22  0552 09/19/22  0644   WBC 9.2 9.2   HGB 7.4* 7.9*   HCT 22.8* 25.4*    216   LYMPHOPCT 27 24   MONOPCT 9 7         BMP:       Recent Labs     09/18/22  0552 09/19/22  0644    134*   K 4.1 4.0    101   CO2 24 24   BUN 24* 22*   CREATININE 0.96* 1.06*   MG 1.6 1.5*         Hepatic Function Panel:        Recent Labs     09/18/22  0552 09/19/22  0644   PROT 5.0* 5.1*   LABALBU 1.5* 1.3*   BILIDIR 0.1 0.1   IBILI 0.2 0.2   BILITOT 0.3 0.3   ALKPHOS 123* 116*   ALT 19 18   AST 27 23         No results for input(s): RPR in the last 72 hours. No results for input(s): HIV in the last 72 hours. No results for input(s): BC in the last 72 hours. Lab Results   Component Value Date/Time     CREATININE 1.06 09/19/2022 06:44 AM     GLUCOSE 68 09/19/2022 06:44 AM         Detailed results: Thank you for allowing us to participate in the care of this patient. Please call with questions. This note is created with the assistance of a speech recognition program.  While intending to generate adocument that actually reflects the content of the visit, the document can still have some errors including those of syntax and sound a like substitutions which may escape proof reading. It such instances, actual meaningcan be extrapolated by contextual diversion. Jones Duane, MD  Office: [phone]  Perfect serve / office [phone]       I have discussed the care of the patient, including pertinent history and exam findings,  with the resident. I have seen and examined the patient and the key elements of all parts of the encounter have been performed by me. I agree with the assessment, plan and orders as documented by the resident.     Angélica García, Infectious Diseases

## 2022-09-22 LAB
GLUCOSE BLD-MCNC: 68 MG/DL (ref 65–105)
GLUCOSE BLD-MCNC: 74 MG/DL (ref 65–105)
GLUCOSE BLD-MCNC: 79 MG/DL (ref 65–105)
GLUCOSE BLD-MCNC: 90 MG/DL (ref 65–105)
LV EF: 70 %
LVEF MODALITY: NORMAL

## 2022-09-22 PROCEDURE — 99232 SBSQ HOSP IP/OBS MODERATE 35: CPT | Performed by: INTERNAL MEDICINE

## 2022-09-22 PROCEDURE — 6370000000 HC RX 637 (ALT 250 FOR IP)

## 2022-09-22 PROCEDURE — 97535 SELF CARE MNGMENT TRAINING: CPT

## 2022-09-22 PROCEDURE — 1200000000 HC SEMI PRIVATE

## 2022-09-22 PROCEDURE — 94640 AIRWAY INHALATION TREATMENT: CPT

## 2022-09-22 PROCEDURE — 6370000000 HC RX 637 (ALT 250 FOR IP): Performed by: FAMILY MEDICINE

## 2022-09-22 PROCEDURE — 2580000003 HC RX 258: Performed by: STUDENT IN AN ORGANIZED HEALTH CARE EDUCATION/TRAINING PROGRAM

## 2022-09-22 PROCEDURE — 93306 TTE W/DOPPLER COMPLETE: CPT

## 2022-09-22 PROCEDURE — 82947 ASSAY GLUCOSE BLOOD QUANT: CPT

## 2022-09-22 PROCEDURE — 94761 N-INVAS EAR/PLS OXIMETRY MLT: CPT

## 2022-09-22 PROCEDURE — 97530 THERAPEUTIC ACTIVITIES: CPT

## 2022-09-22 PROCEDURE — 6360000002 HC RX W HCPCS: Performed by: STUDENT IN AN ORGANIZED HEALTH CARE EDUCATION/TRAINING PROGRAM

## 2022-09-22 PROCEDURE — 93005 ELECTROCARDIOGRAM TRACING: CPT

## 2022-09-22 PROCEDURE — 2500000003 HC RX 250 WO HCPCS: Performed by: INTERNAL MEDICINE

## 2022-09-22 PROCEDURE — 6360000002 HC RX W HCPCS: Performed by: EMERGENCY MEDICINE

## 2022-09-22 PROCEDURE — 6360000002 HC RX W HCPCS: Performed by: INTERNAL MEDICINE

## 2022-09-22 PROCEDURE — 6370000000 HC RX 637 (ALT 250 FOR IP): Performed by: STUDENT IN AN ORGANIZED HEALTH CARE EDUCATION/TRAINING PROGRAM

## 2022-09-22 PROCEDURE — 6370000000 HC RX 637 (ALT 250 FOR IP): Performed by: NURSE PRACTITIONER

## 2022-09-22 PROCEDURE — 99232 SBSQ HOSP IP/OBS MODERATE 35: CPT | Performed by: FAMILY MEDICINE

## 2022-09-22 PROCEDURE — 6370000000 HC RX 637 (ALT 250 FOR IP): Performed by: EMERGENCY MEDICINE

## 2022-09-22 RX ORDER — LACTOBACILLUS RHAMNOSUS GG 10B CELL
1 CAPSULE ORAL
Status: DISCONTINUED | OUTPATIENT
Start: 2022-09-22 | End: 2022-10-05 | Stop reason: HOSPADM

## 2022-09-22 RX ORDER — MIDODRINE HYDROCHLORIDE 5 MG/1
TABLET ORAL
Status: COMPLETED
Start: 2022-09-22 | End: 2022-09-22

## 2022-09-22 RX ORDER — MIDODRINE HYDROCHLORIDE 5 MG/1
TABLET ORAL
Status: DISPENSED
Start: 2022-09-22 | End: 2022-09-22

## 2022-09-22 RX ORDER — CALCIUM CARBONATE 200(500)MG
500 TABLET,CHEWABLE ORAL 3 TIMES DAILY PRN
Status: DISCONTINUED | OUTPATIENT
Start: 2022-09-22 | End: 2022-10-05 | Stop reason: HOSPADM

## 2022-09-22 RX ADMIN — MIDODRINE HYDROCHLORIDE 10 MG: 5 TABLET ORAL at 16:15

## 2022-09-22 RX ADMIN — BUDESONIDE AND FORMOTEROL FUMARATE DIHYDRATE 1 PUFF: 80; 4.5 AEROSOL RESPIRATORY (INHALATION) at 20:02

## 2022-09-22 RX ADMIN — ENOXAPARIN SODIUM 30 MG: 100 INJECTION SUBCUTANEOUS at 08:28

## 2022-09-22 RX ADMIN — ONDANSETRON 4 MG: 2 INJECTION INTRAMUSCULAR; INTRAVENOUS at 15:54

## 2022-09-22 RX ADMIN — SODIUM CHLORIDE, PRESERVATIVE FREE 10 ML: 5 INJECTION INTRAVENOUS at 20:53

## 2022-09-22 RX ADMIN — Medication 1 CAPSULE: at 08:28

## 2022-09-22 RX ADMIN — FENTANYL CITRATE 50 MCG: 50 INJECTION, SOLUTION INTRAMUSCULAR; INTRAVENOUS at 21:06

## 2022-09-22 RX ADMIN — LOPERAMIDE HYDROCHLORIDE 2 MG: 2 CAPSULE ORAL at 10:22

## 2022-09-22 RX ADMIN — PANTOPRAZOLE SODIUM 40 MG: 40 TABLET, DELAYED RELEASE ORAL at 08:28

## 2022-09-22 RX ADMIN — CEFTRIAXONE SODIUM 2000 MG: 10 INJECTION, POWDER, FOR SOLUTION INTRAVENOUS at 10:14

## 2022-09-22 RX ADMIN — ANTI-FUNGAL POWDER MICONAZOLE NITRATE TALC FREE: 1.42 POWDER TOPICAL at 20:53

## 2022-09-22 RX ADMIN — BUDESONIDE AND FORMOTEROL FUMARATE DIHYDRATE 1 PUFF: 80; 4.5 AEROSOL RESPIRATORY (INHALATION) at 10:53

## 2022-09-22 RX ADMIN — MIDODRINE HYDROCHLORIDE 10 MG: 5 TABLET ORAL at 08:27

## 2022-09-22 RX ADMIN — Medication 1 CAPSULE: at 16:15

## 2022-09-22 RX ADMIN — LEVOTHYROXINE SODIUM 75 MCG: 75 TABLET ORAL at 08:28

## 2022-09-22 RX ADMIN — ANTI-FUNGAL POWDER MICONAZOLE NITRATE TALC FREE: 1.42 POWDER TOPICAL at 08:29

## 2022-09-22 RX ADMIN — ENOXAPARIN SODIUM 30 MG: 100 INJECTION SUBCUTANEOUS at 23:34

## 2022-09-22 RX ADMIN — MIDODRINE HYDROCHLORIDE 10 MG: 5 TABLET ORAL at 03:30

## 2022-09-22 RX ADMIN — ANTACID TABLETS 500 MG: 500 TABLET, CHEWABLE ORAL at 06:40

## 2022-09-22 ASSESSMENT — ENCOUNTER SYMPTOMS
DIARRHEA: 0
VOMITING: 0
WHEEZING: 0
SORE THROAT: 0
APNEA: 0
FACIAL SWELLING: 0
BACK PAIN: 0
BLOOD IN STOOL: 0
EYE ITCHING: 0
COUGH: 0
ABDOMINAL DISTENTION: 0
SINUS PRESSURE: 0
SHORTNESS OF BREATH: 0
VOICE CHANGE: 0
ABDOMINAL PAIN: 0
EYE DISCHARGE: 0
CONSTIPATION: 0
NAUSEA: 0
DIARRHEA: 1

## 2022-09-22 ASSESSMENT — PAIN DESCRIPTION - ORIENTATION: ORIENTATION: LEFT;RIGHT

## 2022-09-22 ASSESSMENT — PAIN SCALES - GENERAL
PAINLEVEL_OUTOF10: 2
PAINLEVEL_OUTOF10: 8

## 2022-09-22 ASSESSMENT — PAIN DESCRIPTION - DESCRIPTORS: DESCRIPTORS: SHARP;POUNDING

## 2022-09-22 ASSESSMENT — PAIN SCALES - WONG BAKER: WONGBAKER_NUMERICALRESPONSE: 0

## 2022-09-22 ASSESSMENT — PAIN DESCRIPTION - LOCATION: LOCATION: BREAST

## 2022-09-22 NOTE — ADT AUTH CERT
Utilization Reviews       RECONSIDERATION CLINICAL 9/21/22 by Mariel Antoine RN       Review Status Review Entered   In Primary 9/22/2022 12:42      Criteria Review   DATE: 9/21/22        PERTINENT UPDATES:     Patient continues to have generalized weakness. She is getting wound care. She is getting wound Care. Patient has not been able to sit up in bed . Vitals - Stable afebrile tachycardia,  Labs -low Magnesium , low albumin. Low HGB         VITALS:     09/21/22 0740 (!) 95/39 98.6 °F (37 °C) Axillary (!) 101 11 99 % --   09/21/22 0738 -- -- -- -- -- 99 % --   09/21/22 0415 (!) 96/51 99 °F (37.2 °C) Oral 99 18 99 % --            ABNL/PERTINENT LABS/RADIOLOGY/DIAGNOSTIC STUDIES:          Recent Labs     09/19/22  0644 09/20/22  0542   WBC 9.2 6.5   HGB 7.9* 7.1*   HCT 25.4* 22.3*    264                   Recent Labs     09/19/22  0644 09/20/22  0542 09/21/22  0358   * 134*  --    K 4.0 4.1  --     103  --    CO2 24 23  --    GLUCOSE 68* 69*  --    BUN 22* 19  --    CREATININE 1.06* 0.96*  --    MG 1.5* 1.4* 1.5*   CALCIUM 7.2* 7.2*  --                  Recent Labs     09/19/22  0644 09/20/22  0542   PROT 5.1* 4.7*   LABALBU 1.3* 1.3*   LABA1C 5.8  --    AST 23 21   ALT 18 16   ALKPHOS 116* 113*   BILITOT 0.3 0.3   BILIDIR 0.1 0.1   CKTOTAL 98  --                      PHYSICAL EXAM:     Cardiovascular:      Rate and Rhythm: Normal rate and regular rhythm. Pulses:           Dorsalis pedis pulses are 2+ on the right side and 2+ on the left side. Heart sounds: Normal heart sounds. No murmur heard. Pulmonary:      Effort: Pulmonary effort is normal.      Breath sounds: Normal breath sounds. No wheezing or rales. Abdominal:      Palpations: Abdomen is soft. There is no mass. Tenderness: There is no abdominal tenderness. Musculoskeletal:      Cervical back: Full passive range of motion without pain and neck supple.       Comments: Notable wounds in bilateral buttocks, left breast, right breast   Lymphadenopathy:      Head:      Right side of head: No submandibular adenopathy. Left side of head: No submandibular adenopathy. Cervical: No cervical adenopathy. Skin:     General: Skin is warm. Neurological:      Mental Status: She is alert and oriented to person, place, and time. MD CONSULTS/ASSESSMENT AND PLAN:     ===INTERNAL MED     Sepsis with septic shock secondary to Gram negative and gram-positive bacteremia (Proteus, staph hominis, staph epidermidis -Rocephin with stop date 9/24/2022 -ID following. Continue local wound care. Multiple wounds with maggots -   Morbid obesity BMI 50 -   Bedbound for > 6 months -we will need aggressive rehab at discharge. Hypothyroidism -Synthroid. CHULA resolved -avoid nephrotoxic agents  Metabolic encephalopathy - resolved  Severe malnutrition -  Diarrhea likely antibiotic associated-check stool for C. difficile due to high risk from hospitalization and antibiotic use. Will need SNF at discharge   [facility] with dressing changes. CDIFF negative   Continue IVF. Imodium as needed . Probiotics  PT OT      Continue to monitor vitals , Intake / output ,  Cell count , HGB , Kidney function, oxygenation  as indicated .            ===ID     Sepsis with septic shock  Altered mentation acute encephalopathy: Resolved  CHULA: Resolved  Bandemia  Lactic acidosis  Elevated procalcitonin   Gram-negative and gram-positive bacteremia-[place] [facility]  Proteus R cipro bactrim - S ceftriaxone ampicillin  Staph hominis R ancef   Staph epi R ancef  U cx BVH 9/9 e coli S cipro  ceftriaxone ampicillin  Bilat breasts infected / necrotic wounds - maggots  Sacral sloughed skin from moisture  Hyperglycemia  Morbd obesity-BMI 50  COPD  CHULA -creatinine: 1.06-0.96     Discussion / summary of stay / plan of care   Sepsis of unclear cause, with septic shock and elevated procalcitonin at 12.   Very concerning for gram-negative organisms and hence a UTI is of a concern  CT abdomen pelvis at [address] [facility] is negative for any bowel obstruction although it was a concern in [facility]  The urine analysis at [facility] is normal but I am not clear as of the UA at [facility]  The bacteremia with gram-negative and gram-positive is also of a concern, pending at [facility]   currently working on placement. Recommendations      - Rocephin: 2 gm Iv q 24 Hr. Stop date 9/24   Source of the proteus septicemia is likely the wounds  - Daptomycin: Completed 9/16- ck elevated then back to normal  - Wound care  - diarrhea c diff neg 9/21  - Neg nasal MRSA swab           MEDICATIONS:     pantoprazole, 40 mg, Oral, QAM AC  lactobacillus, 1 capsule, Oral, Daily with breakfast  budesonide-formoterol, 1 puff, Inhalation, BID  midodrine, 10 mg, Oral, q8h  enoxaparin, 30 mg, SubCUTAneous, BID  cefTRIAXone (ROCEPHIN) IV, 2,000 mg, IntraVENous, Q24H  miconazole, , Topical, BID  levothyroxine, 75 mcg, Oral, Daily     NS 50 ML HR     PRN:  FENTANYL 50 MCGS IV X 1  MAG 1 G IV X 1  IMODIUM 2 MG  PO X 1  ZOFRAN 4 MG IV X 2        ORDERS:     TELE  UNIT VITALS  CONT PULSE OX   I/O  PT OT  DAILY LABS  DAILY WT        PT/OT/SLP/CM ASSESSMENT OR NOTES:   SNF        RECONSIDERATION CLINICAL 9/18/22 by Luke Bob RN       Review Status Review Entered   In Primary 9/22/2022 12:29      Criteria Review   DATE: 9/18/22     PERTINENT UPDATES:     Patient was intubated due to metabolic encephalopathy. Patient remained intubated from 9/17. Patient was also on pressor support with Levophed. Patient had sepsis with Proteus, staph hominis, staph epidermidis. Urine cultures showed E. coli. Patient on ceftriaxone for 2 weeks till 9/24 per infectious disease, also do short course of daptomycin, already completed till 9/17.            VITALS:       BP Temp Temp src Pulse Resp SpO2   09/18/22 1611 (!) 109/57 98.7 °F (37.1 °C) Oral (!) 107 29 99 %   09/18/22 1138 (!) 99/56 97.9 °F (36.6 °C) Oral (!) 115 22 100 %            ABNL/PERTINENT LABS/RADIOLOGY/DIAGNOSTIC STUDIES:     CBC with Differential:           Recent Labs     09/17/22  0625 09/18/22  0552   WBC 7.1 9.2   HGB 7.4* 7.4*   HCT 23.5* 22.8*    179   LYMPHOPCT 28 27   MONOPCT 11 9         BMP:          Recent Labs     09/17/22  0625 09/18/22  0552    135   K 4.1 4.1    102   CO2 26 24   BUN 28* 24*   CREATININE 0.81 0.96*   MG 1.9 1.6         Hepatic Function Panel:           Recent Labs     09/17/22  0625 09/18/22  0552   PROT 4.7* 5.0*   LABALBU 1.3* 1.5*   BILIDIR 0.1 0.1   IBILI 0.1 0.2   BILITOT 0.2* 0.3   ALKPHOS 118* 123*   ALT 19 19   AST 29 27            PHYSICAL EXAM:     Cardiovascular:      Rate and Rhythm: Regular rhythm. Tachycardia present. Pulmonary:      Breath sounds: Rales present. No wheezing. Comments: Decreased breath sounds bilaterally  Abdominal:      General: Bowel sounds are normal. There is distension. Comments: Distended due to obesity   Musculoskeletal:      Right lower leg: Edema present. Left lower leg: Edema present. Comments: 4+ edema lower extremities   Skin:     General: Skin is warm. Neurological:      Mental Status: She is alert and oriented to person, place, and time. Comments: Bed bound for 8 yrs        Wound present under the breasts  Wounds on sacral area        MD CONSULTS/ASSESSMENT AND PLAN:     ===INTERNAL MED     Sepsis with septic shock-resolving, had gram-negative and gram-positive bacteremia with Proteus, staph hominis, staph epidermidis, completed daptomycin till 9/17, on ceftriaxone till 9/24. Likely skin is the source of sepsis.   Patient on midodrine for blood pressure support     Tachycardia-obtain EKG, stop IV fluids, obtain echocardiogram, elevated, proBNP, check chest x-ray to rule out congestion, will give [id number] iv once, watch bp, patient is 11lt+, watch for response     Multiple wounds on breast, buttocks-had maggots, continue wound care, continues on Rocephin     Anemia- hb dropped from 9 on admission to 7.4, slow downtrend but patient is 11lt+, continue to monitor     Hypothyroidism on Synthyroid     COPD-resume home Symbicort     CHULA-resolved     Hypoalbuminemia with severe malnutrition-consult dietitian for supplement recommendations        ===ID     Current:  Sepsis with septic shock  Altered mentation acute encephalopathy  CHULA  Bandemia  Lactic acidosis  Elevated procalcitonin 12  Gram-negative and gram-positive bacteremia-[place] [facility]  Proteus R cipro bactrim - S ceftriaxone ampicillin  Staph hominis R ancef   Staph epi R ancef  U cx BVH 9/9 e coli S cipro  ceftriaxone ampicillin  Bilat breasts infected / necrotic wounds - maggots  Sacral sloughed skin from moisture  Hyperglycemia  Morbd obesity-BMI 52  COPD  CHULA - cr 2.22     Other:     Discussion / summary of stay / plan of care   Sepsis of unclear cause, with septic shock and elevated procalcitonin at 12. Very concerning for gram-negative organisms and hence a UTI is of a concern  CT abdomen pelvis at [facility]. [facility] is negative for any bowel obstruction although it was a concern in [facility]  The urine analysis at [facility] is normal but I am not clear as of the UA at [facility]  The bacteremia with gram-negative and gram-positive is also of a concern, pending at [facility]  Recommendations      Rocephin 2 gm Iv q 24 Hr.  Stop date 9/24   Source of the proteus septicemia is likely the wounds  Daptomycin completed 9/16  Repeat CK 9/17: pending  Wound care     Neg nasal MRSA swab           MEDICATIONS:  Scheduled Meds:    pantoprazole  40 mg Oral QAM AC     budesonide-formoterol  1 puff Inhalation BID     midodrine  10 mg Oral q8h     enoxaparin  30 mg SubCUTAneous BID     cefTRIAXone (ROCEPHIN) IV  2,000 mg IntraVENous Q24H     miconazole   Topical BID     levothyroxine  75 mcg Oral Daily      Continuous Infusions:   sodium chloride 50 mL/hr at 09/21/22 2238   LASIX 20 MG IV X 1  FENTANYL 50 MCGS IV X 1  ZOFRAN 4 MG IV X 1              ORDERS:     TELE  UNIT VITALS  CONT PULSE OX   I/O  PT OT  DAILY LABS  DAILY WT           PT/OT/SLP/CM ASSESSMENT OR NOTES:   TBS        RECONSIDERATION CLINICAL 9/15/22 by Manad Rousseau RN       Review Status Review Entered   In Primary 9/22/2022 12:15      Criteria Review   DATE: 9/15/22     PERTINENT UPDATES:  Continues to be intubated  Low vent settings  No vasopressor requirement at this point  Not following commands. No sedation at this point           VITALS:     src Pulse Resp SpO2         09/15/22 0700 91/72 -- -- (!) 111 22 --   09/15/22 0600 (!) 110/56 99.5 °F (37.5 °C) Esophageal (!) 122 19 100 %   09/15/22 0532 97/60 -- -- -- -- --   09/15/22 0510 -- -- -- (!) 125 18 100 %   09/15/22 0500 -- -- -- (!) 121 19 100 %   09/15/22 0400 (!) 83/48 99.7 °F (37.6 °C) Esophageal (!) 119 15 100 %            ABNL/PERTINENT LABS/RADIOLOGY/DIAGNOSTIC STUDIES:     BMP:          Recent Labs     09/14/22  0515 09/15/22  0735    137   K 3.8 4.1    102   CO2 25 26   BUN 36* 32*   CREATININE 1.10* 1.06*   MG 2.0 1.9         Hepatic Function Panel:           Recent Labs     09/14/22  0515 09/15/22  0735   PROT 4.8* 4.3*   LABALBU 1.5* 1.3*   BILIDIR 0.2 <0.1   IBILI 0.2 Can not be calculated   BILITOT 0.4 0.3   ALKPHOS 103 115*   ALT 16 19   AST 38* 44*      CT HEAD  Impression No acute intracranial abnormality. CXR  No significant interval change.   Support lines and tubes in place with no acute findings in the chest.            ===CRITICAL CARE     Neurologic:   Neuro -intubated  Neuro checks per protocol  Persistent encephalopathy  Would consider [facility] imaging today  Sedation: off sedation  Pain management: Fentanyl      Cardiovascular:  Septic shock  Target MAP greater than 65  Monitor heart rate, sinus tachycardia     Pulmonary:  Intubated and on mechanical ventilation  Chest x-ray without any consolidation/congestion  Weaned well yesterday  SBT twice daily  Extubation once able to follow commands     GI/Nutrition  Initial concern of gut malrotation ruled out on CT abdomen and pelvis 9/10  GlycoLax as needed  Ulcer Prophylaxis: Protonix  Diet:Diet NPO  ADULT TUBE FEEDING; Orogastric; Peptide Based; Continuous; 10; Yes; 10; Q 4 hours; 50; 30; Q 4 hours  Protein calorie malnutrition     Renal/Fluid/Electrolyte  Acute kidney injury with metabolic acidosis  Creatinine was coming down till yesterday  Labs pending this morning  IV fluids at 75 cc/h  Consider fluid bolus today as well  I/O: In: 3234.5 [I.V.:2107.5; NG/GT:1077]  Out: 140 [Urine:140]     ID  Initial presentation with septic shock  Source likely urine/skin  Gram-negative and gram-positive bacteremia at [facility]  Urine and blood cultures has been negative here at [facility]  Leukocytosis improving  Tmax: Temp (24hrs), Av.4 °F (37.4 °C), Min:99 °F (37.2 °C), Max:99.9 °F (37.7 °C)     Antimicrobials: Continued daptomycin and cefepime per infectious diseases     Hematology:          Recent Labs     22  0113 22  0515   HGB 9.1* 8.2*       Plts 130 (249) - continue to monitor at this point. Low suspicion of HIT at this point  Daily CBC     Endocrine:   glucose controlled  Synthroid 75mcg daily           Recent Labs     22  0113 22  0515   GLUCOSE 110* 112*         DVT Prophylaxis              - Heparin SQ  Skin  Multiple skin wounds involving breast, back and thighs  General surgery evaluated and not recommending any surgical intervention  Continue with wound care eval and treat     Dispo:  To remain in ICU        ===ID     Impression :   Current:  Sepsis with septic shock  Altered mentation acute encephalopathy  CHULA  Bandemia  Lactic acidosis  Elevated procalcitonin 12  Gram-negative and gram-positive bacteremia-[place] [facility]  Proteus R cipro bactrim - S ceftriaxone ampicillin  Staph hominis R ancef Staph epi R ancef  U cx BVH 9/9 e coli S cipro  ceftriaxone ampicillin  Bilat breasts infected / necrotic wounds - maggots  Sacral sloughed skin from moisture  Hyperglycemia  Morbd obesity-BMI 47  COPD  CHULA - cr 2.22     Other:     Discussion / summary of stay / plan of care   Sepsis of unclear cause, with septic shock and elevated procalcitonin at 12. Very concerning for gram-negative organisms and hence a UTI is of a concern  CT abdomen pelvis at [facility] is negative for any bowel obstruction although it was a concern in [facility]  The urine analysis at [facility] is normal but I am not clear as of the UA at [facility]  The bacteremia with gram-negative and gram-positive is also of a concern, pending at [facility]  Recommendations   According to sensi of the org in [facility], ceftriaxone 2 weeks till 9/24 and daptomycin short course till 9/17  Source of the proteus septicemia is likely the skin -   Also treating UTI  Staph bacteremia could be from the skin source of just a contamination  Repeat CK CRP procal     Neg nasal MRSA swab        PHYSICAL EXAM:     Constitutional: Intubated  EENT: PERRLA, sclera clear, anicteric, moist mucous membranes  Neck: Supple, symmetrical, trachea midline  Respiratory: Diffuse rhonchi on ascultation  Cardiovascular: tachycardia with regular rhythm, normal S1, S2, no murmur noted, and 2+ distal pulses in all 4 extremities   Abdomen: soft, distended,  no masses or organomegaly  Neurological: Intubated.   Not following commands at this point  Skin: Extensive wounds on the back, buttocks, bilateral breast  Extremities:  peripheral pulses normal, no pedal edema, no clubbing or cyano        MD CONSULTS/ASSESSMENT AND PLAN:        MEDICATIONS:     Scheduled Meds:  Scheduled Medications    midodrine  10 mg Oral q8h    enoxaparin  30 mg SubCUTAneous BID    cefTRIAXone (ROCEPHIN) IV  2,000 mg IntraVENous Q24H    daptomycin (CUBICIN) IVPB  6 mg/kg (Adjusted) IntraVENous Q24H    sodium chloride flush  5-40 mL IntraVENous 2 times per day    pantoprazole (PROTONIX) 40 mg injection  40 mg IntraVENous Daily    miconazole   Topical BID    levothyroxine  75 mcg Oral Daily         Continuous Infusions:  Infusions Meds    lactated ringers 75 mL/hr at 09/15/22 0645    norepinephrine Stopped (09/14/22 2216)    sodium chloride 10 mL/hr at 09/15/22 0645         PRN Meds:   FENTANYL 50 MCGS IV X 4 DOSES           ORDERS:     TELE  ICU VITAL SIGNS  CONT PULSE OX  NPO  I/O  DAILY LABS  GLUCOSE MONITORING  DAILY WT  SOFT RESTRAINTS  TURN Q 2 HR  HOB ELEVATED  VENT PROTOCOL  WOUND CARE        PT/OT/SLP/CM ASSESSMENT OR NOTES:   TBD

## 2022-09-22 NOTE — PLAN OF CARE
Problem: Respiratory - Adult  Goal: Achieves optimal ventilation and oxygenation  9/22/2022 1636 by Corby Carrion RCP  Outcome: Progressing

## 2022-09-22 NOTE — PLAN OF CARE
Problem: Discharge Planning  Goal: Discharge to home or other facility with appropriate resources  9/22/2022 0928 by Esteban Pizarro RN  Outcome: Progressing  9/21/2022 2250 by Adenike Jordan RN  Outcome: Progressing  Flowsheets (Taken 9/21/2022 1941)  Discharge to home or other facility with appropriate resources:   Identify barriers to discharge with patient and caregiver   Arrange for needed discharge resources and transportation as appropriate   Identify discharge learning needs (meds, wound care, etc)     Problem: Respiratory - Adult  Goal: Achieves optimal ventilation and oxygenation  9/22/2022 0928 by Esteban Pizarro RN  Outcome: Progressing  9/21/2022 2250 by Adenike Jordan RN  Outcome: Progressing     Problem: Safety - Medical Restraint  Goal: Remains free of injury from restraints (Restraint for Interference with Medical Device)  Description: INTERVENTIONS:  1. Determine that other, less restrictive measures have been tried or would not be effective before applying the restraint  2. Evaluate the patient's condition at the time of restraint application  3. Inform patient/family regarding the reason for restraint  4. Q2H: Monitor safety, psychosocial status, comfort, nutrition and hydration  9/22/2022 0928 by Esteban Pizarro RN  Outcome: Progressing  9/21/2022 2250 by Adenike Jordan RN  Outcome: Progressing     Problem: Pain  Goal: Verbalizes/displays adequate comfort level or baseline comfort level  9/22/2022 0928 by Esteban Pizarro RN  Outcome: Progressing  9/21/2022 2250 by Adenike Jordan RN  Outcome: Progressing     Problem: Confusion  Goal: Confusion, delirium, dementia, or psychosis is improved or at baseline  Description: INTERVENTIONS:  1. Assess for possible contributors to thought disturbance, including medications, impaired vision or hearing, underlying metabolic abnormalities, dehydration, psychiatric diagnoses, and notify attending LIP  2.  [place] high risk fall precautions, as indicated  3. Provide frequent short contacts to provide reality reorientation, refocusing and direction  4. Decrease environmental stimuli, including noise as appropriate  5. Monitor and intervene to maintain adequate nutrition, hydration, elimination, sleep and activity  6. If unable to ensure safety without constant attention obtain sitter and review sitter guidelines with assigned personnel  7. Initiate Psychosocial CNS and Spiritual Care consult, as indicated  9/22/2022 3154 by Kenzie Kaur RN  Outcome: Progressing  9/21/2022 2250 by Julissa Villarreal RN  Outcome: Progressing  Flowsheets (Taken 9/21/2022 1941)  Effect of thought disturbance (confusion, delirium, dementia, or psychosis) are managed with adequate functional status:   Assess for contributors to thought disturbance, including medications, impaired vision or hearing, underlying metabolic abnormalities, dehydration, psychiatric diagnoses, notify [facility] high risk fall precautions, as indicated   Provide frequent short contacts to provide reality reorientation, refocusing and direction   Decrease environmental stimuli, including noise as appropriate   Monitor and intervene to maintain adequate nutrition, hydration, elimination, sleep and activity   If unable to ensure safety without constant attention obtain sitter and review sitter guidelines with assigned personnel   Initiate Psychosocial Clinical Nurse Specialist and [facility] consult, as indicated     Problem: Nutrition Deficit:  Goal: Optimize nutritional status  9/22/2022 0928 by Kenzie Kaur RN  Outcome: Progressing  9/21/2022 2250 by Julissa Villarreal RN  Outcome: Progressing     Problem: Skin/Tissue Integrity  Goal: Absence of new skin breakdown  Description: 1. Monitor for areas of redness and/or skin breakdown  2. Assess vascular access sites hourly  3. Every 4-6 hours minimum:  Change oxygen saturation probe site  4.   Every 4-6 hours:  If on nasal continuous positive airway pressure, respiratory therapy assess nares and determine need for appliance change or resting period.   9/22/2022 0928 by Orlando Rodriguez RN  Outcome: Progressing  9/21/2022 2250 by Debo Vital RN  Outcome: Progressing     Problem: Safety - Adult  Goal: Free from fall injury  9/22/2022 0928 by Orlando Rodriguez RN  Outcome: Progressing  9/21/2022 2250 by Debo Vital RN  Outcome: Progressing     Problem: ABCDS Injury Assessment  Goal: Absence of physical injury  9/22/2022 0928 by Orlando Rodriguez RN  Outcome: Progressing  9/21/2022 2250 by Debo Vital RN  Outcome: Progressing

## 2022-09-22 NOTE — PROGRESS NOTES
Physical Therapy  Facility/Department: [facility] CAR 2- STEPDOWN  Daily treatment note    Name: Mario Alberto Qiu  : 1980  MRN: [id number]  Date of Service: 2022    Discharge Recommendations:  Patient would benefit from continued therapy after discharge   PT Equipment Recommendations  Equipment Needed: No      Patient Diagnosis(es): There were no encounter diagnoses. Past Medical History:  has no past medical history on file. Past Surgical History:  has no past surgical history on file. Assessment   Body Structures, Functions, Activity Limitations Requiring Skilled Therapeutic Intervention: Decreased functional mobility ; Decreased strength;Decreased endurance;Decreased cognition;Decreased balance; Increased pain  Assessment: Pt is overall Max A x2 for bed mobilitytolerated 5mins at  with MA X for sitting balance. Pt demonstrates significantly impaired endurance, poor trunk control, and  decreasesafety awareness which places the pt at a high fall risk. Pt requires frequent verbal cues for all aspects of mobility. Pt would be unsafe to return to prior living arrangements upon discharge. Pt would benefit from additional therapy upon discharge to maximize functional independence. Therapy Prognosis: Good  Activity Tolerance  Activity Tolerance: Patient limited by fatigue;Patient limited by endurance; Patient limited by pain     Plan   Plan  Plan:  (5-6x/wk)  Current Treatment Recommendations: Strengthening, Balance training, Functional mobility training, ROM, Transfer training, Gait training, Endurance training, Therapeutic activities, Equipment evaluation, education, & procurement, Home exercise program, Safety education & training, Patient/Caregiver education & training  Safety Devices  Type of Devices: Left in bed, Call light within reach, Nurse notified, Bed alarm in place, Patient at risk for falls  Restraints  Restraints Initially in Place: No Restrictions  Restrictions/Precautions  Restrictions/Precautions: General Precautions, Fall Risk, Contact Precautions  Required Braces or Orthoses?: No  Position Activity Restriction  Other position/activity restrictions: extubated 9/16     Subjective   General  Patient assessed for rehabilitation services?: Yes   Objective    Bed mobility  Rolling to Left: Maximum assistance;2 Person assistance  Bed Mobility Comments: Rolling to turn pt for comfort  Transfers  Stand to sit: Dependent/Total  Comment: Pt transfer to and from Chair with [facility] lift , reports feeling uncomfortable d/t poor sitting balance and fear of falling ; pt transfered back to bed at end of session      Balance  Sitting - Static: Poor;+ (Tolerated 5mins in chair)  Sitting - Dynamic: Poor  Comments: sitting balance assessed at the recliner. Pt require MAX -A to maintain sitting Balance. AM-PAC Score  AM-PAC Inpatient Mobility Raw Score : 8 (09/21/22 1237)  AM-PAC Inpatient T-Scale Score : 28.52 (09/21/22 1237)  Mobility Inpatient CMS 0-100% Score: 86.62 (09/21/22 1237)  Mobility Inpatient CMS G-Code Modifier : CM (09/21/22 1237)   Goals  Short Term Goals  Time Frame for Short term goals: 14 visits  Short term goal 1: Pt will perform sit<>stand transfer with min-A. Short term goal 2: Pt will perform bed mobility with min-A. Short term goal 3: Pt will ambulate 30 feet with a RW and CGA. Short term goal 4: Pt will demonstrate fair standing balance to decrease fall risk. Short term goal 5: Pt will tolerate a 35 minute therapy session to promote increased endurance.   Additional Goals?: No       Education  Patient Education  Education Method: Verbal  Barriers to Learning: None  Education Outcome: Verbalized understanding      Therapy Time   Individual Concurrent Group Co-treatment   Time In 1425         Time Out 1454         Minutes 29         Timed Code Treatment Minutes: 15 Minutes(Co-treat with OT)       Michi Escalante, PTA

## 2022-09-22 NOTE — CARE COORDINATION
Spoke to [employer] from [employer], [employer], and [facility] at [facility]. Patient was a denial by corporate, so none are able to accept. Referral sent to [address] [place] [address] received voicemail from Julio at [facility]. For assistance with discharge planning, call [phone] voicemail left for Tana Morel in admissions at [facility] requesting return call to follow up on referral. Spoke to Latisha [phone] from [facility] and [place]. She does not have access to Epic.  Full referral faxed

## 2022-09-22 NOTE — PROGRESS NOTES
[facility]  Office: [address], DO, Mirna Bejarano, DO, Anita Marques, DO, Bashir yang Blood, DO, Margarita Rascon MD, Annmarie Partida MD, Rosi Mcknight MD, Lloyd Yan MD,  Louie Ventura MD, Luke Mccullough MD, Jacqueline Bettencourt, DO, Ankit Davis MD,  Tiara Clark MD, Blayne Farrar MD, Michael Hernandez DO, Chinedu Garcia MD, Mima Ojeda MD, Jarvis Huynh MD, Roxana Dickson MD, Justine Gutierrez MD, Kwan Damico MD, Deborah Pérez DO, Jennifer Jimenez MD, Alfredo Singh MD, Manny Flores, CNP,  Charlotte Gee, CNP, Estelita Orosco, CNP, Sixto Mcelroy, CNP,  Ema Smith, [facility], Svetlana Orozco, CNP, Ameena Thompson, CNP, Michelle Tamayo, CNP, Ryan Marte, [facility], [facility], CNP, Louis Chung PABLANQUITA, Denilson Daniel, CNS, Jim Russell, DNP, Julia Cardoza, CNP, Aida Reyes, [facility], Steve Clancy, [facility]      Daily Progress Note     Admit Date: 9/10/2022  Bed/Room No.  2011/2011-01  Admitting Physician : Rosi Mcknight MD  Code Status :Full Code  Hospital Day:  LOS: 12 days   Chief Complaint:     Altered mental status    Principal Problem:    Septic shock [facility])  Active Problems:    Altered mental status    Acute kidney injury ([address]    Bandemia    Cellulitis of sacral region    Breast, fat necrosis    Gram-neg septicemia ([address]    Gram positive septicemia ([address]    Acute encephalopathy    Elevated procalcitonin    Chronic anemia    Acute respiratory failure with hypoxia (HCC)    Severe protein-calorie malnutrition ([address]    Simple chronic bronchitis (HCC)    Hypothyroid    Hypoalbuminemia    Systemic inflammatory response syndrome (SIRS) of infectious origin with acute organ failure ([address]  Resolved Problems:    * No resolved hospital problems. *    Subjective : Interval History/Significant events :  09/22/22    Patient continues to have diarrhea. C. difficile negative. She is complaining of abdominal cramping.   No fever, chills. Patient continues to have generalized fatigue and weakness. She remains on IV fluids. Vitals - Stable afebrile tachycardia,  Labs -low Magnesium , low albumin. Low HGB     Nursing notes , Consults notes reviewed. Overnight events and updates discussed with Nursing staff . Background History:         Dona Stevens is 38 y.o. female  Who was admitted to the hospital on 9/10/2022 for treatment of Septic shock ([address] Patient was admitted to the hospital on 9/10/2022. she was transferred to our facility from [facility] with altered mental status. Patient was found unresponsive at home soiled with feces and urine and multiple wounds and maggots in the wounds. Patient was found to have acute kidney injury. She lives at home with her  who travels frequently for living and is not home much.  was away from home for 1 week. Patient reported that she did not feel like going to the restroom as it was taxing and decided to urinate and defecate on bed. She progressively got worse and got confused. She did not call for any help. . Patient had poor mobility for a year. CT abdomen pelvis showed malposition of jejunal with small bowel obstruction and possible hepatic steatosis. She has known history of morbid obesity hypothyroidism, gastric fundoplication. Lab work showed hyponatremia, hyperkalemia, elevated procalcitonin. Patient was referred to our facility for concern for sepsis. Blood culture was positive for gram-negative rods and gram-positive cocci in clusters. CT abdomen was repeated and did not show any concern for bowel obstruction. Patient was intubated due to metabolic encephalopathy 74/85/0003. She was also treated with pressors. Urine culture positive for E. coli. Patient was treated with ceftriaxone and recommended to continue till 9/24/2022 to complete 2-week course. Patient also treated with a short course of daptomycin until 9/17/2022.     PMH:  No past medical history on file. Allergies: Allergies   Allergen Reactions    Amoxicillin Hives    Robitussin Day-Night Value Nickolas [Phenylephrine-Diphenhyd-Dm-Gg] Hives and Rash      Medications :  midodrine, , ,   sodium chloride, 500 mL, IntraVENous, Once  pantoprazole, 40 mg, Oral, QAM AC  lactobacillus, 1 capsule, Oral, Daily with breakfast  budesonide-formoterol, 1 puff, Inhalation, BID  lidocaine 1 % injection, 5 mL, IntraDERmal, Once  midodrine, 10 mg, Oral, q8h  enoxaparin, 30 mg, SubCUTAneous, BID  cefTRIAXone (ROCEPHIN) IV, 2,000 mg, IntraVENous, Q24H  sodium chloride flush, 5-40 mL, IntraVENous, 2 times per day  miconazole, , Topical, BID  levothyroxine, 75 mcg, Oral, Daily      Review of Systems   Review of Systems   Constitutional:  Positive for activity change. Negative for appetite change, chills, fatigue, fever and unexpected weight change. HENT:  Negative for congestion, mouth sores, postnasal drip, sinus pressure, sore throat and voice change. Eyes:  Negative for visual disturbance. Respiratory:  Negative for cough, shortness of breath and wheezing. Cardiovascular:  Negative for chest pain and palpitations. Gastrointestinal:  Negative for abdominal pain, blood in stool, constipation, diarrhea, nausea and vomiting. Endocrine: Negative for polyuria. Genitourinary:  Negative for difficulty urinating, dysuria, frequency and urgency. Musculoskeletal:  Negative for arthralgias, joint swelling and myalgias. Skin:  Positive for wound. Neurological:  Negative for dizziness, tremors, speech difficulty, light-headedness and headaches.    Objective :      Current Vitals : Temp: 98.5 °F (36.9 °C),  Heart Rate: 100, Resp: 28, BP: (!) 110/53, SpO2: 99 %  Last 24 Hrs Vitals   Patient Vitals for the past 24 hrs:   BP Temp Temp src Pulse Resp SpO2   09/22/22 1053 -- -- -- 100 28 99 %   09/22/22 0820 (!) 110/53 98.5 °F (36.9 °C) Oral (!) 111 15 99 %   09/22/22 0325 (!) 132/54 98.6 °F (37 °C) Oral (!) 117 26 97 %   09/21/22 2357 (!) 108/54 98.6 °F (37 °C) Oral (!) 106 22 98 %   09/21/22 2226 (!) 123/55 -- -- (!) 111 19 98 %   09/21/22 1941 (!) 88/46 99.2 °F (37.3 °C) Oral (!) 109 23 99 %   09/21/22 1620 (!) 101/40 98.2 °F (36.8 °C) Oral (!) 102 24 99 %       Intake / output   No intake/output data recorded. Physical Exam:  Physical Exam  Vitals and nursing note reviewed. Constitutional:       General: She is not in acute distress. Appearance: She is morbidly obese. She is not diaphoretic. HENT:      Head: Normocephalic and atraumatic. Nose:      Right Sinus: No maxillary sinus tenderness or frontal sinus tenderness. Left Sinus: No maxillary sinus tenderness or frontal sinus tenderness. Mouth/Throat:      Pharynx: No oropharyngeal exudate. Eyes:      General: No scleral icterus. Conjunctiva/sclera: Conjunctivae normal.      Pupils: Pupils are equal, round, and reactive to light. Neck:      Thyroid: No thyromegaly. Vascular: No JVD. Cardiovascular:      Rate and Rhythm: Normal rate and regular rhythm. Pulses:           Dorsalis pedis pulses are 2+ on the right side and 2+ on the left side. Heart sounds: Normal heart sounds. No murmur heard. Pulmonary:      Effort: Pulmonary effort is normal.      Breath sounds: Normal breath sounds. No wheezing or rales. Abdominal:      Palpations: Abdomen is soft. There is no mass. Tenderness: There is no abdominal tenderness. Musculoskeletal:      Cervical back: Full passive range of motion without pain and neck supple. Comments: Notable wounds in bilateral buttocks, left breast, right breast   Lymphadenopathy:      Head:      Right side of head: No submandibular adenopathy. Left side of head: No submandibular adenopathy. Cervical: No cervical adenopathy. Skin:     General: Skin is warm. Neurological:      Mental Status: She is alert and oriented to person, place, and time. Motor: No tremor. Psychiatric:         Behavior: Behavior is cooperative. Laboratory findings:    Recent Labs     09/20/22  0542   WBC 6.5   HGB 7.1*   HCT 22.3*          Recent Labs     09/20/22  0542 09/21/22  0358   *  --    K 4.1  --      --    CO2 23  --    GLUCOSE 69*  --    BUN 19  --    CREATININE 0.96*  --    MG 1.4* 1.5*   CALCIUM 7.2*  --        Recent Labs     09/20/22  0542   PROT 4.7*   LABALBU 1.3*   AST 21   ALT 16   ALKPHOS 113*   BILITOT 0.3   BILIDIR 0.1            Specific Gravity, UA   Date Value Ref Range Status   09/10/2022 1.022 1.005 - 1.030 Final     Protein, UA   Date Value Ref Range Status   09/10/2022 1+ (A) NEGATIVE Final     RBC, UA   Date Value Ref Range Status   09/10/2022 5 TO 10 0 - 4 /HPF Final     Comment:     Reference range defined for non-centrifuged specimen. Bacteria, UA   Date Value Ref Range Status   09/10/2022 FEW (A) None Final     Nitrite, Urine   Date Value Ref Range Status   09/10/2022 NEGATIVE NEGATIVE Final     WBC, UA   Date Value Ref Range Status   09/10/2022 20 TO 50 0 - 5 /HPF Final     Leukocyte Esterase, Urine   Date Value Ref Range Status   09/10/2022 TRACE (A) NEGATIVE Final       Imaging / Clinical Data :-   CT HEAD WO CONTRAST    Result Date: 9/15/2022  No acute intracranial abnormality. XR CHEST PORTABLE    Result Date: 9/18/2022  No acute abnormality identified. Status post extubation. XR CHEST PORTABLE    Result Date: 9/16/2022  No acute process. XR CHEST PORTABLE    Result Date: 9/15/2022  No significant interval change. Support lines and tubes in place with no acute findings in the chest.     XR CHEST PORTABLE    Result Date: 9/14/2022  Stable chest.     XR CHEST PORTABLE    Result Date: 9/13/2022  Support lines and tubes remain in place. Improved aeration at the lung bases.         Clinical Course : gradually improving  Assessment and Plan  :        Sepsis with septic shock secondary to Gram negative and gram-positive bacteremia (Proteus, staph hominis, staph epidermidis -Rocephin with stop date 9/24/2022 -ID following. Continue local wound care. Multiple wounds with maggots -   Morbid obesity BMI 50 -   Bedbound for > 6 months -we will need aggressive rehab at discharge. Hypothyroidism -Synthroid. CHULA resolved -avoid nephrotoxic agents  Metabolic encephalopathy - resolved  Severe malnutrition -  Diarrhea likely antibiotic associated-check stool for C. difficile due to high risk from hospitalization and antibiotic use. Will need SNF at discharge   Limit sedatives. Fenatnyl with dressing changes. CDIFF negative -Imodium as needed. Continue IVF. Probiotics  PT OT   Stable for discharge to skilled nursing facility when arranged. Continue to monitor vitals , Intake / output ,  Cell count , HGB , Kidney function, oxygenation  as indicated . Plan and updates discussed with patient ,  answers  explained to satisfaction.    Plan discussed with Staff  RN     (Please note that portions of this note were completed with a voice recognition program. Efforts were made to edit the dictations but occasionally words are mis-transcribed.)      Antonia Cuellar MD  9/22/2022

## 2022-09-22 NOTE — PROGRESS NOTES
[facility] -   Infectious diseases evaluation  admission date 9/10/2022     reason for consultation:   Sepsis      Impression :   Current:  Sepsis with septic shock  Altered mentation acute encephalopathy: Resolved  CHULA: Resolved  Bandemia  Lactic acidosis  Elevated procalcitonin   Gram-negative and gram-positive bacteremia-[facility]  Proteus R cipro bactrim - S ceftriaxone ampicillin  Staph hominis R ancef   Staph epi R ancef  U cx BVH 9/9 e coli S cipro  ceftriaxone ampicillin  Bilat breasts infected / necrotic wounds - maggots  Sacral sloughed skin from moisture  Hyperglycemia  Morbd obesity-BMI 50  COPD  CHULA -creatinine: 1.06-0.96     Discussion / summary of stay / plan of care   Sepsis of unclear cause, with septic shock and elevated procalcitonin at 12. Very concerning for gram-negative organisms and hence a UTI is of a concern  [facility] [place] although it was a concern in [facility]  The urine analysis at [facility] is normal but I am not clear as of the UA at [facility]  The bacteremia with gram-negative and gram-positive is also of a concern, pending at [facility]   currently working on placement. Recommendations      - Rocephin: 2 gm Iv q 24 Hr. Stop date 9/24   Source of the proteus septicemia is likely the wounds  - Daptomycin: Completed 9/16- ck elevated then back to normal  - Wound care  - diarrhea c diff neg 9/21  - Neg nasal MRSA swab      - Probiotics increased to 3 times daily  - ECG ordered for QTC, QTC resulted at 490.      Infection Control Recommendations   [place] Precautions  Contact Isolation         Antimicrobial Stewardship Recommendations   Simplification of therapy  Targeted therapy        History of Present Illness:   Initial history:  Nichole Merlin is a 38y.o.-year-old female found unresponsive at home was taken to [facility], found to have stools on many of her wounds and maggots. CT of the abdomen showed concern for small bowel obstruction  She had hyponatremia with a sodium 128, leukocytosis elevated procalcitonin and lactic acid  Any blood culture came back positive for gram-negative rods gram-positive cocci clusters  CHULA  Transferred to us with concern of septic shock and sepsis-General surgery on board due to the concern of small bowel obstruction     After transfer to [facility]. Cristi's CT scan of the abdomen pelvis showed no pneumonia on the lower lobes and no acute abdomen. Patient is on antibiotics, infectious consulted for opinion. The urine analysis on 9/10 at [facility] is not suggestive of infection-but I do not have the UA or the urine culture off from [address] [place] [address] urinalysis seems to be very contaminated with epithelial cells                          Interval changes  9/19/2022   Patient Vitals for the past 8 hrs:    Height   09/19/22 1235 5' 7\" (1.702 m)      Blood cultures from [facility] are back with Proteus and 2 strains of staph coagulase-negative  U cx over there showed E coli multiS  AB switched accordingly     All the wounds are evaluated with wound care on the bedside, the back has multiple open ulcers but no clear cellulitis at this point     [place] [address] OFF  The breasts had macerated wounds and the necrotic superficial skin is sloughing, no deep induration in both breasts, both being pressure wounds     9/16 extubated and appropriate -lungs clear -  Abd soft non tender  No rash    9/19 patient is currently resting in bed post wound care. After talking with wound care they said that the wounds are improving. Abdomen was soft nontender. Patient did complain of diarrhea today. WBC: 6.5, overall trend is down. Total CK: 314, on 9/14/2022  Procalcitonin 1.88   improved    9/20 patient is currently resting in bed.   Patient states that she has had intermittent diarrhea for the last couple of days. Does say that her pain is getting better with her wounds and that is also concurrent with wound care. Patient had no acute concerns at this time.  is currently working on placement. 9/21: Patient is currently in bed, vitals are stable patient is a little tachycardic today in the low 100s. However patient was just finishing up with physical therapy. Patient is no longer complaining of diarrhea episodes. Patient states that pain is decreasing and that wounds seem to be healing. Wound care is on.  is still working on referrals to skilled nursing rehab facilities. 9/22: On examination patient is resting in bed, patient has heart rate in the 110s all other vitals are unremarkable. Patient complains of another bout of diarrhea this morning, C. difficile was negative. Probiotics were increased to 3 times daily. ECG was ordered to check QTC. QTC resulted at 490. Rocephin is currently being administered until 9/24. Discharge is currently being planned. Summary of relevant labs:    Labs:  sodium 134  leukocytosis: 13.1-10.8-9.0-7.1-9.2-9.2-6.5  elevated procalcitonin   Elevated lactic acid 2.5, 9/11/2022  Creat: 1.10-1.06-0.96-0.81-0.96-1.06-0.96  -136 improved  Magnesium: 1.5    Micro:  BC GNR and CPC clusters at the [facility]  UA neg 9/10   MRSA nasal swab negative  blood culture 9/10      Imaging:  BREASTS US neg for abscess  CT scan of the abdomen pelvis showed no pneumonia on the lower lobes and no acute abdomen. I have personally reviewed the past medical history, past surgical history, medications, social history, and family history, and I haveupdated the database accordingly. Allergies:   Amoxicillin and Robitussin day-night value lawrence [[id number]]      Review of Systems:   Review of Systems   Constitutional:  Negative for appetite change, fatigue and fever.    HENT:  Negative for congestion, ear discharge and facial swelling. Eyes:  Negative for discharge, itching and visual disturbance. Respiratory:  Negative for apnea, cough and shortness of breath. Cardiovascular:  Negative for chest pain and leg swelling. Gastrointestinal:  Positive for diarrhea. Negative for abdominal distention and abdominal pain. Patient is no longer having diarrhea episodes. Endocrine: Negative for polyphagia. Genitourinary:  Negative for difficulty urinating and dyspareunia. Musculoskeletal:  Negative for arthralgias and back pain. Allergic/Immunologic: Negative for immunocompromised state. Neurological:  Negative for dizziness and light-headedness. Hematological:  Negative for adenopathy. Psychiatric/Behavioral:  Negative for agitation. Physical Examination :   Physical Exam  Constitutional:       General: She is not in acute distress. Appearance: She is obese. She is not toxic-appearing. HENT:      Head: Normocephalic and atraumatic. Right Ear: External ear normal.      Left Ear: External ear normal.      Nose: Nose normal. No congestion. Mouth/Throat:      Mouth: Mucous membranes are moist.      Pharynx: Oropharynx is clear. Eyes:      Extraocular Movements: Extraocular movements intact. Conjunctiva/sclera: Conjunctivae normal.   Cardiovascular:      Rate and Rhythm: Regular rhythm. Tachycardia present. Pulses: Normal pulses. Heart sounds: Normal heart sounds. Comments: Heart rate at bedside and was in the 110s. Pulmonary:      Effort: Pulmonary effort is normal. No respiratory distress. Breath sounds: Normal breath sounds. No wheezing. Abdominal:      General: Bowel sounds are normal. There is no distension. Palpations: Abdomen is soft. Tenderness: There is abdominal tenderness. Comments: Patient has mild abdominal tenderness on palpation. Musculoskeletal:         General: No swelling or tenderness.       Cervical back: file   Social Connections: Not on file   Intimate Partner Violence: Not on file   Housing Stability: Not on file            Family History:   Family History   No family history on file. Medical Decision Making:   I have independently reviewed/ordered the following labs:     CBC with Differential:        Recent Labs     09/18/22  0552 09/19/22  0644   WBC 9.2 9.2   HGB 7.4* 7.9*   HCT 22.8* 25.4*    216   LYMPHOPCT 27 24   MONOPCT 9 7         BMP:       Recent Labs     09/18/22  0552 09/19/22  0644    134*   K 4.1 4.0    101   CO2 24 24   BUN 24* 22*   CREATININE 0.96* 1.06*   MG 1.6 1.5*         Hepatic Function Panel:        Recent Labs     09/18/22  0552 09/19/22  0644   PROT 5.0* 5.1*   LABALBU 1.5* 1.3*   BILIDIR 0.1 0.1   IBILI 0.2 0.2   BILITOT 0.3 0.3   ALKPHOS 123* 116*   ALT 19 18   AST 27 23         No results for input(s): RPR in the last 72 hours. No results for input(s): HIV in the last 72 hours. No results for input(s): BC in the last 72 hours. Lab Results   Component Value Date/Time     CREATININE 1.06 09/19/2022 06:44 AM     GLUCOSE 68 09/19/2022 06:44 AM         Detailed results: Thank you for allowing us to participate in the care of this patient. Please call with questions. This note is created with the assistance of a speech recognition program.  While intending to generate adocument that actually reflects the content of the visit, the document can still have some errors including those of syntax and sound a like substitutions which may escape proof reading. It such instances, actual meaningcan be extrapolated by contextual diversion. Serjio Wilkinson MD  Office: [phone]  Perfect serve / office [phone]       I have discussed the care of the patient, including pertinent history and exam findings,  with the resident. I have seen and examined the patient and the key elements of all parts of the encounter have been performed by me.   I agree with the assessment, plan and orders as documented by the resident.     Angélica García, Infectious Diseases

## 2022-09-22 NOTE — PLAN OF CARE
Problem: Discharge Planning  Goal: Discharge to home or other facility with appropriate resources  9/21/2022 2250 by Lydia Samano RN  Outcome: Progressing  Flowsheets (Taken 9/21/2022 1941)  Discharge to home or other facility with appropriate resources:   Identify barriers to discharge with patient and caregiver   Arrange for needed discharge resources and transportation as appropriate   Identify discharge learning needs (meds, wound care, etc)  9/21/2022 1200 by Veronika Delgado RN  Outcome: Progressing     Problem: Respiratory - Adult  Goal: Achieves optimal ventilation and oxygenation  9/21/2022 2250 by Lydia Samano RN  Outcome: Progressing  9/21/2022 1200 by Veronika Delgado RN  Outcome: Progressing     Problem: Safety - Medical Restraint  Goal: Remains free of injury from restraints (Restraint for Interference with Medical Device)  Description: INTERVENTIONS:  1. Determine that other, less restrictive measures have been tried or would not be effective before applying the restraint  2. Evaluate the patient's condition at the time of restraint application  3. Inform patient/family regarding the reason for restraint  4. Q2H: Monitor safety, psychosocial status, comfort, nutrition and hydration  9/21/2022 2250 by Lydia Samano RN  Outcome: Progressing  9/21/2022 1200 by Veronika Delgado RN  Outcome: Progressing     Problem: Pain  Goal: Verbalizes/displays adequate comfort level or baseline comfort level  9/21/2022 2250 by Lydia Samano RN  Outcome: Progressing  9/21/2022 1200 by Veronika Delgado RN  Outcome: Progressing     Problem: Confusion  Goal: Confusion, delirium, dementia, or psychosis is improved or at baseline  Description: INTERVENTIONS:  1. Assess for possible contributors to thought disturbance, including medications, impaired vision or hearing, underlying metabolic abnormalities, dehydration, psychiatric diagnoses, and notify attending LIP  2.  [place] high risk fall precautions, as indicated  3. Provide frequent short contacts to provide reality reorientation, refocusing and direction  4. Decrease environmental stimuli, including noise as appropriate  5. Monitor and intervene to maintain adequate nutrition, hydration, elimination, sleep and activity  6. If unable to ensure safety without constant attention obtain sitter and review sitter guidelines with assigned personnel  7. Initiate Psychosocial CNS and Spiritual Care consult, as indicated  9/21/2022 2250 by Vesna Avendaño RN  Outcome: Progressing  Flowsheets (Taken 9/21/2022 1941)  Effect of thought disturbance (confusion, delirium, dementia, or psychosis) are managed with adequate functional status:   Assess for contributors to thought disturbance, including medications, impaired vision or hearing, underlying metabolic abnormalities, dehydration, psychiatric diagnoses, notify [facility] high risk fall precautions, as indicated   Provide frequent short contacts to provide reality reorientation, refocusing and direction   Decrease environmental stimuli, including noise as appropriate   Monitor and intervene to maintain adequate nutrition, hydration, elimination, sleep and activity   If unable to ensure safety without constant attention obtain sitter and review sitter guidelines with assigned personnel   Initiate Psychosocial Clinical Nurse Specialist and [facility] consult, as indicated  9/21/2022 1200 by Maurisio Reid RN  Outcome: Progressing     Problem: Nutrition Deficit:  Goal: Optimize nutritional status  9/21/2022 2250 by Vesna Avendaño RN  Outcome: Progressing  9/21/2022 1200 by Maurisio Reid RN  Outcome: Progressing     Problem: Skin/Tissue Integrity  Goal: Absence of new skin breakdown  Description: 1. Monitor for areas of redness and/or skin breakdown  2. Assess vascular access sites hourly  3. Every 4-6 hours minimum:  Change oxygen saturation probe site  4.   Every 4-6 hours:  If on nasal continuous positive airway pressure, respiratory therapy assess nares and determine need for appliance change or resting period.   9/21/2022 2250 by Julissa Villarreal RN  Outcome: Progressing  9/21/2022 1200 by Kenzie Kaur RN  Outcome: Progressing     Problem: Safety - Adult  Goal: Free from fall injury  9/21/2022 2250 by Julissa Villarreal RN  Outcome: Progressing  9/21/2022 1200 by Kenzie Kaur RN  Outcome: Progressing     Problem: ABCDS Injury Assessment  Goal: Absence of physical injury  9/21/2022 2250 by Julissa Villarreal RN  Outcome: Progressing  9/21/2022 1200 by Kenzie Kaur RN  Outcome: Progressing

## 2022-09-22 NOTE — PROGRESS NOTES
Occupational Therapy  Facility/Department: [facility] CAR 2- STEPDOWN  Occupational Therapy Daily Treatment Note    Name: Marbella Paredes  : 1980  MRN: [id number]  Date of Service: 2022    Discharge Recommendations:  Patient would benefit from continued therapy after discharge    Patient Diagnosis(es): There were no encounter diagnoses. Past Medical History:  has no past medical history on file. Past Surgical History:  has no past surgical history on file. Assessment   Performance deficits / Impairments: Decreased functional mobility ; Decreased ADL status; Decreased ROM; Decreased strength;Decreased safe awareness;Decreased cognition;Decreased endurance;Decreased balance;Decreased high-level IADLs;Decreased posture  Assessment: Pt participated in OT session wtih fair tolerance with focus on mobility and simple ADL tasks. Pt will continue to require further OT services following discharge from acute care hospital.  Prognosis: Good  Activity Tolerance  Activity Tolerance: Patient limited by fatigue;Patient limited by pain        Plan   Plan  Times per Week: 3-5 x/wk  Current Treatment Recommendations: Strengthening, ROM, Balance training, Functional mobility training, Endurance training, Cognitive reorientation, Pain management, Safety education & training, Patient/Caregiver education & training, Equipment evaluation, education, & procurement, Positioning, Self-Care / ADL, Home management training     Restrictions  Restrictions/Precautions  Restrictions/Precautions: General Precautions, Fall Risk, Contact Precautions  Required Braces or Orthoses?: No  Position Activity Restriction  Other position/activity restrictions: extubated     Subjective   General  Patient assessed for rehabilitation services?: Yes  Response to previous treatment: Patient with no complaints from previous session  Family / Caregiver Present: No  Subjective  Subjective: RN ok'd patient for OT treatment this afternoon.  Pt supine in bed upon SMITH arrival. Pt agreeable to session and reports pain in buttocks and chest not rated       Objective   Safety Devices  Type of Devices: Left in bed;Call light within reach;Nurse notified; Bed alarm in place; Patient at risk for falls  Restraints  Restraints Initially in Place: No  Balance  Sitting: With support (Poor once lifted to recliner with pain and fear of falling)  Standing:  (Not appropriate for standing at this time)  Transfer Training  Transfer Training: No        ADL  Grooming: Setup;Verbal cueing; Increased time to complete;Stand by assistance  Grooming Skilled Clinical Factors: wshing face and brush hair  Additional Comments: Pt reports being cleaned up and fresh gown this morning and declined further ADL     Activity Tolerance  Activity Tolerance: Patient limited by fatigue;Patient limited by endurance; Patient limited by pain  Bed mobility  Rolling to Left: Maximum assistance;2 Person assistance  Rolling to Right: Maximum assistance;2 Person assistance  Bed Mobility Comments: with HOB slightly raised and bedrail use        Cognition  Arousal/Alertness: Delayed responses to stimuli  Following Commands: Follows multistep commands with increased time; Follows multistep commands with repitition  Attention Span: Appears intact  Problem Solving: Assistance required to identify errors made;Assistance required to generate solutions  Insights: Decreased awareness of deficits  Initiation: Requires cues for some  Orientation  Overall Orientation Status: Within Functional Limits     Education Given To: Patient  Education Provided: ADL Adaptive Strategies;Transfer Training  Education Provided Comments: safety, importance of increased activity  Education Method: Demonstration;Verbal  Education Outcome: Verbalized understanding;Continued education needed     AM-PAC Score  AM-[facility] Inpatient Daily Activity Raw Score: 12 (09/22/22 1608)  AM-PAC Inpatient ADL T-Scale Score : 30.6 (09/22/22 1608)  ADL Inpatient CMS 0-100% Score: 66.57 (09/22/22 1608)  ADL Inpatient CMS G-Code Modifier : CL (09/22/22 1608)      Goals  Short Term Goals  Time Frame for Short term goals: By discharge, pt will:  Short Term Goal 1:  Increase BUE strength by 1/2+ muscle grade through utilization of BUE strengthening HEP  Short Term Goal 2: Demo Min A for UB ADLs  Short Term Goal 3: Demo Mod A for LB ADLs with AE PRN  Short Term Goal 4: Demo 15+ min dynamic sitting and reaching outside ADRIANA with unilateral hand release and CGA for improved performance of ADLs/IADLs  Short Term Goal 5: Follow 75% of 2-step commands with appropriate initiation and sequencing for improved functional independence  Short Term Goal 6: NOTIFY OTR TO UPDATE GOALS AS PT PROGRESSES       Therapy Time   Individual Concurrent Group Co-treatment   Time In 1425         Time Out 1455         Minutes 30         Timed Code Treatment Minutes: 15 Minutes (Co-tx with PTA for safety and goal progression)       LUIS Rodrigues/SAMUEL

## 2022-09-23 LAB
EKG ATRIAL RATE: 109 BPM
EKG P AXIS: 50 DEGREES
EKG P-R INTERVAL: 124 MS
EKG Q-T INTERVAL: 364 MS
EKG QRS DURATION: 62 MS
EKG QTC CALCULATION (BAZETT): 490 MS
EKG R AXIS: 33 DEGREES
EKG T AXIS: 22 DEGREES
EKG VENTRICULAR RATE: 109 BPM
GLUCOSE BLD-MCNC: 73 MG/DL (ref 65–105)
GLUCOSE BLD-MCNC: 73 MG/DL (ref 65–105)
GLUCOSE BLD-MCNC: 75 MG/DL (ref 65–105)
GLUCOSE BLD-MCNC: 79 MG/DL (ref 65–105)
GLUCOSE BLD-MCNC: 84 MG/DL (ref 65–105)

## 2022-09-23 PROCEDURE — 6360000002 HC RX W HCPCS: Performed by: INTERNAL MEDICINE

## 2022-09-23 PROCEDURE — 6370000000 HC RX 637 (ALT 250 FOR IP): Performed by: FAMILY MEDICINE

## 2022-09-23 PROCEDURE — 2580000003 HC RX 258: Performed by: STUDENT IN AN ORGANIZED HEALTH CARE EDUCATION/TRAINING PROGRAM

## 2022-09-23 PROCEDURE — 6360000002 HC RX W HCPCS: Performed by: EMERGENCY MEDICINE

## 2022-09-23 PROCEDURE — 1200000000 HC SEMI PRIVATE

## 2022-09-23 PROCEDURE — 94640 AIRWAY INHALATION TREATMENT: CPT

## 2022-09-23 PROCEDURE — 6370000000 HC RX 637 (ALT 250 FOR IP)

## 2022-09-23 PROCEDURE — 2500000003 HC RX 250 WO HCPCS: Performed by: INTERNAL MEDICINE

## 2022-09-23 PROCEDURE — 99232 SBSQ HOSP IP/OBS MODERATE 35: CPT | Performed by: FAMILY MEDICINE

## 2022-09-23 PROCEDURE — 99212 OFFICE O/P EST SF 10 MIN: CPT

## 2022-09-23 PROCEDURE — 94761 N-INVAS EAR/PLS OXIMETRY MLT: CPT

## 2022-09-23 PROCEDURE — 2580000003 HC RX 258: Performed by: FAMILY MEDICINE

## 2022-09-23 PROCEDURE — 99232 SBSQ HOSP IP/OBS MODERATE 35: CPT | Performed by: INTERNAL MEDICINE

## 2022-09-23 PROCEDURE — 97110 THERAPEUTIC EXERCISES: CPT

## 2022-09-23 PROCEDURE — 6370000000 HC RX 637 (ALT 250 FOR IP): Performed by: STUDENT IN AN ORGANIZED HEALTH CARE EDUCATION/TRAINING PROGRAM

## 2022-09-23 PROCEDURE — 82947 ASSAY GLUCOSE BLOOD QUANT: CPT

## 2022-09-23 PROCEDURE — 6370000000 HC RX 637 (ALT 250 FOR IP): Performed by: EMERGENCY MEDICINE

## 2022-09-23 PROCEDURE — 6360000002 HC RX W HCPCS: Performed by: STUDENT IN AN ORGANIZED HEALTH CARE EDUCATION/TRAINING PROGRAM

## 2022-09-23 RX ADMIN — ACETAMINOPHEN 650 MG: 325 TABLET ORAL at 23:58

## 2022-09-23 RX ADMIN — MIDODRINE HYDROCHLORIDE 10 MG: 5 TABLET ORAL at 09:11

## 2022-09-23 RX ADMIN — BUDESONIDE AND FORMOTEROL FUMARATE DIHYDRATE 1 PUFF: 80; 4.5 AEROSOL RESPIRATORY (INHALATION) at 19:41

## 2022-09-23 RX ADMIN — SODIUM CHLORIDE, PRESERVATIVE FREE 10 ML: 5 INJECTION INTRAVENOUS at 09:20

## 2022-09-23 RX ADMIN — LOPERAMIDE HYDROCHLORIDE 2 MG: 2 CAPSULE ORAL at 09:41

## 2022-09-23 RX ADMIN — CEFTRIAXONE SODIUM 2000 MG: 10 INJECTION, POWDER, FOR SOLUTION INTRAVENOUS at 12:08

## 2022-09-23 RX ADMIN — ENOXAPARIN SODIUM 30 MG: 100 INJECTION SUBCUTANEOUS at 09:11

## 2022-09-23 RX ADMIN — ANTI-FUNGAL POWDER MICONAZOLE NITRATE TALC FREE: 1.42 POWDER TOPICAL at 20:49

## 2022-09-23 RX ADMIN — Medication 1 CAPSULE: at 12:08

## 2022-09-23 RX ADMIN — MIDODRINE HYDROCHLORIDE 10 MG: 5 TABLET ORAL at 16:54

## 2022-09-23 RX ADMIN — LEVOTHYROXINE SODIUM 75 MCG: 75 TABLET ORAL at 09:10

## 2022-09-23 RX ADMIN — Medication 1 CAPSULE: at 09:10

## 2022-09-23 RX ADMIN — FENTANYL CITRATE 50 MCG: 50 INJECTION, SOLUTION INTRAMUSCULAR; INTRAVENOUS at 09:41

## 2022-09-23 RX ADMIN — SODIUM CHLORIDE: 9 INJECTION, SOLUTION INTRAVENOUS at 16:59

## 2022-09-23 RX ADMIN — Medication 1 CAPSULE: at 16:54

## 2022-09-23 RX ADMIN — BUDESONIDE AND FORMOTEROL FUMARATE DIHYDRATE 1 PUFF: 80; 4.5 AEROSOL RESPIRATORY (INHALATION) at 08:00

## 2022-09-23 RX ADMIN — ENOXAPARIN SODIUM 30 MG: 100 INJECTION SUBCUTANEOUS at 20:50

## 2022-09-23 RX ADMIN — ANTI-FUNGAL POWDER MICONAZOLE NITRATE TALC FREE: 1.42 POWDER TOPICAL at 09:18

## 2022-09-23 RX ADMIN — PANTOPRAZOLE SODIUM 40 MG: 40 TABLET, DELAYED RELEASE ORAL at 09:11

## 2022-09-23 RX ADMIN — MIDODRINE HYDROCHLORIDE 10 MG: 5 TABLET ORAL at 04:22

## 2022-09-23 RX ADMIN — SODIUM CHLORIDE, PRESERVATIVE FREE 10 ML: 5 INJECTION INTRAVENOUS at 20:50

## 2022-09-23 ASSESSMENT — PAIN DESCRIPTION - ORIENTATION
ORIENTATION: LEFT;RIGHT;POSTERIOR
ORIENTATION: RIGHT;LEFT;ANTERIOR
ORIENTATION: LEFT;RIGHT;ANTERIOR

## 2022-09-23 ASSESSMENT — ENCOUNTER SYMPTOMS
FACIAL SWELLING: 0
EYE REDNESS: 0
SINUS PRESSURE: 0
DIARRHEA: 1
APNEA: 0
CONSTIPATION: 0
COUGH: 0
BACK PAIN: 0
WHEEZING: 0
DIARRHEA: 0
ABDOMINAL DISTENTION: 0
EYE PAIN: 0
BLOOD IN STOOL: 0
SHORTNESS OF BREATH: 0
SORE THROAT: 0
EYE DISCHARGE: 0
VOICE CHANGE: 0
NAUSEA: 0
VOMITING: 0
ABDOMINAL PAIN: 0
EYE ITCHING: 0

## 2022-09-23 ASSESSMENT — PAIN DESCRIPTION - LOCATION
LOCATION: BREAST

## 2022-09-23 ASSESSMENT — PAIN DESCRIPTION - DESCRIPTORS
DESCRIPTORS: ACHING;DISCOMFORT
DESCRIPTORS: SHARP;SORE
DESCRIPTORS: DISCOMFORT

## 2022-09-23 ASSESSMENT — PAIN SCALES - GENERAL
PAINLEVEL_OUTOF10: 3
PAINLEVEL_OUTOF10: 8
PAINLEVEL_OUTOF10: 3
PAINLEVEL_OUTOF10: 0

## 2022-09-23 ASSESSMENT — PAIN DESCRIPTION - FREQUENCY: FREQUENCY: CONTINUOUS

## 2022-09-23 ASSESSMENT — PAIN DESCRIPTION - ONSET: ONSET: ON-GOING

## 2022-09-23 ASSESSMENT — PAIN SCALES - WONG BAKER: WONGBAKER_NUMERICALRESPONSE: 0

## 2022-09-23 ASSESSMENT — PAIN DESCRIPTION - PAIN TYPE: TYPE: CHRONIC PAIN

## 2022-09-23 NOTE — PROGRESS NOTES
Physical Therapy  DATE: 2022  NAME: Denita Zimmer  MRN: [id number]   : 1980    Discharge Recommendations: Further therapy recommended at discharge. Subjective: Pt required max encouragement for participation , reports feeling fatigue and pain form recent wound care. Agreeable to supine exercises only this date. Pain: 7/10  Patient follows: Most Commands  Is patient on ventilator: No  Is patient on sedation: No  Precautions:  General    Therapeutic exercises:  UE/LE(s)  Upper extremity exercises: Bicep curl, shoulder flexion/extension, punches, tricep curl, shoulder abduction/adduction. Reps: 10 AROM  Ankle pumps, heel slide, short arc quads, straight leg raises. Reps 10  A/AAROM to achieve full range. Limited by pain     bilateral gastrocnemius stretching 3 reps x 20 seconds    Goals  Short Term Goals  Short term goal 1: Pt will perform sit<>stand transfer with min-A. Short term goal 2: Pt will perform bed mobility with min-A. Short term goal 3: Pt will ambulate 30 feet with a RW and CGA. Short term goal 4: Pt will demonstrate fair standing balance to decrease fall risk. Short term goal 5: Pt will tolerate a 35 minute therapy session to promote increased endurance. Plan: Progress functional mobility as medically appropriate.    Time In:1121  Time Out: 1145  Time Coded Minutes (treatment minutes): 24  Rehab Potential: Good  Treatments/week: 5-6x/wk    Franci Bañuelos PTA

## 2022-09-23 NOTE — PLAN OF CARE
Problem: Discharge Planning  Goal: Discharge to home or other facility with appropriate resources  9/23/2022 1747 by Janette Gonzalez RN  Outcome: Progressing  8/65/9249 4750 by David Nina RN  Outcome: Progressing     Problem: Respiratory - Adult  Goal: Achieves optimal ventilation and oxygenation  9/23/2022 1747 by Janette Gonzalez RN  Outcome: Progressing  7/19/4849 7423 by David Nina RN  Outcome: Progressing     Problem: Safety - Medical Restraint  Goal: Remains free of injury from restraints (Restraint for Interference with Medical Device)  Description: INTERVENTIONS:  1. Determine that other, less restrictive measures have been tried or would not be effective before applying the restraint  2. Evaluate the patient's condition at the time of restraint application  3. Inform patient/family regarding the reason for restraint  4. Q2H: Monitor safety, psychosocial status, comfort, nutrition and hydration  9/23/2022 1747 by Janette Gonzalez RN  Outcome: Progressing  9/92/6075 9631 by David Nina RN  Outcome: Progressing     Problem: Pain  Goal: Verbalizes/displays adequate comfort level or baseline comfort level  9/23/2022 1747 by Janette Gonzalez RN  Outcome: Progressing  4/76/4106 1584 by David Nina RN  Outcome: Progressing     Problem: Confusion  Goal: Confusion, delirium, dementia, or psychosis is improved or at baseline  Description: INTERVENTIONS:  1. Assess for possible contributors to thought disturbance, including medications, impaired vision or hearing, underlying metabolic abnormalities, dehydration, psychiatric diagnoses, and notify attending LIP  2. [place] high risk fall precautions, as indicated  3. Provide frequent short contacts to provide reality reorientation, refocusing and direction  4. Decrease environmental stimuli, including noise as appropriate  5. Monitor and intervene to maintain adequate nutrition, hydration, elimination, sleep and activity  6.  If unable to ensure safety without constant attention obtain sitter and review sitter guidelines with assigned personnel  7. Initiate Psychosocial CNS and Spiritual Care consult, as indicated  9/23/2022 1747 by Joseph Crenshaw RN  Outcome: Progressing  2/64/7714 2136 by Ward Bee RN  Outcome: Progressing     Problem: Nutrition Deficit:  Goal: Optimize nutritional status  9/23/2022 1747 by Joseph Crenshaw RN  Outcome: Progressing  4/48/8142 6620 by Ward Bee RN  Outcome: Progressing     Problem: Skin/Tissue Integrity  Goal: Absence of new skin breakdown  Description: 1. Monitor for areas of redness and/or skin breakdown  2. Assess vascular access sites hourly  3. Every 4-6 hours minimum:  Change oxygen saturation probe site  4. Every 4-6 hours:  If on nasal continuous positive airway pressure, respiratory therapy assess nares and determine need for appliance change or resting period.   9/23/2022 1747 by Joseph Crenshaw RN  Outcome: Progressing  6/32/2182 8086 by Ward Bee RN  Outcome: Progressing     Problem: Safety - Adult  Goal: Free from fall injury  9/23/2022 1747 by Joseph Crenshaw RN  Outcome: Progressing  3/95/8428 8252 by Ward Bee RN  Outcome: Progressing     Problem: ABCDS Injury Assessment  Goal: Absence of physical injury  9/23/2022 1747 by Joseph Crenshaw RN  Outcome: Progressing  8/90/1088 7521 by Ward Bee RN  Outcome: Progressing

## 2022-09-23 NOTE — PLAN OF CARE
Problem: Discharge Planning  Goal: Discharge to home or other facility with appropriate resources  Outcome: Progressing     Problem: Respiratory - Adult  Goal: Achieves optimal ventilation and oxygenation  0/29/7063 6486 by Amee Fenton RN  Outcome: Progressing  9/22/2022 1636 by Nay Garza RCP  Outcome: Progressing     Problem: Safety - Medical Restraint  Goal: Remains free of injury from restraints (Restraint for Interference with Medical Device)  Description: INTERVENTIONS:  1. Determine that other, less restrictive measures have been tried or would not be effective before applying the restraint  2. Evaluate the patient's condition at the time of restraint application  3. Inform patient/family regarding the reason for restraint  4. Q2H: Monitor safety, psychosocial status, comfort, nutrition and hydration  Outcome: Progressing     Problem: Pain  Goal: Verbalizes/displays adequate comfort level or baseline comfort level  Outcome: Progressing     Problem: Confusion  Goal: Confusion, delirium, dementia, or psychosis is improved or at baseline  Description: INTERVENTIONS:  1. Assess for possible contributors to thought disturbance, including medications, impaired vision or hearing, underlying metabolic abnormalities, dehydration, psychiatric diagnoses, and notify attending LIP  2. [place] high risk fall precautions, as indicated  3. Provide frequent short contacts to provide reality reorientation, refocusing and direction  4. Decrease environmental stimuli, including noise as appropriate  5. Monitor and intervene to maintain adequate nutrition, hydration, elimination, sleep and activity  6. If unable to ensure safety without constant attention obtain sitter and review sitter guidelines with assigned personnel  7.  Initiate Psychosocial CNS and Spiritual Care consult, as indicated  Outcome: Progressing     Problem: Nutrition Deficit:  Goal: Optimize nutritional status  Outcome: Progressing

## 2022-09-23 NOTE — PROGRESS NOTES
[facility] Wound Ostomy Continence Nurse  Follow Up      NAME:  Julia Metcalf  MEDICAL RECORD NUMBER:  [id number]  AGE: 38 y.o. GENDER: female  : 1980  TODAY'S DATE:  2022    Subjective:     Reason for WOCN Evaluation and Assessment: \"multiple wounds to back, breasts\"      Bryce Paz is a 38 y.o. female referred by:   [x] Physician  [] Nursing  [] Other:      Wound Identification:  Wound Type: pressure and caustic dermatitis  Contributing Factors: chronic pressure, decreased mobility, shear force, obesity, incontinence of stool, incontinence of urine, poor hygiene, and non-adherence        Patient was found to be obtunded. Down time in bed prolonged but unknown exact amount. Severe buttock, groin, posterior thigh incontinence associated dermatitis, torso and extremity fold intertrigo, pressure injures of the breasts, left upper arm, and bilateral buttock/ischial tuberosities.                                                    Objective:      BP (!) 108/52   Pulse (!) 105   Temp 97.7 °F (36.5 °C) (Oral)   Resp 25   Ht 5' 7\" (1.702 m)   Wt (!) 320 lb (145.2 kg)   SpO2 96%   BMI 50.12 kg/m²   Facundo Risk Score: Facundo Scale Score: 10    LABS    CBC:   Lab Results   Component Value Date/Time    WBC 6.5 2022 05:42 AM    RBC 2.49 2022 05:42 AM    HGB 7.1 2022 05:42 AM     CMP:  Albumin:    Lab Results   Component Value Date/Time    LABALBU 1.3 2022 05:42 AM     PT/INR:    Lab Results   Component Value Date/Time    PROTIME 11.1 09/10/2022 08:38 PM    INR 1.0 09/10/2022 08:38 PM     HgBA1c:    Lab Results   Component Value Date/Time    LABA1C 5.8 2022 06:44 AM     PTT: No components found for: LABPTT      Assessment:       Measurements:     22 1005   Wound 09/10/22 Breast Left   Date First Assessed/Time First Assessed: 09/10/22 1730   Present on Hospital Admission: Yes  Primary Wound Type: Pressure Injury  Location: Breast  Wound Location Orientation: Left   Wound Image Wound Etiology Pressure Stage 3   Dressing Status New dressing applied   Wound Cleansed Soap and water   Dressing/Treatment Hydrofiber Ag; Foam   Dressing Change Due 09/24/22   Wound Assessment Subcutaneous;Pink/red   Drainage Amount Moderate   Drainage Description Serous   Odor None   Felicia-wound Assessment Intact;Fragile   Wound 09/10/22 Other (Comment) mammary folds bilaterally   Date First Assessed/Time First Assessed: 09/10/22 1730   Present on Hospital Admission: Yes  Primary Wound Type: Other (comment)  Location: Other (Comment)  Wound Description (Comments): mammary folds bilaterally   Wound Image     Wound Etiology Other   Dressing Status New dressing applied   Wound Cleansed Soap and water   Dressing/Treatment Hydrofiber Ag; Other (comment)  (folded pillow cases)   Dressing Change Due 09/24/22   Wound Assessment Subcutaneous;Pink/red;Epithelialization   Drainage Amount Small   Drainage Description Serosanguinous   Odor None   Felicia-wound Assessment Maceration;Fragile   Wound 09/10/22 Breast Right   Date First Assessed/Time First Assessed: 09/10/22 1730   Present on Hospital Admission: Yes  Primary Wound Type: Pressure Injury  Location: Breast  Wound Location Orientation: Right   Wound Image    Wound Etiology Pressure Stage 3   Dressing Status New dressing applied   Wound Cleansed Soap and water   Dressing/Treatment Hydrofiber Ag; Foam   Dressing Change Due 09/24/22   Wound Assessment Subcutaneous;Pink/red   Drainage Amount Small   Drainage Description Serosanguinous   Odor None   Felicia-wound Assessment Intact   Wound 09/10/22 Rib Cage Left;Lateral   Date First Assessed/Time First Assessed: 09/10/22 1530   Present on Hospital Admission: Yes  Primary Wound Type: Pressure Injury  Location: (c) Rib Cage  Wound Location Orientation: Left;Lateral   Wound Image    Wound Etiology Other   Dressing Status New dressing applied   Wound Cleansed Soap and water   Dressing/Treatment Hydrofiber Ag; Foam   Dressing Change Due 09/24/22   Wound Assessment Superficial;Pink/red;Fibrinous   Drainage Amount Moderate   Drainage Description Serous   Odor None   Felicia-wound Assessment Fragile;Blanchable erythema   Wound 09/10/22 Buttocks incontinence associated dermatitis   Date First Assessed/Time First Assessed: 09/10/22 1530   Present on Hospital Admission: Yes  Primary Wound Type: Pressure Injury  Location: Buttocks  Wound Description (Comments): incontinence associated dermatitis   Wound Image    Wound Etiology Other  (severe incontinence associated dermatitis and ischial ST 3 pressure injuries)   Dressing Status New dressing applied   Wound Cleansed Soap and water   Dressing/Treatment Triad hydro/zinc oxide-based hydrophilic paste   Dressing Change Due 09/24/22   Wound Assessment Subcutaneous;Pink/red;Denuded   Drainage Amount Moderate   Drainage Description Serous   Odor None   Felicia-wound Assessment Dry/flaky; Blanchable erythema;Fragile   Wound 09/12/22 Arm Left;Proximal   Date First Assessed/Time First Assessed: 09/12/22 1600   Present on Hospital Admission: Yes  Primary Wound Type: Pressure Injury  Location: Arm  Wound Location Orientation: Left;Proximal   Wound Image    Dressing Status Clean;Dry; Intact   Dressing/Treatment Foam   Dressing Change Due 09/24/22   Wound Length (cm) 1.8 cm   Wound Width (cm) 1.8 cm   Wound Depth (cm) 0.1 cm   Wound Surface Area (cm^2) 3.24 cm^2   Change in Wound Size % (l*w) 91.9   Wound Volume (cm^3) 0.324 cm^3   Wound Healing % 96   Wound Assessment Superficial;Pink/red   Drainage Amount Scant   Drainage Description Serous   Odor None   Felicia-wound Assessment Intact   Wound 09/12/22 Thigh Left;Medial severe intertrigo   Date First Assessed/Time First Assessed: 09/12/22 1705   Present on Hospital Admission: Yes  Location: Thigh  Wound Location Orientation: Left;Medial  Wound Description (Comments): severe intertrigo   Wound Image    Wound Etiology Other   Dressing Status New dressing applied   Wound Cleansed Soap and water   Dressing/Treatment Foam   Dressing Change Due 09/26/22   Wound Assessment Superficial;Pink/red   Drainage Amount Scant   Drainage Description Serosanguinous   Odor None   Felicia-wound Assessment Intact          Response to treatment:  Well tolerated by patient. Plan:      Plan of Care: Turn every 2 hours  Float heels off of bed with pillows under calves  Use lift sling to reposition patient to minimize potential for shear injury. Breasts: Opticell Ag gelling fiber to the wound beds, cover with Mepilex Sacrum Foam (large) Change daily  Breast folds/chest: Opticell Ag gelling fiber to the wound beds, separate the inframammary folds with DriGo HP cloths or folded pillow cases. Change daily and prn soilage  Abdominal fold: DriGo HP cloths. Change at least every 5 days and prn solage  Medial thighs: Left medial thigh cover with a Mepilex silicone foam. Change daily. Buttock and left flank: Triad Hydrophilic Wound Dressing Paste. Daily and prn hygiene.          Specialty Bed Required : Yes   [x] Low Air Loss   [x] Pressure Redistribution  [] Fluid Immersion  [x] Bariatric  [] Total Pressure Relief  [] Other:      Discharge Plan:  tbd     Patient/Caregiver Teaching:        [] Indicates understanding                [] Needs reinforcement  [] Unsuccessful                                  [] Verbal Understanding  [] Demonstrated understanding        [x] No evidence of learning  [] Refused teaching                           [] N/A              Electronically signed by Sofía Dye RN, [employer] on 9/23/2022 at 11:26 AM

## 2022-09-23 NOTE — CARE COORDINATION
Spoke to Latisha at [facility] and [employer].  They are currently reviewing referral

## 2022-09-23 NOTE — PROGRESS NOTES
Received information from [facility] nurse today that pt  called and stated that pt had a wedding ring on that was placed in a bag while pt was on car 3 in room 3003. Writer called unit and asked rn to check room for said ring. The ring was not in the room. Writer will check through patients belongings again in search of ring but if not found, a report will need to be filed with security.

## 2022-09-23 NOTE — PROGRESS NOTES
[facility]  Office: [address], DO, Jessica Woody, DO, Robel Hinton, DO, Melissa Pak Blood, DO, Alex Swain MD, Keagan Baugh MD, Yuri Tovar MD, Donal Gold MD,  Radha Barbosa MD, Parker Monaco MD, Bryn Layer, DO, Reid Lindsay MD,  Jose Chaudhari MD, Joyce Welsh MD, Rosales Danielle, DO, Jing Brooks MD, Suzie Rutledge MD, Boaz Roth MD, Manny Conti MD, Isael Mae MD, Ca Veras MD, Jon Sanders, DO, Rebecca Iraheta MD, Tejal Forbes MD, Lee Diana [facility],  Harjinder Grigsby, CNP, Craig Sever, CNP, Quang Turner, CNP,  Edyta Alcantar, [facility], Anu Grissom, CNP, Lora Crespo, CNP, Brandon Hickey CNP, Alireza Woodard, [facility], [facility], CNP, Jesika Paul PA-C, Audrey Felipe, CNS, Gasper Alfaro, [facility], Verito Ruelas, CNP, Aida Reyes, [facility], Forrest Ruffin, [facility]      Daily Progress Note     Admit Date: 9/10/2022  Bed/Room No.  2011/2011-01  Admitting Physician : Yuri Tovar MD  Code Status :Full Code  Hospital Day:  LOS: 13 days   Chief Complaint:     Altered mental status    Principal Problem:    Septic shock [facility])  Active Problems:    Altered mental status    Acute kidney injury ([address]    Bandemia    Cellulitis of sacral region    Breast, fat necrosis    Gram-neg septicemia ([address]    Gram positive septicemia ([address]    Acute encephalopathy    Elevated procalcitonin    Chronic anemia    Acute respiratory failure with hypoxia (HCC)    Severe protein-calorie malnutrition ([address]    Simple chronic bronchitis (HCC)    Hypothyroid    Hypoalbuminemia    Systemic inflammatory response syndrome (SIRS) of infectious origin with acute organ failure ([address]  Resolved Problems:    * No resolved hospital problems. *    Subjective : Interval History/Significant events :  09/23/22    Patient is getting bedside dressing. Patient's RN, wound care nursing at bedside. Wounds are healing and improved. She remains afebrile. Patient continues to have loose bowels and diarrhea. Vitals - Stable afebrile tachycardia,  Labs -low Magnesium , low albumin. Low HGB     Nursing notes , Consults notes reviewed. Overnight events and updates discussed with Nursing staff . Background History:         Dona Stevens is 38 y.o. female  Who was admitted to the hospital on 9/10/2022 for treatment of Septic shock ([facility] Patient was admitted to the hospital on 9/10/2022. she was transferred to our facility from [facility] with altered mental status. Patient was found unresponsive at home soiled with feces and urine and multiple wounds and maggots in the wounds. Patient was found to have acute kidney injury. She lives at home with her  who travels frequently for living and is not home much.  was away from home for 1 week. Patient reported that she did not feel like going to the restroom as it was taxing and decided to urinate and defecate on bed. She progressively got worse and got confused. She did not call for any help. . Patient had poor mobility for a year. CT abdomen pelvis showed malposition of jejunal with small bowel obstruction and possible hepatic steatosis. She has known history of morbid obesity hypothyroidism, gastric fundoplication. Lab work showed hyponatremia, hyperkalemia, elevated procalcitonin. Patient was referred to our facility for concern for sepsis. Blood culture was positive for gram-negative rods and gram-positive cocci in clusters. CT abdomen was repeated and did not show any concern for bowel obstruction. Patient was intubated due to metabolic encephalopathy 63/66/1053. She was also treated with pressors. Urine culture positive for E. coli. Patient was treated with ceftriaxone and recommended to continue till 9/24/2022 to complete 2-week course. Patient also treated with a short course of daptomycin until 9/17/2022. PMH:  No past medical history on file. Allergies: Allergies   Allergen Reactions    Amoxicillin Hives    Robitussin Day-Night Value Nickolas [Phenylephrine-Diphenhyd-Dm-Gg] Hives and Rash      Medications :  lactobacillus, 1 capsule, Oral, TID WC  sodium chloride, 500 mL, IntraVENous, Once  pantoprazole, 40 mg, Oral, QAM AC  budesonide-formoterol, 1 puff, Inhalation, BID  lidocaine 1 % injection, 5 mL, IntraDERmal, Once  midodrine, 10 mg, Oral, q8h  enoxaparin, 30 mg, SubCUTAneous, BID  cefTRIAXone (ROCEPHIN) IV, 2,000 mg, IntraVENous, Q24H  sodium chloride flush, 5-40 mL, IntraVENous, 2 times per day  miconazole, , Topical, BID  levothyroxine, 75 mcg, Oral, Daily      Review of Systems   Review of Systems   Constitutional:  Positive for activity change. Negative for appetite change, chills, fatigue, fever and unexpected weight change. HENT:  Negative for congestion, mouth sores, postnasal drip, sinus pressure, sore throat and voice change. Eyes:  Negative for visual disturbance. Respiratory:  Negative for cough, shortness of breath and wheezing. Cardiovascular:  Negative for chest pain and palpitations. Gastrointestinal:  Negative for abdominal pain, blood in stool, constipation, diarrhea, nausea and vomiting. Endocrine: Negative for polyuria. Genitourinary:  Negative for difficulty urinating, dysuria, frequency and urgency. Musculoskeletal:  Negative for arthralgias, joint swelling and myalgias. Skin:  Positive for wound. Neurological:  Negative for dizziness, tremors, speech difficulty, light-headedness and headaches.    Objective :      Current Vitals : Temp: 98.8 °F (37.1 °C),  Heart Rate: 100, Resp: 27, BP: (!) 101/50, SpO2: 98 %  Last 24 Hrs Vitals   Patient Vitals for the past 24 hrs:   BP Temp Temp src Pulse Resp SpO2   09/23/22 0421 (!) 101/50 98.8 °F (37.1 °C) Oral 100 27 98 %   09/22/22 2336 (!) 98/40 98.8 °F (37.1 °C) Oral (!) 109 29 98 %   09/22/22 2136 -- -- -- -- 18 --   09/22/22 2106 -- -- -- -- 20 --   09/22/22 2038 (!) 90/40 98.8 °F (37.1 °C) Oral -- -- 97 %   09/22/22 2007 -- -- -- (!) 106 28 100 %   09/22/22 2000 -- -- -- 94 -- --   09/22/22 1555 (!) 106/50 98.2 °F (36.8 °C) Oral (!) 113 26 99 %   09/22/22 1150 120/60 98.2 °F (36.8 °C) Oral (!) 108 25 99 %   09/22/22 1053 -- -- -- 100 28 99 %   09/22/22 0820 (!) 110/53 98.5 °F (36.9 °C) Oral (!) 111 15 99 %       Intake / output   09/21 1901 - 09/23 0700  In: -   Out: 650 [Urine:650]  Physical Exam:  Physical Exam  Vitals and nursing note reviewed. Constitutional:       General: She is not in acute distress. Appearance: She is morbidly obese. She is not diaphoretic. HENT:      Head: Normocephalic and atraumatic. Nose:      Right Sinus: No maxillary sinus tenderness or frontal sinus tenderness. Left Sinus: No maxillary sinus tenderness or frontal sinus tenderness. Mouth/Throat:      Pharynx: No oropharyngeal exudate. Eyes:      General: No scleral icterus. Conjunctiva/sclera: Conjunctivae normal.      Pupils: Pupils are equal, round, and reactive to light. Neck:      Thyroid: No thyromegaly. Vascular: No JVD. Cardiovascular:      Rate and Rhythm: Normal rate and regular rhythm. Pulses:           Dorsalis pedis pulses are 2+ on the right side and 2+ on the left side. Heart sounds: Normal heart sounds. No murmur heard. Pulmonary:      Effort: Pulmonary effort is normal.      Breath sounds: Normal breath sounds. No wheezing or rales. Abdominal:      Palpations: Abdomen is soft. There is no mass. Tenderness: There is no abdominal tenderness. Musculoskeletal:      Cervical back: Full passive range of motion without pain and neck supple. Comments: Notable wounds in bilateral buttocks, left breast, right breast   Lymphadenopathy:      Head:      Right side of head: No submandibular adenopathy. Left side of head: No submandibular adenopathy. Cervical: No cervical adenopathy.    Skin:     General: Skin is warm.   Neurological:      Mental Status: She is alert and oriented to person, place, and time. Motor: No tremor. Psychiatric:         Behavior: Behavior is cooperative. Laboratory findings:    No results for input(s): WBC, HGB, HCT, PLT, SEDRATE, INR in the last 72 hours. Invalid input(s): PT    Recent Labs     09/21/22  0358   MG 1.5*       No results for input(s): PROT, LABALBU, LABA1C, C4WQIDJ, H8OKZXS, FT4, TSH, AST, ALT, LDH, GGT, ALKPHOS, BILITOT, BILIDIR, AMMONIA, AMYLASE, LIPASE, LACTATE, CHOL, HDL, LDLCHOLESTEROL, CHOLHDLRATIO, TRIG, VLDL, BNP, TROPONINI, CKTOTAL, CKMB, CKMBINDEX, RF, GEO in the last 72 hours. Specific Gravity, UA   Date Value Ref Range Status   09/10/2022 1.022 1.005 - 1.030 Final     Protein, UA   Date Value Ref Range Status   09/10/2022 1+ (A) NEGATIVE Final     RBC, UA   Date Value Ref Range Status   09/10/2022 5 TO 10 0 - 4 /HPF Final     Comment:     Reference range defined for non-centrifuged specimen. Bacteria, UA   Date Value Ref Range Status   09/10/2022 FEW (A) None Final     Nitrite, Urine   Date Value Ref Range Status   09/10/2022 NEGATIVE NEGATIVE Final     WBC, UA   Date Value Ref Range Status   09/10/2022 20 TO 50 0 - 5 /HPF Final     Leukocyte Esterase, Urine   Date Value Ref Range Status   09/10/2022 TRACE (A) NEGATIVE Final       Imaging / Clinical Data :-   CT HEAD WO CONTRAST    Result Date: 9/15/2022  No acute intracranial abnormality. XR CHEST PORTABLE    Result Date: 9/18/2022  No acute abnormality identified. Status post extubation. XR CHEST PORTABLE    Result Date: 9/16/2022  No acute process. XR CHEST PORTABLE    Result Date: 9/15/2022  No significant interval change. Support lines and tubes in place with no acute findings in the chest.     XR CHEST PORTABLE    Result Date: 9/14/2022  Stable chest.     XR CHEST PORTABLE    Result Date: 9/13/2022  Support lines and tubes remain in place.   Improved aeration at the lung bases. Clinical Course : gradually improving  Assessment and Plan  :        Sepsis with septic shock secondary to Gram negative and gram-positive bacteremia (Proteus, staph hominis, staph epidermidis -Rocephin with stop date 9/24/2022 -ID following. Continue local wound care. Multiple wounds with maggots -continue aggressive wound care. Morbid obesity BMI 50 -   Bedbound for > 6 months -we will need aggressive rehab at discharge. Psychiatry evaluation for depression. Hypothyroidism -Synthroid. CHULA resolved -avoid nephrotoxic agents  Metabolic encephalopathy - resolved  Severe malnutrition -  Diarrhea likely antibiotic associated-check stool for C. difficile due to high risk from hospitalization and antibiotic use. Will need SNF at discharge   Limit sedatives. Fenatnyl with dressing changes. CDIFF negative -Imodium as needed. Continue IVF. Probiotics  PT OT   Stable for discharge to skilled nursing facility when arranged. Continue to monitor vitals , Intake / output ,  Cell count , HGB , Kidney function, oxygenation  as indicated . Plan and updates discussed with patient ,  answers  explained to satisfaction.    Plan discussed with Staff 48 Carol Peng     (Please note that portions of this note were completed with a voice recognition program. Efforts were made to edit the dictations but occasionally words are mis-transcribed.)      Antonio Hendricks MD  9/23/2022

## 2022-09-24 PROBLEM — F33.1 MDD (MAJOR DEPRESSIVE DISORDER), RECURRENT EPISODE, MODERATE (HCC): Status: ACTIVE | Noted: 2022-09-24

## 2022-09-24 LAB
GLUCOSE BLD-MCNC: 69 MG/DL (ref 65–105)
GLUCOSE BLD-MCNC: 72 MG/DL (ref 65–105)

## 2022-09-24 PROCEDURE — 6370000000 HC RX 637 (ALT 250 FOR IP): Performed by: STUDENT IN AN ORGANIZED HEALTH CARE EDUCATION/TRAINING PROGRAM

## 2022-09-24 PROCEDURE — 90792 PSYCH DIAG EVAL W/MED SRVCS: CPT | Performed by: PSYCHIATRY & NEUROLOGY

## 2022-09-24 PROCEDURE — 82947 ASSAY GLUCOSE BLOOD QUANT: CPT

## 2022-09-24 PROCEDURE — 6360000002 HC RX W HCPCS: Performed by: INTERNAL MEDICINE

## 2022-09-24 PROCEDURE — 2580000003 HC RX 258: Performed by: STUDENT IN AN ORGANIZED HEALTH CARE EDUCATION/TRAINING PROGRAM

## 2022-09-24 PROCEDURE — 6370000000 HC RX 637 (ALT 250 FOR IP): Performed by: PSYCHIATRY & NEUROLOGY

## 2022-09-24 PROCEDURE — 6370000000 HC RX 637 (ALT 250 FOR IP)

## 2022-09-24 PROCEDURE — 94640 AIRWAY INHALATION TREATMENT: CPT

## 2022-09-24 PROCEDURE — 99232 SBSQ HOSP IP/OBS MODERATE 35: CPT | Performed by: INTERNAL MEDICINE

## 2022-09-24 PROCEDURE — 1200000000 HC SEMI PRIVATE

## 2022-09-24 PROCEDURE — 99232 SBSQ HOSP IP/OBS MODERATE 35: CPT | Performed by: STUDENT IN AN ORGANIZED HEALTH CARE EDUCATION/TRAINING PROGRAM

## 2022-09-24 PROCEDURE — 6360000002 HC RX W HCPCS: Performed by: STUDENT IN AN ORGANIZED HEALTH CARE EDUCATION/TRAINING PROGRAM

## 2022-09-24 PROCEDURE — 6370000000 HC RX 637 (ALT 250 FOR IP): Performed by: FAMILY MEDICINE

## 2022-09-24 PROCEDURE — 2500000003 HC RX 250 WO HCPCS: Performed by: INTERNAL MEDICINE

## 2022-09-24 PROCEDURE — 6370000000 HC RX 637 (ALT 250 FOR IP): Performed by: EMERGENCY MEDICINE

## 2022-09-24 PROCEDURE — 94761 N-INVAS EAR/PLS OXIMETRY MLT: CPT

## 2022-09-24 RX ORDER — VENLAFAXINE 75 MG/1
150 TABLET ORAL DAILY
Status: DISCONTINUED | OUTPATIENT
Start: 2022-09-24 | End: 2022-10-05 | Stop reason: HOSPADM

## 2022-09-24 RX ORDER — VENLAFAXINE 75 MG/1
150 TABLET ORAL DAILY
Qty: 90 TABLET | Refills: 3 | Status: SHIPPED | OUTPATIENT
Start: 2022-09-24

## 2022-09-24 RX ADMIN — VENLAFAXINE 150 MG: 75 TABLET ORAL at 17:26

## 2022-09-24 RX ADMIN — ENOXAPARIN SODIUM 30 MG: 100 INJECTION SUBCUTANEOUS at 08:35

## 2022-09-24 RX ADMIN — LEVOTHYROXINE SODIUM 75 MCG: 75 TABLET ORAL at 08:36

## 2022-09-24 RX ADMIN — CEFTRIAXONE SODIUM 2000 MG: 10 INJECTION, POWDER, FOR SOLUTION INTRAVENOUS at 12:06

## 2022-09-24 RX ADMIN — MIDODRINE HYDROCHLORIDE 10 MG: 5 TABLET ORAL at 11:55

## 2022-09-24 RX ADMIN — MIDODRINE HYDROCHLORIDE 10 MG: 5 TABLET ORAL at 00:56

## 2022-09-24 RX ADMIN — MIDODRINE HYDROCHLORIDE 10 MG: 5 TABLET ORAL at 19:10

## 2022-09-24 RX ADMIN — Medication 1 CAPSULE: at 08:35

## 2022-09-24 RX ADMIN — Medication 1 CAPSULE: at 11:56

## 2022-09-24 RX ADMIN — PANTOPRAZOLE SODIUM 40 MG: 40 TABLET, DELAYED RELEASE ORAL at 08:35

## 2022-09-24 RX ADMIN — SODIUM CHLORIDE, PRESERVATIVE FREE 10 ML: 5 INJECTION INTRAVENOUS at 08:36

## 2022-09-24 RX ADMIN — ANTI-FUNGAL POWDER MICONAZOLE NITRATE TALC FREE: 1.42 POWDER TOPICAL at 08:35

## 2022-09-24 RX ADMIN — ANTI-FUNGAL POWDER MICONAZOLE NITRATE TALC FREE: 1.42 POWDER TOPICAL at 19:58

## 2022-09-24 RX ADMIN — BUDESONIDE AND FORMOTEROL FUMARATE DIHYDRATE 1 PUFF: 80; 4.5 AEROSOL RESPIRATORY (INHALATION) at 19:46

## 2022-09-24 RX ADMIN — Medication 1 CAPSULE: at 17:27

## 2022-09-24 RX ADMIN — BUDESONIDE AND FORMOTEROL FUMARATE DIHYDRATE 1 PUFF: 80; 4.5 AEROSOL RESPIRATORY (INHALATION) at 08:37

## 2022-09-24 RX ADMIN — ENOXAPARIN SODIUM 30 MG: 100 INJECTION SUBCUTANEOUS at 19:58

## 2022-09-24 RX ADMIN — ONDANSETRON 4 MG: 4 TABLET, ORALLY DISINTEGRATING ORAL at 19:10

## 2022-09-24 ASSESSMENT — ENCOUNTER SYMPTOMS
BLOOD IN STOOL: 0
VOICE CHANGE: 0
DIARRHEA: 1
NAUSEA: 1
SORE THROAT: 0
SHORTNESS OF BREATH: 0
NAUSEA: 0
COLOR CHANGE: 0
CONSTIPATION: 0
ABDOMINAL DISTENTION: 0
SINUS PRESSURE: 0
DIARRHEA: 0
BACK PAIN: 0
EYE REDNESS: 0
COUGH: 0
EYE PAIN: 0
EYE DISCHARGE: 0
ABDOMINAL PAIN: 0
VOMITING: 0
CHEST TIGHTNESS: 0
WHEEZING: 0
RHINORRHEA: 0

## 2022-09-24 NOTE — PROGRESS NOTES
[facility] -   Infectious diseases evaluation  admission date 9/10/2022     reason for consultation:   Sepsis      Impression :   Current:  Sepsis with septic shock  Altered mentation acute encephalopathy: Resolved  CHULA: Resolved  Bandemia  Lactic acidosis  Elevated procalcitonin   Gram-negative and gram-positive bacteremia-[place] [facility]  Proteus R cipro bactrim - S ceftriaxone ampicillin  Staph hominis R ancef   Staph epi R ancef  U cx [facility] 9/9 e coli S cipro  ceftriaxone ampicillin  Bilat breasts infected / necrotic wounds - maggots  Sacral sloughed skin from moisture  Hyperglycemia  Morbd obesity-BMI 50  COPD  CHULA -creatinine: 1.06-0.96     Discussion / summary of stay / plan of care   Sepsis of unclear cause, with septic shock and elevated procalcitonin at 12. Very concerning for gram-negative organisms and hence a UTI is of a concern  CT abdomen pelvis at [facility]. [facility] is negative for any bowel obstruction although it was a concern in [facility]  The urine analysis at [facility] is normal but I am not clear as of the UA at [facility]  The bacteremia with gram-negative and gram-positive is also of a concern, pending at [facility]   currently working on placement. Recommendations      - Rocephin: 2 gm Iv q 24 Hr. Stop date 9/24   Source of the proteus septicemia is likely the wounds  - Daptomycin: Completed 9/16- ck elevated then back to normal  - Wound care- wounds much healthier and breast cellulitis resolved  - diarrhea c diff neg 9/21 -resolved on probiotics  - Neg nasal MRSA swab      - Probiotics increased to 3 times daily  -prolonged .      Infection Control Recommendations   [place] Precautions  Contact Isolation         Antimicrobial Stewardship Recommendations   Simplification of therapy  Targeted therapy        History of Present Illness:   Initial history:  Marbella Paredes is a 38y.o.-year-old female found unresponsive at home was taken to [facility], found to have stools on many of her wounds and maggots. CT of the abdomen showed concern for small bowel obstruction  She had hyponatremia with a sodium 128, leukocytosis elevated procalcitonin and lactic acid  Any blood culture came back positive for gram-negative rods gram-positive cocci clusters  CHULA  Transferred to us with concern of septic shock and sepsis-General surgery on board due to the concern of small bowel obstruction     After transfer to [facility]. Cristi's CT scan of the abdomen pelvis showed no pneumonia on the lower lobes and no acute abdomen. Patient is on antibiotics, infectious consulted for opinion. The urine analysis on 9/10 at [facility] is not suggestive of infection-but I do not have the UA or the urine culture off from [address] [place] [address] urinalysis seems to be very contaminated with epithelial cells                          Interval changes  9/19/2022   BP (!) 98/42   Pulse 96   Temp 98.6 °F (37 °C) (Oral)   Resp 27   Ht 5' 7\" (1.702 m)   Wt (!) 320 lb (145.2 kg)   SpO2 97%   BMI 50.12 kg/m²       Blood cultures from [facility] are back with Proteus and 2 strains of staph coagulase-negative  U cx over there showed E coli multiS  AB switched accordingly     All the wounds are evaluated with wound care on the bedside, the back has multiple open ulcers but no clear cellulitis at this point     [address] OFF  The breasts had macerated wounds and the necrotic superficial skin is sloughing, no deep induration in both breasts, both being pressure wounds     9/16 extubated and appropriate -lungs clear -  Abd soft non tender  No rash    9/19 patient is currently resting in bed post wound care. After talking with wound care they said that the wounds are improving. Abdomen was soft nontender. Patient did complain of diarrhea today. 9/20 patient is currently resting in bed.   Patient states that she has had intermittent diarrhea for the last couple of days. Does say that her pain is getting better with her wounds and that is also concurrent with wound care. Patient had no acute concerns at this time.  is currently working on placement. 9/21: Patient is currently in bed, vitals are stable patient is a little tachycardic today in the low 100s. However patient was just finishing up with physical therapy. Patient is no longer complaining of diarrhea episodes. Patient states that pain is decreasing and that wounds seem to be healing. Wound care is on.  is still working on referrals to skilled nursing rehab facilities. 9/22: On examination patient is resting in bed, patient has heart rate in the 110s all other vitals are unremarkable. Patient complains of another bout of diarrhea this morning, C. difficile was negative. Probiotics were increased to 3 times daily. ECG was ordered to check QTC. QTC resulted at 490. Rocephin is currently being administered until 9/24. Discharge is currently being planned. 9/23: Alert appropriate, eating better, not short of breath, no diarrhea. Labs reviewed    Summary of relevant labs:    Labs:  sodium 134  leukocytosis: 13.1-10.8-9.0-7.1-9.2-9.2-6.5  elevated procalcitonin   Elevated lactic acid 2.5, 9/11/2022  Creat: 1.10-1.06-0.96-0.81-0.96-1.06-0.96  -136 improved  Magnesium: 1.5    Micro:  BC GNR and CPC clusters at the [facility]  UA neg 9/10   MRSA nasal swab negative  blood culture 9/10      Imaging:  BREASTS US neg for abscess  CT scan of the abdomen pelvis showed no pneumonia on the lower lobes and no acute abdomen. I have personally reviewed the past medical history, past surgical history, medications, social history, and family history, and I haveupdated the database accordingly.         Allergies:   Amoxicillin and Robitussin day-night value lawrence [phenylephrine-diphenhyd-dm-gg]      Review of Systems: Review of Systems   Constitutional:  Negative for appetite change, chills, diaphoresis, fatigue and fever. HENT:  Negative for congestion, ear discharge and facial swelling. Eyes:  Negative for pain, discharge, redness, itching and visual disturbance. Respiratory:  Negative for apnea, cough and shortness of breath. Cardiovascular:  Negative for chest pain and leg swelling. Gastrointestinal:  Positive for diarrhea. Negative for abdominal distention and abdominal pain. Patient is no longer having diarrhea episodes. Endocrine: Negative for polyphagia. Genitourinary:  Negative for difficulty urinating and dyspareunia. Musculoskeletal:  Negative for arthralgias and back pain. Allergic/Immunologic: Negative for immunocompromised state. Neurological:  Negative for dizziness and light-headedness. Hematological:  Negative for adenopathy. Psychiatric/Behavioral:  Negative for agitation. Physical Examination :   Physical Exam  Constitutional:       General: She is not in acute distress. Appearance: She is obese. She is not ill-appearing, toxic-appearing or diaphoretic. HENT:      Head: Normocephalic and atraumatic. Right Ear: External ear normal.      Left Ear: External ear normal.      Nose: Nose normal. No congestion. Mouth/Throat:      Mouth: Mucous membranes are moist.      Pharynx: Oropharynx is clear. Eyes:      Extraocular Movements: Extraocular movements intact. Conjunctiva/sclera: Conjunctivae normal.   Cardiovascular:      Rate and Rhythm: Regular rhythm. Tachycardia present. Pulses: Normal pulses. Heart sounds: Normal heart sounds. Comments: Heart rate at bedside and was in the 110s. Pulmonary:      Effort: Pulmonary effort is normal. No respiratory distress. Breath sounds: Normal breath sounds. No wheezing. Abdominal:      General: Bowel sounds are normal. There is no distension. Palpations: Abdomen is soft. Tenderness: There is abdominal tenderness. Comments: Patient has mild abdominal tenderness on palpation. Musculoskeletal:         General: No swelling or tenderness. Cervical back: Normal range of motion and neck supple. No rigidity. Skin:     General: Skin is warm. Findings: Lesion present. Comments: Patient has bilateral breast fullness and buttocks wound. Wounds are improving per wound care   Neurological:      General: No focal deficit present. Mental Status: She is alert and oriented to person, place, and time. Sensory: No sensory deficit. Psychiatric:         Mood and Affect: Mood normal.         Behavior: Behavior normal.          Past Medical History:   Past Medical History   No past medical history on file. Past Surgical  History:   Past Surgical History   No past surgical history on file.         Medications:      Scheduled Medications    budesonide-formoterol  1 puff Inhalation BID    lidocaine 1 % injection  5 mL IntraDERmal Once    sodium chloride flush  5-40 mL IntraVENous 2 times per day    midodrine  10 mg Oral q8h    enoxaparin  30 mg SubCUTAneous BID    cefTRIAXone (ROCEPHIN) IV  2,000 mg IntraVENous Q24H    sodium chloride flush  5-40 mL IntraVENous 2 times per day    pantoprazole (PROTONIX) 40 mg injection  40 mg IntraVENous Daily    miconazole   Topical BID    levothyroxine  75 mcg Oral Daily            Social History:      Social History               Socioeconomic History    Marital status:        Spouse name: Not on file    Number of children: Not on file    Years of education: Not on file    Highest education level: Not on file   Occupational History    Not on file   Tobacco Use    Smoking status: Not on file    Smokeless tobacco: Not on file   Substance and Sexual Activity    Alcohol use: Not on file    Drug use: Not on file    Sexual activity: Not on file   Other Topics Concern    Not on file   Social History Narrative    Not on file Social Determinants of Health      Financial Resource Strain: Not on file   Food Insecurity: Not on file   Transportation Needs: Not on file   Physical Activity: Not on file   Stress: Not on file   Social Connections: Not on file   Intimate Partner Violence: Not on file   Housing Stability: Not on file            Family History:   Family History   No family history on file. Medical Decision Making:   I have independently reviewed/ordered the following labs:     CBC with Differential:        Recent Labs     09/18/22  0552 09/19/22  0644   WBC 9.2 9.2   HGB 7.4* 7.9*   HCT 22.8* 25.4*    216   LYMPHOPCT 27 24   MONOPCT 9 7         BMP:       Recent Labs     09/18/22  0552 09/19/22  0644    134*   K 4.1 4.0    101   CO2 24 24   BUN 24* 22*   CREATININE 0.96* 1.06*   MG 1.6 1.5*         Hepatic Function Panel:        Recent Labs     09/18/22  0552 09/19/22  0644   PROT 5.0* 5.1*   LABALBU 1.5* 1.3*   BILIDIR 0.1 0.1   IBILI 0.2 0.2   BILITOT 0.3 0.3   ALKPHOS 123* 116*   ALT 19 18   AST 27 23         No results for input(s): RPR in the last 72 hours. No results for input(s): HIV in the last 72 hours. No results for input(s): BC in the last 72 hours. Lab Results   Component Value Date/Time     CREATININE 1.06 09/19/2022 06:44 AM     GLUCOSE 68 09/19/2022 06:44 AM         Detailed results: Thank you for allowing us to participate in the care of this patient. Please call with questions. This note is created with the assistance of a speech recognition program.  While intending to generate adocument that actually reflects the content of the visit, the document can still have some errors including those of syntax and sound a like substitutions which may escape proof reading. It such instances, actual meaningcan be extrapolated by contextual diversion.            Angélica García, Infectious Diseases

## 2022-09-24 NOTE — PLAN OF CARE
Problem: Discharge Planning  Goal: Discharge to home or other facility with appropriate resources  4/33/1716 6435 by Osiris No RN  Outcome: Progressing  9/23/2022 1747 by Bhanu Goode RN  Outcome: Progressing     Problem: Respiratory - Adult  Goal: Achieves optimal ventilation and oxygenation  3/21/0975 1754 by Osiris No RN  Outcome: Progressing  9/23/2022 1747 by Bhanu Goode RN  Outcome: Progressing     Problem: Safety - Medical Restraint  Goal: Remains free of injury from restraints (Restraint for Interference with Medical Device)  Description: INTERVENTIONS:  1. Determine that other, less restrictive measures have been tried or would not be effective before applying the restraint  2. Evaluate the patient's condition at the time of restraint application  3. Inform patient/family regarding the reason for restraint  4. Q2H: Monitor safety, psychosocial status, comfort, nutrition and hydration  1/49/0627 8799 by Osiris No RN  Outcome: Progressing  9/23/2022 1747 by Bhanu Goode RN  Outcome: Progressing     Problem: Pain  Goal: Verbalizes/displays adequate comfort level or baseline comfort level  4/04/2853 9129 by Osiris No RN  Outcome: Progressing  9/23/2022 1747 by Bhanu Goode RN  Outcome: Progressing     Problem: Confusion  Goal: Confusion, delirium, dementia, or psychosis is improved or at baseline  Description: INTERVENTIONS:  1. Assess for possible contributors to thought disturbance, including medications, impaired vision or hearing, underlying metabolic abnormalities, dehydration, psychiatric diagnoses, and notify attending LIP  2. [place] high risk fall precautions, as indicated  3. Provide frequent short contacts to provide reality reorientation, refocusing and direction  4. Decrease environmental stimuli, including noise as appropriate  5. Monitor and intervene to maintain adequate nutrition, hydration, elimination, sleep and activity  6.  If unable to ensure safety without constant attention obtain sitter and review sitter guidelines with assigned personnel  7. Initiate Psychosocial CNS and Spiritual Care consult, as indicated  4/53/5834 9257 by Santos Bear RN  Outcome: Progressing  9/23/2022 1747 by Williams Curry RN  Outcome: Progressing     Problem: Nutrition Deficit:  Goal: Optimize nutritional status  8/05/8912 8335 by Santos Bear RN  Outcome: Progressing  9/23/2022 1747 by Williams Curry RN  Outcome: Progressing     Problem: Skin/Tissue Integrity  Goal: Absence of new skin breakdown  Description: 1. Monitor for areas of redness and/or skin breakdown  2. Assess vascular access sites hourly  3. Every 4-6 hours minimum:  Change oxygen saturation probe site  4. Every 4-6 hours:  If on nasal continuous positive airway pressure, respiratory therapy assess nares and determine need for appliance change or resting period.   6/99/0078 2326 by Santos Bear RN  Outcome: Progressing  9/23/2022 1747 by Williams Curry RN  Outcome: Progressing     Problem: Safety - Adult  Goal: Free from fall injury  5/35/5256 8191 by Santos Bear RN  Outcome: Progressing  9/23/2022 1747 by Williams Curry RN  Outcome: Progressing     Problem: ABCDS Injury Assessment  Goal: Absence of physical injury  6/87/3989 8755 by Santos Bear RN  Outcome: Progressing  9/23/2022 1747 by Williams Curry RN  Outcome: Progressing

## 2022-09-24 NOTE — FLOWSHEET NOTE
Patient turned, BM x 1. Moderate amount of soft stool. Dressings under breasts changed, barrier cream applied to multiple wound sites, new purewick provided. Patient tolerated procedure well. Will continue to monitor.

## 2022-09-24 NOTE — CONSULTS
Department of Psychiatry  Behavioral Health Consult    REASON FOR CONSULT: Severe Depression    CONSULTING PHYSICIAN: Dr. Loreto Lopez    History obtained from: Patient and chart    HISTORY OF PRESENT ILLNESS:    The patient is a 38 y.o. female with significant past psychiatric history of depression and a medical history significant for morbid obesity s/p gastric fundoplication, hypothyroidism, COPD who was found unresponsive at home covered in urine and stools with several scattered wounds and maggots in the lungs. The patient was noted to have acute kidney injury. A CT scan of her abdomen showed small bowel obstruction and possible hepatic steatosis. The patient was transferred to [facility] ICU for sepsis. Seen using telehealth  -The patient states that she is feeling better today. She admits to a history of depression. She has felt that life is not worth living and has made statements to that effect in the past.  The patient denies feeling suicidal at this time. She has been prescribed medication for depression. She was taking Effexor 75 mg daily. He has found it somewhat helpful. The patient expresses surprise at her physical condition at the time of admission. She states she just let herself go. She has been living with family members and it is not clear how she ended up in the state noted above. The patient denies any auditory or visual hallucinations. She is coherent in her speech. She is oriented to time place and person and does not appear to have any cognitive difficulties. The patient denies that anxiety is a significant issue      The patient is currently receiving care for the above psychiatric illness.       Psychiatric Review of Systems           Obsessions and Compulsions: Denies       Mariana or Hypomania: Denies     Hallucinations: Denies     Panic Attacks:  Denies     Delusions:  Denies     Phobias:  Denies     Trauma: Denies      Substance Abuse History:  ETOH: denies  Marijuana: denies  Opiates: denies  Other Drugs: denies      Past Psychiatric History:  Prior Diagnosis: Depressed  Two years or more  Hospitalization: no  Hx of Suicidal Attempts: no  Hx of violence:  no  ON effexor recently. Personal History: [place]. Has 3 kids. Lives with  and daughter. Doesn't know how she ended up with wounds. Past Medical History:    No past medical history on file. Past Surgical History:    No past surgical history on file. Medications Prior to Admission:   Medications Prior to Admission: metFORMIN (GLUCOPHAGE) 500 MG tablet, Take 500 mg by mouth 2 times daily (with meals)  oxybutynin (DITROPAN XL) 15 MG extended release tablet, Take 15 mg by mouth daily  [DISCONTINUED] ciprofloxacin (CIPRO) 250 MG tablet, Take 250 mg by mouth in the morning and 250 mg at noon and 250 mg in the evening. Allergies:  Amoxicillin and Robitussin day-night value lawrence [[id number]]    FAMILY/SOCIAL HISTORY:  No family history on file.   Social History     Socioeconomic History    Marital status:      Spouse name: Not on file    Number of children: Not on file    Years of education: Not on file    Highest education level: Not on file   Occupational History    Not on file   Tobacco Use    Smoking status: Not on file    Smokeless tobacco: Not on file   Substance and Sexual Activity    Alcohol use: Not on file    Drug use: Not on file    Sexual activity: Not on file   Other Topics Concern    Not on file   Social History Narrative    Not on file     Social Determinants of Health     Financial Resource Strain: Not on file   Food Insecurity: Not on file   Transportation Needs: Not on file   Physical Activity: Not on file   Stress: Not on file   Social Connections: Not on file   Intimate Partner Violence: Not on file   Housing Stability: Not on file       REVIEW OF SYSTEMS    Constitutional: [] fever  [] chills  [] weight loss  []weakness [] Other:  Eyes:  [] photophobia  [] discharge [] acuity change   [] Diplopia   [] Other:  HENT:  [] sore throat  [] ear pain [] Tinnitus   [] Other  Respiratory:  [] Cough  [] Shortness of breath   [] Sputum   [] Other:   Cardiac: []Chest pain   []Palpitations []Edema  []PND  [] Other:  GI:  []Abdominal pain   []Nausea  []Vomiting  []Diarrhea  [] Other:  :  [] Dysuria   []Frequency  []Hematuria  []Discharge  [] Other:  Possible Pregnancy: []Yes   []No   LMP:   Musculoskeletal:  []Back pain  []Neck pain  []Recent Injury   Skin:  []Rash  [] Itching  [] Other:  Neurologic:  [] Headache  [] Focal weakness  [] Sensory changes []Other:  Endocrine:  [] Polyuria  [] Polydipsia  [] Hair Loss  [] Other:  Lymphatic:   [] Swollen glands   Psychiatric:  As per HPI      All other systems negative except as marked or mentioned/indicated in the HPI. Nathalyn Push PHYSICAL EXAM:  Vitals:  BP (!) 111/56   Pulse (!) 112   Temp 97.9 °F (36.6 °C) (Oral)   Resp 26   Ht 5' 7\" (1.702 m)   Wt (!) 320 lb (145.2 kg)   SpO2 100%   BMI 50.12 kg/m²      Neuro Exam:      Involuntary Movements: No    Mental Status Examination:    Level of consciousness:  within normal limits   Appearance:  hospital attire  Behavior/Motor:  no abnormalities noted  Attitude toward examiner:  cooperative and attentive  Speech:  spontaneous, normal rate, and normal volume   Mood: depressed  Affect:  mood congruent  Thought processes:  linear, goal directed, and coherent   Thought content:  Suicidal Ideation:  denies suicidal ideation  Delusions:  no evidence of delusions  Perceptual Disturbance:  denies any perceptual disturbance  Cognition:  oriented to person, place, and time   Concentration intact  Memory intact  Insight fair   Judgement fair   Fund of Knowledge adequate        LABS: REVIEWED TODAY:  No results for input(s): WBC, HGB, PLT in the last 72 hours. No results for input(s): NA, K, CL, CO2, BUN, CREATININE, GLUCOSE in the last 72 hours.   No results for input(s): BILITOT, ALKPHOS, AST, ALT in the last 72 hours. No results found for: [place], LABBENZ, CANNAB, COCAINESCRN, LABMETH, OPIATESCREENURINE, PHENCYCLIDINESCREENURINE, PPXUR, ETOH  Lab Results   Component Value Date/Time    TSH 4.96 09/10/2022 08:38 PM     No results found for: LITHIUM  No results found for: VALPROATE, CBMZ  No results found for: LITHIUM, VALPROATE    FURTHER LABS ORDERED :      Radiology     ECHO Complete 2D W Doppler W Color    Result Date: 9/22/2022  Transthoracic Echocardiography Report (TTE)  Patient Name Ana Luisa Connolly Date of Study               09/22/2022               L   Date of      1980  Gender                      Female  Birth   Age          38 year(s)  Race                           Room Number  2011        Height:                     67 inch, 170.18 cm   Corporate ID [id number]    Weight:                     326 pounds, 147.9 kg  #   Patient Acct [de-identified]   BSA:          2.49 m^2      BMI:      51.06  #                                                              kg/m^2   MR #         [id number]     Preethi Benjamin   Accession #  [id number]  Interpreting Physician      [address]   Fellow                   Referring Nurse                           Practitioner   Interpreting             Referring Physician         Olaf Hernadez MD  Fellow  Additional Comments Technically difficult study. Type of Study   TTE procedure:2D Echocardiogram, M-Mode, Doppler, Color Doppler. Procedure Date Date: 09/22/2022 Start: 02:49 PM [facility] Technical Quality: Adequate visualization Indications:Dyspnea/SOB, LV Function and Tachycardia. History / Tech. Comments: Echo done at patient bedside. Procedure explained to patient. Patient Status: Inpatient Height: 67 inches Weight: 326 pounds BSA: 2.49 m^2 BMI: 51.06 kg/m^2 CONCLUSIONS Summary Left ventricle is normal in size.  Global left ventricular systolic function is difficult to assess due to heart rate but appears hyperdynamic. Calculated ejection fraction 70% by Patel's method. Left atrium is normal in size. Right atrium is normal in size. Normal right ventricular size and function. No significant valvular abnormalities. Signature ----------------------------------------------------------------------------  Electronically signed by Diane Anderson(Sonographer) on 09/22/2022  03:18 PM ---------------------------------------------------------------------------- ----------------------------------------------------------------------------  Electronically signed by Grover Mark(Interpreting physician) on 09/22/2022  03:33 PM ---------------------------------------------------------------------------- FINDINGS Left Atrium Left atrium is normal in size. Left Ventricle Left ventricle is normal in size. Global left ventricular systolic function is difficult to assess due to heart rate but appears hyperdynamic. Calculated ejection fraction 70% by Patel's method. Right Atrium Right atrium is normal in size. Right Ventricle Normal right ventricular size and function. Mitral Valve Normal mitral valve structure and function. No mitral regurgitation. Aortic Valve Aortic valve is trileaflet and opens adequately. No aortic insufficiency. Tricuspid Valve Normal tricuspid valve structure and function. No tricuspid regurgitation. Pulmonic Valve The pulmonic valve is normal in structure. No pulmonic insufficiency. Pericardial Effusion No pericardial effusion seen. Miscellaneous E/E' average = 9.0. IVC normal diameter & inspiratory collapse indicating normal RA filling pressure .  M-mode / 2D Measurements & Calculations:   LVIDd:4.4 cm(3.7 - 5.6 cm)       Diastolic MCURPM:30.9 ml  VPHPN:2.8 cm(2.2 - 4.0 cm)       Systolic WYTDMQ:0.30 ml  IVSd:1 cm(0.6 - 1.1 cm)          Aortic Root:2.6 cm(2.0 - 3.7 cm)  LVPWd:1 cm(0.6 - 1.1 cm)         LA Dimension: 3.2 cm(1.9 - 4.0 cm)  Fractional Shortenin.55 %    LA volume/Index: 26 ml /10m^2  Calculated LVEF (%): 70.63 %     LVOT:1.6 cm                                   RVDd:2.6 cm   Mitral:                                 Aortic   Valve Area (P1/2-Time): 4.78 cm^2       Peak Velocity: 1.41 m/s  Peak E-Wave: 1.09 m/s                   Mean Velocity: 0.93 m/s  Peak A-Wave: 1.02 m/s                   Peak Gradient: 7.95 mmHg  E/A Ratio: 1.07                         Mean Gradient: 4 mmHg  Peak Gradient: 4.75 mmHg  Mean Gradient: 3 mmHg  Deceleration Time: 158 msec             Area (continuity): 1.88 cm^2  P1/2t: 46 msec                          AV VTI: 22.1 cm   Area (continuity): 1.74 cm^2  Mean Velocity: 0.87 m/s                                           Pulmonic:                                           Peak Velocity: 1.76 m/s                                          Peak Gradient: 12.39 mmHg  Diastology / Tissue Doppler Septal Wall E' velocity:0.12 m/s Septal Wall E/E':8.8 Lateral Wall E' velocity:0.12 m/s Lateral Wall E/E':9.3    CT ABDOMEN PELVIS WO CONTRAST Additional Contrast? None    Result Date: 2022  EXAMINATION: CT OF THE ABDOMEN AND PELVIS WITHOUT CONTRAST 9/10/2022 11:55 pm TECHNIQUE: CT of the abdomen and pelvis was performed without the administration of intravenous contrast. Multiplanar reformatted images are provided for review. Automated exposure control, iterative reconstruction, and/or weight based adjustment of the mA/kV was utilized to reduce the radiation dose to as low as reasonably achievable. COMPARISON: None.  HISTORY: ORDERING SYSTEM PROVIDED HISTORY: concern for malrotation on [facility] CT scan; unable to upload disc or view previous study TECHNOLOGIST PROVIDED HISTORY: concern for malrotation on [facility] CT scan; unable to upload disc or view previous study Is the patient pregnant?->No Reason for Exam: concern for malrotation on [facility] CT scan; unable to upload disc or view previous study FINDINGS: Lower Chest: Minimal basilar atelectasis is identified. No cardiomegaly. Calcified lymph nodes are seen in the right perihilar region. Calcified granulomatous changes seen within the right lower lobe. No spiculated lung mass is identified. Organs: Diffuse fatty liver infiltration. No splenic mass is identified. No adrenal mass. No pancreatic mass. No peripancreatic inflammatory process. The gallbladder appears unremarkable. No renal calculi, ureteral calculi, or hydroureteronephrosis is identified. GI/Bowel: Visualized loops of bowel appear normal in caliber. No ileus or obstruction is identified. Normal appendix. No focal inflammatory bowel wall thickening is identified. The colon appears in appropriate position. The cecum is in the right lower quadrant as is the appendix. The ligament of Treitz is noted to be to the left of the spine. No small bowel obstruction. No mesenteric edema or significant mesenteric stranding is identified to suggest an acute malrotation. No chronic appearing bowel rotation is identified. Pelvis: No pelvic mass is identified. The pelvis is somewhat obscured by artifact from the patient's orthopedic hardware. A Yanez catheter is noted within the bladder. Uterus and adnexal structures appear grossly unremarkable. No free fluid in the pelvis. There is a left common iliac vein stent. Peritoneum/Retroperitoneum: No abdominal aortic aneurysm. No retroperitoneal or mesenteric lymphadenopathy is identified. Bones/Soft Tissues: Degenerative changes seen within the spine. No osseous destructive process is identified. 1. No CT findings to suggest an acute bowel malrotation. No cecal or sigmoid volvulus. No small bowel obstruction or colonic obstruction. No swelling of the central mesentery or mesenteric edema or gas. 2. Diffuse fatty liver infiltration. 3. No acute inflammatory process seen in the abdomen or pelvis.  4. Subsegmental atelectatic changes as well as chronic sequela of remote granulomatous process seen within the lungs. CT HEAD WO CONTRAST    Result Date: 9/15/2022  EXAMINATION: CT OF THE HEAD WITHOUT CONTRAST  9/15/2022 12:06 pm TECHNIQUE: CT of the head was performed without the administration of intravenous contrast. Automated exposure control, iterative reconstruction, and/or weight based adjustment of the mA/kV was utilized to reduce the radiation dose to as low as reasonably achievable. COMPARISON: None. HISTORY: ORDERING SYSTEM PROVIDED HISTORY: Encephalopathy, rule our stroke TECHNOLOGIST PROVIDED HISTORY: Encephalopathy, rule our stroke Is the patient pregnant?->No FINDINGS: BRAIN/VENTRICLES: There is no acute intracranial hemorrhage, mass effect, or midline shift. There is satisfactory overall gray-white matter differentiation. The ventricular structures are symmetric and unremarkable. The infratentorial structures are unremarkable. ORBITS: The visualized portion of the orbits demonstrate no acute abnormality. SINUSES: The visualized paranasal sinuses and mastoid air cells demonstrate no acute abnormality. SOFT TISSUES/SKULL:  No acute abnormality of the visualized skull or soft tissues. No acute intracranial abnormality. XR CHEST PORTABLE    Result Date: 9/18/2022  EXAMINATION: ONE XRAY VIEW OF THE CHEST 9/18/2022 6:56 pm COMPARISON: September 16, 2022 HISTORY: ORDERING SYSTEM PROVIDED HISTORY: sob, tachycardia TECHNOLOGIST PROVIDED HISTORY: sob, tachycardia Reason for Exam: shortness of breath   upright port FINDINGS: No infiltrate or consolidation or effusion is identified. The heart size is normal.  There is a right-sided PICC line with its tip in the SVC. Patient's ET and nasogastric tubes have been removed. Chronic left-sided rib fractures are noted. No acute abnormality identified. Status post extubation.      XR CHEST PORTABLE    Result Date: 9/16/2022  EXAMINATION: ONE XRAY VIEW OF THE CHEST 9/16/2022 6:53 am COMPARISON: 09/15/2022 HISTORY: ORDERING SYSTEM PROVIDED HISTORY: intubated patient TECHNOLOGIST PROVIDED HISTORY: intubated patient FINDINGS: Endotracheal tube and enteric catheter remain in place. Right-sided PICC in place terminating in the SVC. The lungs are without acute focal process. There is no effusion or pneumothorax. The cardiomediastinal silhouette is stable. The osseous structures are stable. No acute process. XR CHEST PORTABLE    Result Date: 9/15/2022  EXAMINATION: ONE XRAY VIEW OF THE CHEST 9/15/2022 6:31 am COMPARISON: 09/14/2022 HISTORY: ORDERING SYSTEM PROVIDED HISTORY: intubated patient TECHNOLOGIST PROVIDED HISTORY: intubated patient FINDINGS: Endotracheal tube, enteric tube, and right IJ central venous catheter remain in place. Cardiomediastinal silhouette is grossly stable. No new airspace consolidation. No evidence of pneumothorax or pleural effusion. No significant interval change. Support lines and tubes in place with no acute findings in the chest.     XR CHEST PORTABLE    Result Date: 9/14/2022  EXAMINATION: ONE XRAY VIEW OF THE CHEST 9/14/2022 5:58 am COMPARISON: 09/13/2022 HISTORY: ORDERING SYSTEM PROVIDED HISTORY: intubated patient TECHNOLOGIST PROVIDED HISTORY: intubated patient FINDINGS: ETT, NG tube and right IJ central line remain in place. Stable cardiomediastinal silhouette. No focal consolidation. Mild vascular prominence. No pleural effusion or pneumothorax. No acute bony abnormality. Stable chest.     XR CHEST PORTABLE    Result Date: 9/13/2022  EXAMINATION: ONE X-RAY VIEW OF THE CHEST 9/13/2022 7:06 am COMPARISON: 09/12/2022 HISTORY: ORDERING SYSTEM PROVIDED HISTORY:  Intubated patient TECHNOLOGIST PROVIDED HISTORY: Intubated patient Reason for Exam:  Intubated port supine at 6:40 am FINDINGS: Endotracheal tube and enteric tube remain in place. Right IJ central venous catheter remains in place. Cardiomediastinal contours are grossly stable.  There is improved aeration at the lung bases. No new airspace consolidation, pneumothorax, or pleural effusion. Support lines and tubes remain in place. Improved aeration at the lung bases. XR CHEST PORTABLE    Result Date: 9/12/2022  EXAMINATION: ONE XRAY VIEW OF THE CHEST 9/12/2022 6:14 am COMPARISON: 09/11/2022 HISTORY: ORDERING SYSTEM PROVIDED HISTORY: intubated patient TECHNOLOGIST PROVIDED HISTORY: intubated patient FINDINGS: Endotracheal tube, enteric tube, right IJ central venous catheter remain in place. Multiple cardiac monitoring leads are overlying the chest.  Heart size is stable. There is mild increased interstitial prominence at the lung bases. No evidence of airspace consolidation, pneumothorax, or pleural effusion. 1.  Stable support lines and tubes. 2.  Mild increased interstitial prominence at the lung bases, possibly related to edema, atelectasis, or developing pneumonia. XR CHEST PORTABLE    Result Date: 9/11/2022  EXAMINATION: ONE XRAY VIEW OF THE CHEST 9/11/2022 5:55 am COMPARISON: Chest radiograph performed 10/02/2007. HISTORY: ORDERING SYSTEM PROVIDED HISTORY: intubated patient TECHNOLOGIST PROVIDED HISTORY: intubated patient FINDINGS: There is no acute consolidation or effusion. There is no pneumothorax. The mediastinal structures are unremarkable. The upper abdomen is unremarkable. The extrathoracic soft tissues are unremarkable. There is an endotracheal tube with tip in the midtrachea. There is a gastric tube and the tip is not visualized. There is a right internal jugular line with the tip in the mid SVC. No acute cardiopulmonary process. Support tubes as described above.      VL DUP LOWER EXTREMITY VENOUS BILATERAL    Result Date: 9/19/2022    [facility]  Vascular Lower Extremities DVT Study Procedure   Patient Name  Pj Barfield Date of Study           09/19/2022                L   Date of Birth 1980  Gender                  Female   Age           38 year(s)  Race                       Room Number   2011 port   Corporate ID  [id number]    Weight:                 326 pounds, 147.9 kg  #   Robin Arceo  [de-identified]  #   MR #          [id number]     Sonographer             Leydi Gray RVT   Accession #   [id number]  Interpreting Physician  Noman Adame   Referring                 Referring Physician     Laura Park MD  Nurse  Practitioner  Procedure Type of Study:   Veins: Lower Extremities DVT Study, Venous Scan Lower Bilateral.  Indications for Study:Pain, edema, discoloration. Patient Status: In Patient. Conclusions   Summary   Bilateral:  No evidence of deep or superficial venous thrombosis. Signature   ----------------------------------------------------------------  Electronically signed by Leydi Gray RVT(Sonographer) on  09/19/2022 04:12 PM  ----------------------------------------------------------------   ----------------------------------------------------------------  Electronically signed by Noman Adame(Interpreting physician)  on 09/19/2022 04:17 PM  ----------------------------------------------------------------  Findings:   Right Impression:                    Left Impression:  The common femoral, femoral,         The common femoral, femoral,  popliteal and tibial veins           popliteal and tibial veins  demonstrate normal compressibility   demonstrate normal compressibility  and augmentation. Normal             and augmentation. Normal  compressibility of the great         compressibility of the great  saphenous vein. Normal               saphenous vein. Normal  compressibility of the small         compressibility of the small  saphenous vein. Limited              saphenous vein. Limited visualization  visualization of the lower thigh and of the posterior tibial and peroneal  calf veins. veins. Risk Factors   - The patient's risk factor(s) include: obesity.  Velocities are measured in cm/s ; Diameters are measured in cm Right Lower Extremities DVT Study Measurements Right 2D Measurements +------------------------------------+----------+---------------+----------+ ! Location                            ! Visualized! Compressibility! Thrombosis! +------------------------------------+----------+---------------+----------+ ! Common Femoral                      !Yes       ! Yes            ! None      ! +------------------------------------+----------+---------------+----------+ ! Prox Femoral                        !Yes       ! Yes            ! None      ! +------------------------------------+----------+---------------+----------+ ! Mid Femoral                         !Yes       ! Yes            ! None      ! +------------------------------------+----------+---------------+----------+ ! Dist Femoral                        !Yes       ! Yes            ! None      ! +------------------------------------+----------+---------------+----------+ ! Deep Femoral                        !Yes       ! Yes            ! None      ! +------------------------------------+----------+---------------+----------+ ! Popliteal                           !Yes       ! Yes            ! None      ! +------------------------------------+----------+---------------+----------+ ! Sapheno Femoral Junction            ! Yes       ! Yes            ! None      ! +------------------------------------+----------+---------------+----------+ ! PTV                                 ! Yes       ! Yes            ! None      ! +------------------------------------+----------+---------------+----------+ ! Peroneal                            !Yes       ! Yes            ! None      ! +------------------------------------+----------+---------------+----------+ ! Gastroc                             ! Yes       ! Yes            ! None      ! +------------------------------------+----------+---------------+----------+ ! GSV Thigh                           ! Yes       ! Yes            ! None      ! +------------------------------------+----------+---------------+----------+ ! GSV Knee                            ! Yes       ! Yes            ! None      ! +------------------------------------+----------+---------------+----------+ ! GSV Ankle                           ! Yes       ! Yes            ! None      ! +------------------------------------+----------+---------------+----------+ ! SSV                                 ! Yes       ! Yes            ! None      ! +------------------------------------+----------+---------------+----------+ Right Doppler Measurements +---------------------------+------+------+--------------------------------+ ! Location                   ! Signal!Reflux! Reflux (msec)                   ! +---------------------------+------+------+--------------------------------+ ! Common Femoral             !Phasic! No    !                                ! +---------------------------+------+------+--------------------------------+ ! Prox Femoral               !Phasic! No    !                                ! +---------------------------+------+------+--------------------------------+ ! Popliteal                  !Phasic! No    !                                ! +---------------------------+------+------+--------------------------------+ Left Lower Extremities DVT Study Measurements Left 2D Measurements +------------------------------------+----------+---------------+----------+ ! Location                            ! Visualized! Compressibility! Thrombosis! +------------------------------------+----------+---------------+----------+ ! Common Femoral                      !Yes       ! Yes            ! None      ! +------------------------------------+----------+---------------+----------+ ! Prox Femoral                        !Yes       ! Yes            ! None      ! +------------------------------------+----------+---------------+----------+ ! Mid Femoral                         !Yes       ! Yes            ! None      ! +------------------------------------+----------+---------------+----------+ ! Dist Femoral                        !Yes       ! Yes            ! None      ! +------------------------------------+----------+---------------+----------+ ! Deep Femoral                        !Yes       ! Yes            ! None      ! +------------------------------------+----------+---------------+----------+ ! Popliteal                           !Yes       ! Yes            ! None      ! +------------------------------------+----------+---------------+----------+ ! Sapheno Femoral Junction            ! Yes       ! Yes            ! None      ! +------------------------------------+----------+---------------+----------+ ! PTV                                 ! Yes       ! Yes            ! None      ! +------------------------------------+----------+---------------+----------+ ! Peroneal                            !Yes       ! Yes            ! None      ! +------------------------------------+----------+---------------+----------+ ! Gastroc                             ! Yes       ! Yes            ! None      ! +------------------------------------+----------+---------------+----------+ ! GSV Thigh                           ! Yes       ! Yes            ! None      ! +------------------------------------+----------+---------------+----------+ ! GSV Knee                            ! Yes       ! Yes            ! None      ! +------------------------------------+----------+---------------+----------+ ! GSV Ankle                           ! Yes       ! Yes            ! None      ! +------------------------------------+----------+---------------+----------+ ! SSV                                 ! Yes       ! Yes            ! None      ! +------------------------------------+----------+---------------+----------+ Left Doppler Measurements +---------------------------+------+------+--------------------------------+ ! Location                   ! Signal!Reflux! Reflux (msec) ! +---------------------------+------+------+--------------------------------+ ! Common Femoral             !Phasic! No    !                                ! +---------------------------+------+------+--------------------------------+ ! Prox Femoral               !Phasic! No    !                                ! +---------------------------+------+------+--------------------------------+ ! Popliteal                  !Phasic! No    !                                ! +---------------------------+------+------+--------------------------------+    US BREAST LIMITED LEFT    Result Date: 9/11/2022  EXAMINATION: TARGETED ULTRASOUND OF THE RIGHT BREAST; TARGETED ULTRASOUND OF THE LEFT BREAST 9/11/2022 COMPARISON: Same day CT abdomen/pelvis HISTORY: ORDERING SYSTEM PROVIDED HISTORY: assess for abscess TECHNOLOGIST PROVIDED HISTORY: Assess for abscess FINDINGS: Targeted ultrasound of the bilateral breasts was performed. Right: Targeted ultrasound of the right breast in the area of concern near the 3 o'clock position was performed. There is no evidence of abscess. No solid or cystic mass is identified. No evidence of abnormal vascularity. Left: Targeted ultrasound of the left breast in the area of concern near the 7 o'clock position was performed. There is no evidence of abscess. No solid or cystic mass is identified. Areas of apparent prominent vascularity appear to correspond to vessels. No evidence of abscess in either breast. BIRADS: BIRADS - CATEGORY 1 Negative. Normal interval follow-up is recommended in 12 months. OVERALL ASSESSMENT - NEGATIVE A letter of notification will be sent to the patient regarding the results. The [employer] recommends annual mammograms for women 40 years and older.     US BREAST LIMITED RIGHT    Result Date: 9/11/2022  EXAMINATION: TARGETED ULTRASOUND OF THE RIGHT BREAST; TARGETED ULTRASOUND OF THE LEFT BREAST 9/11/2022 COMPARISON: Same day CT abdomen/pelvis HISTORY: ORDERING SYSTEM PROVIDED HISTORY: assess for abscess TECHNOLOGIST PROVIDED HISTORY: Assess for abscess FINDINGS: Targeted ultrasound of the bilateral breasts was performed. Right: Targeted ultrasound of the right breast in the area of concern near the 3 o'clock position was performed. There is no evidence of abscess. No solid or cystic mass is identified. No evidence of abnormal vascularity. Left: Targeted ultrasound of the left breast in the area of concern near the 7 o'clock position was performed. There is no evidence of abscess. No solid or cystic mass is identified. Areas of apparent prominent vascularity appear to correspond to vessels. No evidence of abscess in either breast. BIRADS: BIRADS - CATEGORY 1 Negative. Normal interval follow-up is recommended in 12 months. OVERALL ASSESSMENT - NEGATIVE A letter of notification will be sent to the patient regarding the results. The [employer] recommends annual mammograms for women 40 years and older. EKG: Qtc is 490ms      DIAGNOSIS:   MDD, recurrent, moderate        RISK ASSESSMENT: low risk of suicide or harm to others        RECOMMENDATIONS  Disposition: no indication for admission to psychiatry  Risk Management:  routine:  no special precautions necessary    Medications:  Effexor 150mg daily ordered. (Increased from prior dose of 75mg daily). Discussed with the treating physician/ team about the patient and treatment plan  Reviewed the chart    Discussed with the patient risk, benefit, alternative and common side effects for the  proposed medication treatment. Patient is consenting to the treatment. Thanks for the consult. Please call me if needed. Ramin Blanco is a 38 y.o. female being evaluated by a Virtual Visit (video visit) encounter to address concerns as mentioned above. A caregiver was present in the room along with the patient.  Pursuant to the emergency declaration under the [facility] [address]40 waiver authority and the [employer] and [employer] Act, this Virtual Visit was conducted with patient's (and/or legal guardian's) consent, to reduce the patient's risk of exposure to COVID-19 and provide necessary medical care. Services were provided through a video synchronous discussion virtually to substitute for in-person visit by provider. Patient is present at [facility]  and I am physically present at my office in [place]     --Alexa Javier MD on 9/24/2022 at 3:00 PM    An electronic signature was used to authenticate this note. **This report has been created using voice recognition software. It may contain minor errors which are inherent in voice recognition technology. **

## 2022-09-24 NOTE — PROGRESS NOTES
[facility] -   Infectious diseases evaluation  admission date 9/10/2022     reason for consultation:   Sepsis      Impression :   Current:  Sepsis with septic shock  Altered mentation acute encephalopathy: Resolved  CHULA: Resolved  Bandemia  Lactic acidosis  Elevated procalcitonin   Gram-negative and gram-positive bacteremia-[place] [facility]  Proteus R cipro bactrim - S ceftriaxone ampicillin  Staph hominis R ancef   Staph epi R ancef  U cx BVH 9/9 e coli S cipro  ceftriaxone ampicillin  Bilat breasts infected / necrotic wounds - maggots  Improved   Sacral sloughed skin from moisture  Hyperglycemia  Morbd obesity-BMI 50  COPD  CHULA -creatinine: 1.06-0.96     Discussion / summary of stay / plan of care   Sepsis of unclear cause, with septic shock and elevated procalcitonin at 12. Very concerning for gram-negative organisms and hence a UTI is of a concern  CT abdomen pelvis at [facility] is negative for any bowel obstruction although it was a concern in [facility]  The urine analysis at [facility] is normal but I am not clear as of the UA at [facility]  The bacteremia with gram-negative and gram-positive is also of a concern, pending at [facility]   currently working on placement. Recommendations      - post Rocephin: 2 gm Iv q 24 Hr. Stop date 9/24  - completed  Source of the proteus septicemia is likely the wounds  - post Daptomycin: Completed 9/16- ck elevated then back to normal  - Wound care- wounds much healthier and breast cellulitis resolved  - diarrhea c diff neg 9/21 -resolved on probiotics 3 x per day  - Neg nasal MRSA swab    - Prolonged .  Defer quinolones     Infection Control Recommendations   [place] Precautions  Contact Isolation         Antimicrobial Stewardship Recommendations   Simplification of therapy  Targeted therapy        History of Present Illness:   Initial history:  Edgardo East is a 38 y.o.-year-old female found unresponsive at home was taken to [facility], found to have stools on many of her wounds and maggots. CT of the abdomen showed concern for small bowel obstruction  She had hyponatremia with a sodium 128, leukocytosis elevated procalcitonin and lactic acid  Any blood culture came back positive for gram-negative rods gram-positive cocci clusters  CHULA  Transferred to us with concern of septic shock and sepsis-General surgery on board due to the concern of small bowel obstruction     After [facility] scan of the abdomen pelvis showed no pneumonia on the lower lobes and no acute abdomen. Patient is on antibiotics, infectious consulted for opinion. The urine analysis on 9/10 at [facility] is not suggestive of infection-but I do not have the UA or the urine culture off from [address] [place] [address] urinalysis seems to be very contaminated with epithelial cells                          Interval changes  9/19/2022   BP (!) 111/56   Pulse (!) 112   Temp 97.9 °F (36.6 °C) (Oral)   Resp 26   Ht 5' 7\" (1.702 m)   Wt (!) 320 lb (145.2 kg)   SpO2 100%   BMI 50.12 kg/m²       Blood cultures from [facility] are back with Proteus and 2 strains of staph coagulase-negative  U cx over there showed E coli multiS  AB switched accordingly     All the wounds are evaluated with wound care on the bedside, the back has multiple open ulcers but no clear cellulitis at this point     [place] [address] OFF  The breasts had macerated wounds and the necrotic superficial skin is sloughing, no deep induration in both breasts, both being pressure wounds     9/16 extubated and appropriate -lungs clear -  Abd soft non tender  No rash    9/19 patient is currently resting in bed post wound care. After talking with wound care they said that the wounds are improving. Abdomen was soft nontender. Patient did complain of diarrhea today.     9/20 patient is currently resting in bed. Patient states that she has had intermittent diarrhea for the last couple of days. Does say that her pain is getting better with her wounds and that is also concurrent with wound care. Patient had no acute concerns at this time.  is currently working on placement. 9/21: Patient is currently in bed, vitals are stable patient is a little tachycardic today in the low 100s. However patient was just finishing up with physical therapy. Patient is no longer complaining of diarrhea episodes. Patient states that pain is decreasing and that wounds seem to be healing. Wound care is on.  is still working on referrals to skilled nursing rehab facilities. 9/22: On examination patient is resting in bed, patient has heart rate in the 110s all other vitals are unremarkable. Patient complains of another bout of diarrhea this morning, C. difficile was negative. Probiotics were increased to 3 times daily. ECG was ordered to check QTC. QTC resulted at 490. Rocephin is currently being administered until 9/24. Discharge is currently being planned. 9/23: Alert appropriate, eating better, not short of breath, no diarrhea. Labs reviewed    9/24:  Alert  Labs reviewed  Afebrile  Tachycardic heart rate in the 110s  1 bout of diarrhea overnight  Complaining of nausea today  Wounds are healing, last day of antibiotics. Summary of relevant labs:    Labs:  sodium 134  leukocytosis: 13.1-10.8-9.0-7.1-9.2-9.2-6.5  elevated procalcitonin   Elevated lactic acid 2.5, 9/11/2022  Creat: 1.10-1.06-0.96-0.81-0.96-1.06-0.96  -136 improved  Magnesium: 1.5    Micro:  BC GNR and CPC clusters at the [facility]  UA neg 9/10   MRSA nasal swab negative  blood culture 9/10      Imaging:  BREASTS US neg for abscess  CT scan of the abdomen pelvis showed no pneumonia on the lower lobes and no acute abdomen.      I have personally reviewed the past medical history, past surgical history, medications, social history, and family history, and I haveupdated the database accordingly. Allergies:   Amoxicillin and Robitussin day-night value lawrence [[id number]]      Review of Systems:   Review of Systems   Constitutional:  Positive for activity change. Negative for appetite change, chills, fatigue and fever. HENT:  Negative for congestion, ear discharge and rhinorrhea. Eyes:  Negative for pain, discharge, redness and visual disturbance. Respiratory:  Negative for cough, chest tightness, shortness of breath and wheezing. Cardiovascular:  Negative for chest pain and leg swelling. Gastrointestinal:  Positive for diarrhea and nausea. Negative for abdominal distention and abdominal pain. Patient complained of mild nausea, 1 bout of diarrhea. Endocrine: Negative for heat intolerance and polyuria. Genitourinary:  Negative for difficulty urinating and dyspareunia. Musculoskeletal:  Negative for arthralgias and back pain. Skin:  Negative for color change. Allergic/Immunologic: Negative for immunocompromised state. Neurological:  Positive for weakness. Negative for dizziness and light-headedness. Hematological:  Negative for adenopathy. Psychiatric/Behavioral:  Negative for agitation. Physical Examination :   Physical Exam  Constitutional:       General: She is not in acute distress. Appearance: She is obese. She is not ill-appearing or toxic-appearing. HENT:      Head: Normocephalic and atraumatic. Right Ear: External ear normal.      Left Ear: External ear normal.      Nose: Nose normal. No congestion or rhinorrhea. Mouth/Throat:      Mouth: Mucous membranes are moist.      Pharynx: Oropharynx is clear. Eyes:      Extraocular Movements: Extraocular movements intact. Conjunctiva/sclera: Conjunctivae normal.      Pupils: Pupils are equal, round, and reactive to light. Cardiovascular:      Rate and Rhythm: Regular rhythm. Tachycardia present. Pulses: Normal pulses. Heart sounds: Normal heart sounds. No murmur heard. Comments: Heart rate 110s  Pulmonary:      Effort: Pulmonary effort is normal.      Breath sounds: Normal breath sounds. Abdominal:      General: Bowel sounds are normal. There is no distension. Palpations: Abdomen is soft. Tenderness: There is no abdominal tenderness. Musculoskeletal:         General: No swelling. Cervical back: Normal range of motion and neck supple. No rigidity. Comments: Legs still very weak but can lift them off the bed   Skin:     General: Skin is warm and dry. Findings: Lesion present. Comments: Patient has bilateral breast wounds as well as buttock wound. Neurological:      Mental Status: She is alert and oriented to person, place, and time. Cranial Nerves: No cranial nerve deficit. Psychiatric:         Mood and Affect: Mood normal.          Past Medical History:   Past Medical History   No past medical history on file. Past Surgical  History:   Past Surgical History   No past surgical history on file.         Medications:      Scheduled Medications    budesonide-formoterol  1 puff Inhalation BID    lidocaine 1 % injection  5 mL IntraDERmal Once    sodium chloride flush  5-40 mL IntraVENous 2 times per day    midodrine  10 mg Oral q8h    enoxaparin  30 mg SubCUTAneous BID    cefTRIAXone (ROCEPHIN) IV  2,000 mg IntraVENous Q24H    sodium chloride flush  5-40 mL IntraVENous 2 times per day    pantoprazole (PROTONIX) 40 mg injection  40 mg IntraVENous Daily    miconazole   Topical BID    levothyroxine  75 mcg Oral Daily            Social History:      Social History               Socioeconomic History    Marital status:        Spouse name: Not on file    Number of children: Not on file    Years of education: Not on file    Highest education level: Not on file   Occupational History    Not on file   Tobacco Use    Smoking status: Not on file    Smokeless tobacco: Not on file   Substance and Sexual Activity    Alcohol use: Not on file    Drug use: Not on file    Sexual activity: Not on file   Other Topics Concern    Not on file   Social History Narrative    Not on file      Social Determinants of Health      Financial Resource Strain: Not on file   Food Insecurity: Not on file   Transportation Needs: Not on file   Physical Activity: Not on file   Stress: Not on file   Social Connections: Not on file   Intimate Partner Violence: Not on file   Housing Stability: Not on file            Family History:   Family History   No family history on file. Medical Decision Making:   I have independently reviewed/ordered the following labs:     CBC with Differential:        Recent Labs     09/18/22  0552 09/19/22  0644   WBC 9.2 9.2   HGB 7.4* 7.9*   HCT 22.8* 25.4*    216   LYMPHOPCT 27 24   MONOPCT 9 7         BMP:       Recent Labs     09/18/22  0552 09/19/22  0644    134*   K 4.1 4.0    101   CO2 24 24   BUN 24* 22*   CREATININE 0.96* 1.06*   MG 1.6 1.5*         Hepatic Function Panel:        Recent Labs     09/18/22  0552 09/19/22  0644   PROT 5.0* 5.1*   LABALBU 1.5* 1.3*   BILIDIR 0.1 0.1   IBILI 0.2 0.2   BILITOT 0.3 0.3   ALKPHOS 123* 116*   ALT 19 18   AST 27 23         No results for input(s): RPR in the last 72 hours. No results for input(s): HIV in the last 72 hours. No results for input(s): BC in the last 72 hours. Lab Results   Component Value Date/Time     CREATININE 1.06 09/19/2022 06:44 AM     GLUCOSE 68 09/19/2022 06:44 AM         Detailed results:      Navid Lay M.D. Internal Medicine Resident PGY-1  [facility],  [facility]. Thank you for allowing us to participate in the care of this patient. Please call with questions.      This note is created with the assistance of a speech recognition program.  While intending to generate adocument that actually reflects the content of the visit, the document can still have some errors including those of syntax and sound a like substitutions which may escape proof reading. It such instances, actual meaningcan be extrapolated by contextual diversion. I have discussed the care of the patient, including pertinent history and exam findings,  with the resident. I have seen and examined the patient and the key elements of all parts of the encounter have been performed by me. I agree with the assessment, plan and orders as documented by the resident.     Angélica García, Infectious Diseases

## 2022-09-25 LAB — GLUCOSE BLD-MCNC: 86 MG/DL (ref 65–105)

## 2022-09-25 PROCEDURE — 6360000002 HC RX W HCPCS: Performed by: STUDENT IN AN ORGANIZED HEALTH CARE EDUCATION/TRAINING PROGRAM

## 2022-09-25 PROCEDURE — 99232 SBSQ HOSP IP/OBS MODERATE 35: CPT | Performed by: INTERNAL MEDICINE

## 2022-09-25 PROCEDURE — 6370000000 HC RX 637 (ALT 250 FOR IP)

## 2022-09-25 PROCEDURE — 6370000000 HC RX 637 (ALT 250 FOR IP): Performed by: PSYCHIATRY & NEUROLOGY

## 2022-09-25 PROCEDURE — 99232 SBSQ HOSP IP/OBS MODERATE 35: CPT | Performed by: STUDENT IN AN ORGANIZED HEALTH CARE EDUCATION/TRAINING PROGRAM

## 2022-09-25 PROCEDURE — 2580000003 HC RX 258: Performed by: STUDENT IN AN ORGANIZED HEALTH CARE EDUCATION/TRAINING PROGRAM

## 2022-09-25 PROCEDURE — 6370000000 HC RX 637 (ALT 250 FOR IP): Performed by: EMERGENCY MEDICINE

## 2022-09-25 PROCEDURE — 6370000000 HC RX 637 (ALT 250 FOR IP): Performed by: STUDENT IN AN ORGANIZED HEALTH CARE EDUCATION/TRAINING PROGRAM

## 2022-09-25 PROCEDURE — 2700000000 HC OXYGEN THERAPY PER DAY

## 2022-09-25 PROCEDURE — 1200000000 HC SEMI PRIVATE

## 2022-09-25 PROCEDURE — 82947 ASSAY GLUCOSE BLOOD QUANT: CPT

## 2022-09-25 PROCEDURE — 6360000002 HC RX W HCPCS: Performed by: EMERGENCY MEDICINE

## 2022-09-25 PROCEDURE — 6370000000 HC RX 637 (ALT 250 FOR IP): Performed by: FAMILY MEDICINE

## 2022-09-25 PROCEDURE — 94761 N-INVAS EAR/PLS OXIMETRY MLT: CPT

## 2022-09-25 PROCEDURE — 94640 AIRWAY INHALATION TREATMENT: CPT

## 2022-09-25 RX ADMIN — ACETAMINOPHEN 650 MG: 325 TABLET ORAL at 23:11

## 2022-09-25 RX ADMIN — SODIUM CHLORIDE, PRESERVATIVE FREE 10 ML: 5 INJECTION INTRAVENOUS at 20:09

## 2022-09-25 RX ADMIN — ANTI-FUNGAL POWDER MICONAZOLE NITRATE TALC FREE: 1.42 POWDER TOPICAL at 09:19

## 2022-09-25 RX ADMIN — LEVOTHYROXINE SODIUM 75 MCG: 75 TABLET ORAL at 09:17

## 2022-09-25 RX ADMIN — FENTANYL CITRATE 50 MCG: 50 INJECTION, SOLUTION INTRAMUSCULAR; INTRAVENOUS at 16:53

## 2022-09-25 RX ADMIN — MIDODRINE HYDROCHLORIDE 10 MG: 5 TABLET ORAL at 09:16

## 2022-09-25 RX ADMIN — BUDESONIDE AND FORMOTEROL FUMARATE DIHYDRATE 1 PUFF: 80; 4.5 AEROSOL RESPIRATORY (INHALATION) at 07:34

## 2022-09-25 RX ADMIN — BUDESONIDE AND FORMOTEROL FUMARATE DIHYDRATE 1 PUFF: 80; 4.5 AEROSOL RESPIRATORY (INHALATION) at 21:35

## 2022-09-25 RX ADMIN — MIDODRINE HYDROCHLORIDE 10 MG: 5 TABLET ORAL at 02:30

## 2022-09-25 RX ADMIN — Medication 1 CAPSULE: at 09:16

## 2022-09-25 RX ADMIN — ANTI-FUNGAL POWDER MICONAZOLE NITRATE TALC FREE: 1.42 POWDER TOPICAL at 20:08

## 2022-09-25 RX ADMIN — PANTOPRAZOLE SODIUM 40 MG: 40 TABLET, DELAYED RELEASE ORAL at 09:16

## 2022-09-25 RX ADMIN — VENLAFAXINE 150 MG: 75 TABLET ORAL at 09:16

## 2022-09-25 RX ADMIN — ENOXAPARIN SODIUM 30 MG: 100 INJECTION SUBCUTANEOUS at 09:16

## 2022-09-25 RX ADMIN — ENOXAPARIN SODIUM 30 MG: 100 INJECTION SUBCUTANEOUS at 20:08

## 2022-09-25 RX ADMIN — ONDANSETRON 4 MG: 2 INJECTION INTRAMUSCULAR; INTRAVENOUS at 05:40

## 2022-09-25 ASSESSMENT — ENCOUNTER SYMPTOMS
SINUS PRESSURE: 0
WHEEZING: 0
EYE DISCHARGE: 0
BLOOD IN STOOL: 0
VOMITING: 0
ABDOMINAL PAIN: 1
CONSTIPATION: 0
NAUSEA: 0
SHORTNESS OF BREATH: 0
ABDOMINAL PAIN: 0
VOICE CHANGE: 0
SORE THROAT: 0
DIARRHEA: 0
COUGH: 0
EYE PAIN: 0
ABDOMINAL DISTENTION: 0

## 2022-09-25 ASSESSMENT — PAIN SCALES - GENERAL
PAINLEVEL_OUTOF10: 0
PAINLEVEL_OUTOF10: 8

## 2022-09-25 NOTE — PROGRESS NOTES
[facility] -   Infectious diseases evaluation  admission date 9/10/2022     reason for consultation:   Sepsis      Impression :   Current:  Sepsis with septic shock  Altered mentation acute encephalopathy: Resolved  CHULA: Resolved  Bandemia  Lactic acidosis  Elevated procalcitonin   Gram-negative and gram-positive bacteremia-[place] [facility]  Proteus R cipro bactrim - S ceftriaxone ampicillin  Staph hominis R ancef   Staph epi R ancef  U cx BVH 9/9 e coli S cipro  ceftriaxone ampicillin  Bilat breasts infected / necrotic wounds - maggots  Improved   Sacral sloughed skin from moisture  Hyperglycemia  Morbd obesity-BMI 50  COPD  CHULA -creatinine: 1.06-0.96     Discussion / summary of stay / plan of care   Sepsis of unclear cause, with septic shock and elevated procalcitonin at 12. Very concerning for gram-negative organisms and hence a UTI is of a concern  CT abdomen pelvis at [facility]. [facility] is negative for any bowel obstruction although it was a concern in [facility]  The urine analysis at [facility] is normal but I am not clear as of the UA at [facility]  The bacteremia with gram-negative and gram-positive is also of a concern, pending at [facility]   currently working on placement. Recommendations      - post Rocephin: 2 gm Iv q 24 Hr. Stop date 9/24  - completed  Source of the proteus septicemia is likely the wounds  - post Daptomycin: Completed 9/16- ck elevated then back to normal  - Wound care- wounds much healthier and breast cellulitis resolved  - diarrhea c diff neg 9/21 -resolved on probiotics 3 x per day  - Neg nasal MRSA swab  ID signing off 9/25 - pls call back if issues    - Prolonged .  Defer quinolones     Infection Control Recommendations   [place] Precautions  Contact Isolation         Antimicrobial Stewardship Recommendations   Simplification of therapy  Targeted therapy        History of Present Illness: Initial history:  Tom Drake is a 38y.o.-year-old female found unresponsive at home was taken to [facility], found to have stools on many of her wounds and maggots. CT of the abdomen showed concern for small bowel obstruction  She had hyponatremia with a sodium 128, leukocytosis elevated procalcitonin and lactic acid  Any blood culture came back positive for gram-negative rods gram-positive cocci clusters  CHULA  Transferred to us with concern of septic shock and sepsis-General surgery on board due to the concern of small bowel obstruction     After transfer to [facility]. Cristi's CT scan of the abdomen pelvis showed no pneumonia on the lower lobes and no acute abdomen. Patient is on antibiotics, infectious consulted for opinion. The urine analysis on 9/10 at [facility] is not suggestive of infection-but I do not have the UA or the urine culture off from [address] [place] [address] urinalysis seems to be very contaminated with epithelial cells                          Interval changes  9/19/2022   BP 93/61   Pulse 100   Temp 98.2 °F (36.8 °C) (Oral)   Resp 22   Ht 5' 7\" (1.702 m)   Wt (!) 320 lb 8 oz (145.4 kg)   SpO2 100%   BMI 50.20 kg/m²       Blood cultures from [facility] are back with Proteus and 2 strains of staph coagulase-negative  U cx over there showed E coli multiS  AB switched accordingly     All the wounds are evaluated with wound care on the bedside, the back has multiple open ulcers but no clear cellulitis at this point     [place] [address] OFF  The breasts had macerated wounds and the necrotic superficial skin is sloughing, no deep induration in both breasts, both being pressure wounds     9/16 extubated and appropriate -lungs clear -  Abd soft non tender  No rash    9/19 patient is currently resting in bed post wound care. After talking with wound care they said that the wounds are improving. Abdomen was soft nontender.   Patient did complain of diarrhea today. 9/20 patient is currently resting in bed. Patient states that she has had intermittent diarrhea for the last couple of days. Does say that her pain is getting better with her wounds and that is also concurrent with wound care. Patient had no acute concerns at this time.  is currently working on placement. 9/21: Patient is currently in bed, vitals are stable patient is a little tachycardic today in the low 100s. However patient was just finishing up with physical therapy. Patient is no longer complaining of diarrhea episodes. Patient states that pain is decreasing and that wounds seem to be healing. Wound care is on.  is still working on referrals to skilled nursing rehab facilities. 9/22: On examination patient is resting in bed, patient has heart rate in the 110s all other vitals are unremarkable. Patient complains of another bout of diarrhea this morning, C. difficile was negative. Probiotics were increased to 3 times daily. ECG was ordered to check QTC. QTC resulted at 490. Rocephin is currently being administered until 9/24. Discharge is currently being planned. 9/23: Alert appropriate, eating better, not short of breath, no diarrhea. Labs reviewed    9/24:  Alert  Labs reviewed  Afebrile  Tachycardic heart rate in the 110s  1 bout of diarrhea overnight  Complaining of nausea today  Wounds are healing, last day of antibiotics. 9/25: Allergic  Labs reviewed  Patient has been afebrile  Patient says she has mild chest pain and abdominal pain, states that its most likely due to gas.   Patient did have not have any abdominal tenderness on palpation    Summary of relevant labs:    Labs:  sodium 134  leukocytosis: 13.1-10.8-9.0-7.1-9.2-9.2-6.5  elevated procalcitonin   Elevated lactic acid 2.5, 9/11/2022  Creat: 1.10-1.06-0.96-0.81-0.96-1.06-0.96  -136 improved  Magnesium: 1.5    Micro:  BC GNR and CPC clusters at the [facility]  UA neg 9/10 MRSA nasal swab negative  blood culture 9/10      Imaging:  BREASTS US neg for abscess  CT scan of the abdomen pelvis showed no pneumonia on the lower lobes and no acute abdomen. I have personally reviewed the past medical history, past surgical history, medications, social history, and family history, and I haveupdated the database accordingly. Allergies:   Amoxicillin and Robitussin day-night value lawrence [[id number]]      Review of Systems:   Review of Systems   Constitutional:  Positive for activity change. Negative for chills, diaphoresis and fever. HENT:  Negative for congestion and ear discharge. Eyes:  Negative for pain and discharge. Respiratory:  Negative for cough, shortness of breath and wheezing. Cardiovascular:  Positive for chest pain. Negative for leg swelling. Musculoskeletal in nature   Gastrointestinal:  Positive for abdominal pain. Negative for abdominal distention. Patient complains of gas   Endocrine: Negative for polydipsia and polyphagia. Genitourinary:  Negative for difficulty urinating and flank pain. Musculoskeletal:  Negative for arthralgias and joint swelling. Allergic/Immunologic: Negative for immunocompromised state. Neurological:  Negative for light-headedness and headaches. Hematological:  Negative for adenopathy. Psychiatric/Behavioral:  Negative for agitation. Physical Examination :   Physical Exam  Constitutional:       General: She is not in acute distress. Appearance: She is obese. HENT:      Head: Normocephalic and atraumatic. Right Ear: External ear normal.      Left Ear: External ear normal.      Nose: Nose normal.      Mouth/Throat:      Mouth: Mucous membranes are moist.      Pharynx: Oropharynx is clear. Eyes:      General: No scleral icterus. Extraocular Movements: Extraocular movements intact. Pupils: Pupils are equal, round, and reactive to light.    Cardiovascular:      Rate and Rhythm: Regular rhythm. Tachycardia present. Pulses: Normal pulses. Heart sounds: Normal heart sounds. No murmur heard. Comments: Patient is in the 110s  Pulmonary:      Effort: Pulmonary effort is normal. No respiratory distress. Breath sounds: Normal breath sounds. No wheezing. Abdominal:      General: Bowel sounds are normal. There is no distension. Tenderness: There is no abdominal tenderness. Musculoskeletal:         General: No deformity or signs of injury. Cervical back: No rigidity or tenderness. Skin:     General: Skin is warm and dry. Coloration: Skin is not pale. Findings: Lesion present. Comments: Bilateral breast wounds and buttock wound   Neurological:      Mental Status: She is alert. Mental status is at baseline. Psychiatric:         Mood and Affect: Mood normal.          Past Medical History:   Past Medical History   No past medical history on file. Past Surgical  History:   Past Surgical History   No past surgical history on file.         Medications:      Scheduled Medications    budesonide-formoterol  1 puff Inhalation BID    lidocaine 1 % injection  5 mL IntraDERmal Once    sodium chloride flush  5-40 mL IntraVENous 2 times per day    midodrine  10 mg Oral q8h    enoxaparin  30 mg SubCUTAneous BID    cefTRIAXone (ROCEPHIN) IV  2,000 mg IntraVENous Q24H    sodium chloride flush  5-40 mL IntraVENous 2 times per day    pantoprazole (PROTONIX) 40 mg injection  40 mg IntraVENous Daily    miconazole   Topical BID    levothyroxine  75 mcg Oral Daily            Social History:      Social History               Socioeconomic History    Marital status:        Spouse name: Not on file    Number of children: Not on file    Years of education: Not on file    Highest education level: Not on file   Occupational History    Not on file   Tobacco Use    Smoking status: Not on file    Smokeless tobacco: Not on file   Substance and Sexual Activity Alcohol use: Not on file    Drug use: Not on file    Sexual activity: Not on file   Other Topics Concern    Not on file   Social History Narrative    Not on file      Social Determinants of Health      Financial Resource Strain: Not on file   Food Insecurity: Not on file   Transportation Needs: Not on file   Physical Activity: Not on file   Stress: Not on file   Social Connections: Not on file   Intimate Partner Violence: Not on file   Housing Stability: Not on file            Family History:   Family History   No family history on file. Medical Decision Making:   I have independently reviewed/ordered the following labs:     CBC with Differential:        Recent Labs     09/18/22  0552 09/19/22  0644   WBC 9.2 9.2   HGB 7.4* 7.9*   HCT 22.8* 25.4*    216   LYMPHOPCT 27 24   MONOPCT 9 7         BMP:       Recent Labs     09/18/22  0552 09/19/22  0644    134*   K 4.1 4.0    101   CO2 24 24   BUN 24* 22*   CREATININE 0.96* 1.06*   MG 1.6 1.5*         Hepatic Function Panel:        Recent Labs     09/18/22  0552 09/19/22  0644   PROT 5.0* 5.1*   LABALBU 1.5* 1.3*   BILIDIR 0.1 0.1   IBILI 0.2 0.2   BILITOT 0.3 0.3   ALKPHOS 123* 116*   ALT 19 18   AST 27 23         No results for input(s): RPR in the last 72 hours. No results for input(s): HIV in the last 72 hours. No results for input(s): BC in the last 72 hours. Lab Results   Component Value Date/Time     CREATININE 1.06 09/19/2022 06:44 AM     GLUCOSE 68 09/19/2022 06:44 AM         Detailed results:      Ben Smith M.D. Internal Medicine Resident PGY-1  [facility],  [facility]. Thank you for allowing us to participate in the care of this patient. Please call with questions.      This note is created with the assistance of a speech recognition program.  While intending to generate adocument that actually reflects the content of the visit, the document can still have some errors including those of syntax and sound a like substitutions which may escape proof reading. It such instances, actual meaningcan be extrapolated by contextual diversion. I have discussed the care of the patient, including pertinent history and exam findings,  with the resident. I have seen and examined the patient and the key elements of all parts of the encounter have been performed by me. I agree with the assessment, plan and orders as documented by the resident.     Angélica García, Infectious Diseases

## 2022-09-25 NOTE — CARE COORDINATION
Transitional planning    Spoke to patient about plan for discharge. Plan is for SNF. Referrals sent to [facility] and [facility] and [facility]. [facility] is first choice.

## 2022-09-25 NOTE — DISCHARGE INSTR - COC
Continuity of Care Form    Patient Name: Bryce Paz   :  1980  MRN:  [id number]    Admit date:  9/10/2022  Discharge date:  10-5-22    Code Status Order: Full Code   Advance Directives:     Admitting Physician:  Aldo Lopez MD  PCP: No primary care provider on file. Discharging Nurse: [facility] Unit/Room#: 6512/2907-53  Discharging Unit Phone Number: [phone]    Emergency Contact:   Extended Emergency Contact Information  Primary Emergency Contact: [address]  Mobile Phone: [phone]  Relation: Spouse  Preferred language: English   needed? No  Secondary Emergency Contact: [address]  Mobile Phone: [phone]  Relation: Parent   needed? No    Past Surgical History:  No past surgical history on file. Immunization History: There is no immunization history on file for this patient.     Active Problems:  Patient Active Problem List   Diagnosis Code    Altered mental status R41.82    Septic shock (HCC) A41.9, R65.21    Acute kidney injury ([address] N17.9    Bandemia D72.825    Cellulitis of sacral region L03.319    Breast, fat necrosis N64.1    Gram-neg septicemia (HCC) A41.50    Gram positive septicemia (HCC) A41.89    Acute encephalopathy G93.40    Elevated procalcitonin R79.89    Chronic anemia D64.9    Acute respiratory failure with hypoxia (HCC) J96.01    Severe protein-calorie malnutrition (HCC) E43    Simple chronic bronchitis (HCC) J41.0    Hypothyroid E03.9    Hypoalbuminemia E88.09    Systemic inflammatory response syndrome (SIRS) of infectious origin with acute organ failure (HCC) A41.9, R65.20    MDD (major depressive disorder), recurrent episode, moderate (HCC) F33.1    Malrotation of intestine Q43.3       Isolation/Infection:   Isolation            No Isolation          Patient Infection Status       Infection Onset Added Last Indicated Last Indicated By Review Planned Expiration Resolved Resolved By    None active    Resolved    C-diff Rule Out 09/20/22 09/20/22 09/20/22 C. difficile toxin Molecular (Ordered)   09/21/22 Rule-Out Test Resulted            Nurse Assessment:  Last Vital Signs: /65   Pulse (!) 105   Temp 97.7 °F (36.5 °C) (Oral)   Resp 25   Ht 5' 7\" (1.702 m)   Wt (!) 319 lb 14.2 oz (145.1 kg)   SpO2 96%   BMI 50.10 kg/m²     Last documented pain score (0-10 scale): Pain Level: 10  Last Weight:   Wt Readings from Last 1 Encounters:   10/03/22 (!) 319 lb 14.2 oz (145.1 kg)     Mental Status:  oriented and alert    IV Access:Picc Right Basialis placed on 915-22      Nursing Mobility/ADLs:  Walking   Dependent  Transfer  Dependent  Bathing  Dependent  Dressing  Dependent  Toileting  Dependent  Feeding  Assisted  Med Admin  Dependent  Med Delivery   whole    Wound Care Documentation and Therapy:  Wound 09/10/22 Breast Left (Active)   Wound Image    10/04/22 1030   Wound Etiology Pressure Stage 3 10/04/22 1030   Dressing Status New dressing applied 10/04/22 1030   Wound Cleansed Soap and water 10/04/22 1030   Dressing/Treatment Hydrofiber Ag; Foam 10/04/22 1030   Dressing Change Due 10/05/22 10/04/22 1030   Wound Length (cm) 15.8 cm 10/04/22 1030   Wound Width (cm) 8 cm 10/04/22 1030   Wound Depth (cm) 0.2 cm 10/04/22 1030   Wound Surface Area (cm^2) 126.4 cm^2 10/04/22 1030   Change in Wound Size % (l*w) 15.73 10/04/22 1030   Wound Volume (cm^3) 25.28 cm^3 10/04/22 1030   Wound Healing % 16 10/04/22 1030   Wound Assessment Subcutaneous;Pink/red 10/04/22 1030   Drainage Amount Moderate 10/04/22 1030   Drainage Description Serosanguinous 10/04/22 1030   Odor None 10/04/22 1030   Felicia-wound Assessment Intact 10/04/22 1030   Margins Unattached edges; Undefined edges 10/02/22 2000   Wound Thickness Description not for Pressure Injury Full thickness 10/02/22 2000   Number of days: 23       Wound 09/10/22 Other (Comment) mammary folds bilaterally (Active)   Wound Image    10/04/22 1030   Wound Etiology Other 10/04/22 1030   Dressing Status New dressing applied 10/04/22 1030   Wound Cleansed Soap and water 10/04/22 1030   Dressing/Treatment Interdry Ag/wicking fabric with Ag 10/04/22 1030   Dressing Change Due 10/11/22 10/04/22 1030   Wound Assessment Superficial;Pink/red 10/04/22 1030   Drainage Amount Scant 10/04/22 1030   Drainage Description Serosanguinous 10/04/22 1030   Odor None 10/04/22 1030   Felicia-wound Assessment Intact; Hyperpigmented 10/04/22 1030   Margins Unattached edges; Undefined edges 10/04/22 0735   Wound Thickness Description not for Pressure Injury Full thickness 10/04/22 0735   Number of days: 23       Wound 09/10/22 Breast Right (Active)   Wound Image   10/04/22 1030   Wound Etiology Pressure Stage 3 10/04/22 1030   Dressing Status New dressing applied 10/04/22 1030   Wound Cleansed Soap and water 10/04/22 1030   Dressing/Treatment Hydrofiber Ag; Foam 10/04/22 1030   Dressing Change Due 10/05/22 10/04/22 1030   Wound Length (cm) 3 cm 10/04/22 1030   Wound Width (cm) 5 cm 10/04/22 1030   Wound Depth (cm) 0.2 cm 10/04/22 1030   Wound Surface Area (cm^2) 15 cm^2 10/04/22 1030   Change in Wound Size % (l*w) 79.17 10/04/22 1030   Wound Volume (cm^3) 3 cm^3 10/04/22 1030   Wound Healing % 79 10/04/22 1030   Wound Assessment Pink/red;Subcutaneous; Epithelialization 10/04/22 1030   Drainage Amount Moderate 10/04/22 1030   Drainage Description Serosanguinous 10/04/22 1030   Odor None 10/04/22 1030   Felicia-wound Assessment Intact 10/04/22 1030   Margins Unattached edges; Undefined edges 10/04/22 0735   Wound Thickness Description not for Pressure Injury Full thickness 10/04/22 0735   Number of days: 23       Wound 09/10/22 Rib Cage Left;Lateral (Active)   Wound Image   10/04/22 1030   Wound Etiology Other 10/04/22 1030   Dressing Status New dressing applied 10/04/22 1030   Wound Cleansed Soap and water 10/04/22 1030   Dressing/Treatment Moisturizing cream 10/04/22 1030   Dressing Change Due 10/05/22 10/04/22 1030   Wound Length (cm) 6 cm 09/12/22 1605   Wound Width (cm) 8 cm 09/12/22 1605   Wound Depth (cm) 0.2 cm 09/12/22 1605   Wound Surface Area (cm^2) 48 cm^2 09/12/22 1605   Wound Volume (cm^3) 9.6 cm^3 09/12/22 1605   Wound Assessment Superficial;Pink/red 10/04/22 1030   Drainage Amount None 10/04/22 1030   Drainage Description Sanguinous 10/04/22 0735   Odor None 10/04/22 1030   Felicia-wound Assessment Hyperpigmented;Dry/flaky 10/04/22 1030   Margins Unattached edges 10/04/22 0735   Number of days: 23       Wound 09/10/22 Buttocks incontinence associated dermatitis (Active)   Wound Image    10/04/22 1030   Wound Etiology Pressure Stage 3 10/04/22 1030   Dressing Status New dressing applied 10/02/22 1600   Wound Cleansed Soap and water 10/04/22 1030   Dressing/Treatment Triad hydro/zinc oxide-based hydrophilic paste 61/57/07 8283   Dressing Change Due 10/05/22 10/04/22 1030   Wound Assessment Subcutaneous;Pink/red 10/04/22 1030   Drainage Amount Small 10/04/22 1030   Drainage Description Serosanguinous 10/04/22 1030   Odor None 10/04/22 1030   Felicia-wound Assessment Intact;Dry/flaky; Hyperpigmented 10/04/22 1030   Margins Unattached edges 10/04/22 0735   Wound Thickness Description not for Pressure Injury Full thickness 10/04/22 0735   Number of days: 23       Wound 09/12/22 Arm Left;Proximal (Active)   Wound Image   09/23/22 1005   Wound Etiology Pressure Stage 2 10/04/22 0735   Dressing Status Intact;Dry;Clean 09/30/22 1600   Wound Cleansed Not Cleansed 09/30/22 1600   Dressing/Treatment Open to air 10/04/22 0735   Dressing Change Due 09/28/22 09/28/22 1945   Wound Length (cm) 1.8 cm 09/23/22 1005   Wound Width (cm) 1.8 cm 09/23/22 1005   Wound Depth (cm) 0.1 cm 09/23/22 1005   Wound Surface Area (cm^2) 3.24 cm^2 09/23/22 1005   Change in Wound Size % (l*w) 91.9 09/23/22 1005   Wound Volume (cm^3) 0.324 cm^3 09/23/22 1005   Wound Healing % 96 09/23/22 1005   Wound Assessment Dry;Epithelialization 10/04/22 1030   Drainage Amount None 10/04/22 1030 Drainage Description Serous 10/04/22 0735   Odor None 10/04/22 1030   Felicia-wound Assessment Intact 10/04/22 1030   Number of days: 21       Wound 09/12/22 Thigh Left;Medial severe intertrigo (Active)   Wound Image   09/23/22 1005   Wound Etiology Other 10/02/22 2000   Dressing Status Clean; Intact;Dry 10/03/22 2000   Wound Cleansed Wound cleanser 10/02/22 2000   Dressing/Treatment Triad hydro/zinc oxide-based hydrophilic paste 35/00/31 3098   Dressing Change Due 10/02/22 10/02/22 2000   Wound Length (cm) 4 cm 09/12/22 1605   Wound Width (cm) 2.2 cm 09/12/22 1605   Wound Depth (cm) 0.2 cm 09/12/22 1605   Wound Surface Area (cm^2) 8.8 cm^2 09/12/22 1605   Wound Volume (cm^3) 1.76 cm^3 09/12/22 1605   Wound Assessment Dry;Epithelialization 10/04/22 1030   Drainage Amount None 10/04/22 1030   Drainage Description Sanguinous 10/02/22 2000   Odor None 10/04/22 1030   Felicia-wound Assessment Intact; Hyperpigmented 10/04/22 1030   Wound Thickness Description not for Pressure Injury Full thickness 10/02/22 2000   Number of days: 21   Breasts: Opticell Ag gelling fiber to the wound beds, cover with Mepilex Sacrum Foam (large) Change daily  Breast folds/chest: separate the inframammary folds with DriGo HP cloths or folded pillow cases. Change weekly and prn soilage  Abdominal fold: DriGo HP cloths. Change at least weekly and prn solage  Medial thighs: Left medial thigh cover with a Mepilex silicone foam. Change daily. Buttock and left flank: Triad Hydrophilic Wound Dressing Paste. Daily and prn hygiene. Elimination:  Continence:    Bowel: Yes  Bladder: purewick in use  Urinary Catheter:  external female catheter    Colostomy/Ileostomy/Ileal Conduit: No       Date of Last BM: 10/4/2022    Intake/Output Summary (Last 24 hours) at 10/4/2022 1157  Last data filed at 10/4/2022 0005  Gross per 24 hour   Intake --   Output 700 ml   Net -700 ml     I/O last 3 completed shifts:  In: -   Out: 1350 [Urine:1350]    Safety Concerns: At Risk for Falls    Impairments/Disabilities:      None    Nutrition Therapy:  Current Nutrition Therapy: - Oral Diet:  General    Routes of Feeding: Oral  Liquids: No Restrictions  Daily Fluid Restriction: no  Last Modified Barium Swallow with Video (Video Swallowing Test): not done    Treatments at the Time of Hospital Discharge:   Respiratory Treatments:   Oxygen Therapy:  is not on home oxygen therapy. Ventilator:  - No ventilator support    Rehab Therapies: Physical Therapy and Occupational Therapy  Weight Bearing Status/Restrictions: No weight bearing restrictions  Other Medical Equipment (for information only, NOT a DME order):  walker  Other Treatments: turn every 2 hours                                  Elevate heels off of bed    Patient's personal belongings (please select all that are sent with patient):  None    RN SIGNATURE:  Electronically signed by Akshat Hdez RN on 10/4/22 at 12:02 PM EDT    CASE MANAGEMENT/SOCIAL WORK SECTION    Inpatient Status POJU:3-    Readmission Risk Assessment Score:  Readmission Risk              Risk of Unplanned Readmission:  20           Discharging to Facility/ Agency   Name: [address] [place] [address]  Address:  [address] [place] [address]  Fax:        / signature: Electronically signed by Mario Luu RN on 10/4/22 at 3:14 PM EDT    PHYSICIAN SECTION    Prognosis: Fair    Condition at Discharge: Stable    Rehab Potential (if transferring to Rehab): Fair    Recommended Labs or Other Treatments After Discharge: Boost puddings or equivalent with each meal    Physician Certification: I certify the above information and transfer of Alan Vasquez  is necessary for the continuing treatment of the diagnosis listed and that she requires [facility] for less 30 days.      Update Admission H&P: No change in H&P    PHYSICIAN SIGNATURE:  Electronically signed by Jacqueline Bettencourt DO on 10/5/2022 at 12:16 PM

## 2022-09-25 NOTE — PLAN OF CARE
Problem: Discharge Planning  Goal: Discharge to home or other facility with appropriate resources  Outcome: Progressing  Flowsheets (Taken 9/24/2022 2000)  Discharge to home or other facility with appropriate resources:   Identify barriers to discharge with patient and caregiver   Identify discharge learning needs (meds, wound care, etc)     Problem: Respiratory - Adult  Goal: Achieves optimal ventilation and oxygenation  Outcome: Progressing     Problem: Safety - Medical Restraint  Goal: Remains free of injury from restraints (Restraint for Interference with Medical Device)  Description: INTERVENTIONS:  1. Determine that other, less restrictive measures have been tried or would not be effective before applying the restraint  2. Evaluate the patient's condition at the time of restraint application  3. Inform patient/family regarding the reason for restraint  4. Q2H: Monitor safety, psychosocial status, comfort, nutrition and hydration  Outcome: Completed     Problem: Pain  Goal: Verbalizes/displays adequate comfort level or baseline comfort level  Outcome: Not Progressing     Problem: Confusion  Goal: Confusion, delirium, dementia, or psychosis is improved or at baseline  Description: INTERVENTIONS:  1. Assess for possible contributors to thought disturbance, including medications, impaired vision or hearing, underlying metabolic abnormalities, dehydration, psychiatric diagnoses, and notify attending LIP  2. [place] high risk fall precautions, as indicated  3. Provide frequent short contacts to provide reality reorientation, refocusing and direction  4. Decrease environmental stimuli, including noise as appropriate  5. Monitor and intervene to maintain adequate nutrition, hydration, elimination, sleep and activity  6. If unable to ensure safety without constant attention obtain sitter and review sitter guidelines with assigned personnel  7.  Initiate Psychosocial CNS and Spiritual Care consult, as indicated  Outcome: Progressing  Flowsheets (Taken 9/24/2022 2000)  Effect of thought disturbance (confusion, delirium, dementia, or psychosis) are managed with adequate functional status: Assess for contributors to thought disturbance, including medications, impaired vision or hearing, underlying metabolic abnormalities, dehydration, psychiatric diagnoses, notify LIP     Problem: Nutrition Deficit:  Goal: Optimize nutritional status  Outcome: Progressing     Problem: Skin/Tissue Integrity  Goal: Absence of new skin breakdown  Description: 1. Monitor for areas of redness and/or skin breakdown  2. Assess vascular access sites hourly  3. Every 4-6 hours minimum:  Change oxygen saturation probe site  4. Every 4-6 hours:  If on nasal continuous positive airway pressure, respiratory therapy assess nares and determine need for appliance change or resting period.   Outcome: Progressing     Problem: Safety - Adult  Goal: Free from fall injury  Outcome: Progressing     Problem: ABCDS Injury Assessment  Goal: Absence of physical injury  Outcome: Progressing     Problem: Pain  Goal: Verbalizes/displays adequate comfort level or baseline comfort level  Outcome: Not Progressing

## 2022-09-25 NOTE — PROGRESS NOTES
[facility]  Office: [address], DO, Mirna Bejarano, DO, Anita Marques, DO, Bashir yang Blood, DO, Margarita Rascon MD, Annmarie Partida MD, Rosi Mcknight MD, Lloyd Yan MD,  Louie Ventura MD, Luke Mccullough MD, Jacqueline Bettencourt, DO, Ankit Davis MD,  Tiara Clark MD, Blayne Farrar MD, Michael Hernandez DO, Chinedu Garcia MD, Mima Ojeda MD, Jarvis Huynh MD, Roxana Dickson MD, Justine Gutierrez MD, Kwan Damico MD, Deborah Pérez DO, Jennifer Jimenez MD, Alfredo Singh MD, Manny Flores, CNP,  Charlotte eGe, CNP, Estelita Orosco, CNP, Sixto Mcelroy, CNP,  Ema Smith, [place], Svetlana Orozco, CNP, Ameena Thompson, CNP, Michelle Tamayo, CNP, Ryan Marte, [facility], [facility], CNP, Louis Chung PABLANQUITA, Denilson Daniel, CNS, Jim Russell, DNP, Julia Cardoza, CNP, Aida Reyes, [facility], Steve Clancy, [facility]      Daily Progress Note     Admit Date: 9/10/2022  Bed/Room No.  2011/2011-01  Admitting Physician : Mima Ojeda MD  Code Status :Full Code  Hospital Day:  LOS: 15 days   Chief Complaint:     Altered mental status    Principal Problem:    Septic shock [facility])  Active Problems:    Altered mental status    Acute kidney injury ([facility] [address]    Bandemia    Cellulitis of sacral region    Breast, fat necrosis    Gram-neg septicemia ([address]    Gram positive septicemia ([address]    Acute encephalopathy    Elevated procalcitonin    Chronic anemia    Acute respiratory failure with hypoxia (HCC)    Severe protein-calorie malnutrition ([address]    Simple chronic bronchitis (HCC)    Hypothyroid    Hypoalbuminemia    Systemic inflammatory response syndrome (SIRS) of infectious origin with acute organ failure (HCC)    MDD (major depressive disorder), recurrent episode, moderate ([address]  Resolved Problems:    * No resolved hospital problems. *    Subjective :         Interval History/Significant events :  09/25/22    Pt seen at bedside  She is enjoying an theresa  Had 1 episode loose stool this AM and 1 episode of emesis non bloody non bilious    Background History:         Marielos Cole is 38 y.o. female  Who was admitted to the hospital on 9/10/2022 for treatment of Septic shock ([facility] [address] Patient was admitted to the hospital on 9/10/2022. she was transferred to our facility from [facility] with altered mental status. Patient was found unresponsive at home soiled with feces and urine and multiple wounds and maggots in the wounds. Patient was found to have acute kidney injury. She lives at home with her  who travels frequently for living and is not home much.  was away from home for 1 week. Patient reported that she did not feel like going to the restroom as it was taxing and decided to urinate and defecate on bed. She progressively got worse and got confused. She did not call for any help. . Patient had poor mobility for a year. CT abdomen pelvis showed malposition of jejunal with small bowel obstruction and possible hepatic steatosis. She has known history of morbid obesity hypothyroidism, gastric fundoplication. Lab work showed hyponatremia, hyperkalemia, elevated procalcitonin. Patient was referred to our facility for concern for sepsis. Blood culture was positive for gram-negative rods and gram-positive cocci in clusters. CT abdomen was repeated and did not show any concern for bowel obstruction. Patient was intubated due to metabolic encephalopathy 24/63/5928. She was also treated with pressors. Urine culture positive for E. coli. Patient was treated with ceftriaxone and recommended to continue till 9/24/2022 to complete 2-week course. Patient also treated with a short course of daptomycin until 9/17/2022. PMH:  No past medical history on file. Allergies:    Allergies   Allergen Reactions    Amoxicillin Hives    Robitussin Day-Night Value Nickolas [Phenylephrine-Diphenhyd-Dm-Gg] Hives and Rash      Medications :  venlafaxine, 150 mg, Oral, Daily  lactobacillus, 1 capsule, Oral, TID WC  sodium chloride, 500 mL, IntraVENous, Once  pantoprazole, 40 mg, Oral, QAM AC  budesonide-formoterol, 1 puff, Inhalation, BID  lidocaine 1 % injection, 5 mL, IntraDERmal, Once  midodrine, 10 mg, Oral, q8h  enoxaparin, 30 mg, SubCUTAneous, BID  sodium chloride flush, 5-40 mL, IntraVENous, 2 times per day  miconazole, , Topical, BID  levothyroxine, 75 mcg, Oral, Daily      Review of Systems   Review of Systems   Constitutional:  Positive for activity change. Negative for appetite change, chills, fatigue, fever and unexpected weight change. HENT:  Negative for congestion, mouth sores, postnasal drip, sinus pressure, sore throat and voice change. Eyes:  Negative for visual disturbance. Respiratory:  Negative for cough, shortness of breath and wheezing. Cardiovascular:  Negative for chest pain and palpitations. Gastrointestinal:  Negative for abdominal pain, blood in stool, constipation, diarrhea, nausea and vomiting. Endocrine: Negative for polyuria. Genitourinary:  Negative for difficulty urinating, dysuria, frequency and urgency. Musculoskeletal:  Negative for arthralgias, joint swelling and myalgias. Skin:  Positive for wound. Neurological:  Negative for dizziness, tremors, speech difficulty, light-headedness and headaches.    Objective :      Current Vitals : Temp: 98.2 °F (36.8 °C),  Heart Rate: 100, Resp: 22, BP: 93/61, SpO2: 100 %  Last 24 Hrs Vitals   Patient Vitals for the past 24 hrs:   BP Temp Temp src Pulse Resp SpO2 Weight   09/25/22 0540 -- -- -- -- -- -- (!) 320 lb 8 oz (145.4 kg)   09/25/22 0449 93/61 98.2 °F (36.8 °C) Oral 100 22 100 % --   09/25/22 0030 121/60 98.6 °F (37 °C) Axillary 99 26 97 % --   09/24/22 2000 (!) 123/47 98.9 °F (37.2 °C) Axillary (!) 102 28 99 % --   09/24/22 1736 (!) 114/57 99.3 °F (37.4 °C) Axillary (!) 113 25 100 % --   09/24/22 1146 (!) 111/56 97.9 °F (36.6 °C) Oral -- -- -- --   09/24/22 0841 -- -- -- (!) 112 26 100 % --   09/24/22 0759 -- 98.1 °F (36.7 °C) Oral -- -- -- --       Intake / output   09/23 1901 - 09/25 0700  In: -   Out: 850 [Urine:850]  Physical Exam:  Physical Exam  Vitals and nursing note reviewed. Constitutional:       General: She is not in acute distress. Appearance: She is morbidly obese. She is not diaphoretic. HENT:      Head: Normocephalic and atraumatic. Nose:      Right Sinus: No maxillary sinus tenderness or frontal sinus tenderness. Left Sinus: No maxillary sinus tenderness or frontal sinus tenderness. Mouth/Throat:      Pharynx: No oropharyngeal exudate. Eyes:      General: No scleral icterus. Conjunctiva/sclera: Conjunctivae normal.      Pupils: Pupils are equal, round, and reactive to light. Neck:      Thyroid: No thyromegaly. Vascular: No JVD. Cardiovascular:      Rate and Rhythm: Normal rate and regular rhythm. Pulses:           Dorsalis pedis pulses are 2+ on the right side and 2+ on the left side. Heart sounds: Normal heart sounds. No murmur heard. Pulmonary:      Effort: Pulmonary effort is normal.      Breath sounds: Normal breath sounds. No wheezing or rales. Abdominal:      Palpations: Abdomen is soft. There is no mass. Tenderness: There is no abdominal tenderness. Musculoskeletal:      Cervical back: Full passive range of motion without pain and neck supple. Comments: Notable wounds in bilateral buttocks, left breast, right breast   Lymphadenopathy:      Head:      Right side of head: No submandibular adenopathy. Left side of head: No submandibular adenopathy. Cervical: No cervical adenopathy. Skin:     General: Skin is warm. Neurological:      Mental Status: She is alert and oriented to person, place, and time. Motor: No tremor. Psychiatric:         Behavior: Behavior is cooperative.          Laboratory findings:    No results for input(s): WBC, HGB, HCT, PLT, SEDRATE, INR in the last 72 hours. Invalid input(s): PT    No results for input(s): NA, K, CL, CO2, GLUCOSE, BUN, CREATININE, MG, CALCIUM, CAION, PHOS, PSA in the last 72 hours. No results for input(s): PROT, LABALBU, LABA1C, T5DNJIS, J9GWAQB, FT4, TSH, AST, ALT, LDH, GGT, ALKPHOS, BILITOT, BILIDIR, AMMONIA, AMYLASE, LIPASE, LACTATE, CHOL, HDL, LDLCHOLESTEROL, CHOLHDLRATIO, TRIG, VLDL, BNP, TROPONINI, CKTOTAL, CKMB, CKMBINDEX, RF, GEO in the last 72 hours. Specific Gravity, UA   Date Value Ref Range Status   09/10/2022 1.022 1.005 - 1.030 Final     Protein, UA   Date Value Ref Range Status   09/10/2022 1+ (A) NEGATIVE Final     RBC, UA   Date Value Ref Range Status   09/10/2022 5 TO 10 0 - 4 /HPF Final     Comment:     Reference range defined for non-centrifuged specimen. Bacteria, UA   Date Value Ref Range Status   09/10/2022 FEW (A) None Final     Nitrite, Urine   Date Value Ref Range Status   09/10/2022 NEGATIVE NEGATIVE Final     WBC, UA   Date Value Ref Range Status   09/10/2022 20 TO 50 0 - 5 /HPF Final     Leukocyte Esterase, Urine   Date Value Ref Range Status   09/10/2022 TRACE (A) NEGATIVE Final       Imaging / Clinical Data :-   CT HEAD WO CONTRAST    Result Date: 9/15/2022  No acute intracranial abnormality. XR CHEST PORTABLE    Result Date: 9/18/2022  No acute abnormality identified. Status post extubation. XR CHEST PORTABLE    Result Date: 9/16/2022  No acute process. XR CHEST PORTABLE    Result Date: 9/15/2022  No significant interval change. Support lines and tubes in place with no acute findings in the chest.     XR CHEST PORTABLE    Result Date: 9/14/2022  Stable chest.     XR CHEST PORTABLE    Result Date: 9/13/2022  Support lines and tubes remain in place. Improved aeration at the lung bases.         Clinical Course : gradually improving  Assessment and Plan  :        Sepsis with septic shock secondary to Gram negative and gram-positive bacteremia (Proteus, staph hominis, staph epidermidis -Rocephin with stop date 9/24/2022 -ID following. Continue local wound care. Multiple wounds with maggots -continue aggressive wound care. Morbid obesity BMI 50 -   Bedbound for > 6 months -we will need aggressive rehab at discharge. Psychiatry evaluation for depression. Hypothyroidism -Synthroid. CHULA resolved -avoid nephrotoxic agents  Metabolic encephalopathy - resolved  Severe malnutrition -  Diarrhea likely antibiotic associated-check stool for C. difficile due to high risk from hospitalization and antibiotic use. Evaluated by psychiatry yesterday - no role for inpatient therapy  Meds were adjusted       Stable for discharge to skilled nursing facility when arranged - SNF  Continue to monitor vitals , Intake / output ,  Cell count , HGB , Kidney function, oxygenation  as indicated. Plan and updates discussed with patient, answers  explained to satisfaction.    Plan discussed with Staff [facility] Nunez MD  9/25/2022

## 2022-09-26 LAB
ANION GAP SERPL CALCULATED.3IONS-SCNC: 11 MMOL/L (ref 9–17)
BUN BLDV-MCNC: 7 MG/DL (ref 6–20)
CALCIUM SERPL-MCNC: 7.5 MG/DL (ref 8.6–10.4)
CHLORIDE BLD-SCNC: 103 MMOL/L (ref 98–107)
CO2: 21 MMOL/L (ref 20–31)
CREAT SERPL-MCNC: 0.74 MG/DL (ref 0.5–0.9)
GFR AFRICAN AMERICAN: >60 ML/MIN
GFR NON-AFRICAN AMERICAN: >60 ML/MIN
GFR SERPL CREATININE-BSD FRML MDRD: ABNORMAL ML/MIN/{1.73_M2}
GLUCOSE BLD-MCNC: 64 MG/DL (ref 65–105)
GLUCOSE BLD-MCNC: 79 MG/DL (ref 70–99)
GLUCOSE BLD-MCNC: 83 MG/DL (ref 65–105)
GLUCOSE BLD-MCNC: 84 MG/DL (ref 65–105)
MAGNESIUM: 1.3 MG/DL (ref 1.6–2.6)
POTASSIUM SERPL-SCNC: 3.9 MMOL/L (ref 3.7–5.3)
SODIUM BLD-SCNC: 135 MMOL/L (ref 135–144)

## 2022-09-26 PROCEDURE — 6370000000 HC RX 637 (ALT 250 FOR IP): Performed by: EMERGENCY MEDICINE

## 2022-09-26 PROCEDURE — 97535 SELF CARE MNGMENT TRAINING: CPT

## 2022-09-26 PROCEDURE — 83735 ASSAY OF MAGNESIUM: CPT

## 2022-09-26 PROCEDURE — 6360000002 HC RX W HCPCS: Performed by: STUDENT IN AN ORGANIZED HEALTH CARE EDUCATION/TRAINING PROGRAM

## 2022-09-26 PROCEDURE — 94640 AIRWAY INHALATION TREATMENT: CPT

## 2022-09-26 PROCEDURE — 6370000000 HC RX 637 (ALT 250 FOR IP): Performed by: FAMILY MEDICINE

## 2022-09-26 PROCEDURE — 1200000000 HC SEMI PRIVATE

## 2022-09-26 PROCEDURE — 97110 THERAPEUTIC EXERCISES: CPT

## 2022-09-26 PROCEDURE — 6370000000 HC RX 637 (ALT 250 FOR IP)

## 2022-09-26 PROCEDURE — 82947 ASSAY GLUCOSE BLOOD QUANT: CPT

## 2022-09-26 PROCEDURE — 99232 SBSQ HOSP IP/OBS MODERATE 35: CPT | Performed by: STUDENT IN AN ORGANIZED HEALTH CARE EDUCATION/TRAINING PROGRAM

## 2022-09-26 PROCEDURE — 36415 COLL VENOUS BLD VENIPUNCTURE: CPT

## 2022-09-26 PROCEDURE — 80048 BASIC METABOLIC PNL TOTAL CA: CPT

## 2022-09-26 PROCEDURE — 97530 THERAPEUTIC ACTIVITIES: CPT

## 2022-09-26 PROCEDURE — 94761 N-INVAS EAR/PLS OXIMETRY MLT: CPT

## 2022-09-26 PROCEDURE — 2580000003 HC RX 258: Performed by: STUDENT IN AN ORGANIZED HEALTH CARE EDUCATION/TRAINING PROGRAM

## 2022-09-26 PROCEDURE — 6370000000 HC RX 637 (ALT 250 FOR IP): Performed by: PSYCHIATRY & NEUROLOGY

## 2022-09-26 PROCEDURE — 6360000002 HC RX W HCPCS: Performed by: FAMILY MEDICINE

## 2022-09-26 PROCEDURE — 6370000000 HC RX 637 (ALT 250 FOR IP): Performed by: STUDENT IN AN ORGANIZED HEALTH CARE EDUCATION/TRAINING PROGRAM

## 2022-09-26 RX ORDER — MAGNESIUM SULFATE IN WATER 40 MG/ML
2000 INJECTION, SOLUTION INTRAVENOUS DAILY
Status: DISCONTINUED | OUTPATIENT
Start: 2022-09-26 | End: 2022-09-30

## 2022-09-26 RX ADMIN — ANTI-FUNGAL POWDER MICONAZOLE NITRATE TALC FREE: 1.42 POWDER TOPICAL at 20:57

## 2022-09-26 RX ADMIN — BUDESONIDE AND FORMOTEROL FUMARATE DIHYDRATE 1 PUFF: 80; 4.5 AEROSOL RESPIRATORY (INHALATION) at 20:40

## 2022-09-26 RX ADMIN — Medication 1 CAPSULE: at 08:52

## 2022-09-26 RX ADMIN — MAGNESIUM SULFATE HEPTAHYDRATE 1000 MG: 1 INJECTION, SOLUTION INTRAVENOUS at 08:51

## 2022-09-26 RX ADMIN — ONDANSETRON 4 MG: 2 INJECTION INTRAMUSCULAR; INTRAVENOUS at 10:59

## 2022-09-26 RX ADMIN — BUDESONIDE AND FORMOTEROL FUMARATE DIHYDRATE 1 PUFF: 80; 4.5 AEROSOL RESPIRATORY (INHALATION) at 07:38

## 2022-09-26 RX ADMIN — ENOXAPARIN SODIUM 30 MG: 100 INJECTION SUBCUTANEOUS at 08:52

## 2022-09-26 RX ADMIN — SODIUM CHLORIDE, PRESERVATIVE FREE 5 ML: 5 INJECTION INTRAVENOUS at 08:53

## 2022-09-26 RX ADMIN — SODIUM CHLORIDE, PRESERVATIVE FREE 10 ML: 5 INJECTION INTRAVENOUS at 20:56

## 2022-09-26 RX ADMIN — Medication 1 CAPSULE: at 17:00

## 2022-09-26 RX ADMIN — VENLAFAXINE 150 MG: 75 TABLET ORAL at 08:54

## 2022-09-26 RX ADMIN — MAGNESIUM SULFATE HEPTAHYDRATE 1000 MG: 1 INJECTION, SOLUTION INTRAVENOUS at 05:26

## 2022-09-26 RX ADMIN — LEVOTHYROXINE SODIUM 75 MCG: 75 TABLET ORAL at 08:53

## 2022-09-26 RX ADMIN — ENOXAPARIN SODIUM 30 MG: 100 INJECTION SUBCUTANEOUS at 20:56

## 2022-09-26 RX ADMIN — MAGNESIUM SULFATE HEPTAHYDRATE 1000 MG: 1 INJECTION, SOLUTION INTRAVENOUS at 06:27

## 2022-09-26 RX ADMIN — MAGNESIUM SULFATE HEPTAHYDRATE 1000 MG: 1 INJECTION, SOLUTION INTRAVENOUS at 04:20

## 2022-09-26 RX ADMIN — PANTOPRAZOLE SODIUM 40 MG: 40 TABLET, DELAYED RELEASE ORAL at 08:53

## 2022-09-26 ASSESSMENT — ENCOUNTER SYMPTOMS
NAUSEA: 0
WHEEZING: 0
VOICE CHANGE: 0
CONSTIPATION: 0
COUGH: 0
ABDOMINAL PAIN: 0
SORE THROAT: 0
SHORTNESS OF BREATH: 0
BLOOD IN STOOL: 0
SINUS PRESSURE: 0
DIARRHEA: 0
VOMITING: 0

## 2022-09-26 ASSESSMENT — PAIN SCALES - GENERAL: PAINLEVEL_OUTOF10: 0

## 2022-09-26 NOTE — PLAN OF CARE
Problem: Respiratory - Adult  Goal: Achieves optimal ventilation and oxygenation  Outcome: Progressing  Flowsheets (Taken 9/25/2022 3369)  Achieves optimal ventilation and oxygenation:   Assess for changes in respiratory status   Assess for changes in mentation and behavior   Position to facilitate oxygenation and minimize respiratory effort   Oxygen supplementation based on oxygen saturation or arterial blood gases   Initiate smoking cessation protocol as indicated   Assess the need for suctioning and aspirate as needed   Encourage broncho-pulmonary hygiene including cough, deep breathe, incentive spirometry   Assess and instruct to report shortness of breath or any respiratory difficulty   Respiratory therapy support as indicated

## 2022-09-26 NOTE — PLAN OF CARE
Problem: Respiratory - Adult  Goal: Achieves optimal ventilation and oxygenation  9/26/2022 0823 by Yesy Pelayo RCP  Outcome: Progressing

## 2022-09-26 NOTE — PROGRESS NOTES
Physical Therapy  Facility/Department: [facility]- [facility]  Daily Treatment note    Name: Bryce Paz  : 1980  MRN: [id number]  Date of Service: 2022    Discharge Recommendations:  Patient would benefit from continued therapy after discharge   PT Equipment Recommendations  Equipment Needed: No      Patient Diagnosis(es): There were no encounter diagnoses. Past Medical History:  has no past medical history on file. Past Surgical History:  has no past surgical history on file. Assessment   Body Structures, Functions, Activity Limitations Requiring Skilled Therapeutic Intervention: Decreased functional mobility ; Decreased strength;Decreased endurance;Decreased cognition;Decreased balance; Increased pain  Assessment: Pt is overall Max A x2 for rolling , transfer with skylift to chair . performed supine ex prior to transfer. .Pt would be unsafe to return to prior living arrangements upon discharge. Pt would benefit from additional therapy upon discharge to maximize functional independence. Therapy Prognosis: Good  Activity Tolerance  Activity Tolerance: Patient limited by fatigue;Patient limited by endurance; Patient limited by pain     Plan   Plan  Plan:  (5-6x/wk)  Current Treatment Recommendations: Strengthening, Balance training, Functional mobility training, ROM, Transfer training, Gait training, Endurance training, Therapeutic activities, Equipment evaluation, education, & procurement, Home exercise program, Safety education & training, Patient/Caregiver education & training  Safety Devices  Type of Devices: Call light within reach, Nurse notified, Bed alarm in place, Patient at risk for falls, Left in chair  Restraints  Restraints Initially in Place: No     Restrictions  Restrictions/Precautions  Restrictions/Precautions: General Precautions, Fall Risk, Up as Tolerated  Required Braces or Orthoses?: No  Position Activity Restriction  Other position/activity restrictions: Extubated , Korey Greer to wall suction, IV     Subjective   General  Patient assessed for rehabilitation services?: Yes  Response To Previous Treatment: Patient with no complaints from previous session. Family / Caregiver Present: No  Follows Commands: Within Functional Limits  General Comment  Comments: . agreeable to transfer to recliner. Subjective  Subjective: RN and pt agreeable to PT. Pt supine in bed upon arrival, required max encouragement for particpation with good return. Orientation  Overall Orientation Status: Within Functional Limits  Orientation Level: Oriented X4  Cognition  Overall Cognitive Status: WFL     Objective      Balance  Sitting: With support (seated at EOB ~5 min CGA)  Bed mobility  Rolling to Left: Maximum assistance;2 Person assistance  Rolling to Right: Maximum assistance;2 Person assistance  Transfers  Bed to Chair: Dependent/Total (tranfer with Pillo lift)   Exercise Treatment: . Ankle pumps, heel slide, short arc quads, straight leg raises. Reps  x10    AM-PAC Score  AM-PAC Inpatient Mobility Raw Score : 8 (09/21/22 1237)  AM-PAC Inpatient T-Scale Score : 28.52 (09/21/22 [facility])  Mobility Inpatient CMS 0-100% Score: 86.62 (09/21/22 [facility])  Mobility Inpatient CMS G-Code Modifier : CM (09/21/22 [facility]7)     Goals  Short Term Goals  Time Frame for Short term goals: 14 visits  Short term goal 1: Pt will perform sit<>stand transfer with min-A. Short term goal 2: Pt will perform bed mobility with min-A. Short term goal 3: Pt will ambulate 30 feet with a RW and CGA. Short term goal 4: Pt will demonstrate fair standing balance to decrease fall risk. Short term goal 5: Pt will tolerate a 35 minute therapy session to promote increased endurance. Additional Goals?: No       Education  Patient Education  Education Given To: Patient  Education Provided: Role of Therapy;Transfer Training;Plan of Care; Fall Prevention Strategies; Energy Conservation;Home Exercise Program  Education Provided Comments: the ex for strenthening and to improve ROM.   Education Method: Verbal  Barriers to Learning: None  Education Outcome: Verbalized understanding;Continued education needed      Therapy Time   Individual Concurrent Group Co-treatment   Time In 1408         Time Out 1809         Minutes 29         Timed Code Treatment Minutes: 1859 Eugenio Mccoy, PTA

## 2022-09-26 NOTE — PROGRESS NOTES
[facility]  Office: [address], DO, Bharat Patella, DO, Amanda Suero, DO, Layne Joaquin Blood, DO, Mihir Johnson MD, Alicja Wan MD, Dariusz Bourgeois MD, Juliana Gonzales MD,  Alecia Trevino MD, Michael Quintanilla MD, Lázaro Jc DO, Irene Banks MD,  Julia Crabtree MD, Violetta Ramos MD, Raúl Ochoa DO, Fer Padilla MD, Vince Agosto MD, Asha Perez MD, Linda Simmons MD, Kirt Del Angel MD, Nery Warren MD, Lee Ann Clark DO, Estefani Walker MD, Wil Rutherford MD, Tircia Ybarra, CNP,  Geoff Conte, CNP, Ann-Marie Osman, CNP, Merwyn Cooks, CNP,  Mariangel Vance, DNP, Pamela Shah, CNP, Zaid Fregoso, CNP, Thiago Wong, CNP, Himanshu Dunham, CNP, [facility] Dum[facility], CNP, Jaden Sharif PA-C, Joi Guzman, CNS, Jonas Lan, DNP, Heather Woodward, CNP, Aida Reyes, CNP, Ej Kingston, [facility]      Daily Progress Note     Admit Date: 9/10/2022  Bed/Room No.  2011/2011-01  Admitting Physician : Vince Agosto MD  Code Status :Full Code  Hospital Day:  LOS: 16 days   Chief Complaint:     Altered mental status    Principal Problem:    Septic shock [facility])  Active Problems:    Altered mental status    Acute kidney injury ([address]    Bandemia    Cellulitis of sacral region    Breast, fat necrosis    Gram-neg septicemia ([address]    Gram positive septicemia ([address]    Acute encephalopathy    Elevated procalcitonin    Chronic anemia    Acute respiratory failure with hypoxia (HCC)    Severe protein-calorie malnutrition ([address]    Simple chronic bronchitis (HCC)    Hypothyroid    Hypoalbuminemia    Systemic inflammatory response syndrome (SIRS) of infectious origin with acute organ failure (HCC)    MDD (major depressive disorder), recurrent episode, moderate ([address]  Resolved Problems:    * No resolved hospital problems. *    Subjective :         Interval History/Significant events :  09/26/22    Feeling much better today  Was awoken from sleep during morning rounds      Background History:         Ana Fuentes is 38 y.o. female  Who was admitted to the hospital on 9/10/2022 for treatment of Septic shock ([facility] [address] Patient was admitted to the hospital on 9/10/2022. she was transferred to our facility from [facility] with altered mental status. Patient was found unresponsive at home soiled with feces and urine and multiple wounds and maggots in the wounds. Patient was found to have acute kidney injury. She lives at home with her  who travels frequently for living and is not home much.  was away from home for 1 week. Patient reported that she did not feel like going to the restroom as it was taxing and decided to urinate and defecate on bed. She progressively got worse and got confused. She did not call for any help. . Patient had poor mobility for a year. CT abdomen pelvis showed malposition of jejunal with small bowel obstruction and possible hepatic steatosis. She has known history of morbid obesity hypothyroidism, gastric fundoplication. Lab work showed hyponatremia, hyperkalemia, elevated procalcitonin. Patient was referred to our facility for concern for sepsis. Blood culture was positive for gram-negative rods and gram-positive cocci in clusters. CT abdomen was repeated and did not show any concern for bowel obstruction. Patient was intubated due to metabolic encephalopathy 15/77/0969. She was also treated with pressors. Urine culture positive for E. coli. Patient was treated with ceftriaxone and recommended to continue till 9/24/2022 to complete 2-week course. Patient also treated with a short course of daptomycin until 9/17/2022. PMH:  No past medical history on file. Allergies:    Allergies   Allergen Reactions    Amoxicillin Hives    Robitussin Day-Night Value Nickolas [Phenylephrine-Diphenhyd-Dm-Gg] Hives and Rash      Medications :  venlafaxine, 150 mg, Oral, Daily  lactobacillus, 1 capsule, Oral, TID WC  sodium chloride, 500 mL, IntraVENous, Once  pantoprazole, 40 mg, Oral, QAM AC  budesonide-formoterol, 1 puff, Inhalation, BID  lidocaine 1 % injection, 5 mL, IntraDERmal, Once  midodrine, 10 mg, Oral, q8h  enoxaparin, 30 mg, SubCUTAneous, BID  sodium chloride flush, 5-40 mL, IntraVENous, 2 times per day  miconazole, , Topical, BID  levothyroxine, 75 mcg, Oral, Daily      Review of Systems   Review of Systems   Constitutional:  Positive for activity change. Negative for appetite change, chills, fatigue, fever and unexpected weight change. HENT:  Negative for congestion, mouth sores, postnasal drip, sinus pressure, sore throat and voice change. Eyes:  Negative for visual disturbance. Respiratory:  Negative for cough, shortness of breath and wheezing. Cardiovascular:  Negative for chest pain and palpitations. Gastrointestinal:  Negative for abdominal pain, blood in stool, constipation, diarrhea, nausea and vomiting. Endocrine: Negative for polyuria. Genitourinary:  Negative for difficulty urinating, dysuria, frequency and urgency. Musculoskeletal:  Negative for arthralgias, joint swelling and myalgias. Skin:  Positive for wound. Neurological:  Negative for dizziness, tremors, speech difficulty, light-headedness and headaches.    Objective :      Current Vitals : Temp: 97.9 °F (36.6 °C),  Heart Rate: 99, Resp: 22, BP: (!) 103/51, SpO2: 100 %  Last 24 Hrs Vitals   Patient Vitals for the past 24 hrs:   BP Temp Temp src Pulse Resp SpO2   09/26/22 0757 (!) 103/51 97.9 °F (36.6 °C) Oral 99 22 100 %   09/26/22 0545 -- -- -- 97 -- --   09/26/22 0414 -- -- -- -- 20 --   09/26/22 0410 110/62 98 °F (36.7 °C) Oral (!) 108 26 98 %   09/25/22 2320 -- -- -- -- 20 --   09/25/22 2315 (!) 105/55 98.1 °F (36.7 °C) Oral (!) 103 23 98 %   09/25/22 2137 -- -- -- (!) 101 18 98 %   09/25/22 1930 (!) 101/55 98.2 °F (36.8 °C) Oral (!) 104 20 99 %   09/25/22 0915 (!) 113/50 99.1 °F (37.3 °C) Oral (!) 102 26 99 % Intake / output   09/24 1901 - 09/26 0700  In: -   Out: 1150 [Urine:1150]  Physical Exam:  Physical Exam  Vitals and nursing note reviewed. Constitutional:       General: She is not in acute distress. Appearance: She is morbidly obese. She is not diaphoretic. HENT:      Head: Normocephalic and atraumatic. Nose:      Right Sinus: No maxillary sinus tenderness or frontal sinus tenderness. Left Sinus: No maxillary sinus tenderness or frontal sinus tenderness. Mouth/Throat:      Pharynx: No oropharyngeal exudate. Eyes:      General: No scleral icterus. Conjunctiva/sclera: Conjunctivae normal.      Pupils: Pupils are equal, round, and reactive to light. Neck:      Thyroid: No thyromegaly. Vascular: No JVD. Cardiovascular:      Rate and Rhythm: Normal rate and regular rhythm. Pulses:           Dorsalis pedis pulses are 2+ on the right side and 2+ on the left side. Heart sounds: Normal heart sounds. No murmur heard. Pulmonary:      Effort: Pulmonary effort is normal.      Breath sounds: Normal breath sounds. No wheezing or rales. Abdominal:      Palpations: Abdomen is soft. There is no mass. Tenderness: There is no abdominal tenderness. Musculoskeletal:      Cervical back: Full passive range of motion without pain and neck supple. Comments: Notable wounds in bilateral buttocks, left breast, right breast   Lymphadenopathy:      Head:      Right side of head: No submandibular adenopathy. Left side of head: No submandibular adenopathy. Cervical: No cervical adenopathy. Skin:     General: Skin is warm. Neurological:      Mental Status: She is alert and oriented to person, place, and time. Motor: No tremor. Psychiatric:         Behavior: Behavior is cooperative. Laboratory findings:    No results for input(s): WBC, HGB, HCT, PLT, SEDRATE, INR in the last 72 hours.     Invalid input(s): PT    Recent Labs     09/26/22  0245   MG 1.3* No results for input(s): PROT, LABALBU, LABA1C, E5CBHDZ, J4HHWUY, FT4, TSH, AST, ALT, LDH, GGT, ALKPHOS, BILITOT, BILIDIR, AMMONIA, AMYLASE, LIPASE, LACTATE, CHOL, HDL, LDLCHOLESTEROL, CHOLHDLRATIO, TRIG, VLDL, BNP, TROPONINI, CKTOTAL, CKMB, CKMBINDEX, RF, GEO in the last 72 hours. Specific Gravity, UA   Date Value Ref Range Status   09/10/2022 1.022 1.005 - 1.030 Final     Protein, UA   Date Value Ref Range Status   09/10/2022 1+ (A) NEGATIVE Final     RBC, UA   Date Value Ref Range Status   09/10/2022 5 TO 10 0 - 4 /HPF Final     Comment:     Reference range defined for non-centrifuged specimen. Bacteria, UA   Date Value Ref Range Status   09/10/2022 FEW (A) None Final     Nitrite, Urine   Date Value Ref Range Status   09/10/2022 NEGATIVE NEGATIVE Final     WBC, UA   Date Value Ref Range Status   09/10/2022 20 TO 50 0 - 5 /HPF Final     Leukocyte Esterase, Urine   Date Value Ref Range Status   09/10/2022 TRACE (A) NEGATIVE Final       Imaging / Clinical Data :-   CT HEAD WO CONTRAST    Result Date: 9/15/2022  No acute intracranial abnormality. XR CHEST PORTABLE    Result Date: 9/18/2022  No acute abnormality identified. Status post extubation. XR CHEST PORTABLE    Result Date: 9/16/2022  No acute process. XR CHEST PORTABLE    Result Date: 9/15/2022  No significant interval change. Support lines and tubes in place with no acute findings in the chest.     XR CHEST PORTABLE    Result Date: 9/14/2022  Stable chest.     XR CHEST PORTABLE    Result Date: 9/13/2022  Support lines and tubes remain in place. Improved aeration at the lung bases. Clinical Course : gradually improving  Assessment and Plan  :        Sepsis with septic shock secondary to Gram negative and gram-positive bacteremia (Proteus, staph hominis, staph epidermidis -Rocephin with stop date 9/24/2022 -ID following. Continue local wound care. Multiple wounds with maggots -continue aggressive wound care.   Morbid obesity BMI 50 -   Bedbound for > 6 months -we will need aggressive rehab at discharge. Psychiatry evaluation for depression. Hypothyroidism -Synthroid. CHULA resolved -avoid nephrotoxic agents  Metabolic encephalopathy - resolved  Diarrhea likely antibiotic associated-check stool for C. difficile due to high risk from hospitalization and antibiotic use.   Evaluated by psychiatry yesterday - no role for inpatient therapy  Meds were adjusted     Stable for discharge to skilled nursing facility when arranged - SNF    Luis Felipe Douglas MD  9/26/2022

## 2022-09-26 NOTE — PROGRESS NOTES
Occupational Therapy  Facility/Department: [facility]  Occupational Therapy Daily Treatment Note    Name: Mario Alberto Qiu  : 1980  MRN: [id number]  Date of Service: 2022    Discharge Recommendations:  Patient would benefit from continued therapy after discharge        Patient Diagnosis(es): There were no encounter diagnoses. Past Medical History:  has no past medical history on file. Past Surgical History:  has no past surgical history on file. Treatment Diagnosis: AMS      Assessment   Performance deficits / Impairments: Decreased functional mobility ; Decreased ADL status; Decreased ROM; Decreased strength;Decreased safe awareness;Decreased cognition;Decreased endurance;Decreased balance;Decreased high-level IADLs;Decreased posture  Assessment: Pt would benefit from continued acute care and post acute care OT to address above listed deficits. Treatment Diagnosis: AMS  Prognosis: Good  Activity Tolerance  Activity Tolerance: Patient limited by pain; Patient Tolerated treatment well;Patient limited by fatigue        Plan   Plan  Times per Week: 3-5 x/wk  Current Treatment Recommendations: Strengthening, ROM, Balance training, Functional mobility training, Endurance training, Cognitive reorientation, Pain management, Safety education & training, Patient/Caregiver education & training, Equipment evaluation, education, & procurement, Positioning, Self-Care / ADL, Home management training     Restrictions  Restrictions/Precautions  Restrictions/Precautions: General Precautions, Fall Risk, Up as Tolerated  Required Braces or Orthoses?: No  Position Activity Restriction  Other position/activity restrictions: Extubated , Purewick to wall suction, IV    Subjective   General  Patient assessed for rehabilitation services?: Yes  Response to previous treatment: Patient with no complaints from previous session  Family / Caregiver Present: No  Subjective  Subjective: RN ok'd patient for OT treatment this am. Pt supine in bed upon SMITH arrival. Pt agreeable to session and reports pain in abdomen 7/10, RN notified. Objective   SpO2: 97 %  O2 Device: None (Room air)  Safety Devices  Type of Devices: Left in bed;Call light within reach;Nurse notified; Bed alarm in place; Patient at risk for falls  Balance  Sitting: KP (pt declined sitting EOB at this time d/t pain)  Standing: KP (d/t declining EOB)        ADL  Feeding: Setup;Verbal cueing; Increased time to complete;Stand by assistance (able to open containers and feed self)  Grooming: Setup;Verbal cueing; Increased time to complete;Stand by assistance (wash face, brush teeth)  UE Bathing: Moderate assistance;Setup;Verbal cueing; Increased time to complete (assist to wash back and underarms/sides)  LE Bathing: Setup;Verbal cueing; Increased time to complete; Moderate assistance (assist to wash lower legs and feet)  UE Dressing: Setup;Verbal cueing; Increased time to complete;Stand by assistance (assist to snap, thread arms and tie gown)  LE Dressing: Maximum assistance;Setup;Verbal cueing; Increased time to complete (assist to doff/don footies)  Toileting: Dependent/Total (purewick to wall suction)     Pt in bed upon arrival. Just finished breakast. Setup at tray table for self care (see above for LOF). Pt needed increased time and assist d/t fatigue, pain and overall weakness. Pt declined sitting at EOB d/t pain and fatigue.        Cognition  Overall Cognitive Status: WFL  Orientation  Overall Orientation Status: Within Functional Limits  Orientation Level: Oriented X4  Education Given To: Patient  Education Provided: Role of Therapy;Plan of Care  Education Method: Demonstration;Verbal  Barriers to Learning: None  Education Outcome: Verbalized understanding;Continued education needed    AM-PAC Score  AM-PAC Inpatient Daily Activity Raw Score: 17 (09/26/22 1150)  AM-PAC Inpatient ADL T-Scale Score : 37.26 (09/26/22 1150)  ADL Inpatient CMS 0-100% Score: 50.11 (09/26/22 1150)  ADL Inpatient CMS G-Code Modifier : CK (09/26/22 1150)     Goals  Short Term Goals  Time Frame for Short term goals: By discharge, pt will:  Short Term Goal 1:  Increase BUE strength by 1/2+ muscle grade through utilization of BUE strengthening HEP  Short Term Goal 2: Demo Min A for UB ADLs  Short Term Goal 3: Demo Mod A for LB ADLs with AE PRN  Short Term Goal 4: Demo 15+ min dynamic sitting and reaching outside ADRIANA with unilateral hand release and CGA for improved performance of ADLs/IADLs  Short Term Goal 5: Follow 75% of 2-step commands with appropriate initiation and sequencing for improved functional independence  Short Term Goal 6: NOTIFY OTR TO UPDATE GOALS AS PT PROGRESSES    Therapy Time   Individual Concurrent Group Co-treatment   Time In 0905         Time Out 1002         Minutes 57         Timed Code Treatment Minutes: [place], SMITH/L

## 2022-09-26 NOTE — PROGRESS NOTES
Notified JOSE G Cervantes of the patient's last magnesium level of 1.5 (See MAR for treatment) with no redraw. The patient was noted to be tachycardic throughout the day. New orders for a magnesium level redraw was ordered.

## 2022-09-26 NOTE — CARE COORDINATION
CM called and left message requesting call back with Nkechi Monzon at [facility]. Cm called and spoke with Latisha at [facility] who states they are unable to accept patient. CM called and left message requesting call back with Carrie Cheung at [facility].

## 2022-09-26 NOTE — PROGRESS NOTES
Comprehensive Nutrition Assessment    Type and Reason for Visit:  Reassess    Nutrition Recommendations/Plan:   Continue current diet with Ensure ONS at all meals. Encourage/monitor PO intakes as tolerated. Monitor labs, weights, and plan of care. Malnutrition Assessment:  Malnutrition Status: At risk for malnutrition (Comment) (09/19/22 1245)    Context:  Acute Illness     Findings of the 6 clinical characteristics of malnutrition:  Energy Intake:  Mild decrease in energy intake  Weight Loss:  No significant weight loss     Body Fat Loss:  No significant body fat loss   Muscle Mass Loss:  No significant muscle mass loss  Fluid Accumulation:  Moderate to Severe Extremities, Generalized   Strength:  Not Performed    Nutrition Assessment:    Pt continues to tolerate an oral diet without issues and has a good appetite. Pt ate 75% of breakfast this morning. Encouraged intakes of Ensure supplements. Consuming 25-75% of meals. Last BM 9/26. Labs reviewed: Mg 1.3 mg/dL, Glucose 64-84 mg/dL. Meds reviewed: Probiotics. Nutrition Related Findings:    Labs/Meds reviewed. Edema to all extremities. Last BM 9/26. Wound Type: Multiple, Pressure Injury, Stage III (to left/right breast, left proximal arm)       Current Nutrition Intake & Therapies:    Average Meal Intake: 51-75%  Average Supplements Intake: 26-50%, 51-75%  ADULT DIET; Regular; 4 carb choices (60 gm/meal)  ADULT ORAL NUTRITION SUPPLEMENT; Breakfast, Dinner, Lunch; Standard High Calorie/High Protein Oral Supplement    Anthropometric Measures:  Height: 5' 7\" (170.2 cm)  Ideal Body Weight (IBW): 135 lbs (61 kg)    Admission Body Weight: 307 lb (139.3 kg)  Current Body Weight: 320 lb 8 oz (145.4 kg), 237.4 % IBW.  Weight Source: Bed Scale  Current BMI (kg/m2): 50.2                          BMI Categories: Obese Class 3 (BMI 40.0 or greater)    Estimated Daily Nutrient Needs:  Energy Requirements Based On: Formula  Weight Used for Energy Requirements: Current  Energy (kcal/day): 5676-7596 kcals/day  Weight Used for Protein Requirements: Ideal  Protein (g/day):  g pro/day  Fluid (ml/day): per MD    Nutrition Diagnosis:   Inadequate oral intake related to  (current condition; appetite) as evidenced by intake 51-75% (variable PO intakes; need for ONS)    Nutrition Interventions:   Food and/or Nutrient Delivery: Continue Current Diet, Continue Oral Nutrition Supplement  Nutrition Education/Counseling: No recommendation at this time  Coordination of Nutrition Care: Continue to monitor while inpatient  Plan of Care discussed with: Patient    Goals:  Previous Goal Met: Progressing toward Goal(s)  Goals: Meet at least 75% of estimated needs, within 7 days       Nutrition Monitoring and Evaluation:   Behavioral-Environmental Outcomes: None Identified  Food/Nutrient Intake Outcomes: Food and Nutrient Intake, Supplement Intake  Physical Signs/Symptoms Outcomes: Biochemical Data, GI Status, Fluid Status or Edema, Nutrition Focused Physical Findings, Skin, Weight    Discharge Planning:    Continue current diet, Continue Oral Nutrition Supplement     Kiera Pike, [address] [place] [address],   Contact: [phone]

## 2022-09-27 LAB
ABSOLUTE EOS #: 0.38 K/UL (ref 0–0.4)
ABSOLUTE IMMATURE GRANULOCYTE: 0.23 K/UL (ref 0–0.3)
ABSOLUTE LYMPH #: 3.49 K/UL (ref 1–4.8)
ABSOLUTE MONO #: 0.61 K/UL (ref 0.1–0.8)
ANION GAP SERPL CALCULATED.3IONS-SCNC: 9 MMOL/L (ref 9–17)
BASOPHILS # BLD: 1 % (ref 0–2)
BASOPHILS ABSOLUTE: 0.08 K/UL (ref 0–0.2)
BUN BLDV-MCNC: 8 MG/DL (ref 6–20)
CALCIUM SERPL-MCNC: 7.6 MG/DL (ref 8.6–10.4)
CHLORIDE BLD-SCNC: 105 MMOL/L (ref 98–107)
CO2: 22 MMOL/L (ref 20–31)
CREAT SERPL-MCNC: 0.67 MG/DL (ref 0.5–0.9)
EOSINOPHILS RELATIVE PERCENT: 5 % (ref 1–4)
GFR AFRICAN AMERICAN: >60 ML/MIN
GFR NON-AFRICAN AMERICAN: >60 ML/MIN
GFR SERPL CREATININE-BSD FRML MDRD: ABNORMAL ML/MIN/{1.73_M2}
GLUCOSE BLD-MCNC: 105 MG/DL (ref 65–105)
GLUCOSE BLD-MCNC: 66 MG/DL (ref 70–99)
GLUCOSE BLD-MCNC: 79 MG/DL (ref 65–105)
HCT VFR BLD CALC: 24.4 % (ref 36.3–47.1)
HEMOGLOBIN: 7.6 G/DL (ref 11.9–15.1)
IMMATURE GRANULOCYTES: 3 %
LYMPHOCYTES # BLD: 46 % (ref 24–44)
MAGNESIUM: 1.6 MG/DL (ref 1.6–2.6)
MCH RBC QN AUTO: 28.5 PG (ref 25.2–33.5)
MCHC RBC AUTO-ENTMCNC: 31.1 G/DL (ref 28.4–34.8)
MCV RBC AUTO: 91.4 FL (ref 82.6–102.9)
MONOCYTES # BLD: 8 % (ref 1–7)
MORPHOLOGY: ABNORMAL
NRBC AUTOMATED: 0.3 PER 100 WBC
PDW BLD-RTO: 17.9 % (ref 11.8–14.4)
PLATELET # BLD: 440 K/UL (ref 138–453)
PMV BLD AUTO: 9.4 FL (ref 8.1–13.5)
POTASSIUM SERPL-SCNC: 4.1 MMOL/L (ref 3.7–5.3)
RBC # BLD: 2.67 M/UL (ref 3.95–5.11)
SEG NEUTROPHILS: 37 % (ref 36–66)
SEGMENTED NEUTROPHILS ABSOLUTE COUNT: 2.81 K/UL (ref 1.8–7.7)
SODIUM BLD-SCNC: 136 MMOL/L (ref 135–144)
WBC # BLD: 7.6 K/UL (ref 3.5–11.3)

## 2022-09-27 PROCEDURE — 6370000000 HC RX 637 (ALT 250 FOR IP): Performed by: FAMILY MEDICINE

## 2022-09-27 PROCEDURE — 80048 BASIC METABOLIC PNL TOTAL CA: CPT

## 2022-09-27 PROCEDURE — 6370000000 HC RX 637 (ALT 250 FOR IP): Performed by: STUDENT IN AN ORGANIZED HEALTH CARE EDUCATION/TRAINING PROGRAM

## 2022-09-27 PROCEDURE — 83735 ASSAY OF MAGNESIUM: CPT

## 2022-09-27 PROCEDURE — 2580000003 HC RX 258: Performed by: STUDENT IN AN ORGANIZED HEALTH CARE EDUCATION/TRAINING PROGRAM

## 2022-09-27 PROCEDURE — 6360000002 HC RX W HCPCS: Performed by: STUDENT IN AN ORGANIZED HEALTH CARE EDUCATION/TRAINING PROGRAM

## 2022-09-27 PROCEDURE — 36415 COLL VENOUS BLD VENIPUNCTURE: CPT

## 2022-09-27 PROCEDURE — 6370000000 HC RX 637 (ALT 250 FOR IP)

## 2022-09-27 PROCEDURE — 99232 SBSQ HOSP IP/OBS MODERATE 35: CPT | Performed by: STUDENT IN AN ORGANIZED HEALTH CARE EDUCATION/TRAINING PROGRAM

## 2022-09-27 PROCEDURE — 94640 AIRWAY INHALATION TREATMENT: CPT

## 2022-09-27 PROCEDURE — 6370000000 HC RX 637 (ALT 250 FOR IP): Performed by: PSYCHIATRY & NEUROLOGY

## 2022-09-27 PROCEDURE — 85025 COMPLETE CBC W/AUTO DIFF WBC: CPT

## 2022-09-27 PROCEDURE — 97530 THERAPEUTIC ACTIVITIES: CPT

## 2022-09-27 PROCEDURE — 2500000003 HC RX 250 WO HCPCS: Performed by: STUDENT IN AN ORGANIZED HEALTH CARE EDUCATION/TRAINING PROGRAM

## 2022-09-27 PROCEDURE — 97110 THERAPEUTIC EXERCISES: CPT

## 2022-09-27 PROCEDURE — 82947 ASSAY GLUCOSE BLOOD QUANT: CPT

## 2022-09-27 PROCEDURE — 6370000000 HC RX 637 (ALT 250 FOR IP): Performed by: EMERGENCY MEDICINE

## 2022-09-27 PROCEDURE — 1200000000 HC SEMI PRIVATE

## 2022-09-27 RX ORDER — SENNA AND DOCUSATE SODIUM 50; 8.6 MG/1; MG/1
2 TABLET, FILM COATED ORAL 2 TIMES DAILY
Status: DISCONTINUED | OUTPATIENT
Start: 2022-09-27 | End: 2022-10-05 | Stop reason: HOSPADM

## 2022-09-27 RX ADMIN — Medication 1 CAPSULE: at 20:47

## 2022-09-27 RX ADMIN — SODIUM CHLORIDE, PRESERVATIVE FREE 10 ML: 5 INJECTION INTRAVENOUS at 20:47

## 2022-09-27 RX ADMIN — ONDANSETRON 4 MG: 2 INJECTION INTRAMUSCULAR; INTRAVENOUS at 01:46

## 2022-09-27 RX ADMIN — MIDODRINE HYDROCHLORIDE 10 MG: 5 TABLET ORAL at 08:43

## 2022-09-27 RX ADMIN — MAGNESIUM HYDROXIDE 30 ML: 400 SUSPENSION ORAL at 16:16

## 2022-09-27 RX ADMIN — VENLAFAXINE 150 MG: 75 TABLET ORAL at 08:43

## 2022-09-27 RX ADMIN — ENOXAPARIN SODIUM 30 MG: 100 INJECTION SUBCUTANEOUS at 08:43

## 2022-09-27 RX ADMIN — ENOXAPARIN SODIUM 30 MG: 100 INJECTION SUBCUTANEOUS at 20:47

## 2022-09-27 RX ADMIN — PANTOPRAZOLE SODIUM 40 MG: 40 TABLET, DELAYED RELEASE ORAL at 08:43

## 2022-09-27 RX ADMIN — ANTI-FUNGAL POWDER MICONAZOLE NITRATE TALC FREE: 1.42 POWDER TOPICAL at 08:51

## 2022-09-27 RX ADMIN — MAGNESIUM SULFATE HEPTAHYDRATE 2000 MG: 40 INJECTION, SOLUTION INTRAVENOUS at 08:48

## 2022-09-27 RX ADMIN — ONDANSETRON 4 MG: 4 TABLET, ORALLY DISINTEGRATING ORAL at 12:55

## 2022-09-27 RX ADMIN — SODIUM CHLORIDE, PRESERVATIVE FREE 10 ML: 5 INJECTION INTRAVENOUS at 08:48

## 2022-09-27 RX ADMIN — STANDARDIZED SENNA CONCENTRATE AND DOCUSATE SODIUM 2 TABLET: 8.6; 5 TABLET ORAL at 16:16

## 2022-09-27 RX ADMIN — BUDESONIDE AND FORMOTEROL FUMARATE DIHYDRATE 1 PUFF: 80; 4.5 AEROSOL RESPIRATORY (INHALATION) at 19:56

## 2022-09-27 RX ADMIN — MIDODRINE HYDROCHLORIDE 10 MG: 5 TABLET ORAL at 18:13

## 2022-09-27 RX ADMIN — Medication 1 CAPSULE: at 08:43

## 2022-09-27 RX ADMIN — LEVOTHYROXINE SODIUM 75 MCG: 75 TABLET ORAL at 08:43

## 2022-09-27 ASSESSMENT — PAIN DESCRIPTION - DESCRIPTORS: DESCRIPTORS: ACHING

## 2022-09-27 ASSESSMENT — PAIN SCALES - GENERAL
PAINLEVEL_OUTOF10: 2
PAINLEVEL_OUTOF10: 5

## 2022-09-27 ASSESSMENT — PAIN DESCRIPTION - ORIENTATION: ORIENTATION: RIGHT

## 2022-09-27 ASSESSMENT — PAIN DESCRIPTION - LOCATION
LOCATION: BUTTOCKS
LOCATION: ABDOMEN

## 2022-09-27 ASSESSMENT — PAIN - FUNCTIONAL ASSESSMENT: PAIN_FUNCTIONAL_ASSESSMENT: ACTIVITIES ARE NOT PREVENTED

## 2022-09-27 ASSESSMENT — PAIN SCALES - WONG BAKER
WONGBAKER_NUMERICALRESPONSE: 0

## 2022-09-27 ASSESSMENT — PAIN DESCRIPTION - FREQUENCY: FREQUENCY: INTERMITTENT

## 2022-09-27 ASSESSMENT — PAIN DESCRIPTION - ONSET: ONSET: GRADUAL

## 2022-09-27 ASSESSMENT — PAIN DESCRIPTION - PAIN TYPE: TYPE: CHRONIC PAIN

## 2022-09-27 NOTE — CARE COORDINATION
Spoke to Latisha from [facility].  They are unable to accept d/t no bed availability

## 2022-09-27 NOTE — PROGRESS NOTES
Physical Therapy  Facility/Department: [facility]  Daily treatment note    Name: Bryce Paz  : 1980  MRN: [id number]  Date of Service: 2022    Discharge Recommendations:  Patient would benefit from continued therapy after discharge   PT Equipment Recommendations  Equipment Needed: No      Patient Diagnosis(es): There were no encounter diagnoses. Past Medical History:  has no past medical history on file. Past Surgical History:  has no past surgical history on file. Assessment   Body Structures, Functions, Activity Limitations Requiring Skilled Therapeutic Intervention: Decreased functional mobility ; Decreased strength;Decreased endurance;Decreased cognition;Decreased balance; Increased pain  Assessment: Pt is overall Max A x2 for rolling , transfer with skylift to chair, tolerated ~6mins danging feet, limited by fear of falling and poor sitting balance. Pt would be unsafe to return to prior living arrangements upon discharge. Pt would benefit from additional therapy upon discharge to maximize functional independence. Therapy Prognosis: Good  Requires PT Follow-Up: Yes  Activity Tolerance  Activity Tolerance: Patient limited by fatigue;Patient limited by endurance; Patient limited by pain;Treatment limited secondary to medical complications     Plan   Plan  Plan:  (5-6x/wk)  Current Treatment Recommendations: Strengthening, Balance training, Functional mobility training, ROM, Transfer training, Gait training, Endurance training, Therapeutic activities, Equipment evaluation, education, & procurement, Home exercise program, Safety education & training, Patient/Caregiver education & training  Safety Devices  Type of Devices: Call light within reach, Nurse notified, Bed alarm in place, Patient at risk for falls, Left in chair  Restraints  Restraints Initially in Place: No     Restrictions  Restrictions/Precautions  Restrictions/Precautions: General Precautions, Fall Risk, Up as Tolerated  Required Braces or Orthoses?: No  Position Activity Restriction  Other position/activity restrictions: Extubated 9/16, Purewick to wall suction, IV     Subjective   General  Patient assessed for rehabilitation services?: Yes  Response To Previous Treatment: Patient with no complaints from previous session. Family / Caregiver Present: No  Follows Commands: Within Functional Limits  General Comment  Comments: . agreeable to transfer to recliner. Subjective  Subjective: RN and pt agreeable to PT. Pt supine in bed upon arrival, required max encouragement for particpation with good return. Cognition   Orientation  Overall Orientation Status: Within Functional Limits  Orientation Level: Oriented X4  Cognition  Overall Cognitive Status: WFL     Objective   Bed mobility  Rolling to Left: Maximum assistance;2 Person assistance  Rolling to Right: Maximum assistance;2 Person assistance  Scooting: Maximal assistance  Transfers  Bed to Chair: Dependent/Total  Comment: Pt transfer to and from Chair with [facility] lift ,  poor tolerance sitting with legs dangling , demo poor balance and fear of falling, max encouragement t/o   Balance  Sitting - Static: Poor;+;Fair;- (Tolerated danglinhg feet x6mins , demo fear of falling t/o stating she feels like she is sliding of the chair.)  Sitting - Dynamic: Poor;+  Comments: sitting balance assessed at the recliner. Pt require MAX -A to maintain sitting Balance. Exercise Treatment: Seated LE exercise program: Long Arc Quads, hip abduction/adduction, heel/toe raises, and marches. Reps:10 A/AAROM . Ankle pumps, heel slide, short arc quads.  Repss10     AM-PAC Score  AM-PAC Inpatient Mobility Raw Score : 8 (09/21/22 1237)  AM-PAC Inpatient T-Scale Score : 28.52 (09/21/22 1237)  Mobility Inpatient CMS 0-100% Score: 86.62 (09/21/22 1237)  Mobility Inpatient CMS G-Code Modifier : CM (09/21/22 1237)   Goals  Short Term Goals  Time Frame for Short term goals: 14 visits  Short term goal 1: Pt will perform sit<>stand transfer with min-A. Short term goal 2: Pt will perform bed mobility with min-A. Short term goal 3: Pt will ambulate 30 feet with a RW and CGA. Short term goal 4: Pt will demonstrate fair standing balance to decrease fall risk. Short term goal 5: Pt will tolerate a 35 minute therapy session to promote increased endurance. Additional Goals?: No       Education  Patient Education  Education Given To: Patient  Education Provided: Role of Therapy;Transfer Training;Plan of Care; Fall Prevention Strategies; Energy Conservation;Home Exercise Program  Education Provided Comments: the ex for strenthening and to improve ROM.   Education Method: Verbal  Education Outcome: Verbalized understanding;Continued education needed      Therapy Time   Individual Concurrent Group Co-treatment   Time In 6081         Time Out 1459         Minutes 42         Timed Code Treatment Minutes: [address], PTA

## 2022-09-27 NOTE — PLAN OF CARE
Problem: Discharge Planning  Goal: Discharge to home or other facility with appropriate resources  Flowsheets (Taken 9/24/2022 2000 by Lj Baca RN)  Discharge to home or other facility with appropriate resources:   Identify barriers to discharge with patient and caregiver   Identify discharge learning needs (meds, wound care, etc)     Problem: Pain  Goal: Verbalizes/displays adequate comfort level or baseline comfort level  Flowsheets (Taken 9/16/2022 2000 by Britany Jackson RN)  Verbalizes/displays adequate comfort level or baseline comfort level:   Encourage patient to monitor pain and request assistance   Assess pain using appropriate pain scale   Implement non-pharmacological measures as appropriate and evaluate response

## 2022-09-27 NOTE — PROGRESS NOTES
[facility]  Office: [address], DO, Jerry Jay, DO, Liss Delacruz, DO, Josh Valderrama, DO, Liban Douglas MD, Emmy Denny MD, Lars Oden MD, Bhumika Soriano MD,  Myla Byrne MD, Elias Cifuentes MD, Angi Saeed DO, Sri Rees MD,  Caty Johnson MD, Rohit Warren MD, Carlos A Fuentes DO, Francisco J Rodriguez MD, Ashley Casas MD, Monroe Villavicencio MD, Eula Holm MD, Sergei Orlando MD, Mary Corbin MD, Fatimah Waite, DO, Jacqueline Velez MD, Stephanie Olmos MD, Donis Hogue, [facility],  Adri Reed, CNP, Dilcia Galeano, CNP, Ethel Mahoney, CNP,  Franki Lundborg, [facility], Will Weathers, CNP, Earl Alfaro, CNP, Noemi Everett, CNP, Zarina Dorman, CNP, Vikki Bhatt, CNP, Sharmin Bond PA-C, Ezequiel Boyd, CNS, Olivia Marroquin, [facility], Michael Riddle, CNP, Hazel Parish, CNP, Marleny Alanis, [facility]    Progress Note    9/27/2022    4:27 PM    Name:   Rosetta Vela  MRN:     [id number]     Acct:      [de-identified]   Room:   2011/2011-01  IP Day:  16  Admit Date:  9/10/2022  4:45 PM    PCP:   No primary care provider on file. Code Status:  Full Code    Subjective:     C/C: Found unresponsive at home  Interval History Status: not changed. Patient seen at bedside  Patient is very frustrated that she has to be in the hospital and were not able to find a place for her to go. She cannot go home as she is not able to take care of herself. Patient complains of nausea, is having dry heaving, improved little bit with Zofran. Patient states that she is constipated and has not had a bowel movement in the last few days. She feels slight abdominal pain but she strains and does not have any bowel movement.     Brief History:        40-year-old female with known medical history of morbid obesity, hypothyroidism, history of gastric fundoplication, COPD presents to the hospital from [facility]. Patient was found unresponsive at home covered in the urine and stools with several scattered wounds and maggots in the wounds. Patient was noted to have acute kidney injury and CT scan of abdomen showed malposition of jejunal with small bowel obstruction and possible hepatic steatosis. Patient had abnormal labs including hyponatremia, hyperkalemia, elevated procalcitonin. Patient was transferred to [facility] ICU for sepsis, leukocytosis, UTI. Patient was started on broad-spectrum antibiotics, infectious disease was consulted. Positive blood cultures shows gram-negative rods and gram-positive cocci in clusters. A CT scan in [facility] did not show concern for small bowel obstruction. Patient was intubated due to metabolic encephalopathy. Patient remained intubated from 9/17. Patient was also on pressor support with Levophed. Patient had sepsis with Proteus, staph hominis, staph epidermidis. Urine cultures showed E. coli. Patient on ceftriaxone for 2 weeks till 9/24 per infectious disease, also did short course of daptomycin, already completed till 9/17. Review of Systems:     Constitutional:  negative for chills, fevers, sweats  Respiratory:  negative for cough, dyspnea on exertion, shortness of breath, wheezing  Cardiovascular:  negative for chest pain, chest pressure/discomfort, lower extremity edema, palpitations  Gastrointestinal:  negative for abdominal pain, constipation, diarrhea, nausea, vomiting  Neurological:  negative for dizziness, headache    Medications: Allergies:     Allergies   Allergen Reactions    Amoxicillin Hives    Robitussin Day-Night Value Nickolas [Phenylephrine-Diphenhyd-Dm-Gg] Hives and Rash       Current Meds:   Scheduled Meds:    sennosides-docusate sodium  2 tablet Oral BID    magnesium sulfate  2,000 mg IntraVENous Daily    venlafaxine  150 mg Oral Daily    lactobacillus  1 capsule Oral TID WC    sodium chloride  500 mL IntraVENous Once    pantoprazole  40 mg Oral QAM AC    budesonide-formoterol  1 puff Inhalation BID    lidocaine 1 % injection  5 mL IntraDERmal Once    midodrine  10 mg Oral q8h    enoxaparin  30 mg SubCUTAneous BID    sodium chloride flush  5-40 mL IntraVENous 2 times per day    miconazole   Topical BID    levothyroxine  75 mcg Oral Daily     Continuous Infusions:    sodium chloride      sodium chloride 10 mL/hr at 22 0438     PRN Meds: calcium carbonate, loperamide, magnesium sulfate, sodium chloride flush, sodium chloride, sodium chloride flush, sodium chloride, ondansetron **OR** ondansetron, polyethylene glycol, acetaminophen **OR** acetaminophen, potassium chloride **OR** potassium chloride    Data:     Past Medical History: Morbid obesity  Lymphedema  COPD  History of smoking    Social History:    Quit smoking many years go  No alcohol    Family History:Family history cancer ovarian- maternal aunt       Vitals:  /65   Pulse (!) 106   Temp 98.2 °F (36.8 °C) (Oral)   Resp 22   Ht 5' 7\" (1.702 m)   Wt (!) 320 lb 8 oz (145.4 kg)   SpO2 96%   BMI 50.20 kg/m²   Temp (24hrs), Av.2 °F (36.8 °C), Min:98.1 °F (36.7 °C), Max:98.4 °F (36.9 °C)    Recent Labs     22  1158 22  1620 22  1100 22  1616   POCGLU 84 83 105 79       I/O (24Hr): Intake/Output Summary (Last 24 hours) at 2022 1627  Last data filed at 2022 1319  Gross per 24 hour   Intake 827.27 ml   Output 200 ml   Net 627.27 ml       Labs:  Hematology:No results for input(s): WBC, RBC, HGB, HCT, MCV, MCH, MCHC, RDW, PLT, MPV, SEDRATE, CRP, INR, DDIMER, [id number], LABABSO in the last 72 hours.     Invalid input(s): PT  Chemistry:  Recent Labs     22  0245 22  0944 22  0646   NA  --  135 136   K  --  3.9 4.1   CL  --  103 105   CO2  --  21 22   GLUCOSE  --  79 66*   BUN  --  7 8   CREATININE  --  0.74 0.67   MG 1.3*  --  1.6   ANIONGAP  --  11 9   LABGLOM  --  >60 >60   GFRAA  --  >60 >60   CALCIUM --  7.5* 7.6*     Recent Labs     09/25/22  0909 09/26/22  0759 09/26/22  1158 09/26/22  1620 09/27/22  1100 09/27/22  1616   POCGLU 86 64* 84 83 105 79     ABG:  Lab Results   Component Value Date/Time    POCPH 7.498 09/16/2022 04:15 AM    POCPCO2 35.3 09/16/2022 04:15 AM    POCPO2 134.7 09/16/2022 04:15 AM    POCHCO3 27.4 09/16/2022 04:15 AM    NBEA 4 09/12/2022 03:42 AM    PBEA 4 09/16/2022 04:15 AM    BNYE5UTS 99 09/16/2022 04:15 AM    FIO2 30.0 09/16/2022 04:15 AM     Lab Results   Component Value Date/Time    SPECIAL R WRIST 0.1ML 09/10/2022 07:32 PM     Lab Results   Component Value Date/Time    CULTURE NO GROWTH 09/10/2022 08:31 PM       Radiology:  No results found.     Physical Examination:        General appearance:  alert, cooperative and morbidly obese mental Status:  oriented to person, place and time and normal affect  Lungs: Decreased breath sounds bilaterally, normal effort   heart:  regular rate and rhythm, no murmur  Abdomen:  soft, nontender, nondistended, normal bowel sounds, no masses, hepatomegaly, splenomegaly  Extremities: 2+ edema, redness, tenderness in the calves  Skin: Multiple skin wounds present on sacral area, under the breast  Assessment:        Hospital Problems             Last Modified POA    * (Principal) Septic shock ([address] 9/17/2022 Yes    Altered mental status 9/11/2022 Yes    Acute kidney injury ([address] 9/11/2022 Yes    Bandemia 9/11/2022 Yes    Cellulitis of sacral region 9/11/2022 Yes    Breast, fat necrosis 9/11/2022 Yes    Gram-neg septicemia ([address] 9/11/2022 Yes    Gram positive septicemia ([address] 9/11/2022 Yes    Acute encephalopathy 9/11/2022 Yes    Elevated procalcitonin 9/11/2022 Yes    Chronic anemia 9/18/2022 Yes    Acute respiratory failure with hypoxia ([address] 9/18/2022 Yes    Severe protein-calorie malnutrition ([facility] 9/18/2022 Yes    Simple chronic bronchitis [facility] 9/18/2022 Yes    Hypothyroid 9/18/2022 Yes    Hypoalbuminemia 9/18/2022 Yes    Systemic inflammatory response syndrome (SIRS) of infectious origin with acute organ failure ([facility] 9/18/2022 Yes    MDD (major depressive disorder), recurrent episode, moderate ([facility] 9/24/2022 Yes       Plan:        Sepsis with septic shock-resolved, positive bacteremia with Proteus, staph hominis, staph epidermidis, completed daptomycin till 9/17, ceftriaxone till 9/24. Likely skin is the source of sepsis. Patient on midodrine for blood pressure support. Continue wound care. Multiple wounds on breast, buttocks-had maggots, continue wound care     Anemia of chronic disease, recheck hb in the am, encourage oral nutrition     Hypothyroidism on Synthyroid     COPD-continue inhalers     CHULA-resolved     Hypoalbuminemia with severe malnutrition - dietitian for supplement recommendations, patient does not like taste of Ensure supplements. Encephalopathy- resolved    Constipation-senna and milk of mag added    Morbid obesity-needs lifestyle modification, encourage physical therapy    Difficult discharge plan, discharge orders is in.     Prema Dee MD  9/27/2022  4:27 PM

## 2022-09-27 NOTE — CARE COORDINATION
Follow up on the following referrals  [place] Left voicemail for [facility] x2  TIARA CHRISTAL SOTOMAYOR - YUDELKA left voicemail for [facility]- left voicemail for [employer]

## 2022-09-28 LAB
ANION GAP SERPL CALCULATED.3IONS-SCNC: 11 MMOL/L (ref 9–17)
BUN BLDV-MCNC: 7 MG/DL (ref 6–20)
CALCIUM SERPL-MCNC: 7.5 MG/DL (ref 8.6–10.4)
CHLORIDE BLD-SCNC: 105 MMOL/L (ref 98–107)
CO2: 21 MMOL/L (ref 20–31)
CREAT SERPL-MCNC: 0.62 MG/DL (ref 0.5–0.9)
GFR AFRICAN AMERICAN: >60 ML/MIN
GFR NON-AFRICAN AMERICAN: >60 ML/MIN
GFR SERPL CREATININE-BSD FRML MDRD: ABNORMAL ML/MIN/{1.73_M2}
GLUCOSE BLD-MCNC: 69 MG/DL (ref 70–99)
GLUCOSE BLD-MCNC: 71 MG/DL (ref 65–105)
GLUCOSE BLD-MCNC: 94 MG/DL (ref 65–105)
MAGNESIUM: 1.8 MG/DL (ref 1.6–2.6)
POTASSIUM SERPL-SCNC: 4.2 MMOL/L (ref 3.7–5.3)
SODIUM BLD-SCNC: 137 MMOL/L (ref 135–144)

## 2022-09-28 PROCEDURE — 94640 AIRWAY INHALATION TREATMENT: CPT

## 2022-09-28 PROCEDURE — 6370000000 HC RX 637 (ALT 250 FOR IP): Performed by: STUDENT IN AN ORGANIZED HEALTH CARE EDUCATION/TRAINING PROGRAM

## 2022-09-28 PROCEDURE — 6360000002 HC RX W HCPCS: Performed by: STUDENT IN AN ORGANIZED HEALTH CARE EDUCATION/TRAINING PROGRAM

## 2022-09-28 PROCEDURE — 99231 SBSQ HOSP IP/OBS SF/LOW 25: CPT | Performed by: STUDENT IN AN ORGANIZED HEALTH CARE EDUCATION/TRAINING PROGRAM

## 2022-09-28 PROCEDURE — 6370000000 HC RX 637 (ALT 250 FOR IP): Performed by: FAMILY MEDICINE

## 2022-09-28 PROCEDURE — 82947 ASSAY GLUCOSE BLOOD QUANT: CPT

## 2022-09-28 PROCEDURE — 2580000003 HC RX 258: Performed by: STUDENT IN AN ORGANIZED HEALTH CARE EDUCATION/TRAINING PROGRAM

## 2022-09-28 PROCEDURE — 6370000000 HC RX 637 (ALT 250 FOR IP): Performed by: EMERGENCY MEDICINE

## 2022-09-28 PROCEDURE — 83735 ASSAY OF MAGNESIUM: CPT

## 2022-09-28 PROCEDURE — 97530 THERAPEUTIC ACTIVITIES: CPT

## 2022-09-28 PROCEDURE — 1200000000 HC SEMI PRIVATE

## 2022-09-28 PROCEDURE — 97535 SELF CARE MNGMENT TRAINING: CPT

## 2022-09-28 PROCEDURE — 97110 THERAPEUTIC EXERCISES: CPT

## 2022-09-28 PROCEDURE — 36415 COLL VENOUS BLD VENIPUNCTURE: CPT

## 2022-09-28 PROCEDURE — 80048 BASIC METABOLIC PNL TOTAL CA: CPT

## 2022-09-28 PROCEDURE — 6370000000 HC RX 637 (ALT 250 FOR IP)

## 2022-09-28 PROCEDURE — 6370000000 HC RX 637 (ALT 250 FOR IP): Performed by: PSYCHIATRY & NEUROLOGY

## 2022-09-28 RX ORDER — OXYCODONE HYDROCHLORIDE 5 MG/1
10 TABLET ORAL EVERY 4 HOURS PRN
Status: DISCONTINUED | OUTPATIENT
Start: 2022-09-28 | End: 2022-10-05 | Stop reason: HOSPADM

## 2022-09-28 RX ORDER — OXYCODONE HYDROCHLORIDE 5 MG/1
5 TABLET ORAL EVERY 4 HOURS PRN
Status: DISCONTINUED | OUTPATIENT
Start: 2022-09-28 | End: 2022-10-05 | Stop reason: HOSPADM

## 2022-09-28 RX ADMIN — MAGNESIUM SULFATE HEPTAHYDRATE 2000 MG: 40 INJECTION, SOLUTION INTRAVENOUS at 09:08

## 2022-09-28 RX ADMIN — BUDESONIDE AND FORMOTEROL FUMARATE DIHYDRATE 1 PUFF: 80; 4.5 AEROSOL RESPIRATORY (INHALATION) at 08:03

## 2022-09-28 RX ADMIN — ANTI-FUNGAL POWDER MICONAZOLE NITRATE TALC FREE: 1.42 POWDER TOPICAL at 13:01

## 2022-09-28 RX ADMIN — SODIUM CHLORIDE, PRESERVATIVE FREE 10 ML: 5 INJECTION INTRAVENOUS at 20:28

## 2022-09-28 RX ADMIN — Medication 1 CAPSULE: at 09:00

## 2022-09-28 RX ADMIN — MIDODRINE HYDROCHLORIDE 10 MG: 5 TABLET ORAL at 02:19

## 2022-09-28 RX ADMIN — ANTI-FUNGAL POWDER MICONAZOLE NITRATE TALC FREE: 1.42 POWDER TOPICAL at 02:16

## 2022-09-28 RX ADMIN — Medication 1 CAPSULE: at 16:10

## 2022-09-28 RX ADMIN — PANTOPRAZOLE SODIUM 40 MG: 40 TABLET, DELAYED RELEASE ORAL at 09:00

## 2022-09-28 RX ADMIN — OXYCODONE 5 MG: 5 TABLET ORAL at 16:08

## 2022-09-28 RX ADMIN — BUDESONIDE AND FORMOTEROL FUMARATE DIHYDRATE 1 PUFF: 80; 4.5 AEROSOL RESPIRATORY (INHALATION) at 20:12

## 2022-09-28 RX ADMIN — ANTI-FUNGAL POWDER MICONAZOLE NITRATE TALC FREE: 1.42 POWDER TOPICAL at 22:00

## 2022-09-28 RX ADMIN — MIDODRINE HYDROCHLORIDE 10 MG: 5 TABLET ORAL at 16:10

## 2022-09-28 RX ADMIN — LEVOTHYROXINE SODIUM 75 MCG: 75 TABLET ORAL at 09:00

## 2022-09-28 RX ADMIN — Medication 1 CAPSULE: at 13:00

## 2022-09-28 RX ADMIN — VENLAFAXINE 150 MG: 75 TABLET ORAL at 09:00

## 2022-09-28 RX ADMIN — OXYCODONE 5 MG: 5 TABLET ORAL at 15:20

## 2022-09-28 RX ADMIN — MIDODRINE HYDROCHLORIDE 10 MG: 5 TABLET ORAL at 09:00

## 2022-09-28 RX ADMIN — ENOXAPARIN SODIUM 30 MG: 100 INJECTION SUBCUTANEOUS at 08:59

## 2022-09-28 RX ADMIN — SODIUM CHLORIDE, PRESERVATIVE FREE 10 ML: 5 INJECTION INTRAVENOUS at 08:59

## 2022-09-28 ASSESSMENT — PAIN SCALES - WONG BAKER
WONGBAKER_NUMERICALRESPONSE: 0

## 2022-09-28 ASSESSMENT — PAIN SCALES - GENERAL
PAINLEVEL_OUTOF10: 0
PAINLEVEL_OUTOF10: 8
PAINLEVEL_OUTOF10: 8

## 2022-09-28 NOTE — CARE COORDINATION
Received a voicemail from [place] at [facility] she informs me that that are not able to accept the patient at this time    [facility] left voicemail asking for a decision on admission    Sharmin from [facility] calls and asks for clinicals to be faxed. Clinicals sent    Spoke to [place] at [facility] was informed that they are out of network. Declined    1215 Sharmin from [facility] called to ask for initial therapy notes and updated  therpay notes. Informed her that I am awaiting an OT note.   Sent the remainder of requested documentation

## 2022-09-28 NOTE — PROGRESS NOTES
[facility]  Office: [address], DO, Elena , DO, Lakisha Sender, DO, Trey December Blood, DO, Meri Weinberg MD, Gabrielle Boss MD, Moi Willson MD, Nathaly Arndt MD,  Jhonatan Gillespie MD, Boyd Howard MD, Estefanía Abdul, DO, Melene Boast, MD,  Tita Hamilton MD, Carlos Suarez MD, Amy Finn DO, Abbey Borja MD, Racquel Lopez MD, Ariel Rolon MD, Johanne Hannon MD, Josiah Madden MD, Radha Garcia MD, Fernand Dance, DO, Dominic Gonzalez MD, Arden Grace MD, Korey Lorenz, CNP,  Quinn Lee, CNP, Pete Montiel, CNP, Rebecca Stark, CNP,  Gabe White, [facility], Jaguar Covarrubias, CNP, Latricia Gonzalez, CNP, Dev Chowdhury, CNP, Leo Zelaya, CNP, Luther Desouza, CNP, Damon Mclean PA-C, Whitney Cadena, CNS, Jacinda Hernandez, [facility], Michelet Harman, CNP, Destiny Fleming, CNP, Kurtis Beyer, [address] [place] [address]    Progress Note    9/28/2022    3:09 PM    Name:   Fabio Wilder  MRN:     [id number]     Acct:      [de-identified]   Room:   2011/2011-01  IP Day:  18  Admit Date:  9/10/2022  4:45 PM    PCP:   No primary care provider on file. Code Status:  Full Code    Subjective:     C/C: Found unresponsive at home  Interval History Status: not changed. Patient seen at bedside  Patient is very frustrated that she has to be in the hospital and were not able to find a place for her to go. Doing dressing changes. Patient had a bowel movement yesterday and felt much better. No Acute events overnight    Brief History:        58-year-old female with known medical history of morbid obesity, hypothyroidism, history of gastric fundoplication, COPD presents to the hospital from [facility]. Patient was found unresponsive at home covered in the urine and stools with several scattered wounds and maggots in the wounds.   Patient was noted to have acute kidney injury and CT scan of abdomen showed malposition of jejunal with small bowel obstruction and possible hepatic steatosis. Patient had abnormal labs including hyponatremia, hyperkalemia, elevated procalcitonin. Patient was transferred to [facility] ICU for sepsis, leukocytosis, UTI. Patient was started on broad-spectrum antibiotics, infectious disease was consulted. Positive blood cultures shows gram-negative rods and gram-positive cocci in clusters. A CT scan in [facility] did not show concern for small bowel obstruction. Patient was intubated due to metabolic encephalopathy. Patient remained intubated from 9/17. Patient was also on pressor support with Levophed. Patient had sepsis with Proteus, staph hominis, staph epidermidis. Urine cultures showed E. coli. Patient on ceftriaxone for 2 weeks till 9/24 per infectious disease, also did short course of daptomycin, already completed till 9/17. Review of Systems:     Constitutional:  negative for chills, fevers, sweats  Respiratory:  negative for cough, dyspnea on exertion, shortness of breath, wheezing  Cardiovascular:  negative for chest pain, chest pressure/discomfort, lower extremity edema, palpitations  Gastrointestinal:  negative for abdominal pain, constipation, diarrhea, nausea, vomiting  Neurological:  negative for dizziness, headache    Medications: Allergies:     Allergies   Allergen Reactions    Amoxicillin Hives    Robitussin Day-Night Value Nickolas [Phenylephrine-Diphenhyd-Dm-Gg] Hives and Rash       Current Meds:   Scheduled Meds:    sennosides-docusate sodium  2 tablet Oral BID    magnesium sulfate  2,000 mg IntraVENous Daily    venlafaxine  150 mg Oral Daily    lactobacillus  1 capsule Oral TID WC    sodium chloride  500 mL IntraVENous Once    pantoprazole  40 mg Oral QAM AC    budesonide-formoterol  1 puff Inhalation BID    lidocaine 1 % injection  5 mL IntraDERmal Once    midodrine  10 mg Oral q8h    enoxaparin  30 mg SubCUTAneous BID    sodium chloride flush  5-40 mL IntraVENous 2 times per day    miconazole   Topical BID    levothyroxine  75 mcg Oral Daily     Continuous Infusions:    sodium chloride      sodium chloride 10 mL/hr at 22 0438     PRN Meds: calcium carbonate, magnesium sulfate, sodium chloride flush, sodium chloride, sodium chloride flush, sodium chloride, ondansetron **OR** ondansetron, polyethylene glycol, acetaminophen **OR** acetaminophen, potassium chloride **OR** potassium chloride    Data:     Past Medical History: Morbid obesity  Lymphedema  COPD  History of smoking    Social History:    Quit smoking many years go  No alcohol    Family History:Family history cancer ovarian- maternal aunt       Vitals:  /63   Pulse 93   Temp 97.9 °F (36.6 °C) (Oral)   Resp 20   Ht 5' 7\" (1.702 m)   Wt (!) 320 lb 8 oz (145.4 kg)   SpO2 95%   BMI 50.20 kg/m²   Temp (24hrs), Av.2 °F (36.8 °C), Min:97.9 °F (36.6 °C), Max:98.3 °F (36.8 °C)    Recent Labs     22  1100 22  1616 22  0744 22  1145   POCGLU 105 79 71 94         I/O (24Hr):     Intake/Output Summary (Last 24 hours) at 2022 1509  Last data filed at 2022 1413  Gross per 24 hour   Intake 480 ml   Output 450 ml   Net 30 ml         Labs:  Hematology:  Recent Labs     22  1650   WBC 7.6   RBC 2.67*   HGB 7.6*   HCT 24.4*   MCV 91.4   MCH 28.5   MCHC 31.1   RDW 17.9*      MPV 9.4     Chemistry:  Recent Labs     22  0245 22  0944 22  0646 22  0723   NA  --  135 136 137   K  --  3.9 4.1 4.2   CL  --  103 105 105   CO2  --  21 22 21   GLUCOSE  --  79 66* 69*   BUN  --  7 8 7   CREATININE  --  0.74 0.67 0.62   MG 1.3*  --  1.6 1.8   ANIONGAP  --  11 9 11   LABGLOM  --  >60 >60 >60   GFRAA  --  >60 >60 >60   CALCIUM  --  7.5* 7.6* 7.5*       Recent Labs     22  1158 22  1620 22  1100 22  1616 22  0744 22  1145   POCGLU 84 83 105 79 71 94       ABG:  Lab Results   Component Value Date/Time    POCPH 7.498 09/16/2022 04:15 AM    POCPCO2 35.3 09/16/2022 04:15 AM    POCPO2 134.7 09/16/2022 04:15 AM    POCHCO3 27.4 09/16/2022 04:15 AM    NBEA 4 09/12/2022 03:42 AM    PBEA 4 09/16/2022 04:15 AM    TMKO1MNC 99 09/16/2022 04:15 AM    FIO2 30.0 09/16/2022 04:15 AM     Lab Results   Component Value Date/Time    SPECIAL R WRIST 0.1ML 09/10/2022 07:32 PM     Lab Results   Component Value Date/Time    CULTURE NO GROWTH 09/10/2022 08:31 PM       Radiology:  No results found.     Physical Examination:        General appearance:  alert, cooperative and morbidly obese mental Status:  oriented to person, place and time and normal affect  Lungs: Decreased breath sounds bilaterally, normal effort   heart:  regular rate and rhythm, no murmur  Abdomen:  soft, nontender, nondistended, normal bowel sounds, no masses, hepatomegaly, splenomegaly  Extremities: 2+ edema, redness, tenderness in the calves  Skin: Multiple skin wounds present on sacral area, under the breast  Assessment:        Hospital Problems             Last Modified POA    * (Principal) Septic shock ([address] 9/17/2022 Yes    Altered mental status 9/11/2022 Yes    Acute kidney injury ([address] 9/11/2022 Yes    Bandemia 9/11/2022 Yes    Cellulitis of sacral region 9/11/2022 Yes    Breast, fat necrosis 9/11/2022 Yes    Gram-neg septicemia ([address] 9/11/2022 Yes    Gram positive septicemia ([address] 9/11/2022 Yes    Acute encephalopathy 9/11/2022 Yes    Elevated procalcitonin 9/11/2022 Yes    Chronic anemia 9/18/2022 Yes    Acute respiratory failure with hypoxia ([address] 9/18/2022 Yes    Severe protein-calorie malnutrition ([address] 9/18/2022 Yes    Simple chronic bronchitis ([address] 9/18/2022 Yes    Hypothyroid 9/18/2022 Yes    Hypoalbuminemia 9/18/2022 Yes    Systemic inflammatory response syndrome (SIRS) of infectious origin with acute organ failure ([facility] 9/18/2022 Yes    MDD (major depressive disorder), recurrent episode, moderate [facility] 9/24/2022 Yes     Plan: Sepsis with septic shock-resolved, positive bacteremia with Proteus, staph hominis, staph epidermidis, completed daptomycin till 9/17, ceftriaxone till 9/24. Likely skin is the source of sepsis. Patient on midodrine for blood pressure support. Continue wound care. Multiple wounds on breast, buttocks-had maggots, continue wound care     Anemia of chronic disease- hb stable, encourage oral nutrition     Hypothyroidism on Synthyroid     COPD-continue inhalers     CHULA-resolved     Hypoalbuminemia with severe malnutrition - dietitian for supplement recommendations, encourage supplement     Encephalopathy- resolved    Constipation- continue bowel regimen    Morbid obesity-needs lifestyle modification, encourage physical therapy    Difficult discharge plan, discharge orders is in.     Aldo Lopez MD  9/28/2022  3:09 PM

## 2022-09-28 NOTE — ADT AUTH CERT
Utilization Reviews       CLINICAL REVIEW 9/27/22 by Coleman Madden RN       Review Status Review Entered   In Primary 9/28/2022 1432       Created By   Coleman Madden RN      Criteria Review   DATE: 9/27/22     PERTINENT UPDATES:     complains of nausea, is having dry heaving, improved little bit with Zofran.    Patient states that she is constipated and has not had a bowel movement in the last few days   Wound care cont  Difficult dc plan - many snfs declined admission        VITALS:     09/27/22 1100 98.2 (36.8) 24 110 113/51 -- High fowlers -- -- 96 None (Room air) -- -- --     09/27/22 0830 98.4 (36.9) 30 109 113/65 -- -- -- -- 98 None (Room air)                ABNL/PERTINENT LABS/RADIOLOGY/DIAGNOSTIC STUDIES:       Latest Reference Range & Units 9/27/22 06:46 9/27/22 11:00 9/27/22 16:16 9/27/22 16:50   Sodium 135 - 144 mmol/L 136         Potassium 3.7 - 5.3 mmol/L 4.1         Chloride 98 - 107 mmol/L 105         CO2 20 - 31 mmol/L 22         BUN,BUNPL 6 - 20 mg/dL 8         Creatinine 0.50 - 0.90 mg/dL 0.67         Anion Gap 9 - 17 mmol/L 9         GFR Non-African American >60 mL/min >60         GFR African American >60 mL/min >60         Magnesium 1.6 - 2.6 mg/dL 1.6         Glucose, Random 70 - 99 mg/dL 66 (L)         POC Glucose 65 - 105 mg/dL   105 79     CALCIUM, SERUM, 601098 8.6 - 10.4 mg/dL 7.6 (L)         GFR Comment                WBC 3.5 - 11.3 k/uL       7.6   RBC 3.95 - 5.11 m/uL       2.67 (L)   Hemoglobin Quant 11.9 - 15.1 g/dL       7.6 (L)   Hematocrit 36.3 - 47.1 %       24.4 (L)   Platelet Count 797 - 453 k/uL       440            PHYSICAL EXAM:     Lungs: Decreased breath sounds bilaterally, normal effort   heart:  regular rate and rhythm, no murmur  Abdomen:  soft, nontender, nondistended, normal bowel sounds, no masses, hepatomegaly, splenomegaly  Extremities: 2+ edema, redness, tenderness in the calves  Skin: Multiple skin wounds present on sacral area, under the breast        MD CONSULTS/ASSESSMENT AND PLAN:     Sepsis with septic shock-resolved, positive bacteremia with Proteus, staph hominis, staph epidermidis, completed daptomycin till 9/17, ceftriaxone till 9/24. Likely skin is the source of sepsis. Patient on midodrine for blood pressure support. Continue wound care. Multiple wounds on breast, buttocks-had maggots, continue wound care     Anemia of chronic disease, recheck hb in the am, encourage oral nutrition     Hypothyroidism on Synthyroid     COPD-continue inhalers     CHULA-resolved     Hypoalbuminemia with severe malnutrition - dietitian for supplement recommendations, patient does not like taste of Ensure supplements.      Encephalopathy- resolved     Constipation-senna and milk of mag added     Morbid obesity-needs lifestyle modification, encourage physical therapy        MEDICATIONS:     Scheduled Meds:   Scheduled Medications    sennosides-docusate sodium  2 tablet Oral BID    venlafaxine  150 mg Oral Daily    lactobacillus  1 capsule Oral TID WC    pantoprazole  40 mg Oral QAM AC    budesonide-formoterol  1 puff Inhalation BID    midodrine  10 mg Oral q8h    enoxaparin  30 mg SubCUTAneous BID    miconazole   Topical BID    levothyroxine  75 mcg Oral Daily         PRN Meds:   ZOFRAN 4 MG PO X 1  ZOFRAN 4 MG IV X 1           ORDERS:     TELE  UNIT VITALS  CONT PULSE OX   I/O  PT OT  DAILY LABS  DAILY WT  REG DIET        PT/OT/SLP/CM ASSESSMENT OR NOTES:   WILL NEED SNF AT DISCHARGE        9/23/22 WOUND CARE NOTE by Darshan Burr RN       Review Status Review Entered   In Primary 9/28/2022 1431       Created By   Darshan Burr RN      Criteria Review     09/23/22 1005   Wound 09/10/22 Breast Left   Date First Assessed/Time First Assessed: 09/10/22 1730   Present on Hospital Admission: Yes  Primary Wound Type: Pressure Injury  Location: Breast  Wound Location Orientation: Left   Wound Image                Wound Etiology Pressure Stage 3   Dressing Status New dressing applied   Wound Cleansed Soap and water   Dressing/Treatment Hydrofiber Ag; Foam   Dressing Change Due 09/24/22   Wound Assessment Subcutaneous;Pink/red   Drainage Amount Moderate   Drainage Description Serous   Odor None   Felicia-wound Assessment Intact;Fragile   Wound 09/10/22 Other (Comment) mammary folds bilaterally   Date First Assessed/Time First Assessed: 09/10/22 1730   Present on Hospital Admission: Yes  Primary Wound Type: Other (comment)  Location: Other (Comment)  Wound Description (Comments): mammary folds bilaterally   Wound Image                Wound Etiology Other   Dressing Status New dressing applied   Wound Cleansed Soap and water   Dressing/Treatment Hydrofiber Ag; Other (comment)  (folded pillow cases)   Dressing Change Due 09/24/22   Wound Assessment Subcutaneous;Pink/red;Epithelialization   Drainage Amount Small   Drainage Description Serosanguinous   Odor None   Felicia-wound Assessment Maceration;Fragile   Wound 09/10/22 Breast Right   Date First Assessed/Time First Assessed: 09/10/22 1730   Present on Hospital Admission: Yes  Primary Wound Type: Pressure Injury  Location: Breast  Wound Location Orientation: Right   Wound Image    Wound Etiology Pressure Stage 3   Dressing Status New dressing applied   Wound Cleansed Soap and water   Dressing/Treatment Hydrofiber Ag; Foam   Dressing Change Due 09/24/22   Wound Assessment Subcutaneous;Pink/red   Drainage Amount Small   Drainage Description Serosanguinous   Odor None   Felicia-wound Assessment Intact   Wound 09/10/22 Rib Cage Left;Lateral   Date First Assessed/Time First Assessed: 09/10/22 1530   Present on Hospital Admission: Yes  Primary Wound Type: Pressure Injury  Location: (c) Rib Cage  Wound Location Orientation: Left;Lateral   Wound Image    Wound Etiology Other   Dressing Status New dressing applied   Wound Cleansed Soap and water   Dressing/Treatment Hydrofiber Ag; Foam   Dressing Change Due 09/24/22   Wound Assessment Superficial;Pink/red;Fibrinous   Drainage Amount Moderate   Drainage Description Serous   Odor None   Felicia-wound Assessment Fragile;Blanchable erythema   Wound 09/10/22 Buttocks incontinence associated dermatitis   Date First Assessed/Time First Assessed: 09/10/22 1530   Present on Hospital Admission: Yes  Primary Wound Type: Pressure Injury  Location: Buttocks  Wound Description (Comments): incontinence associated dermatitis   Wound Image    Wound Etiology Other  (severe incontinence associated dermatitis and ischial ST 3 pressure injuries)   Dressing Status New dressing applied   Wound Cleansed Soap and water   Dressing/Treatment Triad hydro/zinc oxide-based hydrophilic paste   Dressing Change Due 09/24/22   Wound Assessment Subcutaneous;Pink/red;Denuded   Drainage Amount Moderate   Drainage Description Serous   Odor None   Felicia-wound Assessment Dry/flaky; Blanchable erythema;Fragile   Wound 09/12/22 Arm Left;Proximal   Date First Assessed/Time First Assessed: 09/12/22 1600   Present on Hospital Admission: Yes  Primary Wound Type: Pressure Injury  Location: Arm  Wound Location Orientation: Left;Proximal   Wound Image    Dressing Status Clean;Dry; Intact   Dressing/Treatment Foam   Dressing Change Due 09/24/22   Wound Length (cm) 1.8 cm   Wound Width (cm) 1.8 cm   Wound Depth (cm) 0.1 cm   Wound Surface Area (cm^2) 3.24 cm^2   Change in Wound Size % (l*w) 91.9   Wound Volume (cm^3) 0.324 cm^3   Wound Healing % 96   Wound Assessment Superficial;Pink/red   Drainage Amount Scant   Drainage Description Serous   Odor None   Felicia-wound Assessment Intact   Wound 09/12/22 Thigh Left;Medial severe intertrigo   Date First Assessed/Time First Assessed: 09/12/22 1705   Present on Hospital Admission: Yes  Location: Thigh  Wound Location Orientation: Left;Medial  Wound Description (Comments): severe intertrigo   Wound Image    Wound Etiology Other   Dressing Status New dressing applied   Wound Cleansed Soap and water Dressing/Treatment Foam   Dressing Change Due 09/26/22   Wound Assessment Superficial;Pink/red   Drainage Amount Scant   Drainage Description Serosanguinous   Odor None   Felicia-wound Assessment Intact            Response to treatment:  Well tolerated by patient. Plan:      Plan of Care: Turn every 2 hours  Float heels off of bed with pillows under calves  Use lift sling to reposition patient to minimize potential for shear injury. Breasts: Opticell Ag gelling fiber to the wound beds, cover with Mepilex Sacrum Foam (large) Change daily  Breast folds/chest: Opticell Ag gelling fiber to the wound beds, separate the inframammary folds with DriGo HP cloths or folded pillow cases. Change daily and prn soilage  Abdominal fold: DriGo HP cloths. Change at least every 5 days and prn solage  Medial thighs: Left medial thigh cover with a Mepilex silicone foam. Change daily. Buttock and left flank: Triad Hydrophilic Wound Dressing Paste. Daily and prn hygiene. Specialty Bed Required : Yes   [x] Low Air Loss   [x] Pressure Redistribution  [] Fluid Immersion  [x] Bariatric  [] Total Pressure Relief  [] Other:         CLINCAL REVIEW 9/23/22 by Johanny Ryan RN       Review Status Review Entered   In Primary 9/28/2022 1420       Created By   Johanny Ryan RN      Criteria Review   DATE: 9/23/22     PERTINENT UPDATES:     Patient continues to have loose bowels and diarrhea. Vitals - Stable afebrile tachycardia,  Labs -low Magnesium , low albumin.  Low HGB         VITALS:     09/23/22 0421  (!) 101/50  98.8 °F (37.1 °C)  Oral  100  27  98 %  09/22/22 2336  (!) 98/40  98.8 °F (37.1 °C)  Oral  (!) 109  29  98 %        ABNL/PERTINENT LABS/RADIOLOGY/DIAGNOSTIC STUDIES:       Latest Reference Range & Units 9/23/22 09:13 9/23/22 10:58 9/23/22 16:01 9/23/22 20:39 9/23/22 21:04   POC Glucose 65 - 105 mg/dL 79 73 84 75 73            PHYSICAL EXAM:     Cardiovascular:      Rate and Rhythm: Normal rate and regular rhythm. Pulses:           Dorsalis pedis pulses are 2+ on the right side and 2+ on the left side. Heart sounds: Normal heart sounds. No murmur heard. Pulmonary:      Effort: Pulmonary effort is normal.      Breath sounds: Normal breath sounds. No wheezing or rales. Abdominal:      Palpations: Abdomen is soft. There is no mass. Tenderness: There is no abdominal tenderness. Musculoskeletal:      Cervical back: Full passive range of motion without pain and neck supple. Comments: Notable wounds in bilateral buttocks, left breast, right breast         MD CONSULTS/ASSESSMENT AND PLAN:     ===INTERNAL MED     Sepsis with septic shock secondary to Gram negative and gram-positive bacteremia (Proteus, staph hominis, staph epidermidis -Rocephin with stop date 9/24/2022 -ID following. Continue local wound care. Multiple wounds with maggots -continue aggressive wound care. Morbid obesity BMI 50 -   Bedbound for > 6 months -we will need aggressive rehab at discharge. Psychiatry evaluation for depression. Hypothyroidism -Synthroid. CHULA resolved -avoid nephrotoxic agents  Metabolic encephalopathy - resolved  Severe malnutrition -  Diarrhea likely antibiotic associated-check stool for C. difficile due to high risk from hospitalization and antibiotic use. Will need SNF at discharge   Limit sedatives. Fenatnyl with dressing changes. CDIFF negative -Imodium as needed. Continue IVF.    Probiotics  PT OT         ===ID     Sepsis with septic shock  Altered mentation acute encephalopathy: Resolved  CHULA: Resolved  Bandemia  Lactic acidosis  Elevated procalcitonin   Gram-negative and gram-positive bacteremia-[facility]  Proteus R cipro bactrim - S ceftriaxone ampicillin  Staph hominis R ancef   Staph epi R ancef  U cx BVH 9/9 e coli S cipro  ceftriaxone ampicillin  Bilat breasts infected / necrotic wounds - maggots  Sacral sloughed skin from moisture  Hyperglycemia  Morbd obesity-BMI 50  COPD  CHULA -creatinine: 1.06-0.96     Discussion / summary of stay / plan of care   Sepsis of unclear cause, with septic shock and elevated procalcitonin at 12. Very concerning for gram-negative organisms and hence a UTI is of a concern  CT abdomen pelvis at [facility] is negative for any bowel obstruction although it was a concern in [facility]  The urine analysis at [facility] is normal but I am not clear as of the UA at [facility]  The bacteremia with gram-negative and gram-positive is also of a concern     - Rocephin: 2 gm Iv q 24 Hr. Stop date 9/24   Source of the proteus septicemia is likely the wounds  - Daptomycin: Completed 9/16- ck elevated then back to normal  - Wound care- wounds much healthier and breast cellulitis resolved  - diarrhea c diff neg 9/21 -resolved on probiotics  - Neg nasal MRSA swab       - Probiotics increased to 3 times daily  -prolonged .      Infection Control Recommendations   Wood Precautions  Contact Isolation         MEDICATIONS:     lactobacillus, 1 capsule, Oral, TID WC  pantoprazole, 40 mg, Oral, QAM AC  budesonide-formoterol, 1 puff, Inhalation, BID  midodrine, 10 mg, Oral, q8h  enoxaparin, 30 mg, SubCUTAneous, BID  cefTRIAXone (ROCEPHIN) IV, 2,000 mg, IntraVENous, Q24H  miconazole, , Topical, BID  levothyroxine, 75 mcg, Oral, Daily     NS 50 ML HR     Prn:  TYLENOL 650 MG POX 1  IMODIUM 2 MG PO X 1  FENTANYL 50 MCGS IV X 1           ORDERS:     TELE  UNIT VITALS  CONT PULSE OX   I/O  PT OT  DAILY LABS  DAILY WT  REG DIET        PT/OT/SLP/CM ASSESSMENT OR NOTES:   WILL NEED SNF AT DISCHARGE

## 2022-09-28 NOTE — CARE COORDINATION
Met with patient to obtain more SNF choices.  Referrals sent to [facility], [facility], [facility], and [address]

## 2022-09-28 NOTE — PROGRESS NOTES
Occupational Therapy  Facility/Department: [facility] CAR 2- STEPDOWN  Occupational Daily Treatment Note    Name: Joaquin Robbins  : 1980  MRN: [id number]  Date of Service: 2022    Discharge Recommendations:  Patient would benefit from continued therapy after discharge    Patient Diagnosis(es): There were no encounter diagnoses. Past Medical History:  has no past medical history on file. Past Surgical History:  has no past surgical history on file. Treatment Diagnosis: AMS  Assessment   Performance deficits / Impairments: Decreased functional mobility ; Decreased ADL status; Decreased ROM; Decreased strength;Decreased safe awareness;Decreased endurance;Decreased balance;Decreased high-level IADLs;Decreased posture  Treatment Diagnosis: AMS  Prognosis: Good  Activity Tolerance  Activity Tolerance: Patient Tolerated treatment well;Patient limited by fatigue;Patient limited by pain        Plan   Plan  Times per Week: 3-5 x/wk  Current Treatment Recommendations: Strengthening, ROM, Balance training, Functional mobility training, Endurance training, Cognitive reorientation, Pain management, Safety education & training, Patient/Caregiver education & training, Equipment evaluation, education, & procurement, Positioning, Self-Care / ADL, Home management training     Restrictions  Restrictions/Precautions  Restrictions/Precautions: General Precautions, Fall Risk, Up as Tolerated  Required Braces or Orthoses?: No  Position Activity Restriction  Other position/activity restrictions: Extubated . Abdominal binder for chest support/wounds. Pain assessment: Pt c/o pain in breasts/chest and R butt cheek. Pt did not state pain number. Subjective      Safety Devices  Type of Devices: All fall risk precautions in place;Call light within reach;Gait belt;Nurse notified; Left in chair  Balance  Sitting: With support (CGA seated EOB x15 minutes.)  Standing: With support (Max A x2 static standing EOB using SW (bariatric) and step.)  Gait  Overall Level of Assistance:  (KP d/t poor standing balance.)     ADL  Grooming: Setup; Independent (Oral care seated EOB.)  UE Bathing: Setup; Independent (wash face seated EOB.)  LE Dressing: Maximum assistance;Setup (Don socks supine in bed d/t limited reach.)  Additional Comments: Pt completed supine/sit transfer to seated EOB. Pt completed simple grooming seated EOB w/set up. Sit/stand transfer using elevated step and SW (bariatric). Max encouragement and moral support provided to pt throughout session with good return. Pt returned to supine in bed for transfer bed/chair using maxi adams lift. Pt set up with lunch tray on tray table in front of pt at end of session. Bed mobility  Rolling to Left: Maximum assistance  Rolling to Right: Maximum assistance  Supine to Sit: Maximum assistance;2 Person assistance  Sit to Supine: Moderate assistance;2 Person assistance  Scooting: Maximal assistance  Bed Mobility Comments: Rolling R/L to straighten linens. Transfers  Sit to stand: Maximum assistance;2 Person assistance  Stand to sit: Maximum assistance;2 Person assistance  Transfer Comments: Static standing EOB using SW (bariatric) and step x30 seconds. Cognition  Overall Cognitive Status: WFL  Orientation  Overall Orientation Status: Within Functional Limits     Education Given To: Patient  Education Provided Comments: OT POC, transfer/walker safety, importance of participation in therapy, pursed lip breathing, bed mobility - pt verbalized understanding. AM-PAC Score        AM-[facility] Inpatient Daily Activity Raw Score: 16 (09/28/22 1415)  AM-PAC Inpatient ADL T-Scale Score : 35.96 (09/28/22 1415)  ADL Inpatient CMS 0-100% Score: 53.32 (09/28/22 1415)  ADL Inpatient CMS G-Code Modifier : CK (09/28/22 1415)    Goals  Short Term Goals  Time Frame for Short term goals: By discharge, pt will:  Short Term Goal 1:  Increase BUE strength by 1/2+ muscle grade through utilization of BUE strengthening HEP  Short Term Goal 2: Demo Min A for UB ADLs  Short Term Goal 3: Demo Mod A for LB ADLs with AE PRN  Short Term Goal 4: Demo 15+ min dynamic sitting and reaching outside ADRIANA with unilateral hand release and CGA for improved performance of ADLs/IADLs  Short Term Goal 5: Follow 75% of 2-step commands with appropriate initiation and sequencing for improved functional independence  Short Term Goal 6: NOTIFY OTR TO UPDATE GOALS AS PT PROGRESSES       Therapy Time   Individual Concurrent Group Co-treatment   Time In 1237         Time Out 1330         Minutes 53         Timed Code Treatment Minutes: 53 Minutes    Pt supine in bed upon therapist arrival. Pleasant and agreeable to therapy. See above for LOF for all tasks. Pt retired to seated in chair at end of session with call light within reach. Will collaborate with OTR to update mobility goals.      LUIS Carlisle/SAMUEL

## 2022-09-28 NOTE — PROGRESS NOTES
Physical Therapy  Facility/Department: [facility] CAR 2- STEPDOWN  Daily Treatment note    Name: Marlys Alamo  : 1980  MRN: [id number]  Date of Service: 2022    Discharge Recommendations:  Patient would benefit from continued therapy after discharge          Patient Diagnosis(es): There were no encounter diagnoses. Past Medical History:  has no past medical history on file. Past Surgical History:  has no past surgical history on file. Assessment   Body Structures, Functions, Activity Limitations Requiring Skilled Therapeutic Intervention: Decreased functional mobility ; Decreased strength;Decreased endurance;Decreased cognition;Decreased balance; Increased pain  Assessment: Pt is overall Max A x2 for bed mobility, Tolerated ~18mins at EOB with CGA for balance,STS x1 for a 30seconds with Beriatric RW and MAX A x2 persons; improved tolerance noted this date. Pt very pleased with progress. limited by fear of falling and weakness. Pt would be unsafe to return to prior living arrangements upon discharge. Pt would benefit from additional therapy upon discharge to maximize functional independence. Therapy Prognosis: Good  Requires PT Follow-Up: Yes  Activity Tolerance  Activity Tolerance: Patient limited by fatigue;Patient limited by endurance; Patient limited by pain;Treatment limited secondary to medical complications     Plan   Plan  Plan:  (5-6x/wk)  Current Treatment Recommendations: Strengthening, Balance training, Functional mobility training, ROM, Transfer training, Gait training, Endurance training, Therapeutic activities, Equipment evaluation, education, & procurement, Home exercise program, Safety education & training, Patient/Caregiver education & training  Safety Devices  Type of Devices:  All fall risk precautions in place, Call light within reach, Gait belt, Nurse notified, Left in chair, Heels elevated for pressure relief  Restraints  Restraints Initially in Place: No Restrictions  Restrictions/Precautions  Restrictions/Precautions: General Precautions, Fall Risk, Up as Tolerated  Required Braces or Orthoses?: No  Position Activity Restriction  Other position/activity restrictions: Extubated 9/16. Abdominal binder for chest support/wounds. Subjective   General  Patient assessed for rehabilitation services?: Yes  Response To Previous Treatment: Patient with no complaints from previous session. Family / Caregiver Present: No  Follows Commands: Within Functional Limits  General Comment  Comments: . agreeable to transfer to recliner. Subjective  Subjective: RN and pt agreeable to PT. Pt supine in bed upon arrival,continue to require encouragement for particpation with good return. Cognition   Orientation  Overall Orientation Status: Within Functional Limits  Orientation Level: Oriented X4  Cognition  Overall Cognitive Status: WFL     Objective   Balance  Sitting: With support (CGA seated EOB x15 minutes.)  Standing: With support (Max A x2 static standing EOB using SW (bariatric) and step.)  Gait  Overall Level of Assistance:  (KP d/t poor standing balance.)  Bed mobility  Rolling to Left: Maximum assistance  Rolling to Right: Maximum assistance  Supine to Sit: Maximum assistance;2 Person assistance  Sit to Supine: Moderate assistance;2 Person assistance  Scooting: Maximal assistance  Bed Mobility Comments: Rolling for Linen/ comfort  Transfers  Sit to Stand: Maximum Assistance;2 Person Assistance  Stand to sit: Maximum Assistance;2 Person Assistance  Bed to Chair: Dependent/Total  Comment: STS xwith RW xMAX x2 A , Pt stood x30sec and requested to sit d/r fatigue. Attempted a second STS  with no success . Limited by weakness and fatigue.  Pt transfer to and from Chair with [facility] lift  Ambulation  WB Status: Unsafe to attempt this date     Balance  Sitting - Static: Fair;+  Sitting - Dynamic: Poor;+  Standing - Static: Poor;+  Standing - Dynamic: Poor;+  Comments: Standing with RW and MAX A x2 persons;,demo improvement  with tolerance sitting at EOB for ~18mins with CGA for balance  Exercise Treatment:Seated LE exercise program: Long Arc Quads, hip abduction/adduction, heel/toe raises, and marches. Reps:x10.   2 sets each. AM-PAC Score  AM-PAC Inpatient Mobility Raw Score : 8 (09/21/22 1237)  AM-PAC Inpatient T-Scale Score : 28.52 (09/21/22 1237)  Mobility Inpatient CMS 0-100% Score: 86.62 (09/21/22 1237)  Mobility Inpatient CMS G-Code Modifier : CM (09/21/22 1237)     Goals  Short Term Goals  Time Frame for Short term goals: 14 visits  Short term goal 1: Pt will perform sit<>stand transfer with min-A. Short term goal 2: Pt will perform bed mobility with min-A. Short term goal 3: Pt will ambulate 30 feet with a RW and CGA. Short term goal 4: Pt will demonstrate fair standing balance to decrease fall risk. Short term goal 5: Pt will tolerate a 35 minute therapy session to promote increased endurance. Additional Goals?: No       Education  Patient Education  Education Given To: Patient  Education Provided: Role of Therapy;Transfer Training;Plan of Care; Fall Prevention Strategies; Energy Conservation;Home Exercise Program  Education Provided Comments: the ex for strenthening and to improve ROM.   Education Method: Verbal  Barriers to Learning: None  Education Outcome: Verbalized understanding;Continued education needed      Therapy Time   Individual Concurrent Group Co-treatment   Time In 2377         Time Out 1330         Minutes 53         Timed Code Treatment Minutes: 38 Minutes (co-treat with KATHERINE)       Lucretia Mcarthur, PTA

## 2022-09-29 PROBLEM — Q43.3 MALROTATION OF INTESTINE: Status: ACTIVE | Noted: 2022-09-29

## 2022-09-29 LAB
GLUCOSE BLD-MCNC: 106 MG/DL (ref 65–105)
GLUCOSE BLD-MCNC: 78 MG/DL (ref 65–105)

## 2022-09-29 PROCEDURE — 6360000002 HC RX W HCPCS: Performed by: STUDENT IN AN ORGANIZED HEALTH CARE EDUCATION/TRAINING PROGRAM

## 2022-09-29 PROCEDURE — 6370000000 HC RX 637 (ALT 250 FOR IP): Performed by: STUDENT IN AN ORGANIZED HEALTH CARE EDUCATION/TRAINING PROGRAM

## 2022-09-29 PROCEDURE — 6370000000 HC RX 637 (ALT 250 FOR IP): Performed by: EMERGENCY MEDICINE

## 2022-09-29 PROCEDURE — 6370000000 HC RX 637 (ALT 250 FOR IP)

## 2022-09-29 PROCEDURE — 2580000003 HC RX 258: Performed by: STUDENT IN AN ORGANIZED HEALTH CARE EDUCATION/TRAINING PROGRAM

## 2022-09-29 PROCEDURE — 6370000000 HC RX 637 (ALT 250 FOR IP): Performed by: PSYCHIATRY & NEUROLOGY

## 2022-09-29 PROCEDURE — 82947 ASSAY GLUCOSE BLOOD QUANT: CPT

## 2022-09-29 PROCEDURE — 6370000000 HC RX 637 (ALT 250 FOR IP): Performed by: FAMILY MEDICINE

## 2022-09-29 PROCEDURE — 1200000000 HC SEMI PRIVATE

## 2022-09-29 PROCEDURE — 99231 SBSQ HOSP IP/OBS SF/LOW 25: CPT | Performed by: STUDENT IN AN ORGANIZED HEALTH CARE EDUCATION/TRAINING PROGRAM

## 2022-09-29 PROCEDURE — 97110 THERAPEUTIC EXERCISES: CPT

## 2022-09-29 PROCEDURE — 94761 N-INVAS EAR/PLS OXIMETRY MLT: CPT

## 2022-09-29 PROCEDURE — 97530 THERAPEUTIC ACTIVITIES: CPT

## 2022-09-29 PROCEDURE — 94640 AIRWAY INHALATION TREATMENT: CPT

## 2022-09-29 RX ADMIN — MIDODRINE HYDROCHLORIDE 10 MG: 5 TABLET ORAL at 18:33

## 2022-09-29 RX ADMIN — VENLAFAXINE 150 MG: 75 TABLET ORAL at 11:20

## 2022-09-29 RX ADMIN — MIDODRINE HYDROCHLORIDE 10 MG: 5 TABLET ORAL at 02:58

## 2022-09-29 RX ADMIN — LEVOTHYROXINE SODIUM 75 MCG: 75 TABLET ORAL at 10:15

## 2022-09-29 RX ADMIN — STANDARDIZED SENNA CONCENTRATE AND DOCUSATE SODIUM 2 TABLET: 8.6; 5 TABLET ORAL at 10:15

## 2022-09-29 RX ADMIN — Medication 1 CAPSULE: at 11:20

## 2022-09-29 RX ADMIN — Medication 1 CAPSULE: at 20:23

## 2022-09-29 RX ADMIN — MIDODRINE HYDROCHLORIDE 10 MG: 5 TABLET ORAL at 11:20

## 2022-09-29 RX ADMIN — OXYCODONE 5 MG: 5 TABLET ORAL at 20:23

## 2022-09-29 RX ADMIN — PANTOPRAZOLE SODIUM 40 MG: 40 TABLET, DELAYED RELEASE ORAL at 10:15

## 2022-09-29 RX ADMIN — BUDESONIDE AND FORMOTEROL FUMARATE DIHYDRATE 1 PUFF: 80; 4.5 AEROSOL RESPIRATORY (INHALATION) at 20:28

## 2022-09-29 RX ADMIN — MAGNESIUM SULFATE HEPTAHYDRATE 2000 MG: 40 INJECTION, SOLUTION INTRAVENOUS at 10:14

## 2022-09-29 RX ADMIN — SODIUM CHLORIDE: 9 INJECTION, SOLUTION INTRAVENOUS at 10:12

## 2022-09-29 RX ADMIN — SODIUM CHLORIDE, PRESERVATIVE FREE 10 ML: 5 INJECTION INTRAVENOUS at 10:15

## 2022-09-29 RX ADMIN — Medication 1 CAPSULE: at 15:39

## 2022-09-29 RX ADMIN — ANTI-FUNGAL POWDER MICONAZOLE NITRATE TALC FREE: 1.42 POWDER TOPICAL at 11:23

## 2022-09-29 RX ADMIN — ENOXAPARIN SODIUM 30 MG: 100 INJECTION SUBCUTANEOUS at 20:24

## 2022-09-29 RX ADMIN — ENOXAPARIN SODIUM 30 MG: 100 INJECTION SUBCUTANEOUS at 10:15

## 2022-09-29 RX ADMIN — SODIUM CHLORIDE, PRESERVATIVE FREE 10 ML: 5 INJECTION INTRAVENOUS at 20:24

## 2022-09-29 RX ADMIN — ANTI-FUNGAL POWDER MICONAZOLE NITRATE TALC FREE: 1.42 POWDER TOPICAL at 20:26

## 2022-09-29 ASSESSMENT — PAIN SCALES - WONG BAKER
WONGBAKER_NUMERICALRESPONSE: 0

## 2022-09-29 ASSESSMENT — PAIN SCALES - GENERAL
PAINLEVEL_OUTOF10: 8
PAINLEVEL_OUTOF10: 6

## 2022-09-29 ASSESSMENT — PAIN DESCRIPTION - LOCATION: LOCATION: BREAST;BUTTOCKS

## 2022-09-29 ASSESSMENT — PAIN DESCRIPTION - DESCRIPTORS: DESCRIPTORS: ACHING

## 2022-09-29 NOTE — PROGRESS NOTES
[facility]  Office: [address], DO, Jonatan Gayle, DO, Danika Princeton, DO, Carla Uriel Valderrama, DO, Florentin Arguello MD, Diamond Cruz MD, Princess Torsten MD, Sri Nunez MD,  Priti Hardwick MD, Bren Dangelo MD, Ladell Scheuermann, DO, Trevor Rader MD,  Janeth Harman MD, Dong Tiwari MD, Vel Phelps, DO, Mis Reynoso MD, Cheri Childs MD, Irma Gonzalez MD, Mia Key MD, Cynthia Farnsworth MD, Temitope Crowley MD, Jam Reaves DO, Ezequiel Dinero MD, Shania Herrera MD, Padma Martin, [facility],  Javier Dukes, CNP, Patricio Tate, CNP, aKcy Herman, CNP,  Deepti Mercado, [facility], Hansel Davey, CNP, Mariana Ruiz, CNP, Shabana Lorenz, CNP, Arleen Alpers, CNP, Melony Fields, CNP, Jerene Oppenheim, PA-C, Zoey Pa, CNS, Etta Sarabia, [facility], Aurelio Bethea, CNP, Katerina Ugarte, CNP, Jovanna Wray, [address] [place] [address]    Progress Note    9/29/2022    4:02 PM    Name:   Ezra Pearson  MRN:     [id number]     Acct:      [de-identified]   Room:   [address] [place] [address] Day:  23  Admit Date:  9/10/2022  4:45 PM    PCP:   No primary care provider on file. Code Status:  Full Code    Subjective:     C/C: Found unresponsive at home  Interval History Status: not changed. Patient seen at bedside  No acute events overnight  No new complaints  Vitals reviewed    Brief History:       75-year-old female with known medical history of morbid obesity, hypothyroidism, history of gastric fundoplication, COPD presents to the hospital from [facility]. Patient was found unresponsive at home covered in the urine and stools with several scattered wounds and maggots in the wounds. Patient was noted to have acute kidney injury and CT scan of abdomen showed malposition of jejunal with small bowel obstruction and possible hepatic steatosis.   Patient had abnormal labs including hyponatremia, hyperkalemia, elevated procalcitonin. Patient was transferred to [facility] ICU for sepsis, leukocytosis, UTI. Patient was started on broad-spectrum antibiotics, infectious disease was consulted. Positive blood cultures shows gram-negative rods and gram-positive cocci in clusters. A CT scan in [facility] did not show concern for small bowel obstruction. Patient was intubated due to metabolic encephalopathy. Patient remained intubated from 9/17. Patient was also on pressor support with Levophed. Patient had sepsis with Proteus, staph hominis, staph epidermidis. Urine cultures showed E. coli. Patient on ceftriaxone for 2 weeks till 9/24 per infectious disease, also did short course of daptomycin, already completed till 9/17. Review of Systems:     Constitutional:  negative for chills, fevers, sweats  Respiratory:  negative for cough, dyspnea on exertion, shortness of breath, wheezing  Cardiovascular:  negative for chest pain, chest pressure/discomfort, lower extremity edema, palpitations  Gastrointestinal:  negative for abdominal pain, constipation, diarrhea, nausea, vomiting  Neurological:  negative for dizziness, headache    Medications: Allergies:     Allergies   Allergen Reactions    Amoxicillin Hives    Robitussin Day-Night Value Nickolas [Phenylephrine-Diphenhyd-Dm-Gg] Hives and Rash       Current Meds:   Scheduled Meds:    sennosides-docusate sodium  2 tablet Oral BID    magnesium sulfate  2,000 mg IntraVENous Daily    venlafaxine  150 mg Oral Daily    lactobacillus  1 capsule Oral TID WC    sodium chloride  500 mL IntraVENous Once    pantoprazole  40 mg Oral QAM AC    budesonide-formoterol  1 puff Inhalation BID    lidocaine 1 % injection  5 mL IntraDERmal Once    midodrine  10 mg Oral q8h    enoxaparin  30 mg SubCUTAneous BID    sodium chloride flush  5-40 mL IntraVENous 2 times per day    miconazole   Topical BID    levothyroxine  75 mcg Oral Daily     Continuous Infusions:    sodium chloride      sodium chloride 100 mL/hr at 22 1012     PRN Meds: oxyCODONE **OR** oxyCODONE, calcium carbonate, magnesium sulfate, sodium chloride flush, sodium chloride, sodium chloride flush, sodium chloride, ondansetron **OR** ondansetron, polyethylene glycol, acetaminophen **OR** acetaminophen, potassium chloride **OR** potassium chloride    Data:     Past Medical History: Morbid obesity  Lymphedema  COPD  History of smoking    Social History:    Quit smoking many years go  No alcohol    Family History:Family history cancer ovarian- maternal aunt       Vitals:  BP (!) 115/49   Pulse (!) 106 Comment: pt was being repositioned and changed  Temp 97.8 °F (36.6 °C) (Oral)   Resp 22   Ht 5' 7\" (1.702 m)   Wt (!) 320 lb 8 oz (145.4 kg)   SpO2 94%   BMI 50.20 kg/m²   Temp (24hrs), Av.1 °F (36.7 °C), Min:97.7 °F (36.5 °C), Max:98.6 °F (37 °C)    Recent Labs     22  0744 22  1145 22  0802 22  1249   POCGLU 71 94 78 106*         I/O (24Hr):     Intake/Output Summary (Last 24 hours) at 2022 1602  Last data filed at 2022 1500  Gross per 24 hour   Intake 289.73 ml   Output 250 ml   Net 39.73 ml         Labs:  Hematology:  Recent Labs     22  1650   WBC 7.6   RBC 2.67*   HGB 7.6*   HCT 24.4*   MCV 91.4   MCH 28.5   MCHC 31.1   RDW 17.9*      MPV 9.4       Chemistry:  Recent Labs     22  0646 22  0723    137   K 4.1 4.2    105   CO2 22 21   GLUCOSE 66* 69*   BUN 8 7   CREATININE 0.67 0.62   MG 1.6 1.8   ANIONGAP 9 11   LABGLOM >60 >60   GFRAA >60 >60   CALCIUM 7.6* 7.5*       Recent Labs     22  1100 22  1616 22  0744 22  1145 22  0802 22  1249   POCGLU 105 79 71 94 78 106*       ABG:  Lab Results   Component Value Date/Time    POCPH 7.498 2022 04:15 AM    POCPCO2 35.3 2022 04:15 AM    POCPO2 134.7 2022 04:15 AM    POCHCO3 27.4 2022 04:15 AM    NBEA 4 2022 03:42 AM    PBEA 4 09/16/2022 04:15 AM    NJEH9TNU 99 09/16/2022 04:15 AM    FIO2 30.0 09/16/2022 04:15 AM     Lab Results   Component Value Date/Time    SPECIAL R WRIST 0.1ML 09/10/2022 07:32 PM     Lab Results   Component Value Date/Time    CULTURE NO GROWTH 09/10/2022 08:31 PM       Radiology:  No results found. Physical Examination:        General appearance:  alert, cooperative and morbidly obese mental Status:  oriented to person, place and time and normal affect  Lungs: Decreased breath sounds bilaterally, normal effort   heart:  regular rate and rhythm, no murmur  Abdomen:  soft, nontender, nondistended, normal bowel sounds, no masses, hepatomegaly, splenomegaly  Extremities: 2+ edema, redness, tenderness in the calves  Skin: Multiple skin wounds present on sacral area, under the breast  Assessment:        Hospital Problems             Last Modified POA    * (Principal) Septic shock ([address] 9/17/2022 Yes    Altered mental status 9/11/2022 Yes    Acute kidney injury ([address] 9/11/2022 Yes    Bandemia 9/11/2022 Yes    Cellulitis of sacral region 9/11/2022 Yes    Breast, fat necrosis 9/11/2022 Yes    Gram-neg septicemia ([address] 9/11/2022 Yes    Gram positive septicemia ([address] 9/11/2022 Yes    Acute encephalopathy 9/11/2022 Yes    Elevated procalcitonin 9/11/2022 Yes    Chronic anemia 9/18/2022 Yes    Acute respiratory failure with hypoxia ([address] 9/18/2022 Yes    Severe protein-calorie malnutrition ([address] 9/18/2022 Yes    Simple chronic bronchitis ([address] 9/18/2022 Yes    Hypothyroid 9/18/2022 Yes    Hypoalbuminemia 9/18/2022 Yes    Systemic inflammatory response syndrome (SIRS) of infectious origin with acute organ failure ([address] 9/18/2022 Yes    MDD (major depressive disorder), recurrent episode, moderate ([address] 9/24/2022 Yes    Malrotation of intestine 9/29/2022 Yes     Plan:        Sepsis with septic shock-resolved, positive bacteremia with Proteus, staph hominis, staph epidermidis, completed daptomycin till 9/17, ceftriaxone till 9/24.

## 2022-09-29 NOTE — PROGRESS NOTES
Patient transferred to [facility] . Report called , patients medications & chart with wound supplies transported .

## 2022-09-29 NOTE — PLAN OF CARE
Problem: Discharge Planning  Goal: Discharge to home or other facility with appropriate resources  Outcome: Progressing     Problem: Respiratory - Adult  Goal: Achieves optimal ventilation and oxygenation  Outcome: Progressing     Problem: Pain  Goal: Verbalizes/displays adequate comfort level or baseline comfort level  Outcome: Progressing     Problem: Confusion  Goal: Confusion, delirium, dementia, or psychosis is improved or at baseline  Description: INTERVENTIONS:  1. Assess for possible contributors to thought disturbance, including medications, impaired vision or hearing, underlying metabolic abnormalities, dehydration, psychiatric diagnoses, and notify attending LIP  2. Dora high risk fall precautions, as indicated  3. Provide frequent short contacts to provide reality reorientation, refocusing and direction  4. Decrease environmental stimuli, including noise as appropriate  5. Monitor and intervene to maintain adequate nutrition, hydration, elimination, sleep and activity  6. If unable to ensure safety without constant attention obtain sitter and review sitter guidelines with assigned personnel  7. Initiate Psychosocial CNS and Spiritual Care consult, as indicated  Outcome: Progressing     Problem: Nutrition Deficit:  Goal: Optimize nutritional status  Outcome: Progressing     Problem: Skin/Tissue Integrity  Goal: Absence of new skin breakdown  Description: 1. Monitor for areas of redness and/or skin breakdown  2. Assess vascular access sites hourly  3. Every 4-6 hours minimum:  Change oxygen saturation probe site  4. Every 4-6 hours:  If on nasal continuous positive airway pressure, respiratory therapy assess nares and determine need for appliance change or resting period.   Outcome: Progressing     Problem: Safety - Adult  Goal: Free from fall injury  Outcome: Progressing     Problem: ABCDS Injury Assessment  Goal: Absence of physical injury  Outcome: Progressing

## 2022-09-29 NOTE — CARE COORDINATION
[address], spoke to [employer]. She tell me that she did not receive clinicals. Resent information and provided her with information so she can pull clinicals off the computer. Awaiting response. Sent updated clinicals to [facility], will call later this morning and ask for decision. [address] to inquire about admission, left voicemail asking for a return call    [phone] Received a call from Lex ba at [facility], she informs me that they have started precert. She is not sure how long it will take.

## 2022-09-29 NOTE — PROGRESS NOTES
Physical Therapy  Facility/Department: [facility]  Physical Therapy Daily Treatment Note    Name: Ramin Blanco  : 1980  MRN: [id number]  Date of Service: 2022    Discharge Recommendations:  Patient would benefit from continued therapy after discharge   PT Equipment Recommendations  Equipment Needed: No  Other: will CTA, pt currently unsafe to perform any aspect of mobility without significant physical assistance      Patient Diagnosis(es): There were no encounter diagnoses. Past Medical History:  has no past medical history on file. Past Surgical History:  has no past surgical history on file. Assessment   Body Structures, Functions, Activity Limitations Requiring Skilled Therapeutic Intervention: Decreased functional mobility ; Decreased strength;Decreased endurance;Decreased cognition;Decreased balance; Increased pain  Assessment: Pt required modA to maxA for bed mobility, able to sit EOB ~20 mins with SBA/supervision. Pt attempted to stand to bariatric RW with maxA but unable to achieve fully upright standing position with 2 trials attempted. Pt completed seated B LE exs EOB, very limited by decreased endurance, B LE weakness and pain from wounds with movement. Pt currenlty unsafe to return to prior living arrangement, requires significant physical assistance for all aspects of mobility. Will require continued PT at discharge to address deficits and to decrease caretaker burden.   Therapy Prognosis: Good  Requires PT Follow-Up: Yes  Activity Tolerance  Activity Tolerance: Patient limited by fatigue;Patient limited by endurance     Plan   Plan  Plan:  (5-6x/wk)  Current Treatment Recommendations: Strengthening, Balance training, Functional mobility training, ROM, Transfer training, Gait training, Endurance training, Therapeutic activities, Equipment evaluation, education, & procurement, Home exercise program, Safety education & training, Patient/Caregiver education & training  Safety Devices  Type of Devices: All fall risk precautions in place, Call light within reach, Gait belt, Nurse notified, Makenzie Ropes elevated for pressure relief, Left in bed  Restraints  Restraints Initially in Place: No     Restrictions  Restrictions/Precautions  Restrictions/Precautions: General Precautions, Fall Risk, Up as Tolerated  Required Braces or Orthoses?: No  Position Activity Restriction  Other position/activity restrictions: Extubated 9/16. Abdominal binder for chest support/wounds. Subjective   Pain: 8/10 R knee, site if multiple wounds when touched  General  Chart Reviewed: Yes  Patient assessed for rehabilitation services?: Yes  Response To Previous Treatment: Patient with no complaints from previous session. Family / Caregiver Present: No  Follows Commands: Within Functional Limits  General Comment  Comments: Pt left in bed with call light within reach  Subjective  Subjective: Pt and RN agreeable to PT. Pt alert in bed upon arrival, very pleasant and cooperative with treatment. Pt c/o generalized pain and \"stiffness\", 8/10 R knee and at the site of wounds (pt has multiple wounds under breasts and on back and buttocks). Cognition   Orientation  Overall Orientation Status: Within Functional Limits  Orientation Level: Oriented X4  Cognition  Overall Cognitive Status: WFL  Arousal/Alertness: Appropriate responses to stimuli  Following Commands: Follows multistep commands with increased time; Follows multistep commands with repitition  Attention Span: Appears intact  Memory: Appears intact  Safety Judgement: Good awareness of safety precautions  Problem Solving: Assistance required to identify errors made;Assistance required to generate solutions  Insights: Fully aware of deficits  Initiation: Requires cues for some  Sequencing: Requires cues for some     Objective   SpO2: 95 %  O2 Device: None (Room air)     Observation/Palpation  Posture: Fair  Observation: many open sores under breasts, on back and buttocks region     Bed mobility  Rolling to Left: Moderate assistance  Rolling to Right: Moderate assistance  Supine to Sit: Moderate assistance  Sit to Supine: Maximum assistance  Scooting: Maximal assistance  Bed Mobility Comments: multiple trials of rolling for pt to find comfortable position in bed after mobility  Transfers  Sit to Stand: Maximum Assistance  Stand to sit: Maximum Assistance  Comment: pt attempted to stand x 2 trials with max A, unable to fully extend knees to achieve upright standing posture. B LE weakness and fatigue noted, pt demonstrating good effort t/o  Ambulation  WB Status: Unsafe to attempt this date     Balance  Posture: Fair  Sitting - Static: Good;-  Sitting - Dynamic: Fair;+  Standing - Static: Poor  Comments: pt attempted to stand to bariatric RW, unable to stand fully upright. Seated balance assessed EOB ~20 mins with SBA/supervision t/o  Exercise Treatment: .sit  Static Sitting Balance Exercises: EOB 20mins with SBA/supervision    AM-PAC Score  AM-PAC Inpatient Mobility Raw Score : 9 (09/29/22 1214)  AM-PAC Inpatient T-Scale Score : 30.55 (09/29/22 1214)  Mobility Inpatient CMS 0-100% Score: 81.38 (09/29/22 1214)  Mobility Inpatient CMS G-Code Modifier : CM (09/29/22 1214)    Goals  Short Term Goals  Time Frame for Short term goals: 14 visits  Short term goal 1: Pt will perform sit<>stand transfer with min-A. Short term goal 2: Pt will perform bed mobility with min-A. Short term goal 3: Pt will ambulate 30 feet with a RW and CGA. Short term goal 4: Pt will demonstrate fair standing balance to decrease fall risk. Short term goal 5: Pt will tolerate a 35 minute therapy session to promote increased endurance. Additional Goals?: No       Education  Patient Education  Education Given To: Patient  Education Provided: Role of Therapy;Transfer Training;Plan of Care; Fall Prevention Strategies; Energy Conservation;Home Exercise Program  Education Method: Verbal  Barriers to Learning: None  Education Outcome: Verbalized understanding;Continued education needed      Therapy Time   Individual Concurrent Group Co-treatment   Time In 0915         Time Out 1010         Minutes 55         Timed Code Treatment Minutes: [address] [place], [place]

## 2022-09-30 LAB
ABSOLUTE EOS #: 0.64 K/UL (ref 0–0.44)
ABSOLUTE IMMATURE GRANULOCYTE: 0.37 K/UL (ref 0–0.3)
ABSOLUTE LYMPH #: 3.23 K/UL (ref 1.1–3.7)
ABSOLUTE MONO #: 0.67 K/UL (ref 0.1–1.2)
ANION GAP SERPL CALCULATED.3IONS-SCNC: 9 MMOL/L (ref 9–17)
BASOPHILS # BLD: 1 % (ref 0–2)
BASOPHILS ABSOLUTE: 0.06 K/UL (ref 0–0.2)
BUN BLDV-MCNC: 7 MG/DL (ref 6–20)
CALCIUM SERPL-MCNC: 7.8 MG/DL (ref 8.6–10.4)
CHLORIDE BLD-SCNC: 106 MMOL/L (ref 98–107)
CO2: 24 MMOL/L (ref 20–31)
CREAT SERPL-MCNC: 0.63 MG/DL (ref 0.5–0.9)
EOSINOPHILS RELATIVE PERCENT: 8 % (ref 1–4)
GFR AFRICAN AMERICAN: >60 ML/MIN
GFR NON-AFRICAN AMERICAN: >60 ML/MIN
GFR SERPL CREATININE-BSD FRML MDRD: ABNORMAL ML/MIN/{1.73_M2}
GLUCOSE BLD-MCNC: 84 MG/DL (ref 70–99)
HCT VFR BLD CALC: 24 % (ref 36.3–47.1)
HEMOGLOBIN: 7.6 G/DL (ref 11.9–15.1)
IMMATURE GRANULOCYTES: 5 %
LYMPHOCYTES # BLD: 42 % (ref 24–43)
MAGNESIUM: 2.4 MG/DL (ref 1.6–2.6)
MCH RBC QN AUTO: 29 PG (ref 25.2–33.5)
MCHC RBC AUTO-ENTMCNC: 31.7 G/DL (ref 28.4–34.8)
MCV RBC AUTO: 91.6 FL (ref 82.6–102.9)
MONOCYTES # BLD: 9 % (ref 3–12)
NRBC AUTOMATED: 0.6 PER 100 WBC
PDW BLD-RTO: 18.2 % (ref 11.8–14.4)
PLATELET # BLD: 422 K/UL (ref 138–453)
PMV BLD AUTO: 9.1 FL (ref 8.1–13.5)
POTASSIUM SERPL-SCNC: 4.1 MMOL/L (ref 3.7–5.3)
RBC # BLD: 2.62 M/UL (ref 3.95–5.11)
RBC # BLD: ABNORMAL 10*6/UL
SEG NEUTROPHILS: 36 % (ref 36–65)
SEGMENTED NEUTROPHILS ABSOLUTE COUNT: 2.74 K/UL (ref 1.5–8.1)
SODIUM BLD-SCNC: 139 MMOL/L (ref 135–144)
WBC # BLD: 7.7 K/UL (ref 3.5–11.3)

## 2022-09-30 PROCEDURE — 6360000002 HC RX W HCPCS: Performed by: STUDENT IN AN ORGANIZED HEALTH CARE EDUCATION/TRAINING PROGRAM

## 2022-09-30 PROCEDURE — 94640 AIRWAY INHALATION TREATMENT: CPT

## 2022-09-30 PROCEDURE — 36415 COLL VENOUS BLD VENIPUNCTURE: CPT

## 2022-09-30 PROCEDURE — 85025 COMPLETE CBC W/AUTO DIFF WBC: CPT

## 2022-09-30 PROCEDURE — 80048 BASIC METABOLIC PNL TOTAL CA: CPT

## 2022-09-30 PROCEDURE — 6370000000 HC RX 637 (ALT 250 FOR IP): Performed by: STUDENT IN AN ORGANIZED HEALTH CARE EDUCATION/TRAINING PROGRAM

## 2022-09-30 PROCEDURE — 6370000000 HC RX 637 (ALT 250 FOR IP)

## 2022-09-30 PROCEDURE — 1200000000 HC SEMI PRIVATE

## 2022-09-30 PROCEDURE — 2700000000 HC OXYGEN THERAPY PER DAY

## 2022-09-30 PROCEDURE — 83735 ASSAY OF MAGNESIUM: CPT

## 2022-09-30 PROCEDURE — 6370000000 HC RX 637 (ALT 250 FOR IP): Performed by: EMERGENCY MEDICINE

## 2022-09-30 PROCEDURE — 6370000000 HC RX 637 (ALT 250 FOR IP): Performed by: PSYCHIATRY & NEUROLOGY

## 2022-09-30 PROCEDURE — 99231 SBSQ HOSP IP/OBS SF/LOW 25: CPT | Performed by: STUDENT IN AN ORGANIZED HEALTH CARE EDUCATION/TRAINING PROGRAM

## 2022-09-30 PROCEDURE — 6370000000 HC RX 637 (ALT 250 FOR IP): Performed by: FAMILY MEDICINE

## 2022-09-30 PROCEDURE — 97530 THERAPEUTIC ACTIVITIES: CPT

## 2022-09-30 PROCEDURE — 2580000003 HC RX 258: Performed by: STUDENT IN AN ORGANIZED HEALTH CARE EDUCATION/TRAINING PROGRAM

## 2022-09-30 PROCEDURE — 94761 N-INVAS EAR/PLS OXIMETRY MLT: CPT

## 2022-09-30 RX ADMIN — ENOXAPARIN SODIUM 30 MG: 100 INJECTION SUBCUTANEOUS at 08:14

## 2022-09-30 RX ADMIN — ENOXAPARIN SODIUM 30 MG: 100 INJECTION SUBCUTANEOUS at 19:44

## 2022-09-30 RX ADMIN — OXYCODONE 5 MG: 5 TABLET ORAL at 05:51

## 2022-09-30 RX ADMIN — ANTI-FUNGAL POWDER MICONAZOLE NITRATE TALC FREE: 1.42 POWDER TOPICAL at 08:14

## 2022-09-30 RX ADMIN — OXYCODONE 10 MG: 5 TABLET ORAL at 16:01

## 2022-09-30 RX ADMIN — MIDODRINE HYDROCHLORIDE 10 MG: 5 TABLET ORAL at 17:44

## 2022-09-30 RX ADMIN — SODIUM CHLORIDE: 9 INJECTION, SOLUTION INTRAVENOUS at 09:24

## 2022-09-30 RX ADMIN — MIDODRINE HYDROCHLORIDE 10 MG: 5 TABLET ORAL at 02:26

## 2022-09-30 RX ADMIN — STANDARDIZED SENNA CONCENTRATE AND DOCUSATE SODIUM 2 TABLET: 8.6; 5 TABLET ORAL at 08:14

## 2022-09-30 RX ADMIN — Medication 1 CAPSULE: at 13:15

## 2022-09-30 RX ADMIN — BUDESONIDE AND FORMOTEROL FUMARATE DIHYDRATE 1 PUFF: 80; 4.5 AEROSOL RESPIRATORY (INHALATION) at 09:03

## 2022-09-30 RX ADMIN — PANTOPRAZOLE SODIUM 40 MG: 40 TABLET, DELAYED RELEASE ORAL at 08:15

## 2022-09-30 RX ADMIN — SODIUM CHLORIDE, PRESERVATIVE FREE 10 ML: 5 INJECTION INTRAVENOUS at 19:54

## 2022-09-30 RX ADMIN — MIDODRINE HYDROCHLORIDE 10 MG: 5 TABLET ORAL at 08:15

## 2022-09-30 RX ADMIN — Medication 1 CAPSULE: at 16:02

## 2022-09-30 RX ADMIN — ANTI-FUNGAL POWDER MICONAZOLE NITRATE TALC FREE: 1.42 POWDER TOPICAL at 19:44

## 2022-09-30 RX ADMIN — Medication 1 CAPSULE: at 08:15

## 2022-09-30 RX ADMIN — VENLAFAXINE 150 MG: 75 TABLET ORAL at 08:14

## 2022-09-30 RX ADMIN — MAGNESIUM SULFATE HEPTAHYDRATE 2000 MG: 40 INJECTION, SOLUTION INTRAVENOUS at 09:25

## 2022-09-30 RX ADMIN — SODIUM CHLORIDE, PRESERVATIVE FREE 10 ML: 5 INJECTION INTRAVENOUS at 08:15

## 2022-09-30 RX ADMIN — BUDESONIDE AND FORMOTEROL FUMARATE DIHYDRATE 1 PUFF: 80; 4.5 AEROSOL RESPIRATORY (INHALATION) at 21:05

## 2022-09-30 RX ADMIN — LEVOTHYROXINE SODIUM 75 MCG: 75 TABLET ORAL at 08:14

## 2022-09-30 ASSESSMENT — PAIN SCALES - WONG BAKER
WONGBAKER_NUMERICALRESPONSE: 0

## 2022-09-30 ASSESSMENT — PAIN DESCRIPTION - LOCATION
LOCATION: BUTTOCKS;BREAST
LOCATION: BUTTOCKS

## 2022-09-30 ASSESSMENT — PAIN SCALES - GENERAL
PAINLEVEL_OUTOF10: 6
PAINLEVEL_OUTOF10: 9
PAINLEVEL_OUTOF10: 5
PAINLEVEL_OUTOF10: 8

## 2022-09-30 NOTE — PROGRESS NOTES
Physical Therapy  Facility/Department: [facility]  Physical Therapy Daily Treatment Note    Name: Connor Falcon  : 1980  MRN: [id number]  Date of Service: 2022    Discharge Recommendations:  Patient would benefit from continued therapy after discharge   PT Equipment Recommendations  Equipment Needed: No  Other: will CTA, pt currently unsafe to perform any aspect of mobility without significant physical assistance      Patient Diagnosis(es): There were no encounter diagnoses. Past Medical History:  has no past medical history on file. Past Surgical History:  has no past surgical history on file. Assessment   Body Structures, Functions, Activity Limitations Requiring Skilled Therapeutic Intervention: Decreased functional mobility ; Decreased strength;Decreased endurance;Decreased cognition;Decreased balance; Increased pain  Assessment: Pt continues to show improvement with mobility, required modA for supine to sit transfer. Pt completed STS transfer x2 trials, modA x2 initially then maxA x2 on second attempt. Pt remained standing with RW ~2 mins each trial. Pt limited by B LE weakness and decreased endurance. Currently unsafe to return to prior living arrangement, would benefit from continued PT to maximize safety and independence. Therapy Prognosis: Good  Requires PT Follow-Up: Yes  Activity Tolerance  Activity Tolerance: Patient limited by fatigue;Patient limited by endurance     Plan   Physcial Therapy Plan  General Plan:  (5-6x/wk)  Current Treatment Recommendations: Strengthening, Balance training, Functional mobility training, ROM, Transfer training, Gait training, Endurance training, Therapeutic activities, Equipment evaluation, education, & procurement, Home exercise program, Safety education & training, Patient/Caregiver education & training  Safety Devices  Type of Devices:  All fall risk precautions in place, Call light within reach, Gait belt, Nurse notified, Heels elevated for pressure relief, Left in bed  Restraints  Restraints Initially in Place: No     Restrictions  Restrictions/Precautions  Restrictions/Precautions: General Precautions, Fall Risk, Up as Tolerated  Required Braces or Orthoses?: No  Position Activity Restriction  Other position/activity restrictions: Extubated 9/16. Abdominal binder for chest support/wounds. Subjective   Pain: pt c/o knee pain, R>L. does not rate. When pt was standing she c/o abdominal pain 10/10. She states \"it feels like I drank too much water\"  General  Chart Reviewed: Yes  Patient assessed for rehabilitation services?: Yes  Response To Previous Treatment: Patient with no complaints from previous session. Family / Caregiver Present: Yes (RN and SMITH)  Follows Commands: Within Functional Limits  General Comment  Comments: Pt left in bed with call light within reach  Subjective  Subjective: Pt and RN agreeable to PT. Pt alert in bed upon arrival, very pleasant and cooperative with treatment. Pt reports she feels \"weak\" today but remained cooperativet/o treatment. Pt has multiple wounds around breasts, on back and buttocks. Cognition   Orientation  Overall Orientation Status: Within Functional Limits  Orientation Level: Oriented X4  Cognition  Overall Cognitive Status: WFL  Arousal/Alertness: Appropriate responses to stimuli  Following Commands: Follows multistep commands with increased time; Follows multistep commands with repitition  Attention Span: Appears intact  Memory: Appears intact  Safety Judgement: Good awareness of safety precautions  Problem Solving: Assistance required to identify errors made;Assistance required to generate solutions  Insights: Fully aware of deficits  Initiation: Requires cues for some  Sequencing: Requires cues for some     Objective   SpO2: 94 %  O2 Device: Nasal cannula     Observation/Palpation  Posture: Good  Observation: many open sores under breasts, on back and buttocks region     Bed mobility  Rolling to Left: Minimal assistance  Rolling to Right: Minimal assistance  Supine to Sit: Moderate assistance (HOB elevated to ~40 degrees)  Sit to Supine: Maximum assistance;2 Person assistance (HOB flat)  Scooting: Moderate assistance  Bed Mobility Comments: pt completed bed mobility with increased time, able to progress B LE toward EOB and progress trunk with modA required to complete. Transfers  Sit to Stand: Moderate Assistance;2 Person Assistance  Stand to Sit: Moderate Assistance;2 Person Assistance;Maximum Assistance (pt completed STS transfer x2 requiring ModA x2 the first trial and maxA x2 the second attempt)  Comment: Pt with noted B LE fatigue, assist of 3rd therapist with second stand to block knees. Pt able to stand fully upright ~2 mins x2 trials. good effort noted t/o  Ambulation  WB Status: Unsafe to attempt this date     Balance  Posture: Good  Sitting - Static: Good  Sitting - Dynamic: Good;-  Standing - Static: Fair  Standing - Dynamic: Poor;+  Comments: standing balance assessed with bariatric RW, seated balance assessed EOB  Exercise Treatment: pt reports completing HEP independently prior to PT arrival    AM-PAC Score  AM-PAC Inpatient Mobility Raw Score : 9 (09/29/22 1214)  AM-PAC Inpatient T-Scale Score : 30.55 (09/29/22 1214)  Mobility Inpatient CMS 0-100% Score: 81.38 (09/29/22 1214)  Mobility Inpatient CMS G-Code Modifier : CM (09/29/22 1214)    Goals  Short Term Goals  Time Frame for Short Term Goals: 14 visits  Short Term Goal 1: Pt will perform sit<>stand transfer with min-A. Short Term Goal 2: Pt will perform bed mobility with min-A. Short Term Goal 3: Pt will ambulate 30 feet with a RW and CGA. Short Term Goal 4: Pt will demonstrate fair standing balance to decrease fall risk. Short Term Goal 5: Pt will tolerate a 35 minute therapy session to promote increased endurance.   Additional Goals?: No       Education  Patient Education  Education Given To: Patient  Education Provided: Role of Therapy;Transfer Training;Plan of Care; Fall Prevention Strategies; Energy Conservation  Education Method: Verbal  Barriers to Learning: None  Education Outcome: Verbalized understanding;Continued education needed;Demonstrated understanding      Therapy Time   Individual Concurrent Group Co-treatment   Time In 1335         Time Out 1406         Minutes 31         Timed Code Treatment Minutes: 16 Minutes (co-treat with SMITH for pt safety)       Blanca Townsend, PTA

## 2022-09-30 NOTE — PROGRESS NOTES
Comprehensive Nutrition Assessment    Type and Reason for Visit:  Reassess    Nutrition Recommendations/Plan:   Remove carbohydrate restriction from diet order. D/c Ensure ONS. Try Boost Puddings. Nutrition Assessment:    Pt reports decreased appetite and variable intake of meals (consumed 25-75% of meals yesterday per RN documentation). C/o constipation (last BM yesterday), abd/gas pain, mild nausea, and taste changes impacting PO intake. Dislikes Ensure ONS, willing to try high protein pudding ONS instead. Labs reviewed. Episodes of hypoglycemia noted. Nutrition Related Findings:    meds/labs reviewed Wound Type: Multiple, Pressure Injury, Stage III (to left/right breast, left proximal arm)       Current Nutrition Intake & Therapies:    Average Meal Intake: 26-50%, 51-75%  Average Supplements Intake: 0%  ADULT DIET; Regular; 4 carb choices (60 gm/meal)  ADULT ORAL NUTRITION SUPPLEMENT; Breakfast, Dinner, Lunch; Standard Oral Supplement    Anthropometric Measures:  Height: 5' 7\" (170.2 cm)  Ideal Body Weight (IBW): 135 lbs (61 kg)    Admission Body Weight: 307 lb (139.3 kg)  Current Body Weight: 320 lb 8 oz (145.4 kg), 237.4 % IBW.  Weight Source: Bed Scale  Current BMI (kg/m2): 50.2                          BMI Categories: Obese Class 3 (BMI 40.0 or greater)    Estimated Daily Nutrient Needs:  Energy Requirements Based On: Formula  Weight Used for Energy Requirements: Current  Energy (kcal/day): 0638-5467 kcals/day  Weight Used for Protein Requirements: Ideal  Protein (g/day):  g pro/day  Method Used for Fluid Requirements: Other (Comment)  Fluid (ml/day): per MD    Nutrition Diagnosis:   Inadequate oral intake related to altered taste perception (appetite) as evidenced by  (variable PO intake)    Nutrition Interventions:   Food and/or Nutrient Delivery: Modify Current Diet, Modify Oral Nutrition Supplement  Nutrition Education/Counseling: No recommendation at this time  Coordination of Nutrition Care: Continue to monitor while inpatient  Plan of Care discussed with: Patient    Goals:  Previous Goal Met: Progressing toward Goal(s)  Goals: Meet at least 75% of estimated needs, prior to discharge       Nutrition Monitoring and Evaluation:   Behavioral-Environmental Outcomes: None Identified  Food/Nutrient Intake Outcomes: Food and Nutrient Intake, Supplement Intake  Physical Signs/Symptoms Outcomes: Weight, Biochemical Data, Nutrition Focused Physical Findings, Skin, Fluid Status or Edema, Nausea or Vomiting, GI Status, Constipation    Discharge Planning:     Too soon to determine     Rubens Hancock MS, RD, LD  Contact: [phone]

## 2022-09-30 NOTE — PLAN OF CARE
Problem: Respiratory - Adult  Goal: Achieves optimal ventilation and oxygenation  9/30/2022 0905 by Violet Flowers RCP  Outcome: Progressing   BRONCHOSPASM/BRONCHOCONSTRICTION     [x]         IMPROVE AERATION/BREATH SOUNDS  [x]   ADMINISTER BRONCHODILATOR THERAPY AS APPROPRIATE  [x]   ASSESS BREATH SOUNDS  []   IMPLEMENT AEROSOL/MDI PROTOCOL  [x]   PATIENT EDUCATION AS NEEDED   PROVIDE ADEQUATE OXYGENATION WITH ACCEPTABLE SP02/ABG'S    [x]  IDENTIFY APPROPRIATE OXYGEN THERAPY  [x]   MONITOR SP02/ABG'S AS NEEDED   [x]   PATIENT EDUCATION AS NEEDED

## 2022-09-30 NOTE — PROGRESS NOTES
Occupational Therapy  Facility/Department: [facility]  Occupational Daily Treatment Note    Name: Joaquin Robbins  : 1980  MRN: [id number]  Date of Service: 2022    Discharge Recommendations:  Patient would benefit from continued therapy after discharge  Patient Diagnosis(es): There were no encounter diagnoses. Past Medical History:  has no past medical history on file. Past Surgical History:  has no past surgical history on file. Treatment Diagnosis: AMS    Assessment   Performance deficits / Impairments: Decreased functional mobility ; Decreased ADL status; Decreased ROM; Decreased strength;Decreased safe awareness;Decreased endurance;Decreased balance;Decreased high-level IADLs;Decreased posture  Treatment Diagnosis: AMS  Prognosis: Good  Activity Tolerance  Activity Tolerance: Patient Tolerated treatment well;Patient limited by fatigue        Plan   Occupational Therapy Plan  Times Per Week: 3-5 x/wk  Current Treatment Recommendations: Strengthening, ROM, Balance training, Functional mobility training, Endurance training, Cognitive reorientation, Pain management, Safety education & training, Patient/Caregiver education & training, Equipment evaluation, education, & procurement, Positioning, Self-Care / ADL, Home management training     Restrictions  Restrictions/Precautions  Restrictions/Precautions: General Precautions, Fall Risk, Up as Tolerated  Required Braces or Orthoses?: No  Position Activity Restriction  Other position/activity restrictions: Extubated . Pain assessment: Pt c/o pain in abdomen 3/10  Subjective      Observation/Palpation  Posture: Good  Observation: many open sores under breasts, on back and buttocks region  Safety Devices  Type of Devices: All fall risk precautions in place;Call light within reach;Gait belt;Nurse notified; Heels elevated for pressure relief;Left in bed  Restraints  Restraints Initially in Place: No  Balance  Sitting: With support (CGA seated EOB x20 minutes.)  Standing: With support (Mod A x2 static standing EOB using RW (bariatric) and box step. 1 minute x2. Second stand required assist with third person blocking B knees to prevent buckling.)  Gait  Overall Level of Assistance:  (KP d/t poor standing balance/endurance.)     ADL  Toileting: Dependent/Total;Setup; Increased time to complete (Pericare/bottomcare supine in bed.)  Additional Comments: Pt completed supine/sit transfer to seated EOB. Pt c/o mild dizziness after transferring to EOB. Sit/stand transfer using RW (bariatric) and box step x2. Seated rest break needed in between each stand. Pt returned to supine in bed for rolling R/L for pericare/bottom care. Pt declined ADL care this day d/t fatigue. Activity Tolerance  Activity Tolerance: Patient limited by fatigue;Patient limited by endurance  Bed mobility  Rolling to Left: Minimal assistance  Rolling to Right: Minimal assistance  Supine to Sit: Moderate assistance (HOB elevated, trunk support.)  Sit to Supine: Maximum assistance;2 Person assistance  Scooting: Moderate assistance;2 Person assistance  Bed Mobility Comments: pt completed bed mobility with increased time, able to progress B LE toward EOB and progress trunk with modA required to complete. Transfers  Sit to stand: Moderate assistance;2 Person assistance  Stand to sit: Moderate assistance;2 Person assistance  Transfer Comments: Static standing EOB using RW (bariatric) and box step 1 minute x2. Cognition  Overall Cognitive Status: WFL  Arousal/Alertness: Appropriate responses to stimuli  Following Commands: Follows multistep commands with increased time; Follows multistep commands with repitition  Attention Span: Appears intact  Memory: Appears intact  Safety Judgement: Good awareness of safety precautions  Problem Solving: Assistance required to identify errors made;Assistance required to generate solutions  Insights: Fully aware of deficits  Initiation: Requires cues for some  Sequencing: Requires cues for some  Orientation  Overall Orientation Status: Within Functional Limits  Orientation Level: Oriented X4  Education Given To: Patient  Education Provided Comments: OT POC, transfer/walker safety, importance of participation in therapy, pursed lip breathing, bed mobility - pt verbalized understanding. AM-PAC Score        AM-PAC Inpatient Daily Activity Raw Score: 16 (09/30/22 1655)  AM-PAC Inpatient ADL T-Scale Score : 35.96 (09/30/22 1655)  ADL Inpatient CMS 0-100% Score: 53.32 (09/30/22 1655)  ADL Inpatient CMS G-Code Modifier : CK (09/30/22 1655)      Goals  Short Term Goals  Time Frame for Short Term Goals: By discharge, pt will:  Short Term Goal 1: Increase BUE strength by 1/2+ muscle grade through utilization of BUE strengthening HEP  Short Term Goal 2: Demo Min A for UB ADLs  Short Term Goal 3: Demo Mod A for LB ADLs with AE PRN  Short Term Goal 4: Demo 15+ min dynamic sitting and reaching outside ADRIANA with unilateral hand release and CGA for improved performance of ADLs/IADLs  Short Term Goal 5: Follow 75% of 2-step commands with appropriate initiation and sequencing for improved functional independence  Short Term Goal 6: NOTIFY OTR TO UPDATE GOALS AS PT PROGRESSES       Therapy Time   Individual Concurrent Group Co-treatment   Time In 1336         Time Out 1408         Minutes 32         Timed Code Treatment Minutes: 23 Minutes               Co tx PT    Pt supine in bed upon therapist arrival. Pleasant and agreeable to therapy. See above for LOF for all tasks. Pt retired to supine in bed at end of session with call light within reach. Will collaborate with OTR to update standing goals.     LUIS Claudio/SAMUEL

## 2022-09-30 NOTE — PLAN OF CARE
Problem: Respiratory - Adult  Goal: Achieves optimal ventilation and oxygenation  9/29/2022 2045 by [facility], [facility]  Outcome: Progressing   BRONCHOSPASM/BRONCHOCONSTRICTION     [x]         IMPROVE AERATION/BREATH SOUNDS  [x]   ADMINISTER BRONCHODILATOR THERAPY AS APPROPRIATE  [x]   ASSESS BREATH SOUNDS  []   IMPLEMENT AEROSOL/MDI PROTOCOL  [x]   PATIENT EDUCATION AS NEEDED

## 2022-09-30 NOTE — PROGRESS NOTES
[facility]  Office: [address], DO, Umair Andreina, DO, Patty Jan, DO, Alcides Weisskg Blood, DO, Madeleine Arteaga MD, Johann Riedel, MD, Tiburcio Murphy MD, Kary Chairez MD,  Star Sullivan MD, Marielos Michaels MD, Dao Martinez, DO, Milderd Costa MD,  Isidro Wood MD, Carmelo Fraser MD, Mame Schmidt, DO, Maico Alcaraz MD, Obdulio Sullivan MD, Buck Starr MD, Jodie Alarcon MD, Kamron Tiwari MD, King Morgan MD, Talisha Weems, DO, Jennifer Haddad MD, Kathleen Cruz MD, Susan Goss, [facility],  Mini Mcgarry CNP, Maggie Gurrola CNP, Kira Espinoza, CNP,  Karyn Mcdonald, [facility], Mami Pemberton, CNP, Ben Arechiga, CNP, Marielena Ferrell CNP, Hugo Gray, CNP, Chetan Flynn CNP, Jewel Alamo PA-C, Bridget Nielsen, CNS, Johanny Caal, [facility], Jannet Cam, CNP, Sweta Zaman, CNP, Vick Zaman, [address] [place] [address]    Progress Note    9/30/2022    3:49 PM    Name:   Temi Lorenzo  MRN:     [id number]     Acct:      [de-identified]   Room:   [address] [place] [address] Day:  21  Admit Date:  9/10/2022  4:45 PM    PCP:   No primary care provider on file. Code Status:  Full Code    Subjective:     C/C: Found unresponsive at home  Interval History Status: not changed. No acute events overnight  Vitals are stable  Pre-CERT pending    Brief History:       47-year-old female with known medical history of morbid obesity, hypothyroidism, history of gastric fundoplication, COPD presents to the hospital from [facility]. Patient was found unresponsive at home covered in the urine and stools with several scattered wounds and maggots in the wounds. Patient was noted to have acute kidney injury and CT scan of abdomen showed malposition of jejunal with small bowel obstruction and possible hepatic steatosis. Patient had abnormal labs including hyponatremia, hyperkalemia, elevated procalcitonin.   Patient was transferred to [facility] ICU for sepsis, leukocytosis, UTI. Patient was started on broad-spectrum antibiotics, infectious disease was consulted. Positive blood cultures shows gram-negative rods and gram-positive cocci in clusters. A CT scan in [facility] did not show concern for small bowel obstruction. Patient was intubated due to metabolic encephalopathy. Patient remained intubated from 9/17. Patient was also on pressor support with Levophed. Patient had sepsis with Proteus, staph hominis, staph epidermidis. Urine cultures showed E. coli. Patient on ceftriaxone for 2 weeks till 9/24 per infectious disease, also did short course of daptomycin, already completed till 9/17. Review of Systems:     Constitutional:  negative for chills, fevers, sweats  Respiratory:  negative for cough, dyspnea on exertion, shortness of breath, wheezing  Cardiovascular:  negative for chest pain, chest pressure/discomfort, lower extremity edema, palpitations  Gastrointestinal:  negative for abdominal pain, constipation, diarrhea, nausea, vomiting  Neurological:  negative for dizziness, headache    Medications: Allergies:     Allergies   Allergen Reactions    Amoxicillin Hives    Robitussin Day-Night Value Nickolas [Phenylephrine-Diphenhyd-Dm-Gg] Hives and Rash       Current Meds:   Scheduled Meds:    sennosides-docusate sodium  2 tablet Oral BID    venlafaxine  150 mg Oral Daily    lactobacillus  1 capsule Oral TID WC    sodium chloride  500 mL IntraVENous Once    pantoprazole  40 mg Oral QAM AC    budesonide-formoterol  1 puff Inhalation BID    lidocaine 1 % injection  5 mL IntraDERmal Once    midodrine  10 mg Oral q8h    enoxaparin  30 mg SubCUTAneous BID    sodium chloride flush  5-40 mL IntraVENous 2 times per day    miconazole   Topical BID    levothyroxine  75 mcg Oral Daily     Continuous Infusions:    sodium chloride      sodium chloride 100 mL/hr at 09/30/22 0924     PRN Meds: oxyCODONE **OR** oxyCODONE, calcium carbonate, magnesium sulfate, sodium chloride flush, sodium chloride, sodium chloride flush, sodium chloride, ondansetron **OR** ondansetron, polyethylene glycol, acetaminophen **OR** acetaminophen, potassium chloride **OR** potassium chloride    Data:     Past Medical History: Morbid obesity  Lymphedema  COPD  History of smoking    Social History:    Quit smoking many years go  No alcohol    Family History:Family history cancer ovarian- maternal aunt       Vitals:  /65   Pulse 94   Temp 97.9 °F (36.6 °C) (Oral)   Resp 15   Ht 5' 7\" (1.702 m)   Wt (!) 320 lb 8 oz (145.4 kg)   SpO2 94%   BMI 50.20 kg/m²   Temp (24hrs), Av.4 °F (36.9 °C), Min:97.9 °F (36.6 °C), Max:98.7 °F (37.1 °C)    Recent Labs     22  0744 22  1145 22  0802 22  1249   POCGLU 71 94 78 106*         I/O (24Hr):     Intake/Output Summary (Last 24 hours) at 2022 1549  Last data filed at 2022 1100  Gross per 24 hour   Intake --   Output 1050 ml   Net -1050 ml         Labs:  Hematology:  Recent Labs     22  1650 22  1247   WBC 7.6 7.7   RBC 2.67* 2.62*   HGB 7.6* 7.6*   HCT 24.4* 24.0*   MCV 91.4 91.6   MCH 28.5 29.0   MCHC 31.1 31.7   RDW 17.9* 18.2*    422   MPV 9.4 9.1       Chemistry:  Recent Labs     22  0723 22  1247    139   K 4.2 4.1    106   CO2 21 24   GLUCOSE 69* 84   BUN 7 7   CREATININE 0.62 0.63   MG 1.8 2.4   ANIONGAP 11 9   LABGLOM >60 >60   GFRAA >60 >60   CALCIUM 7.5* 7.8*       Recent Labs     22  1616 22  0744 22  1145 22  0802 22  1249   POCGLU 79 71 94 78 106*       ABG:  Lab Results   Component Value Date/Time    POCPH 7.498 2022 04:15 AM    POCPCO2 35.3 2022 04:15 AM    POCPO2 134.7 2022 04:15 AM    POCHCO3 27.4 2022 04:15 AM    NBEA 4 2022 03:42 AM    PBEA 4 2022 04:15 AM    TMCN7ICP 99 2022 04:15 AM    FIO2 30.0 2022 04:15 AM     Lab Results   Component Value Date/Time    SPECIAL R WRIST 0.1ML 09/10/2022 07:32 PM     Lab Results   Component Value Date/Time    CULTURE NO GROWTH 09/10/2022 08:31 PM       Radiology:  No results found. Physical Examination:        General appearance:  alert, cooperative and morbidly obese mental Status:  oriented to person, place and time and normal affect  Lungs: Decreased breath sounds bilaterally, normal effort   heart:  regular rate and rhythm, no murmur  Abdomen:  soft, nontender, nondistended, normal bowel sounds, no masses, hepatomegaly, splenomegaly  Extremities: 2+ edema, redness, tenderness in the calves  Skin: Multiple skin wounds present on sacral area, under the breast  Assessment:        Hospital Problems             Last Modified POA    * (Principal) Septic shock ([address] 9/17/2022 Yes    Altered mental status 9/11/2022 Yes    Acute kidney injury ([address] 9/11/2022 Yes    Bandemia 9/11/2022 Yes    Cellulitis of sacral region 9/11/2022 Yes    Breast, fat necrosis 9/11/2022 Yes    Gram-neg septicemia ([address] 9/11/2022 Yes    Gram positive septicemia ([address] 9/11/2022 Yes    Acute encephalopathy 9/11/2022 Yes    Elevated procalcitonin 9/11/2022 Yes    Chronic anemia 9/18/2022 Yes    Acute respiratory failure with hypoxia ([address] 9/18/2022 Yes    Severe protein-calorie malnutrition ([address] 9/18/2022 Yes    Simple chronic bronchitis ([address] 9/18/2022 Yes    Hypothyroid 9/18/2022 Yes    Hypoalbuminemia 9/18/2022 Yes    Systemic inflammatory response syndrome (SIRS) of infectious origin with acute organ failure ([address] 9/18/2022 Yes    MDD (major depressive disorder), recurrent episode, moderate ([address] 9/24/2022 Yes    Malrotation of intestine 9/29/2022 Yes   Plan:        Sepsis with septic shock-resolved, positive bacteremia with Proteus, staph hominis, staph epidermidis, completed daptomycin till 9/17, ceftriaxone till 9/24. Likely skin is the source of sepsis. Patient on midodrine for blood pressure support.   Continue wound care. Multiple wounds on breast, buttocks-had maggots, continue wound care     Anemia of chronic disease- hb stable, encourage oral nutrition     Hypothyroidism on Synthyroid     COPD-continue inhalers     CHULA-resolved     Hypoalbuminemia with severe malnutrition - encourage supplements     Encephalopathy- resolved    Constipation- continue bowel regimen    Morbid obesity-needs lifestyle modification, encourage physical therapy  Labs reviewed    Difficult discharge plan, discharge orders is in. Precert started.     Nataliya Hardin MD  9/30/2022  3:49 PM

## 2022-10-01 PROCEDURE — 6370000000 HC RX 637 (ALT 250 FOR IP)

## 2022-10-01 PROCEDURE — 94640 AIRWAY INHALATION TREATMENT: CPT

## 2022-10-01 PROCEDURE — 6370000000 HC RX 637 (ALT 250 FOR IP): Performed by: FAMILY MEDICINE

## 2022-10-01 PROCEDURE — 6370000000 HC RX 637 (ALT 250 FOR IP): Performed by: STUDENT IN AN ORGANIZED HEALTH CARE EDUCATION/TRAINING PROGRAM

## 2022-10-01 PROCEDURE — 99231 SBSQ HOSP IP/OBS SF/LOW 25: CPT | Performed by: STUDENT IN AN ORGANIZED HEALTH CARE EDUCATION/TRAINING PROGRAM

## 2022-10-01 PROCEDURE — 2580000003 HC RX 258: Performed by: STUDENT IN AN ORGANIZED HEALTH CARE EDUCATION/TRAINING PROGRAM

## 2022-10-01 PROCEDURE — 6360000002 HC RX W HCPCS: Performed by: STUDENT IN AN ORGANIZED HEALTH CARE EDUCATION/TRAINING PROGRAM

## 2022-10-01 PROCEDURE — 6370000000 HC RX 637 (ALT 250 FOR IP): Performed by: EMERGENCY MEDICINE

## 2022-10-01 PROCEDURE — 6370000000 HC RX 637 (ALT 250 FOR IP): Performed by: PSYCHIATRY & NEUROLOGY

## 2022-10-01 PROCEDURE — 1200000000 HC SEMI PRIVATE

## 2022-10-01 RX ADMIN — ANTI-FUNGAL POWDER MICONAZOLE NITRATE TALC FREE: 1.42 POWDER TOPICAL at 10:04

## 2022-10-01 RX ADMIN — OXYCODONE 10 MG: 5 TABLET ORAL at 19:20

## 2022-10-01 RX ADMIN — LEVOTHYROXINE SODIUM 75 MCG: 75 TABLET ORAL at 06:58

## 2022-10-01 RX ADMIN — MIDODRINE HYDROCHLORIDE 10 MG: 5 TABLET ORAL at 10:03

## 2022-10-01 RX ADMIN — OXYCODONE 10 MG: 5 TABLET ORAL at 13:17

## 2022-10-01 RX ADMIN — BUDESONIDE AND FORMOTEROL FUMARATE DIHYDRATE 1 PUFF: 80; 4.5 AEROSOL RESPIRATORY (INHALATION) at 21:53

## 2022-10-01 RX ADMIN — Medication 1 CAPSULE: at 10:02

## 2022-10-01 RX ADMIN — MIDODRINE HYDROCHLORIDE 10 MG: 5 TABLET ORAL at 18:57

## 2022-10-01 RX ADMIN — VENLAFAXINE 150 MG: 75 TABLET ORAL at 10:03

## 2022-10-01 RX ADMIN — ENOXAPARIN SODIUM 30 MG: 100 INJECTION SUBCUTANEOUS at 21:17

## 2022-10-01 RX ADMIN — ENOXAPARIN SODIUM 30 MG: 100 INJECTION SUBCUTANEOUS at 10:03

## 2022-10-01 RX ADMIN — Medication 1 CAPSULE: at 18:57

## 2022-10-01 RX ADMIN — SODIUM CHLORIDE, PRESERVATIVE FREE 10 ML: 5 INJECTION INTRAVENOUS at 10:03

## 2022-10-01 RX ADMIN — Medication 1 CAPSULE: at 15:08

## 2022-10-01 RX ADMIN — SODIUM CHLORIDE, PRESERVATIVE FREE 10 ML: 5 INJECTION INTRAVENOUS at 21:18

## 2022-10-01 RX ADMIN — ANTI-FUNGAL POWDER MICONAZOLE NITRATE TALC FREE: 1.42 POWDER TOPICAL at 21:18

## 2022-10-01 RX ADMIN — PANTOPRAZOLE SODIUM 40 MG: 40 TABLET, DELAYED RELEASE ORAL at 06:58

## 2022-10-01 RX ADMIN — BUDESONIDE AND FORMOTEROL FUMARATE DIHYDRATE 1 PUFF: 80; 4.5 AEROSOL RESPIRATORY (INHALATION) at 08:07

## 2022-10-01 ASSESSMENT — PAIN SCALES - WONG BAKER
WONGBAKER_NUMERICALRESPONSE: 0

## 2022-10-01 ASSESSMENT — PAIN DESCRIPTION - LOCATION
LOCATION: BUTTOCKS
LOCATION: BUTTOCKS;BREAST

## 2022-10-01 ASSESSMENT — PAIN DESCRIPTION - PAIN TYPE: TYPE: ACUTE PAIN

## 2022-10-01 ASSESSMENT — PAIN - FUNCTIONAL ASSESSMENT
PAIN_FUNCTIONAL_ASSESSMENT: PREVENTS OR INTERFERES SOME ACTIVE ACTIVITIES AND ADLS
PAIN_FUNCTIONAL_ASSESSMENT: PREVENTS OR INTERFERES SOME ACTIVE ACTIVITIES AND ADLS

## 2022-10-01 ASSESSMENT — PAIN DESCRIPTION - ORIENTATION: ORIENTATION: LEFT;RIGHT

## 2022-10-01 ASSESSMENT — PAIN SCALES - GENERAL
PAINLEVEL_OUTOF10: 4
PAINLEVEL_OUTOF10: 8

## 2022-10-01 ASSESSMENT — PAIN DESCRIPTION - FREQUENCY: FREQUENCY: CONTINUOUS

## 2022-10-01 ASSESSMENT — PAIN DESCRIPTION - DESCRIPTORS
DESCRIPTORS: THROBBING
DESCRIPTORS: DISCOMFORT

## 2022-10-01 ASSESSMENT — PAIN DESCRIPTION - ONSET: ONSET: ON-GOING

## 2022-10-01 NOTE — PROGRESS NOTES
[facility]  Office: [address], DO, Kristyn Chen, DO, Kulwinder Royal, DO, Karen Valderrama, DO, Elaine Harper MD, Jessika Borges MD, Keren Bhatt MD, Gary Allen MD,  Wily Lopez MD, Patricia Molina MD, Danuta Hurd, DO, Ramón Plummer MD,  Kate Guillen MD, Gaston Araujo MD, Jemma Mathis DO, Caty Hernandez MD, Teetee Goodwin MD, Marcelo Zambrano MD, Radha Dalton MD, Fawn Ramos MD, Ashley Monterroso MD, Austin Gutierrez DO, Romain Chavarria MD, Cosmo Conde MD, Jayme Mayers, [facility],  Le Epperson, CNP, Sulema Christy, CNP, Carlos Schwarz, CNP,  Robe Campos, [facility], Alix Vogel, CNP, Alejandrina Malone, CNP, Sammy Mike, CNP, Clark Ogden, CNP, Reyna Houston, CNP, TARUN DonohueC, Lalo Lee, CNS, Bertin Hassan, [facility], Abhi Crisostomo, CNP, Mohini Sainz, CNP, Fatuma Aguiar, [address] [place] [address]    Progress Note    10/1/2022    11:28 AM    Name:   Lan Srinivasan  MRN:     [id number]     Acct:      [de-identified]   Room:   [address] [place] [address] Day:  21  Admit Date:  9/10/2022  4:45 PM    PCP:   No primary care provider on file. Code Status:  Full Code    Subjective:     C/C: Found unresponsive at home  Interval History Status: not changed. No Acute events overnight  Patient awaiting pre-CERT  No new complaints. Will probably stay over the weekend. Brief History:       57-year-old female with known medical history of morbid obesity, hypothyroidism, history of gastric fundoplication, COPD presents to the hospital from [facility]. Patient was found unresponsive at home covered in the urine and stools with several scattered wounds and maggots in the wounds. Patient was noted to have acute kidney injury and CT scan of abdomen showed malposition of jejunal with small bowel obstruction and possible hepatic steatosis.   Patient had abnormal labs including hyponatremia, hyperkalemia, elevated procalcitonin. Patient was transferred to [facility] ICU for sepsis, leukocytosis, UTI. Patient was started on broad-spectrum antibiotics, infectious disease was consulted. Positive blood cultures shows gram-negative rods and gram-positive cocci in clusters. A CT scan in [facility] did not show concern for small bowel obstruction. Patient was intubated due to metabolic encephalopathy. Patient remained intubated from 9/17. Patient was also on pressor support with Levophed. Patient had sepsis with Proteus, staph hominis, staph epidermidis. Urine cultures showed E. coli. Patient on ceftriaxone for 2 weeks till 9/24 per infectious disease, also did short course of daptomycin, already completed till 9/17. Review of Systems:     Constitutional:  negative for chills, fevers, sweats  Respiratory:  negative for cough, dyspnea on exertion, shortness of breath, wheezing  Cardiovascular:  negative for chest pain, chest pressure/discomfort, lower extremity edema, palpitations  Gastrointestinal:  negative for abdominal pain, constipation, diarrhea, nausea, vomiting  Neurological:  negative for dizziness, headache    Medications: Allergies:     Allergies   Allergen Reactions    Amoxicillin Hives    Robitussin Day-Night Value Nickolas [Phenylephrine-Diphenhyd-Dm-Gg] Hives and Rash       Current Meds:   Scheduled Meds:    sennosides-docusate sodium  2 tablet Oral BID    venlafaxine  150 mg Oral Daily    lactobacillus  1 capsule Oral TID WC    sodium chloride  500 mL IntraVENous Once    pantoprazole  40 mg Oral QAM AC    budesonide-formoterol  1 puff Inhalation BID    lidocaine 1 % injection  5 mL IntraDERmal Once    midodrine  10 mg Oral q8h    enoxaparin  30 mg SubCUTAneous BID    sodium chloride flush  5-40 mL IntraVENous 2 times per day    miconazole   Topical BID    levothyroxine  75 mcg Oral Daily     Continuous Infusions:    sodium chloride      sodium chloride 100 mL/hr at 22 0924     PRN Meds: oxyCODONE **OR** oxyCODONE, calcium carbonate, magnesium sulfate, sodium chloride flush, sodium chloride, sodium chloride flush, sodium chloride, ondansetron **OR** ondansetron, polyethylene glycol, acetaminophen **OR** acetaminophen, potassium chloride **OR** potassium chloride    Data:     Past Medical History: Morbid obesity  Lymphedema  COPD  History of smoking    Social History:    Quit smoking many years go  No alcohol    Family History:Family history cancer ovarian- maternal aunt       Vitals:  /60   Pulse 96   Temp 97.6 °F (36.4 °C) (Oral)   Resp 18   Ht 5' 7\" (1.702 m)   Wt (!) 320 lb 8 oz (145.4 kg)   SpO2 96%   BMI 50.20 kg/m²   Temp (24hrs), Av.9 °F (36.6 °C), Min:97.6 °F (36.4 °C), Max:98.1 °F (36.7 °C)    Recent Labs     22  1145 22  0802 22  1249   POCGLU 94 78 106*         I/O (24Hr):     Intake/Output Summary (Last 24 hours) at 10/1/2022 1128  Last data filed at 2022 1700  Gross per 24 hour   Intake 474 ml   Output 150 ml   Net 324 ml         Labs:  Hematology:  Recent Labs     22  1247   WBC 7.7   RBC 2.62*   HGB 7.6*   HCT 24.0*   MCV 91.6   MCH 29.0   MCHC 31.7   RDW 18.2*      MPV 9.1       Chemistry:  Recent Labs     22  1247      K 4.1      CO2 24   GLUCOSE 84   BUN 7   CREATININE 0.63   MG 2.4   ANIONGAP 9   LABGLOM >60   GFRAA >60   CALCIUM 7.8*       Recent Labs     22  1145 22  0802 22  1249   POCGLU 94 78 106*       ABG:  Lab Results   Component Value Date/Time    POCPH 7.498 2022 04:15 AM    POCPCO2 35.3 2022 04:15 AM    POCPO2 134.7 2022 04:15 AM    POCHCO3 27.4 2022 04:15 AM    NBEA 4 2022 03:42 AM    PBEA 4 2022 04:15 AM    IRXY9FLD 99 2022 04:15 AM    FIO2 30.0 2022 04:15 AM     Lab Results   Component Value Date/Time    SPECIAL R WRIST 0.1ML 09/10/2022 07:32 PM     Lab Results   Component Value Date/Time CULTURE NO GROWTH 09/10/2022 08:31 PM       Radiology:  No results found. Physical Examination:        General appearance:  alert, cooperative and morbidly obese mental Status:  oriented to person, place and time and normal affect  Lungs: Decreased breath sounds bilaterally, normal effort   heart:  regular rate and rhythm, no murmur  Abdomen:  soft, nontender, nondistended, normal bowel sounds, no masses, hepatomegaly, splenomegaly  Extremities: 2+ edema, redness, tenderness in the calves  Skin: Multiple skin wounds present on sacral area, under the breast  Assessment:        Hospital Problems             Last Modified POA    * (Principal) Septic shock ([address] 9/17/2022 Yes    Altered mental status 9/11/2022 Yes    Acute kidney injury ([address] 9/11/2022 Yes    Bandemia 9/11/2022 Yes    Cellulitis of sacral region 9/11/2022 Yes    Breast, fat necrosis 9/11/2022 Yes    Gram-neg septicemia ([address] 9/11/2022 Yes    Gram positive septicemia ([address] 9/11/2022 Yes    Acute encephalopathy 9/11/2022 Yes    Elevated procalcitonin 9/11/2022 Yes    Chronic anemia 9/18/2022 Yes    Acute respiratory failure with hypoxia ([address] 9/18/2022 Yes    Severe protein-calorie malnutrition ([address] 9/18/2022 Yes    Simple chronic bronchitis ([address] 9/18/2022 Yes    Hypothyroid 9/18/2022 Yes    Hypoalbuminemia 9/18/2022 Yes    Systemic inflammatory response syndrome (SIRS) of infectious origin with acute organ failure ([address] 9/18/2022 Yes    MDD (major depressive disorder), recurrent episode, moderate ([address] 9/24/2022 Yes    Malrotation of intestine 9/29/2022 Yes   Plan:        Sepsis with septic shock-resolved, positive bacteremia with Proteus, staph hominis, staph epidermidis, completed daptomycin till 9/17, ceftriaxone till 9/24. Likely skin is the source of sepsis. Patient on midodrine for blood pressure support. Continue wound care.      Multiple wounds on breast, buttocks-had maggots, continue wound care     Anemia of chronic disease- hb stable, encourage oral nutrition     Hypothyroidism on Synthyroid     COPD-continue inhalers     CHULA-resolved     Hypoalbuminemia with severe malnutrition - encourage supplements     Encephalopathy- resolved    Constipation- continue bowel regimen    Morbid obesity-needs lifestyle modification, encourage physical therapy  Labs reviewed    Difficult discharge plan, discharge orders is in. Precert started.     Sj Sandoval MD  10/1/2022  11:28 AM

## 2022-10-02 PROCEDURE — 6370000000 HC RX 637 (ALT 250 FOR IP)

## 2022-10-02 PROCEDURE — 6370000000 HC RX 637 (ALT 250 FOR IP): Performed by: PSYCHIATRY & NEUROLOGY

## 2022-10-02 PROCEDURE — 6370000000 HC RX 637 (ALT 250 FOR IP): Performed by: FAMILY MEDICINE

## 2022-10-02 PROCEDURE — 97110 THERAPEUTIC EXERCISES: CPT

## 2022-10-02 PROCEDURE — 6370000000 HC RX 637 (ALT 250 FOR IP): Performed by: STUDENT IN AN ORGANIZED HEALTH CARE EDUCATION/TRAINING PROGRAM

## 2022-10-02 PROCEDURE — 97535 SELF CARE MNGMENT TRAINING: CPT

## 2022-10-02 PROCEDURE — 6360000002 HC RX W HCPCS: Performed by: STUDENT IN AN ORGANIZED HEALTH CARE EDUCATION/TRAINING PROGRAM

## 2022-10-02 PROCEDURE — 6370000000 HC RX 637 (ALT 250 FOR IP): Performed by: EMERGENCY MEDICINE

## 2022-10-02 PROCEDURE — 1200000000 HC SEMI PRIVATE

## 2022-10-02 PROCEDURE — 2580000003 HC RX 258: Performed by: STUDENT IN AN ORGANIZED HEALTH CARE EDUCATION/TRAINING PROGRAM

## 2022-10-02 PROCEDURE — 97530 THERAPEUTIC ACTIVITIES: CPT

## 2022-10-02 PROCEDURE — 6370000000 HC RX 637 (ALT 250 FOR IP): Performed by: NURSE PRACTITIONER

## 2022-10-02 PROCEDURE — 99231 SBSQ HOSP IP/OBS SF/LOW 25: CPT | Performed by: STUDENT IN AN ORGANIZED HEALTH CARE EDUCATION/TRAINING PROGRAM

## 2022-10-02 PROCEDURE — 2500000003 HC RX 250 WO HCPCS: Performed by: STUDENT IN AN ORGANIZED HEALTH CARE EDUCATION/TRAINING PROGRAM

## 2022-10-02 RX ORDER — LANOLIN ALCOHOL/MO/W.PET/CERES
6 CREAM (GRAM) TOPICAL NIGHTLY PRN
Status: DISCONTINUED | OUTPATIENT
Start: 2022-10-02 | End: 2022-10-05 | Stop reason: HOSPADM

## 2022-10-02 RX ADMIN — MIDODRINE HYDROCHLORIDE 10 MG: 5 TABLET ORAL at 03:33

## 2022-10-02 RX ADMIN — SODIUM CHLORIDE, PRESERVATIVE FREE 10 ML: 5 INJECTION INTRAVENOUS at 21:26

## 2022-10-02 RX ADMIN — SODIUM CHLORIDE, PRESERVATIVE FREE 10 ML: 5 INJECTION INTRAVENOUS at 09:53

## 2022-10-02 RX ADMIN — STANDARDIZED SENNA CONCENTRATE AND DOCUSATE SODIUM 2 TABLET: 8.6; 5 TABLET ORAL at 21:20

## 2022-10-02 RX ADMIN — ENOXAPARIN SODIUM 30 MG: 100 INJECTION SUBCUTANEOUS at 21:19

## 2022-10-02 RX ADMIN — VENLAFAXINE 150 MG: 75 TABLET ORAL at 09:53

## 2022-10-02 RX ADMIN — Medication 1 CAPSULE: at 09:54

## 2022-10-02 RX ADMIN — Medication 1 CAPSULE: at 12:52

## 2022-10-02 RX ADMIN — ANTI-FUNGAL POWDER MICONAZOLE NITRATE TALC FREE: 1.42 POWDER TOPICAL at 21:25

## 2022-10-02 RX ADMIN — ONDANSETRON 4 MG: 4 TABLET, ORALLY DISINTEGRATING ORAL at 10:02

## 2022-10-02 RX ADMIN — MIDODRINE HYDROCHLORIDE 10 MG: 5 TABLET ORAL at 18:02

## 2022-10-02 RX ADMIN — POLYETHYLENE GLYCOL 3350 17 G: 17 POWDER, FOR SOLUTION ORAL at 19:06

## 2022-10-02 RX ADMIN — MIDODRINE HYDROCHLORIDE 10 MG: 5 TABLET ORAL at 09:53

## 2022-10-02 RX ADMIN — MIRTAZAPINE 45 MG: 30 TABLET, ORALLY DISINTEGRATING ORAL at 21:20

## 2022-10-02 RX ADMIN — ONDANSETRON 4 MG: 2 INJECTION INTRAMUSCULAR; INTRAVENOUS at 17:55

## 2022-10-02 RX ADMIN — Medication 1 CAPSULE: at 18:02

## 2022-10-02 RX ADMIN — ANTI-FUNGAL POWDER MICONAZOLE NITRATE TALC FREE: 1.42 POWDER TOPICAL at 09:51

## 2022-10-02 RX ADMIN — Medication 6 MG: at 21:20

## 2022-10-02 RX ADMIN — LEVOTHYROXINE SODIUM 75 MCG: 75 TABLET ORAL at 09:58

## 2022-10-02 RX ADMIN — ENOXAPARIN SODIUM 30 MG: 100 INJECTION SUBCUTANEOUS at 09:53

## 2022-10-02 RX ADMIN — ONDANSETRON 4 MG: 2 INJECTION INTRAMUSCULAR; INTRAVENOUS at 10:23

## 2022-10-02 RX ADMIN — PANTOPRAZOLE SODIUM 40 MG: 40 TABLET, DELAYED RELEASE ORAL at 09:58

## 2022-10-02 RX ADMIN — STANDARDIZED SENNA CONCENTRATE AND DOCUSATE SODIUM 2 TABLET: 8.6; 5 TABLET ORAL at 09:53

## 2022-10-02 ASSESSMENT — PAIN SCALES - WONG BAKER

## 2022-10-02 ASSESSMENT — PAIN SCALES - GENERAL: PAINLEVEL_OUTOF10: 3

## 2022-10-02 ASSESSMENT — PAIN DESCRIPTION - LOCATION: LOCATION: BUTTOCKS

## 2022-10-02 ASSESSMENT — PAIN DESCRIPTION - DESCRIPTORS: DESCRIPTORS: DISCOMFORT

## 2022-10-02 ASSESSMENT — PAIN DESCRIPTION - FREQUENCY: FREQUENCY: CONTINUOUS

## 2022-10-02 ASSESSMENT — PAIN - FUNCTIONAL ASSESSMENT: PAIN_FUNCTIONAL_ASSESSMENT: PREVENTS OR INTERFERES SOME ACTIVE ACTIVITIES AND ADLS

## 2022-10-02 NOTE — PROGRESS NOTES
[facility]  Office: [address], DO, Jerry Jay, DO, Liss Jayme, DO, Josh Casas Blood, DO, Liban Douglas MD, Emmy Denny MD, Lars Oden MD, Bhumika Soriano MD,  Myla Byrne MD, Elias Cifuentes MD, Angi Saeed, DO, Sri Rees MD,  Caty Johnson MD, Rohit Warren MD, Carlos A Feuntes DO, Francisco J Rodriguez MD, Ashley Casas MD, Monroe Villavicencio MD, Eula Holm MD, Sergei Orlando MD, Mary Corbin MD, Fatimah Waite, DO, Jacqueline Velez MD, Stephanie Olmos MD, Donis Hogue, [facility],  Adri Reed, CNP, Dilcia Galeano, CNP, Ethel Mahoney, CNP,  Franki Lundborg, [facility], Will Weathers, CNP, Earl Alfaro, CNP, Noemi Everett, CNP, Zarina Dorman, CNP, Vikki Bhatt, CNP, Sharmin Bond PA-C, Ezequiel Boyd, CNS, Olivia Marroquin, [facility], Michael Riddle, CNP, [place], CNP, Marleny Alanis, [address] [place] [address]    Progress Note    10/2/2022    10:53 AM    Name:   Rosetta Vela  MRN:     [id number]     Acct:      [de-identified]   Room:   [address] [place] [address] Day:  25  Admit Date:  9/10/2022  4:45 PM    PCP:   No primary care provider on file. Code Status:  Full Code    Subjective:     C/C: Found unresponsive at home  Interval History Status: not changed. No acute events overnight  Patient was having nausea and dry heaving this morning when he received sublingual Zofran and had a small vomiting episode. Patient does not have any chest pain or shortness of breath. No abdominal pain. Brief History:       68-year-old female with known medical history of morbid obesity, hypothyroidism, history of gastric fundoplication, COPD presents to the hospital from [facility]. Patient was found unresponsive at home covered in the urine and stools with several scattered wounds and maggots in the wounds.   Patient was noted to have acute kidney injury and CT scan of abdomen showed malposition of jejunal with small bowel obstruction and possible hepatic steatosis. Patient had abnormal labs including hyponatremia, hyperkalemia, elevated procalcitonin. Patient was transferred to [facility] ICU for sepsis, leukocytosis, UTI. Patient was started on broad-spectrum antibiotics, infectious disease was consulted. Positive blood cultures shows gram-negative rods and gram-positive cocci in clusters. A CT scan in [facility] did not show concern for small bowel obstruction. Patient was intubated due to metabolic encephalopathy. Patient remained intubated from 9/17. Patient was also on pressor support with Levophed. Patient had sepsis with Proteus, staph hominis, staph epidermidis. Urine cultures showed E. coli. Patient on ceftriaxone for 2 weeks till 9/24 per infectious disease, also did short course of daptomycin, already completed till 9/17. Review of Systems:     Constitutional:  negative for chills, fevers, sweats  Respiratory:  negative for cough, dyspnea on exertion, shortness of breath, wheezing  Cardiovascular:  negative for chest pain, chest pressure/discomfort, lower extremity edema, palpitations  Gastrointestinal: Positive for nausea, no abdominal pain  Neurological:  negative for dizziness, headache    Medications: Allergies:     Allergies   Allergen Reactions    Amoxicillin Hives    Robitussin Day-Night Value Nickolas [Phenylephrine-Diphenhyd-Dm-Gg] Hives and Rash       Current Meds:   Scheduled Meds:    mirtazapine  45 mg Oral Nightly    sennosides-docusate sodium  2 tablet Oral BID    venlafaxine  150 mg Oral Daily    lactobacillus  1 capsule Oral TID WC    sodium chloride  500 mL IntraVENous Once    pantoprazole  40 mg Oral QAM AC    budesonide-formoterol  1 puff Inhalation BID    lidocaine 1 % injection  5 mL IntraDERmal Once    midodrine  10 mg Oral q8h    enoxaparin  30 mg SubCUTAneous BID    sodium chloride flush  5-40 mL IntraVENous 2 times per day    miconazole 09/10/2022 07:32 PM     Lab Results   Component Value Date/Time    CULTURE NO GROWTH 09/10/2022 08:31 PM       Radiology:  No results found. Physical Examination:        General appearance:  alert, cooperative and morbidly obese mental Status:  oriented to person, place and time and normal affect  Lungs: Decreased breath sounds bilaterally, normal effort   heart:  regular rate and rhythm, no murmur  Abdomen:  soft, nontender, nondistended, normal bowel sounds, no masses, hepatomegaly, splenomegaly  Extremities: 2+ edema, redness, tenderness in the calves  Skin: Multiple skin wounds present on sacral area, under the breast  Assessment:        Hospital Problems             Last Modified POA    * (Principal) Septic shock ([address] 9/17/2022 Yes    Altered mental status 9/11/2022 Yes    Acute kidney injury ([address] 9/11/2022 Yes    Bandemia 9/11/2022 Yes    Cellulitis of sacral region 9/11/2022 Yes    Breast, fat necrosis 9/11/2022 Yes    Gram-neg septicemia ([address] 9/11/2022 Yes    Gram positive septicemia ([address] 9/11/2022 Yes    Acute encephalopathy 9/11/2022 Yes    Elevated procalcitonin 9/11/2022 Yes    Chronic anemia 9/18/2022 Yes    Acute respiratory failure with hypoxia ([address] 9/18/2022 Yes    Severe protein-calorie malnutrition ([address] 9/18/2022 Yes    Simple chronic bronchitis ([address] 9/18/2022 Yes    Hypothyroid 9/18/2022 Yes    Hypoalbuminemia 9/18/2022 Yes    Systemic inflammatory response syndrome (SIRS) of infectious origin with acute organ failure ([address] 9/18/2022 Yes    MDD (major depressive disorder), recurrent episode, moderate ([address] 9/24/2022 Yes    Malrotation of intestine 9/29/2022 Yes   Plan:        Sepsis with septic shock-resolved, positive bacteremia with Proteus, staph hominis, staph epidermidis, completed daptomycin till 9/17, ceftriaxone till 9/24. Likely skin source of sepsis. Patient on midodrine for blood pressure support. Continue wound care.      Multiple wounds on breast, buttocks-had maggots, continue wound care     Anemia of chronic disease- hb stable, encourage oral nutrition     Hypothyroidism on Synthyroid     COPD-continue inhalers     CHULA-resolved     Hypoalbuminemia with severe malnutrition - encourage supplements     Encephalopathy- resolved    Constipation- continue bowel regimen    Morbid obesity-needs lifestyle modification, encourage physical therapy  Labs reviewed  Use zofran for nausea  Encourage sitting upright or in chair if possible  Regular PT/OT    Difficult discharge plan, discharge orders is in. Precert started.     Amanda Celis MD  10/2/2022  10:53 AM

## 2022-10-02 NOTE — PLAN OF CARE
Problem: Discharge Planning  Goal: Discharge to home or other facility with appropriate resources  Outcome: Progressing     Problem: Respiratory - Adult  Goal: Achieves optimal ventilation and oxygenation  Outcome: Progressing     Problem: Pain  Goal: Verbalizes/displays adequate comfort level or baseline comfort level  Outcome: Progressing     Problem: Confusion  Goal: Confusion, delirium, dementia, or psychosis is improved or at baseline  Description: INTERVENTIONS:  1. Assess for possible contributors to thought disturbance, including medications, impaired vision or hearing, underlying metabolic abnormalities, dehydration, psychiatric diagnoses, and notify attending LIP  2. [place] high risk fall precautions, as indicated  3. Provide frequent short contacts to provide reality reorientation, refocusing and direction  4. Decrease environmental stimuli, including noise as appropriate  5. Monitor and intervene to maintain adequate nutrition, hydration, elimination, sleep and activity  6. If unable to ensure safety without constant attention obtain sitter and review sitter guidelines with assigned personnel  7. Initiate Psychosocial CNS and Spiritual Care consult, as indicated  Outcome: Progressing     Problem: Nutrition Deficit:  Goal: Optimize nutritional status  Outcome: Progressing     Problem: Skin/Tissue Integrity  Goal: Absence of new skin breakdown  Description: 1. Monitor for areas of redness and/or skin breakdown  2. Assess vascular access sites hourly  3. Every 4-6 hours minimum:  Change oxygen saturation probe site  4. Every 4-6 hours:  If on nasal continuous positive airway pressure, respiratory therapy assess nares and determine need for appliance change or resting period.   Outcome: Progressing     Problem: Safety - Adult  Goal: Free from fall injury  Outcome: Progressing     Problem: ABCDS Injury Assessment  Goal: Absence of physical injury  Outcome: Progressing

## 2022-10-02 NOTE — PROGRESS NOTES
Physical Therapy  Facility/Department: [address] ORTHO/MED SURG  Physical Therapy Daily Treatment Note    Name: Ezra Pearson  : 1980  MRN: [id number]  Date of Service: 10/2/2022    Discharge Recommendations:  Patient would benefit from continued therapy after discharge   PT Equipment Recommendations  Equipment Needed: No  Other: will CTA, pt currently unsafe to perform any aspect of mobility without significant physical assistance      Patient Diagnosis(es): There were no encounter diagnoses. Past Medical History:  has no past medical history on file. Past Surgical History:  has no past surgical history on file. Assessment   Body Structures, Functions, Activity Limitations Requiring Skilled Therapeutic Intervention: Decreased functional mobility ; Decreased strength;Decreased endurance;Decreased cognition;Decreased balance; Increased pain  Assessment: Pt required Jaime for supine to sit transfer. Pt attempted to performed STS transfer x3 but despite maxA x2 pt was unable to stand fully upright. Pt limited by BLEs weakness and decreased endurance. Currently unsafe to return to prior living arrangements, would benefit from continued PT to maximize safety and independence. Therapy Prognosis: Good  Requires PT Follow-Up: Yes  Activity Tolerance  Activity Tolerance: Patient limited by fatigue;Patient limited by endurance     Plan   Physcial Therapy Plan  General Plan:  (5-6x/week)  Current Treatment Recommendations: Strengthening, Balance training, Functional mobility training, ROM, Transfer training, Gait training, Endurance training, Therapeutic activities, Equipment evaluation, education, & procurement, Home exercise program, Safety education & training, Patient/Caregiver education & training  Safety Devices  Type of Devices:  All fall risk precautions in place, Call light within reach, Gait belt, Nurse notified, Yamilet Cerrato elevated for pressure relief, Left in bed  Restraints  Restraints Initially in Place: No Restrictions  Restrictions/Precautions  Restrictions/Precautions: General Precautions, Fall Risk, Up as Tolerated  Required Braces or Orthoses?: No  Position Activity Restriction  Other position/activity restrictions: Extubated 9/16. Subjective   General  Chart Reviewed: Yes  Patient assessed for rehabilitation services?: Yes  Response To Previous Treatment: Patient with no complaints from previous session. Family / Caregiver Present: No  Follows Commands: Within Functional Limits  General Comment  Comments: Pt returned to supine in bed at end of session with call light in reach. Subjective  Subjective: Pt and RN agreeable to therapy this morning. Pt supine in bed upon arrival with c/o 8/10 pain in low back and hips. Pt pleasant and cooperative throughout session, motivated. Cognition   Orientation  Overall Orientation Status: Within Functional Limits  Cognition  Overall Cognitive Status: WFL     Objective   Bed mobility  Supine to Sit: Minimal assistance  Sit to Supine: Maximum assistance;2 Person assistance  Scooting: Moderate assistance  Bed Mobility Comments: HOB elevated slightly, pt required Jaime with hips and LEs to sit up on EOB, then required maxA x2 to return to supine position with a 3rd person present for safety. Transfers  Sit to Stand: Maximum Assistance;2 Person Assistance  Stand to Sit: Maximum Assistance;2 Person Assistance  Comment: Pt with noted B LE fatigue, assist of 3rd therapist with all stands to block knees. Pt attempted STS transfers x3 from bed but despite maxA x2 put was only able to clear buttocks x2 and unable to stand fully upright. Ambulation  Comments: Pt unable to stand fully upright, therefore unable to ambulate. More Ambulation?: No  Stairs/Curb  Stairs?: No     Balance  Posture: Good  Sitting - Static: Good  Sitting - Dynamic: Good;-  Standing - Static: Poor  Comments: Pt sat EOB 15 minutes with SBA, standing balance assessed with bariatric RW.     Exercise Treatment:  Seated LE exercise program: Long Arc Quads, hip abduction/adduction, heel/toe raises, and marches. Reps: x10 BLE  Comments: Pt performed while seated EOB. Static Sitting Balance Exercises: EOB 15 mins with SBA/supervision      AM-PAC Score  AM-PAC Inpatient Mobility Raw Score : 9 (10/02/22 1308)  AM-PAC Inpatient T-Scale Score : 30.55 (10/02/22 1308)  Mobility Inpatient CMS 0-100% Score: 81.38 (10/02/22 1308)  Mobility Inpatient CMS G-Code Modifier : CM (10/02/22 1308)      Goals  Short Term Goals  Time Frame for Short Term Goals: 14 visits  Short Term Goal 1: Pt will perform sit<>stand transfer with min-A. Short Term Goal 2: Pt will perform bed mobility with min-A. Short Term Goal 3: Pt will ambulate 30 feet with a RW and CGA. Short Term Goal 4: Pt will demonstrate fair standing balance to decrease fall risk. Short Term Goal 5: Pt will tolerate a 35 minute therapy session to promote increased endurance. Additional Goals?: No       Education  Patient Education  Education Given To: Patient  Education Provided: Role of Therapy;Transfer Training;Plan of Care; Fall Prevention Strategies; Energy Conservation  Education Provided Comments: the ex for strenthening and to improve ROM.   Education Method: Verbal  Barriers to Learning: None  Education Outcome: Verbalized understanding;Continued education needed;Demonstrated understanding      Therapy Time   Individual Concurrent Group Co-treatment   Time In 0821         Time Out 0905         Minutes 44         Timed Code Treatment Minutes: 23 Minutes (co-treat with OT)       Qasim Nicholson, PTA

## 2022-10-02 NOTE — PROGRESS NOTES
Occupational Therapy  Facility/Department: [address] ORTHO/MED SURG  Occupational Therapy Daily Treatment Note    Name: Leighann North  : 1980  MRN: [id number]  Date of Service: 10/2/2022    Discharge Recommendations:  Patient would benefit from continued therapy after discharge in order to increase pt strength, balance and independence. Discussed with OTR to update POC       Assessment   Performance deficits / Impairments: Decreased functional mobility ; Decreased ADL status; Decreased ROM; Decreased strength;Decreased safe awareness;Decreased endurance;Decreased balance;Decreased high-level IADLs;Decreased posture  Prognosis: Good  REQUIRES OT FOLLOW-UP: Yes  Activity Tolerance  Activity Tolerance: Patient Tolerated treatment well;Patient limited by fatigue;Patient limited by pain        Plan   Occupational Therapy Plan  Times Per Week: 3-5 x/wk  Current Treatment Recommendations: Strengthening, ROM, Balance training, Functional mobility training, Endurance training, Cognitive reorientation, Pain management, Safety education & training, Patient/Caregiver education & training, Equipment evaluation, education, & procurement, Positioning, Self-Care / ADL, Home management training     Restrictions  Restrictions/Precautions  Restrictions/Precautions: General Precautions, Fall Risk, Up as Tolerated  Required Braces or Orthoses?: No  Position Activity Restriction  Other position/activity restrictions: Extubated . Subjective   General  Chart Reviewed: Yes  Family / Caregiver Present: No  General Comment  Comments: Pt and RN agreeable to therapy this day. Pt supine in bed at start of session and retired to supine in bed at session end with call light in reach. Pt c/o 8/10 pain in buttocks and lower back.  During session pt HR increasing to 130 bpm sitting and RR increasing to 44 BPM, decreasing with rest breaks and pursed lip breathing         Objective     Safety Devices  Type of Devices: Gait belt;Left in bed;Call light within reach  Restraints  Restraints Initially in Place: No  Balance  Sitting: Without support (Pt tolerated approx 8 min static/dynamic sitting unsupported at EOB SBA)  Standing: With support (Max A x2 for 3 stand attempts pt able to clear EOB however unable to stand fully erect)  Gait  Overall Level of Assistance:  (KP d/t decreased standing balance)        ADL  Grooming: Modified independent   Grooming Skilled Clinical Factors: Face washing facilitated supine in bed  Additional Comments: Throughout session pt limited per fatigue, pain and habitus. Pt declined further ADLs with encouragement d/t pain and fatigue     Activity Tolerance  Activity Tolerance: Patient limited by fatigue;Patient limited by endurance  Bed mobility  Supine to Sit: Minimal assistance (req assist with BLE)  Sit to Supine: 2 Person assistance;Maximum assistance  Scooting: Moderate assistance  Bed Mobility Comments: HOB minimally elevated, utilizing bed rails req increased time and multiple rest breaks d/t SOB. Third person for safety  Transfers  Sit to stand: 2 Person assistance;Maximum assistance  Stand to sit: 2 Person assistance;Maximum assistance  Transfer Comments: static standing at EOB utilizing bariatric RW and bariatric platform d/t pt stature and bed laura.  Third person for safety to prevent BLE knee buckling     Cognition  Overall Cognitive Status: WFL  Orientation  Overall Orientation Status: Within Functional Limits                  Education Given To: Patient  Education Provided: Role of Therapy;Transfer Training  Education Provided Comments: proper hand and foot placement; bed mobility sequencing; importance of activity and benefits- F carry over  Education Method: Demonstration;Verbal  Education Outcome: Continued education needed                                               AM-PAC Score        AM-Merged with [facility] Inpatient Daily Activity Raw Score: 17 (10/02/22 0157)  AM-PAC Inpatient ADL T-Scale Score : 37.26 (10/02/22 1457)  ADL Inpatient CMS 0-100% Score: 50.11 (10/02/22 1457)  ADL Inpatient CMS G-Code Modifier : CK (10/02/22 1457)      Goals  Short Term Goals  Time Frame for Short Term Goals: By discharge, pt will:  Short Term Goal 1:  Increase BUE strength by 1/2+ muscle grade through utilization of BUE strengthening HEP  Short Term Goal 2: Demo Min A for UB ADLs  Short Term Goal 3: Demo Mod A for LB ADLs with AE PRN  Short Term Goal 4: Demo 15+ min dynamic sitting and reaching outside ADRIANA with unilateral hand release and CGA for improved performance of ADLs/IADLs  Short Term Goal 5: Follow 75% of 2-step commands with appropriate initiation and sequencing for improved functional independence  Short Term Goal 6: NOTIFY OTR TO UPDATE GOALS AS PT PROGRESSES       Therapy Time   Individual Concurrent Group Co-treatment   Time In 0821         Time Out 0905         Minutes 44         Timed Code Treatment Minutes: 23 Minutes (co tx with PTA)       LUIS Mendoza/SAMUEL

## 2022-10-03 ENCOUNTER — APPOINTMENT (OUTPATIENT)
Dept: GENERAL RADIOLOGY | Age: 42
DRG: 870 | End: 2022-10-03
Attending: INTERNAL MEDICINE
Payer: COMMERCIAL

## 2022-10-03 LAB
ABSOLUTE EOS #: 0.44 K/UL (ref 0–0.4)
ABSOLUTE IMMATURE GRANULOCYTE: 0.33 K/UL (ref 0–0.3)
ABSOLUTE LYMPH #: 5.33 K/UL (ref 1–4.8)
ABSOLUTE MONO #: 0.33 K/UL (ref 0.1–0.8)
ALBUMIN SERPL-MCNC: 1.9 G/DL (ref 3.5–5.2)
ALBUMIN/GLOBULIN RATIO: 0.5 (ref 1–2.5)
ALP BLD-CCNC: 154 U/L (ref 35–104)
ALT SERPL-CCNC: 32 U/L (ref 5–33)
ANION GAP SERPL CALCULATED.3IONS-SCNC: 14 MMOL/L (ref 9–17)
AST SERPL-CCNC: 64 U/L
BASOPHILS # BLD: 0 % (ref 0–2)
BASOPHILS ABSOLUTE: 0 K/UL (ref 0–0.2)
BILIRUB SERPL-MCNC: 0.2 MG/DL (ref 0.3–1.2)
BUN BLDV-MCNC: 6 MG/DL (ref 6–20)
CALCIUM SERPL-MCNC: 8.2 MG/DL (ref 8.6–10.4)
CHLORIDE BLD-SCNC: 104 MMOL/L (ref 98–107)
CO2: 21 MMOL/L (ref 20–31)
CREAT SERPL-MCNC: 0.71 MG/DL (ref 0.5–0.9)
EOSINOPHILS RELATIVE PERCENT: 4 % (ref 1–4)
GFR AFRICAN AMERICAN: >60 ML/MIN
GFR NON-AFRICAN AMERICAN: >60 ML/MIN
GFR SERPL CREATININE-BSD FRML MDRD: ABNORMAL ML/MIN/{1.73_M2}
GLUCOSE BLD-MCNC: 98 MG/DL (ref 70–99)
HCT VFR BLD CALC: 28.4 % (ref 36.3–47.1)
HEMOGLOBIN: 9.1 G/DL (ref 11.9–15.1)
IMMATURE GRANULOCYTES: 3 %
LYMPHOCYTES # BLD: 49 % (ref 24–44)
MCH RBC QN AUTO: 29.4 PG (ref 25.2–33.5)
MCHC RBC AUTO-ENTMCNC: 32 G/DL (ref 28.4–34.8)
MCV RBC AUTO: 91.6 FL (ref 82.6–102.9)
MONOCYTES # BLD: 3 % (ref 1–7)
MORPHOLOGY: ABNORMAL
NRBC AUTOMATED: 0.4 PER 100 WBC
PDW BLD-RTO: 19 % (ref 11.8–14.4)
PLATELET # BLD: 469 K/UL (ref 138–453)
PMV BLD AUTO: 9.2 FL (ref 8.1–13.5)
POTASSIUM SERPL-SCNC: 4.3 MMOL/L (ref 3.7–5.3)
RBC # BLD: 3.1 M/UL (ref 3.95–5.11)
SEG NEUTROPHILS: 41 % (ref 36–66)
SEGMENTED NEUTROPHILS ABSOLUTE COUNT: 4.47 K/UL (ref 1.8–7.7)
SODIUM BLD-SCNC: 139 MMOL/L (ref 135–144)
TOTAL PROTEIN: 6 G/DL (ref 6.4–8.3)
WBC # BLD: 10.9 K/UL (ref 3.5–11.3)

## 2022-10-03 PROCEDURE — 74018 RADEX ABDOMEN 1 VIEW: CPT

## 2022-10-03 PROCEDURE — 99232 SBSQ HOSP IP/OBS MODERATE 35: CPT | Performed by: STUDENT IN AN ORGANIZED HEALTH CARE EDUCATION/TRAINING PROGRAM

## 2022-10-03 PROCEDURE — 6370000000 HC RX 637 (ALT 250 FOR IP): Performed by: FAMILY MEDICINE

## 2022-10-03 PROCEDURE — 94640 AIRWAY INHALATION TREATMENT: CPT

## 2022-10-03 PROCEDURE — 6370000000 HC RX 637 (ALT 250 FOR IP): Performed by: STUDENT IN AN ORGANIZED HEALTH CARE EDUCATION/TRAINING PROGRAM

## 2022-10-03 PROCEDURE — 2580000003 HC RX 258: Performed by: STUDENT IN AN ORGANIZED HEALTH CARE EDUCATION/TRAINING PROGRAM

## 2022-10-03 PROCEDURE — 6360000002 HC RX W HCPCS: Performed by: STUDENT IN AN ORGANIZED HEALTH CARE EDUCATION/TRAINING PROGRAM

## 2022-10-03 PROCEDURE — 97530 THERAPEUTIC ACTIVITIES: CPT

## 2022-10-03 PROCEDURE — 97110 THERAPEUTIC EXERCISES: CPT

## 2022-10-03 PROCEDURE — 85025 COMPLETE CBC W/AUTO DIFF WBC: CPT

## 2022-10-03 PROCEDURE — 6370000000 HC RX 637 (ALT 250 FOR IP)

## 2022-10-03 PROCEDURE — 6370000000 HC RX 637 (ALT 250 FOR IP): Performed by: EMERGENCY MEDICINE

## 2022-10-03 PROCEDURE — 1200000000 HC SEMI PRIVATE

## 2022-10-03 PROCEDURE — 80053 COMPREHEN METABOLIC PANEL: CPT

## 2022-10-03 PROCEDURE — 94761 N-INVAS EAR/PLS OXIMETRY MLT: CPT

## 2022-10-03 PROCEDURE — 6370000000 HC RX 637 (ALT 250 FOR IP): Performed by: PSYCHIATRY & NEUROLOGY

## 2022-10-03 PROCEDURE — 97535 SELF CARE MNGMENT TRAINING: CPT

## 2022-10-03 PROCEDURE — 36415 COLL VENOUS BLD VENIPUNCTURE: CPT

## 2022-10-03 RX ADMIN — STANDARDIZED SENNA CONCENTRATE AND DOCUSATE SODIUM 2 TABLET: 8.6; 5 TABLET ORAL at 21:18

## 2022-10-03 RX ADMIN — OXYCODONE 10 MG: 5 TABLET ORAL at 01:54

## 2022-10-03 RX ADMIN — BUDESONIDE AND FORMOTEROL FUMARATE DIHYDRATE 1 PUFF: 80; 4.5 AEROSOL RESPIRATORY (INHALATION) at 08:53

## 2022-10-03 RX ADMIN — ENOXAPARIN SODIUM 30 MG: 100 INJECTION SUBCUTANEOUS at 21:19

## 2022-10-03 RX ADMIN — MIDODRINE HYDROCHLORIDE 10 MG: 5 TABLET ORAL at 02:10

## 2022-10-03 RX ADMIN — OXYCODONE 10 MG: 5 TABLET ORAL at 10:02

## 2022-10-03 RX ADMIN — SODIUM CHLORIDE, PRESERVATIVE FREE 10 ML: 5 INJECTION INTRAVENOUS at 09:01

## 2022-10-03 RX ADMIN — PANTOPRAZOLE SODIUM 40 MG: 40 TABLET, DELAYED RELEASE ORAL at 09:00

## 2022-10-03 RX ADMIN — BUDESONIDE AND FORMOTEROL FUMARATE DIHYDRATE 1 PUFF: 80; 4.5 AEROSOL RESPIRATORY (INHALATION) at 20:18

## 2022-10-03 RX ADMIN — Medication 1 CAPSULE: at 12:46

## 2022-10-03 RX ADMIN — ANTI-FUNGAL POWDER MICONAZOLE NITRATE TALC FREE: 1.42 POWDER TOPICAL at 09:02

## 2022-10-03 RX ADMIN — Medication 1 CAPSULE: at 09:01

## 2022-10-03 RX ADMIN — STANDARDIZED SENNA CONCENTRATE AND DOCUSATE SODIUM 2 TABLET: 8.6; 5 TABLET ORAL at 09:01

## 2022-10-03 RX ADMIN — ANTI-FUNGAL POWDER MICONAZOLE NITRATE TALC FREE: 1.42 POWDER TOPICAL at 21:19

## 2022-10-03 RX ADMIN — ONDANSETRON 4 MG: 4 TABLET, ORALLY DISINTEGRATING ORAL at 10:03

## 2022-10-03 RX ADMIN — MIRTAZAPINE 45 MG: 30 TABLET, ORALLY DISINTEGRATING ORAL at 21:18

## 2022-10-03 RX ADMIN — ENOXAPARIN SODIUM 30 MG: 100 INJECTION SUBCUTANEOUS at 09:02

## 2022-10-03 RX ADMIN — ONDANSETRON 4 MG: 4 TABLET, ORALLY DISINTEGRATING ORAL at 17:34

## 2022-10-03 RX ADMIN — Medication 1 CAPSULE: at 17:33

## 2022-10-03 RX ADMIN — SODIUM CHLORIDE, PRESERVATIVE FREE 20 ML: 5 INJECTION INTRAVENOUS at 23:11

## 2022-10-03 RX ADMIN — VENLAFAXINE 150 MG: 75 TABLET ORAL at 09:00

## 2022-10-03 RX ADMIN — LEVOTHYROXINE SODIUM 75 MCG: 75 TABLET ORAL at 09:00

## 2022-10-03 ASSESSMENT — PAIN SCALES - GENERAL
PAINLEVEL_OUTOF10: 8
PAINLEVEL_OUTOF10: 8
PAINLEVEL_OUTOF10: 9

## 2022-10-03 ASSESSMENT — PAIN SCALES - WONG BAKER
WONGBAKER_NUMERICALRESPONSE: 0

## 2022-10-03 NOTE — PROGRESS NOTES
[facility]  Office: [address], DO, Bharat Patella, DO, Amanda Suero, DO, Layne Joaquin Blood, DO, Mihir Johnson MD, Alicja Wan MD, Dariusz Bourgeois MD, Juliana Gonzales MD,  Alecia Trevino MD, Michael Quintanilla MD, Lázaro Jc, DO, Irene Banks MD,  Julia Crabtree MD, Violetta Ramos MD, Raúl Ochoa, DO, Fer Padilla MD, Vince Agosto MD, Asha Perez MD, Linda Simmons MD, Kirt Del Angel MD, Nery Warren MD, Lee Ann Clark DO, Estefani Walker MD, Wil Rutherford MD, Tricia Ybarra, CNP,  Geoff Conte, CNP, Ann-Marie Osman, CNP, Merwyn Cooks, CNP,  Mariangel Vance, DNP, Pamela Shah, CNP, Zaid Fregoso, CNP, Thiago Wong, CNP, Himanshu Dunham, CNP, Lars Hernandez, CNP, Jaden Sharif PACapriC, Joi Guzman, CNS, Jonas Lan, DNP, Heather Woodward, CNP, Sherman Leger, CNP, Ej Kingston, [address] [place] [address]    Progress Note    10/3/2022    11:35 AM    Name:   Tom Drake  MRN:     [id number]     Acct:      [de-identified]   Room:   [address] [place] [address] Day:  23  Admit Date:  9/10/2022  4:45 PM    PCP:   No primary care provider on file. Code Status:  Full Code    Subjective:     C/C: Found unresponsive at home  Interval History Status: not changed. Patient complains of intermittent nausea. No vomiting episodes since yesterday. Patient is going to work with therapy today. Complains of itching in her wounds. Complains of leg pain bilaterally. Appetite is slowly improving. No other acute events overnight. Vitals have been stable    Brief History:       57-year-old female with known medical history of morbid obesity, hypothyroidism, history of gastric fundoplication, COPD presents to the hospital from [facility]. Patient was found unresponsive at home covered in the urine and stools with several scattered wounds and maggots in the wounds.   Patient was noted to have acute kidney injury and CT scan of abdomen showed malposition of jejunal with small bowel obstruction and possible hepatic steatosis. Patient had abnormal labs including hyponatremia, hyperkalemia, elevated procalcitonin. Patient was transferred to [facility] ICU for sepsis, leukocytosis, UTI. Patient was started on broad-spectrum antibiotics, infectious disease was consulted. Positive blood cultures shows gram-negative rods and gram-positive cocci in clusters. [facility] show [facility] for [facility]. Patient was intubated due to metabolic encephalopathy. Patient remained intubated from 9/17. Patient was also on pressor support with Levophed. Patient had sepsis with Proteus, staph hominis, staph epidermidis. Urine cultures showed E. coli. Patient on ceftriaxone for 2 weeks till 9/24 per infectious disease, also did short course of daptomycin, already completed till 9/17. Review of Systems:     Constitutional:  negative for chills, fevers, sweats  Respiratory:  negative for cough, dyspnea on exertion, shortness of breath, wheezing  Cardiovascular:  negative for chest pain, chest pressure/discomfort, lower extremity edema, palpitations  Gastrointestinal: Positive for nausea, no abdominal pain  Neurological:  negative for dizziness, headache    Medications: Allergies:     Allergies   Allergen Reactions    Amoxicillin Hives    Robitussin Day-Night Value Nickolas [Phenylephrine-Diphenhyd-Dm-Gg] Hives and Rash       Current Meds:   Scheduled Meds:    mirtazapine  45 mg Oral Nightly    sennosides-docusate sodium  2 tablet Oral BID    venlafaxine  150 mg Oral Daily    lactobacillus  1 capsule Oral TID WC    sodium chloride  500 mL IntraVENous Once    pantoprazole  40 mg Oral QAM AC    budesonide-formoterol  1 puff Inhalation BID    lidocaine 1 % injection  5 mL IntraDERmal Once    midodrine  10 mg Oral q8h    enoxaparin  30 mg SubCUTAneous BID    sodium chloride flush  5-40 mL IntraVENous 2 times per day    miconazole   Topical BID    levothyroxine  75 mcg Oral Daily     Continuous Infusions:    sodium chloride      sodium chloride 100 mL/hr at 22 0924     PRN Meds: melatonin, oxyCODONE **OR** oxyCODONE, calcium carbonate, magnesium sulfate, sodium chloride flush, sodium chloride, sodium chloride flush, sodium chloride, ondansetron **OR** ondansetron, polyethylene glycol, acetaminophen **OR** acetaminophen, potassium chloride **OR** potassium chloride    Data:     Past Medical History: Morbid obesity  Lymphedema  COPD  History of smoking    Social History:    Quit smoking many years go  No alcohol    Family History:Family history cancer ovarian- maternal aunt       Vitals:  BP (!) 117/56   Pulse (!) 101   Temp 98.3 °F (36.8 °C) (Temporal)   Resp 20   Ht 5' 7\" (1.702 m)   Wt (!) 319 lb 14.2 oz (145.1 kg)   SpO2 98%   BMI 50.10 kg/m²   Temp (24hrs), Av.3 °F (36.8 °C), Min:98.3 °F (36.8 °C), Max:98.3 °F (36.8 °C)    No results for input(s): POCGLU in the last 72 hours. I/O (24Hr):     Intake/Output Summary (Last 24 hours) at 10/3/2022 1135  Last data filed at 10/3/2022 0656  Gross per 24 hour   Intake --   Output 1350 ml   Net -1350 ml         Labs:  Hematology:  Recent Labs     22  1247 10/03/22  1008   WBC 7.7 10.9   RBC 2.62* 3.10*   HGB 7.6* 9.1*   HCT 24.0* 28.4*   MCV 91.6 91.6   MCH 29.0 29.4   MCHC 31.7 32.0   RDW 18.2* 19.0*    469*   MPV 9.1 9.2       Chemistry:  Recent Labs     22  1247 10/03/22  1008    139   K 4.1 4.3    104   CO2 24 21   GLUCOSE 84 98   BUN 7 6   CREATININE 0.63 0.71   MG 2.4  --    ANIONGAP 9 14   LABGLOM >60 >60   GFRAA >60 >60   CALCIUM 7.8* 8.2*       Recent Labs     10/03/22  1008   PROT 6.0*   LABALBU 1.9*   AST 64*   ALT 32   ALKPHOS 154*   BILITOT 0.2*       ABG:  Lab Results   Component Value Date/Time    POCPH 7.498 2022 04:15 AM    POCPCO2 35.3 2022 04:15 AM    POCPO2 134.7 09/16/2022 04:15 AM    POCHCO3 27.4 09/16/2022 04:15 AM    NBEA 4 09/12/2022 03:42 AM    PBEA 4 09/16/2022 04:15 AM    AIEW2GGV 99 09/16/2022 04:15 AM    FIO2 30.0 09/16/2022 04:15 AM     Lab Results   Component Value Date/Time    SPECIAL R WRIST 0.1ML 09/10/2022 07:32 PM     Lab Results   Component Value Date/Time    CULTURE NO GROWTH 09/10/2022 08:31 PM       Radiology:  No results found.     Physical Examination:        General appearance:  alert, cooperative and morbidly obese   mental Status:  oriented to person, place and time and normal affect  Lungs: Decreased breath sounds bilaterally, normal effort   heart:  regular rate and rhythm, no murmur  Abdomen:  soft, nontender, nondistended, normal bowel sounds, no masses, hepatomegaly, splenomegaly  Extremities: 2+ edema, redness, tenderness in the calves  Skin: Multiple skin wounds present on sacral area, under the breast    Assessment:        Hospital Problems             Last Modified POA    * (Principal) Septic shock ([address] 9/17/2022 Yes    Altered mental status 9/11/2022 Yes    Acute kidney injury ([address] 9/11/2022 Yes    Bandemia 9/11/2022 Yes    Cellulitis of sacral region 9/11/2022 Yes    Breast, fat necrosis 9/11/2022 Yes    Gram-neg septicemia ([address] 9/11/2022 Yes    Gram positive septicemia ([address] 9/11/2022 Yes    Acute encephalopathy 9/11/2022 Yes    Elevated procalcitonin 9/11/2022 Yes    Chronic anemia 9/18/2022 Yes    Acute respiratory failure with hypoxia ([address] 9/18/2022 Yes    Severe protein-calorie malnutrition ([address] 9/18/2022 Yes    Simple chronic bronchitis ([address] 9/18/2022 Yes    Hypothyroid 9/18/2022 Yes    Hypoalbuminemia 9/18/2022 Yes    Systemic inflammatory response syndrome (SIRS) of infectious origin with acute organ failure ([address] 9/18/2022 Yes    MDD (major depressive disorder), recurrent episode, moderate ([address] 9/24/2022 Yes    Malrotation of intestine 9/29/2022 Yes   Plan:        Sepsis with septic shock-resolved, positive bacteremia with Proteus, staph hominis, staph epidermidis, completed daptomycin till 9/17, ceftriaxone till 9/24. Likely skin source of sepsis. Patient on midodrine for blood pressure support. Continue wound care. Multiple wounds on breast, buttocks-had maggots, continue wound care     Anemia of chronic disease- hb stable, encourage oral nutrition     Hypothyroidism on Synthyroid     COPD-continue inhalers     CHULA-resolved     Hypoalbuminemia with severe malnutrition - encourage supplements     Encephalopathy- resolved    Constipation- continue bowel regimen    Morbid obesity-needs lifestyle modification, encourage physical therapy    Encourage sitting upright or in chair if possible  Regular PT/OT    Complains of nausea, pain lab work including CBC and CMP and x-ray KUB. Difficult discharge plan, discharge orders is in. Precert started.     Erickson Burris MD  10/3/2022  11:35 AM

## 2022-10-03 NOTE — CARE COORDINATION
Transitional Planning    Called [facility] [phone] and left  requesting return phone call. 1000 received call back from [facility], pre cert still pending    [phone] spoke to patient and confirmed with her that plan for discharge is for her to go to [address] [place] [address]. Per cert is pending. CM will update her as needed.

## 2022-10-03 NOTE — PROGRESS NOTES
Physical Therapy  Facility/Department: [address] ORTHO/MED SURG  Physical Therapy Daily Treatment Note    Name: Amelia Toney  : 1980  MRN: [id number]  Date of Service: 10/3/2022    Discharge Recommendations:  Patient would benefit from continued therapy after discharge   PT Equipment Recommendations  Equipment Needed: No  Other: will CTA, pt currently unsafe to perform any aspect of mobility without significant physical assistance      Patient Diagnosis(es): There were no encounter diagnoses. Past Medical History:  has no past medical history on file. Past Surgical History:  has no past surgical history on file. Assessment   Body Structures, Functions, Activity Limitations Requiring Skilled Therapeutic Intervention: Decreased functional mobility ; Decreased strength;Decreased endurance;Decreased cognition;Decreased balance; Increased pain  Assessment: Pt required Jaime for supine to sit transfer. Pt attempted to performed STS transfer x2 but despite maxA x2 pt was unable to stand. Pt limited by BLE pain and weakness and decreased endurance. Currently unsafe to return to prior living arrangements, would benefit from continued PT to maximize safety and independence. Therapy Prognosis: Good  Requires PT Follow-Up: Yes  Activity Tolerance  Activity Tolerance: Patient limited by fatigue;Patient limited by endurance; Patient limited by pain     Plan   Physcial Therapy Plan  General Plan:  (5-6x/week)  Current Treatment Recommendations: Strengthening, Balance training, Functional mobility training, ROM, Transfer training, Gait training, Endurance training, Therapeutic activities, Equipment evaluation, education, & procurement, Home exercise program, Safety education & training, Patient/Caregiver education & training  Safety Devices  Type of Devices: Gait belt, Left in bed, Call light within reach  Restraints  Restraints Initially in Place: No     Restrictions  Restrictions/Precautions  Restrictions/Precautions: General Precautions, Fall Risk, Up as Tolerated  Required Braces or Orthoses?: No  Position Activity Restriction  Other position/activity restrictions: Extubated 9/16. Subjective   General  Chart Reviewed: Yes  Patient assessed for rehabilitation services?: Yes  Response To Previous Treatment: Patient with no complaints from previous session. Family / Caregiver Present: No  Follows Commands: Within Functional Limits  General Comment  Comments: Pt returned to supine in bed at end of session with call light in reach. Subjective  Subjective: Pt and RN agreeable to therapy this morning. Pt supine in bed upon arrival with c/o 10/10 pain in BLEs. Pt pleasant and cooperative throughout session, motivated although requires encouragement. Cognition   Orientation  Overall Orientation Status: Within Functional Limits  Cognition  Overall Cognitive Status: WFL     Objective   Bed mobility  Supine to Sit: Minimal assistance (required assistance with LE progression.)  Sit to Supine: Maximum assistance;2 Person assistance  Scooting: Maximal assistance  Bed Mobility Comments: HOB minimally elevated, utilizing bed rails req increased time and multiple rest breaks d/t SOB. Third person for safety  Transfers  Sit to Stand: Maximum Assistance;2 Person Assistance  Stand to Sit: Maximum Assistance;2 Person Assistance  Comment: Pt attempted STS trasnfers x2 with bariatric step under feet and use of bariatric RW, despite maxA x2 pt was unable to clear buttocks from bed. Pt with c/o significant pain in BLEs and faitgue/shakiness in LEs. Ambulation  Comments: Pt unable to stand fully upright, therefore unable to ambulate. More Ambulation?: No  Stairs/Curb  Stairs?: No     Balance  Posture: Good  Sitting - Static: Good  Sitting - Dynamic: Fair  Standing - Static: Poor  Comments: Pt sat EOB ~26 minutes with SBA/supervision. standing balance assess with bariatric RW.     Exercise Treatment:  Seated LE exercise program: Long Arc Quads, hip abduction/adduction, heel/toe raises. Reps: x10 BLE  Comments: Pt performed while seated EOB. Static Sitting Balance Exercises: EOB 26 minutes with SBA/supervision      AM-PAC Score  AM-PAC Inpatient Mobility Raw Score : 9 (10/03/22 1124)  AM-PAC Inpatient T-Scale Score : 30.55 (10/03/22 1124)  Mobility Inpatient CMS 0-100% Score: 81.38 (10/03/22 1124)  Mobility Inpatient CMS G-Code Modifier : CM (10/03/22 1124)    Goals  Short Term Goals  Time Frame for Short Term Goals: 14 visits  Short Term Goal 1: Pt will perform sit<>stand transfer with min-A. Short Term Goal 2: Pt will perform bed mobility with min-A. Short Term Goal 3: Pt will ambulate 30 feet with a RW and CGA. Short Term Goal 4: Pt will demonstrate fair standing balance to decrease fall risk. Short Term Goal 5: Pt will tolerate a 35 minute therapy session to promote increased endurance. Additional Goals?: No       Education  Patient Education  Education Given To: Patient  Education Provided: Role of Therapy;Transfer Training;Plan of Care; Fall Prevention Strategies; Energy Conservation  Education Provided Comments: the ex for strenthening and to improve ROM.   Education Method: Verbal  Barriers to Learning: None  Education Outcome: Verbalized understanding;Continued education needed;Demonstrated understanding      Therapy Time   Individual Concurrent Group Co-treatment   Time In 0933         Time Out 1020         Minutes 47         Timed Code Treatment Minutes: 23 Minutes (co-treat with OT)       Marika Ball PTA

## 2022-10-03 NOTE — PROGRESS NOTES
Occupational Therapy  Facility/Department: [address] ORTHO/MED SURG  Occupational Therapy Daily Treatment Note    Name: Stevie Shin  : 1980  MRN: [id number]  Date of Service: 10/3/2022    Discharge Recommendations:  Patient would benefit from continued therapy after discharge in order to increase pt balance, strength and independence      Assessment   Performance deficits / Impairments: Decreased functional mobility ; Decreased ADL status; Decreased strength;Decreased safe awareness;Decreased endurance;Decreased balance;Decreased high-level IADLs  Prognosis: Good  REQUIRES OT FOLLOW-UP: Yes  Activity Tolerance  Activity Tolerance: Patient Tolerated treatment well;Patient limited by fatigue;Patient limited by pain        Plan   Occupational Therapy Plan  Times Per Week: 3-5 x/wk  Current Treatment Recommendations: Strengthening, ROM, Balance training, Functional mobility training, Endurance training, Cognitive reorientation, Pain management, Safety education & training, Patient/Caregiver education & training, Equipment evaluation, education, & procurement, Positioning, Self-Care / ADL, Home management training     Restrictions  Restrictions/Precautions  Restrictions/Precautions: General Precautions, Fall Risk, Up as Tolerated  Required Braces or Orthoses?: No  Position Activity Restriction  Other position/activity restrictions: Extubated . Subjective   General  Chart Reviewed: Yes  Family / Caregiver Present: No  General Comment  Comments: Pt and RN agreeable to therapy this day. Pt supine in bed at start of session and retired to supine in bed at sesion end with call light in reach and all needs met. Pt c/o 10/10 pain in B anterior thighs         Objective     Safety Devices  Type of Devices: Gait belt;Call light within reach; Left in bed  Restraints  Restraints Initially in Place: No  Balance  Sitting: Without support (SBA static/dynamic sitting unsupported at EOB tolerating approx 26 min with 0 LOB)  Standing:  (2/2 stands attempted however pt unable to clear EOB)        ADL  Grooming: Supervision;Setup  Grooming Skilled Clinical Factors: Oral care facilitated seated at EOB demo 0 loss of  with 1-2 seated rest breaks d/t fatigue. Mod I for face washing seated at EOB  Additional Comments: Throughout session pt limited per fatigue, pain and habitus. Pt declined further ADLs with encouragement d/t pain and fatigue     Activity Tolerance  Activity Tolerance: Patient limited by fatigue;Patient limited by endurance; Patient limited by pain  Bed mobility  Supine to Sit: Minimal assistance  Sit to Supine: 2 Person assistance;Maximum assistance  Scooting: Maximal assistance  Bed Mobility Comments: HOB elevated, utilizing bed rails  Transfers  Sit to stand: 2 Person assistance;Maximum assistance  Stand to sit: 2 Person assistance;Maximum assistance  Transfer Comments: Pt unable to clear EOB with 2/2 attempts utilizing bariatric RW and bariatric platform     Cognition  Overall Cognitive Status: WFL  Orientation  Overall Orientation Status: Within Functional Limits                  Education Given To: Patient  Education Provided: Role of Therapy;Transfer Training;ADL Adaptive Strategies  Education Provided Comments: proper hand and foot placement; bed mobility sequencing; importance of activity and benefits- F carry over  Education Method: Demonstration;Verbal  Education Outcome: Continued education needed                          AM-PAC Score        AM-PAC Inpatient Daily Activity Raw Score: 16 (10/03/22 1621)  AM-PAC Inpatient ADL T-Scale Score : 35.96 (10/03/22 1621)  ADL Inpatient CMS 0-100% Score: 53.32 (10/03/22 1621)  ADL Inpatient CMS G-Code Modifier : CK (10/03/22 1621)        Goals  Short Term Goals  Time Frame for Short Term Goals: By discharge, pt will:  Short Term Goal 1:  Increase BUE strength by 1/2+ muscle grade through utilization of BUE strengthening HEP  Short Term Goal 2: Demo Min A for UB ADLs  Short Term Goal 3: Demo Mod A for LB ADLs with AE PRN  Short Term Goal 4: Demo 15+ min dynamic sitting and reaching outside ADRIANA with unilateral hand release and CGA for improved performance of ADLs/IADLs  Short Term Goal 5: Follow 75% of 2-step commands with appropriate initiation and sequencing for improved functional independence  Short Term Goal 6: NOTIFY OTR TO UPDATE GOALS AS PT PROGRESSES       Therapy Time   Individual Concurrent Group Co-treatment   Time In 0930         Time Out 1019         Minutes 49         Timed Code Treatment Minutes: 23 Minutes (co tx with PTA)       LUIS Cohen/SMAUEL

## 2022-10-04 ENCOUNTER — APPOINTMENT (OUTPATIENT)
Dept: ULTRASOUND IMAGING | Age: 42
DRG: 870 | End: 2022-10-04
Attending: INTERNAL MEDICINE
Payer: COMMERCIAL

## 2022-10-04 LAB
SARS-COV-2, RAPID: DETECTED
SPECIMEN DESCRIPTION: ABNORMAL

## 2022-10-04 PROCEDURE — 99212 OFFICE O/P EST SF 10 MIN: CPT

## 2022-10-04 PROCEDURE — 6370000000 HC RX 637 (ALT 250 FOR IP)

## 2022-10-04 PROCEDURE — 6370000000 HC RX 637 (ALT 250 FOR IP): Performed by: FAMILY MEDICINE

## 2022-10-04 PROCEDURE — 6370000000 HC RX 637 (ALT 250 FOR IP): Performed by: PSYCHIATRY & NEUROLOGY

## 2022-10-04 PROCEDURE — 6370000000 HC RX 637 (ALT 250 FOR IP): Performed by: STUDENT IN AN ORGANIZED HEALTH CARE EDUCATION/TRAINING PROGRAM

## 2022-10-04 PROCEDURE — 6360000002 HC RX W HCPCS: Performed by: INTERNAL MEDICINE

## 2022-10-04 PROCEDURE — 76705 ECHO EXAM OF ABDOMEN: CPT

## 2022-10-04 PROCEDURE — 2580000003 HC RX 258: Performed by: STUDENT IN AN ORGANIZED HEALTH CARE EDUCATION/TRAINING PROGRAM

## 2022-10-04 PROCEDURE — 1200000000 HC SEMI PRIVATE

## 2022-10-04 PROCEDURE — 97110 THERAPEUTIC EXERCISES: CPT

## 2022-10-04 PROCEDURE — 97530 THERAPEUTIC ACTIVITIES: CPT

## 2022-10-04 PROCEDURE — 6370000000 HC RX 637 (ALT 250 FOR IP): Performed by: EMERGENCY MEDICINE

## 2022-10-04 PROCEDURE — 99232 SBSQ HOSP IP/OBS MODERATE 35: CPT | Performed by: INTERNAL MEDICINE

## 2022-10-04 PROCEDURE — 6370000000 HC RX 637 (ALT 250 FOR IP): Performed by: NURSE PRACTITIONER

## 2022-10-04 PROCEDURE — 6360000002 HC RX W HCPCS: Performed by: STUDENT IN AN ORGANIZED HEALTH CARE EDUCATION/TRAINING PROGRAM

## 2022-10-04 PROCEDURE — 6370000000 HC RX 637 (ALT 250 FOR IP): Performed by: INTERNAL MEDICINE

## 2022-10-04 PROCEDURE — 97535 SELF CARE MNGMENT TRAINING: CPT

## 2022-10-04 PROCEDURE — 87635 SARS-COV-2 COVID-19 AMP PRB: CPT

## 2022-10-04 RX ORDER — MAGNESIUM HYDROXIDE/ALUMINUM HYDROXICE/SIMETHICONE 120; 1200; 1200 MG/30ML; MG/30ML; MG/30ML
30 SUSPENSION ORAL EVERY 6 HOURS PRN
Status: DISCONTINUED | OUTPATIENT
Start: 2022-10-04 | End: 2022-10-05 | Stop reason: HOSPADM

## 2022-10-04 RX ORDER — PROCHLORPERAZINE EDISYLATE 5 MG/ML
10 INJECTION INTRAMUSCULAR; INTRAVENOUS EVERY 6 HOURS PRN
Status: DISCONTINUED | OUTPATIENT
Start: 2022-10-04 | End: 2022-10-05 | Stop reason: HOSPADM

## 2022-10-04 RX ORDER — PANTOPRAZOLE SODIUM 40 MG/1
40 TABLET, DELAYED RELEASE ORAL
Status: DISCONTINUED | OUTPATIENT
Start: 2022-10-04 | End: 2022-10-05 | Stop reason: HOSPADM

## 2022-10-04 RX ADMIN — MIDODRINE HYDROCHLORIDE 10 MG: 5 TABLET ORAL at 08:18

## 2022-10-04 RX ADMIN — Medication 1 CAPSULE: at 08:19

## 2022-10-04 RX ADMIN — OXYCODONE 10 MG: 5 TABLET ORAL at 00:43

## 2022-10-04 RX ADMIN — ENOXAPARIN SODIUM 30 MG: 100 INJECTION SUBCUTANEOUS at 08:19

## 2022-10-04 RX ADMIN — STANDARDIZED SENNA CONCENTRATE AND DOCUSATE SODIUM 2 TABLET: 8.6; 5 TABLET ORAL at 20:03

## 2022-10-04 RX ADMIN — SODIUM CHLORIDE, PRESERVATIVE FREE 10 ML: 5 INJECTION INTRAVENOUS at 08:23

## 2022-10-04 RX ADMIN — ENOXAPARIN SODIUM 30 MG: 100 INJECTION SUBCUTANEOUS at 20:03

## 2022-10-04 RX ADMIN — PANTOPRAZOLE SODIUM 40 MG: 40 TABLET, DELAYED RELEASE ORAL at 07:34

## 2022-10-04 RX ADMIN — MIDODRINE HYDROCHLORIDE 10 MG: 5 TABLET ORAL at 17:38

## 2022-10-04 RX ADMIN — Medication 1 CAPSULE: at 12:38

## 2022-10-04 RX ADMIN — ANTI-FUNGAL POWDER MICONAZOLE NITRATE TALC FREE: 1.42 POWDER TOPICAL at 08:20

## 2022-10-04 RX ADMIN — ONDANSETRON 4 MG: 4 TABLET, ORALLY DISINTEGRATING ORAL at 17:06

## 2022-10-04 RX ADMIN — SODIUM CHLORIDE, PRESERVATIVE FREE 10 ML: 5 INJECTION INTRAVENOUS at 20:06

## 2022-10-04 RX ADMIN — PANTOPRAZOLE SODIUM 40 MG: 40 TABLET, DELAYED RELEASE ORAL at 17:38

## 2022-10-04 RX ADMIN — ONDANSETRON 4 MG: 4 TABLET, ORALLY DISINTEGRATING ORAL at 09:57

## 2022-10-04 RX ADMIN — OXYCODONE 10 MG: 5 TABLET ORAL at 06:01

## 2022-10-04 RX ADMIN — STANDARDIZED SENNA CONCENTRATE AND DOCUSATE SODIUM 2 TABLET: 8.6; 5 TABLET ORAL at 08:20

## 2022-10-04 RX ADMIN — Medication 1 CAPSULE: at 17:38

## 2022-10-04 RX ADMIN — PROCHLORPERAZINE EDISYLATE 10 MG: 5 INJECTION INTRAMUSCULAR; INTRAVENOUS at 12:44

## 2022-10-04 RX ADMIN — OXYCODONE 10 MG: 5 TABLET ORAL at 17:06

## 2022-10-04 RX ADMIN — OXYCODONE 10 MG: 5 TABLET ORAL at 09:57

## 2022-10-04 RX ADMIN — MIDODRINE HYDROCHLORIDE 10 MG: 5 TABLET ORAL at 01:31

## 2022-10-04 RX ADMIN — LEVOTHYROXINE SODIUM 75 MCG: 75 TABLET ORAL at 07:34

## 2022-10-04 RX ADMIN — ANTI-FUNGAL POWDER MICONAZOLE NITRATE TALC FREE: 1.42 POWDER TOPICAL at 20:03

## 2022-10-04 RX ADMIN — VENLAFAXINE 150 MG: 75 TABLET ORAL at 08:20

## 2022-10-04 RX ADMIN — MIRTAZAPINE 45 MG: 30 TABLET, ORALLY DISINTEGRATING ORAL at 20:03

## 2022-10-04 ASSESSMENT — PAIN SCALES - GENERAL
PAINLEVEL_OUTOF10: 10
PAINLEVEL_OUTOF10: 0
PAINLEVEL_OUTOF10: 9
PAINLEVEL_OUTOF10: 10
PAINLEVEL_OUTOF10: 10

## 2022-10-04 ASSESSMENT — PAIN DESCRIPTION - DESCRIPTORS: DESCRIPTORS: ACHING

## 2022-10-04 ASSESSMENT — PAIN DESCRIPTION - LOCATION
LOCATION: ABDOMEN;GENERALIZED
LOCATION: ABDOMEN
LOCATION: LEG;ABDOMEN

## 2022-10-04 ASSESSMENT — PAIN DESCRIPTION - ORIENTATION: ORIENTATION: ANTERIOR

## 2022-10-04 NOTE — PROGRESS NOTES
[facility]ound [facility] Continence Nurse  Follow Up      NAME:  Giovana Metcalf  MEDICAL RECORD NUMBER:  [id number]  AGE: 38 y.o. GENDER: female  : 1980  TODAY'S DATE:  10/4/2022    Subjective:       Reason for WOCN Evaluation and Assessment: \"multiple wounds to back, breasts\"      Ana Fuentes is a 38 y.o. female referred by:   [x] Physician  [] Nursing  [] Other:      Wound Identification:  Wound Type: pressure and caustic dermatitis  Contributing Factors: chronic pressure, decreased mobility, shear force, obesity, incontinence of stool, incontinence of urine, poor hygiene, and non-adherence          Objective:      /65   Pulse (!) 105   Temp 97.7 °F (36.5 °C) (Oral)   Resp 25   Ht 5' 7\" (1.702 m)   Wt (!) 319 lb 14.2 oz (145.1 kg)   SpO2 96%   BMI 50.10 kg/m²   Facundo Risk Score: Facundo Scale Score: 19    LABS    CBC:   Lab Results   Component Value Date/Time    WBC 10.9 10/03/2022 10:08 AM    RBC 3.10 10/03/2022 10:08 AM    HGB 9.1 10/03/2022 10:08 AM     CMP:  Albumin:    Lab Results   Component Value Date/Time    LABALBU 1.9 10/03/2022 10:08 AM     PT/INR:    Lab Results   Component Value Date/Time    PROTIME 11.1 09/10/2022 08:38 PM    INR 1.0 09/10/2022 08:38 PM     HgBA1c:    Lab Results   Component Value Date/Time    LABA1C 5.8 2022 06:44 AM     PTT: No components found for: LABPTT      Assessment:       Measurements:     10/04/22 1030   Wound 09/10/22 Breast Left   Date First Assessed/Time First Assessed: 09/10/22 1730   Present on Hospital Admission: Yes  Primary Wound Type: Pressure Injury  Location: Breast  Wound Location Orientation: Left   Wound Image     Wound Etiology Pressure Stage 3   Dressing Status New dressing applied   Wound Cleansed Soap and water   Dressing/Treatment Hydrofiber Ag; Foam   Dressing Change Due 10/05/22   Wound Length (cm) 15.8 cm  (inferior: 23.0cm x 9.0 cm x 0.3 cm)   Wound Width (cm) 8 cm   Wound Depth (cm) 0.2 cm   Wound Surface Area (cm^2) 126.4 cm^2 Change in Wound Size % (l*w) 15.73   Wound Volume (cm^3) 25.28 cm^3   Wound Healing % 16   Wound Assessment Subcutaneous;Pink/red   Drainage Amount Moderate   Drainage Description Serosanguinous   Odor None   Felicia-wound Assessment Intact   Wound 09/10/22 Other (Comment) mammary folds bilaterally   Date First Assessed/Time First Assessed: 09/10/22 1730   Present on Hospital Admission: Yes  Primary Wound Type: Other (comment)  Location: Other (Comment)  Wound Description (Comments): mammary folds bilaterally   Wound Image     Wound Etiology Other   Dressing Status New dressing applied   Wound Cleansed Soap and water   Dressing/Treatment Interdry Ag/wicking fabric with Ag   Dressing Change Due 10/11/22   Wound Assessment Superficial;Pink/red   Drainage Amount Scant   Drainage Description Serosanguinous   Odor None   Felicia-wound Assessment Intact; Hyperpigmented   Wound 09/10/22 Breast Right   Date First Assessed/Time First Assessed: 09/10/22 1730   Present on Hospital Admission: Yes  Primary Wound Type: Pressure Injury  Location: Breast  Wound Location Orientation: Right   Wound Image    Wound Etiology Pressure Stage 3   Dressing Status New dressing applied   Wound Cleansed Soap and water   Dressing/Treatment Hydrofiber Ag; Foam   Dressing Change Due 10/05/22   Wound Length (cm) 3 cm   Wound Width (cm) 5 cm   Wound Depth (cm) 0.2 cm   Wound Surface Area (cm^2) 15 cm^2   Change in Wound Size % (l*w) 79.17   Wound Volume (cm^3) 3 cm^3   Wound Healing % 79   Wound Assessment Pink/red;Subcutaneous; Epithelialization   Drainage Amount Moderate   Drainage Description Serosanguinous   Odor None   Felicia-wound Assessment Intact   Wound 09/10/22 Rib Cage Left;Lateral   Date First Assessed/Time First Assessed: 09/10/22 1530   Present on Hospital Admission: Yes  Primary Wound Type: Pressure Injury  Location: (c) Rib Cage  Wound Location Orientation: Left;Lateral   Wound Image    Wound Etiology Other   Dressing Status New dressing applied   Wound Cleansed Soap and water   Dressing/Treatment Moisturizing cream   Dressing Change Due 10/05/22   Wound Assessment Superficial;Pink/red   Drainage Amount None   Odor None   Felicia-wound Assessment Hyperpigmented;Dry/flaky   Wound 09/10/22 Buttocks incontinence associated dermatitis   Date First Assessed/Time First Assessed: 09/10/22 1530   Present on Hospital Admission: Yes  Primary Wound Type: Pressure Injury  Location: Buttocks  Wound Description (Comments): incontinence associated dermatitis   Wound Image     Wound Etiology Pressure Stage 3   Wound Cleansed Soap and water   Dressing/Treatment Triad hydro/zinc oxide-based hydrophilic paste   Dressing Change Due 10/05/22   Wound Assessment Subcutaneous;Pink/red   Drainage Amount Small   Drainage Description Serosanguinous   Odor None   Felicia-wound Assessment Intact;Dry/flaky; Hyperpigmented   Wound 09/12/22 Arm Left;Proximal   Date First Assessed/Time First Assessed: 09/12/22 1600   Present on Hospital Admission: Yes  Primary Wound Type: Pressure Injury  Location: Arm  Wound Location Orientation: Left;Proximal   Wound Assessment Dry;Epithelialization   Drainage Amount None   Odor None   Felicia-wound Assessment Intact   Wound 09/12/22 Thigh Left;Medial severe intertrigo   Date First Assessed/Time First Assessed: 09/12/22 1705   Present on Hospital Admission: Yes  Location: Thigh  Wound Location Orientation: Left;Medial  Wound Description (Comments): severe intertrigo   Wound Assessment Dry;Epithelialization   Drainage Amount None   Odor None   Felicia-wound Assessment Intact; Hyperpigmented          Plan of Care: Turn every 2 hours  Float heels off of bed with pillows under calves  Use lift sling to reposition patient to minimize potential for shear injury.      Breasts: Starriser Ag gelling fiber to the wound beds, cover with Mepilex Sacrum Foam (large) Change daily  Breast folds/chest: separate the inframammary folds with DriGo HP cloths or folded pillow cases. Change weekly and prn soilage  Abdominal fold: DriGo HP cloths. Change at least weekly and prn solage  Medial thighs: Left medial thigh cover with a Mepilex silicone foam. Change daily. Buttock and left flank: Triad Hydrophilic Wound Dressing Paste. Daily and prn hygiene.           Specialty Bed Required : Yes   [x] Low Air Loss   [x] Pressure Redistribution  [] Fluid Immersion  [x] Bariatric  [] Total Pressure Relief  [] Other:      Discharge Plan:  tbd     Patient/Caregiver Teaching:        [] Indicates understanding                [] Needs reinforcement  [] Unsuccessful                                  [] Verbal Understanding  [] Demonstrated understanding        [x] No evidence of learning  [] Refused teaching                           [] N/A         Electronically signed by Rogelio Singh RN, CWON on 10/4/2022 at 11:52 AM

## 2022-10-04 NOTE — CARE COORDINATION
Transition planning  Received call from Katelyn Galicia at [facility], she states they have precert for patient. She states they need to order a special air bed for patient ( can be delivered tomorrow am) and can accept her tomorrow, patient will need rapid Covid test.   Updated pt's RN who states patient is having abdominal pain and is not ready for discharge today per Dr. Serena Hutchins- will order an abdominal ultrasound and possibly be ready for discharge tomorrow. 1330 Called and updated Abdiel at [facility]. 1650 Positive covid test noted, will need to get SNF choices that accept Covid positive patients.

## 2022-10-04 NOTE — PROGRESS NOTES
Physical Therapy  Facility/Department: [address] ORTHO/MED SURG  Physical Therapy Daily Treatment Note    Name: Harsha Tran  : 1980  MRN: [id number]  Date of Service: 10/4/2022    Discharge Recommendations:  Patient would benefit from continued therapy after discharge   PT Equipment Recommendations  Equipment Needed: No  Other: will CTA, pt currently unsafe to perform any aspect of mobility without significant physical assistance      Patient Diagnosis(es): There were no encounter diagnoses. Past Medical History:  has no past medical history on file. Past Surgical History:  has no past surgical history on file. Assessment   Body Structures, Functions, Activity Limitations Requiring Skilled Therapeutic Intervention: Decreased functional mobility ; Decreased strength;Decreased endurance;Decreased cognition;Decreased balance; Increased pain  Assessment: Pt with improving functional mobility this date, requiring min-a x2 to perform supine<>sit transfer, max-A x2 to achieve 80% of full standing. Pt with significant BLE pain and weakness. Pt would be unsafe to return to prior living arrangements upon discharge. Pt would benefit from additional therapy upon discharge to maximize functional independence. Therapy Prognosis: Good  Decision Making: Medium Complexity  Requires PT Follow-Up: Yes  Activity Tolerance  Activity Tolerance: Patient limited by fatigue;Patient limited by endurance; Patient limited by pain     Plan   Physcial Therapy Plan  General Plan:  (5-6x/week)  Current Treatment Recommendations: Strengthening, Balance training, Functional mobility training, ROM, Transfer training, Gait training, Endurance training, Therapeutic activities, Equipment evaluation, education, & procurement, Home exercise program, Safety education & training, Patient/Caregiver education & training  Safety Devices  Type of Devices: Gait belt, Call light within reach, Left in bed, Patient at risk for falls, Nurse notified  Restraints  Restraints Initially in Place: No     Restrictions  Restrictions/Precautions  Restrictions/Precautions: General Precautions, Fall Risk, Up as Tolerated, Contact Precautions  Required Braces or Orthoses?: No  Position Activity Restriction  Other position/activity restrictions: Extubated 9/16. Subjective   General  Patient assessed for rehabilitation services?: Yes  Response To Previous Treatment: Patient with no complaints from previous session. Family / Caregiver Present: No  Follows Commands: Within Functional Limits  Subjective  Subjective: Pt supine in bed and agreeable to therapy, RN agreeable to therapy. Pt pleasant and cooperative throughout. Pt complains of 8/10 BLE pain at rest.            Cognition   Cognition  Overall Cognitive Status: Exceptions  Following Commands: Follows multistep commands with increased time  Insights: Fully aware of deficits  Initiation: Requires cues for some  Sequencing: Requires cues for some     Objective                           Bed mobility  Supine to Sit: Minimal assistance;2 Person assistance  Sit to Supine: 2 Person assistance;Minimal assistance  Scooting: Moderate assistance  Bed Mobility Comments: HOB elevated ~30 degrees with use of handrails. Increased time/effort to perform. Transfers  Sit to Stand: Maximum Assistance;2 Person Assistance  Stand to Sit: Maximum Assistance;2 Person Assistance  Comment: Transfers attempted x2 with bariatric step and max-A x2. Pt able to achieve 60% of full stand on first attempt, 80% of full stand on second attempt. Ambulation  More Ambulation?: No  Stairs/Curb  Stairs?: No     Balance  Posture: Good  Sitting - Static: Good;-  Sitting - Dynamic: Fair  Standing - Static: Poor  Comments: standing balance assessed while using handheld assistance. Exercise Treatment: x10 BLE in supine: SLR, hip abduction, ankle pumps, heel slides. x10 BUEs in supine: shoulder flexion. AM-PAC Score  AM-PAC Inpatient Mobility Raw Score : 10 (10/04/22 1336)  AM-PAC Inpatient T-Scale Score : 32.29 (10/04/22 1336)  Mobility Inpatient CMS 0-100% Score: 76.75 (10/04/22 1336)  Mobility Inpatient CMS G-Code Modifier : CL (10/04/22 1336)              Goals  Short Term Goals  Time Frame for Short Term Goals: 14 visits  Short Term Goal 1: Pt will perform sit<>stand transfer with min-A. Short Term Goal 2: Pt will perform bed mobility with min-A. Short Term Goal 3: Pt will ambulate 30 feet with a RW and CGA. Short Term Goal 4: Pt will demonstrate fair standing balance to decrease fall risk. Short Term Goal 5: Pt will tolerate a 35 minute therapy session to promote increased endurance. Additional Goals?: No       Education  Patient Education  Education Given To: Patient  Education Provided: Plan of Care;Transfer Training; Fall Prevention Strategies  Education Method: Verbal  Barriers to Learning: None  Education Outcome: Verbalized understanding;Continued education needed      Therapy Time   Individual Concurrent Group Co-treatment   Time In 0921         Time Out 0959         Minutes 38         Timed Code Treatment Minutes: EDUIN Ferrari 20, PT

## 2022-10-04 NOTE — ADT AUTH CERT
Utilization Reviews        10/1/22 by Annalise Cox RN       Review Status Review Entered   In Primary 10/4/2022 1529       Created By   Annalise Cox RN      Criteria Review   DATE: 10/1/22     PERTINENT UPDATES:     Patient was having nausea and dry heaving this morning when he received sublingual Zofran and had a small vomiting episode. UNSAFE TO RETURN TO HOME  MAX ASSIST FOR BED MOBILITY           VITALS:  10/01/22 0241 98 (36.7) 18 92 148/65 -- -- -- -- 93% None (Room air)        10/01/22 2115 98.1 (36.7) 21 106 108/54 -- Semi fowlers -- -- 96% RA               ABNL/PERTINENT LABS/RADIOLOGY/DIAGNOSTIC STUDIES:     Hematology:        Recent Labs     09/30/22  1247   WBC 7.7   RBC 2.62*   HGB 7.6*   HCT 24.0*   MCV 91.6   MCH 29.0   MCHC 31.7   RDW 18.2*      MPV 9.1         Chemistry:        Recent Labs     09/30/22  1247      K 4.1      CO2 24   GLUCOSE 84   BUN 7   CREATININE 0.63   MG 2.4   ANIONGAP 9   LABGLOM >60   GFRAA >60   CALCIUM 7.8*               Recent Labs     09/29/22  1249   POCGLU 106*         PHYSICAL EXAM:     General appearance:  alert, cooperative and morbidly obese mental Status:  oriented to person, place and time and normal affect  Lungs: Decreased breath sounds bilaterally, normal effort   heart:  regular rate and rhythm, no murmur  Abdomen:  soft, nontender, nondistended, normal bowel sounds, no masses, hepatomegaly, splenomegaly  Extremities: 2+ edema, redness, tenderness in the calves  Skin: Multiple skin wounds present on sacral area, under the breast        MD CONSULTS/ASSESSMENT AND PLAN:     ===INTERNAL MED     Sepsis with septic shock-resolved, positive bacteremia with Proteus, staph hominis, staph epidermidis, completed daptomycin till 9/17, ceftriaxone till 9/24. Likely skin source of sepsis. Patient on midodrine for blood pressure support. Continue wound care.      Multiple wounds on breast, buttocks-had maggots, continue wound care Anemia of chronic disease- hb stable, encourage oral nutrition     Hypothyroidism on Synthyroid     COPD-continue inhalers     CHULA-resolved     Hypoalbuminemia with severe malnutrition - encourage supplements     Encephalopathy- resolved     Constipation- continue bowel regimen     Morbid obesity-needs lifestyle modification, encourage physical therapy  Labs reviewed  Use zofran for nausea  Encourage sitting upright or in chair if possible  Regular PT/OT        MEDICATIONS:     Current Meds:   Scheduled Meds:   Scheduled Medications    mirtazapine  45 mg Oral Nightly    sennosides-docusate sodium  2 tablet Oral BID    venlafaxine  150 mg Oral Daily    lactobacillus  1 capsule Oral TID WC    sodium chloride  500 mL IntraVENous Once    pantoprazole  40 mg Oral QAM AC    budesonide-formoterol  1 puff Inhalation BID    lidocaine 1 % injection  5 mL IntraDERmal Once    midodrine  10 mg Oral q8h    enoxaparin  30 mg SubCUTAneous BID    sodium chloride flush  5-40 mL IntraVENous 2 times per day    miconazole   Topical BID    levothyroxine  75 mcg Oral Daily         Continuous Infusions:   Infusions Meds    sodium chloride      sodium chloride 100 mL/hr at 09/30/22 0924         PRN Meds:   ROXICODONE 10 MG PO X 2 DOSES        ORDERS:     TELE  UNIT VITALS  CONT PULSE OX   I/O  PT OT  DAILY LABS  DAILY WT  REG DIET        PT/OT/SLP/CM ASSESSMENT OR NOTES:   SNF AUTH PENDING         9/29/22 by Taryn Tuttle RN       Review Status Review Entered   In Primary 10/4/2022 1512       Created By   Taryn Tuttle RN      Criteria Review   DATE: 9/29/22        PERTINENT UPDATES:     Henri Durand TO RETURN TO HOME  MAX ASSIST FOR BED MOBILITY  AWAITING SNF AUTH              VITALS:          09/29/22 0800   Vitals   Temp 98.6 °F (37 °C)   Temp Source Oral   Heart Rate 90   Heart Rate Source Monitor   Resp 20   /60   BP Location Left lower arm   Patient Position Semi fowlers   Level of Consciousness 0   MEWS Score 1   Cardiac Rhythm Sinus tachy;Sinus rhythm   Oxygen Therapy   SpO2 92 %   O2 Device None (Room air)           09/29/22 1537   Vitals   Temp 97.8 °F (36.6 °C)   Temp Source Oral   Heart Rate (!) 106  (pt was being repositioned and changed)   Heart Rate Source Monitor   Resp 22   BP (!) 115/49   BP Location Left lower arm   BP Method Automatic   Patient Position Semi fowlers   Level of Consciousness 0   MEWS Score 3   Oxygen Therapy   SpO2 94 %   O2 Device None (Room air)            ABNL/PERTINENT LABS/RADIOLOGY/DIAGNOSTIC STUDIES:     Hematology:        Recent Labs     09/27/22  1650   WBC 7.6   RBC 2.67*   HGB 7.6*   HCT 24.4*   MCV 91.4   MCH 28.5   MCHC 31.1   RDW 17.9*      MPV 9.4         Chemistry:          Recent Labs     09/27/22  0646 09/28/22  0723    137   K 4.1 4.2    105   CO2 22 21   GLUCOSE 66* 69*   BUN 8 7   CREATININE 0.67 0.62   MG 1.6 1.8   ANIONGAP 9 11   LABGLOM >60 >60   GFRAA >60 >60   CALCIUM 7.6* 7.5*                         Recent Labs     09/27/22  1100 09/27/22  1616 09/28/22  0744 09/28/22  1145 09/29/22  0802 09/29/22  1249   POCGLU 105 79 71 94 78 106*               PHYSICAL EXAM:     General appearance:  alert, cooperative and morbidly obese mental Status:  oriented to person, place and time and normal affect  Lungs: Decreased breath sounds bilaterally, normal effort   heart:  regular rate and rhythm, no murmur  Abdomen:  soft, nontender, nondistended, normal bowel sounds, no masses, hepatomegaly, splenomegaly  Extremities: 2+ edema, redness, tenderness in the calves  Skin: Multiple skin wounds present on sacral area, under the breast        MD CONSULTS/ASSESSMENT AND PLAN:     ===INTERNAL MED     Sepsis with septic shock-resolved, positive bacteremia with Proteus, staph hominis, staph epidermidis, completed daptomycin till 9/17, ceftriaxone till 9/24. Likely skin is the source of sepsis. Patient on midodrine for blood pressure support. Continue wound care.      Multiple wounds on breast, buttocks-had maggots, continue wound care     Anemia of chronic disease- hb stable, encourage oral nutrition     Hypothyroidism on Synthyroid     COPD-continue inhalers     CHULA-resolved     Hypoalbuminemia with severe malnutrition - encourage supplements     Encephalopathy- resolved     Constipation- continue bowel regimen     Morbid obesity-needs lifestyle modification, encourage physical therapy           MEDICATIONS:     Scheduled Meds:   Scheduled Medications    sennosides-docusate sodium  2 tablet Oral BID    venlafaxine  150 mg Oral Daily    lactobacillus  1 capsule Oral TID WC    pantoprazole  40 mg Oral QAM AC    budesonide-formoterol  1 puff Inhalation BID    midodrine  10 mg Oral q8h    enoxaparin  30 mg SubCUTAneous BID    miconazole   Topical BID    levothyroxine  75 mcg Oral Daily         PRN Meds:   ROXICODONE 5 MG PO X 1           ORDERS:  TELE  UNIT VITALS  CONT PULSE OX   I/O  PT OT  DAILY LABS  DAILY WT  REG DIET        PT/OT/SLP/CM ASSESSMENT OR NOTES:     Pt required modA to maxA for bed mobility, able to sit EOB ~20 mins with SBA/supervision. Pt attempted to stand to bariatric RW with maxA but unable to achieve fully upright standing position with 2 trials attempted. Pt completed seated B LE exs EOB, very limited by decreased endurance, B LE weakness and pain from wounds with movement.  Pt currenlty unsafe to return to prior living arrangement, requires significant physical assistance for all aspects of mobility

## 2022-10-04 NOTE — PLAN OF CARE
Problem: Discharge Planning  Goal: Discharge to home or other facility with appropriate resources  Outcome: Progressing     Problem: Respiratory - Adult  Goal: Achieves optimal ventilation and oxygenation  Outcome: Progressing     Problem: Pain  Goal: Verbalizes/displays adequate comfort level or baseline comfort level  Outcome: Progressing     Problem: Confusion  Goal: Confusion, delirium, dementia, or psychosis is improved or at baseline  Description: INTERVENTIONS:  1. Assess for possible contributors to thought disturbance, including medications, impaired vision or hearing, underlying metabolic abnormalities, dehydration, psychiatric diagnoses, and notify attending LIP  2. [place] high risk fall precautions, as indicated  3. Provide frequent short contacts to provide reality reorientation, refocusing and direction  4. Decrease environmental stimuli, including noise as appropriate  5. Monitor and intervene to maintain adequate nutrition, hydration, elimination, sleep and activity  6. If unable to ensure safety without constant attention obtain sitter and review sitter guidelines with assigned personnel  7. Initiate Psychosocial CNS and Spiritual Care consult, as indicated  Outcome: Progressing     Problem: Nutrition Deficit:  Goal: Optimize nutritional status  Outcome: Progressing     Problem: Skin/Tissue Integrity  Goal: Absence of new skin breakdown  Description: 1. Monitor for areas of redness and/or skin breakdown  2. Assess vascular access sites hourly  3. Every 4-6 hours minimum:  Change oxygen saturation probe site  4. Every 4-6 hours:  If on nasal continuous positive airway pressure, respiratory therapy assess nares and determine need for appliance change or resting period.   Outcome: Progressing     Problem: Safety - Adult  Goal: Free from fall injury  Outcome: Progressing     Problem: ABCDS Injury Assessment  Goal: Absence of physical injury  Outcome: Progressing

## 2022-10-04 NOTE — PROGRESS NOTES
Occupational Therapy  Facility/Department: [address] ORTHO/MED SURG  Occupational Therapy Daily Treatment Note    Name: Harsha Tran  : 1980  MRN: [id number]  Date of Service: 10/4/2022    Discharge Recommendations:  Patient would benefit from continued therapy after discharge     Patient Diagnosis(es): There were no encounter diagnoses. Past Medical History:  has no past medical history on file. Past Surgical History:  has no past surgical history on file. Treatment Diagnosis: AMS      Assessment   Performance deficits / Impairments: Decreased functional mobility ; Decreased ADL status; Decreased strength;Decreased safe awareness;Decreased endurance;Decreased balance;Decreased high-level IADLs  Assessment: Pt would benefit from continued acute care and post acute care OT to address above listed deficits. Treatment Diagnosis: AMS  Prognosis: Good  Activity Tolerance  Activity Tolerance: Patient limited by fatigue;Patient limited by pain  Activity Tolerance Comments: Significant pain with standing attempts and increased fatigue        Plan   Occupational Therapy Plan  Times Per Week: 3-5 x/wk  Current Treatment Recommendations: Strengthening, ROM, Balance training, Functional mobility training, Endurance training, Cognitive reorientation, Pain management, Safety education & training, Patient/Caregiver education & training, Equipment evaluation, education, & procurement, Positioning, Self-Care / ADL, Home management training     Restrictions  Restrictions/Precautions  Restrictions/Precautions: General Precautions, Fall Risk, Up as Tolerated, Contact Precautions  Required Braces or Orthoses?: No  Position Activity Restriction  Other position/activity restrictions: Extubated .     Subjective   General  Chart Reviewed: Yes  Patient assessed for rehabilitation services?: Yes  Response to previous treatment: Patient with no complaints from previous session  Family / Caregiver Present: No  Subjective  Subjective: RN ok'd patient for OT treatment this morning. Pt supine in bed upon SMITH arrival. Pt agreeable to session and reports pain in BLE 7/10 and headache and 10/10 at end of session with RN present for medications       Objective   Balance  Sitting: Without support (EOB SBA>CGA once midline achieved x25 mintues)  Standing: With support (2/2 attempts with pt able to clear buttocks from bed with MAX Ax2 and stood 10 seconds each)  Gait  Distance (ft):  (not appropriate at this time due to unable to stand fully at this time)        ADL  Feeding: Setup;Modified independent   Grooming: Setup;Supervision  Grooming Skilled Clinical Factors: Pt completed face washing and oral care supine due to fatigue and pain from previous attempt at standing  UE Dressing: Setup;Verbal cueing; Increased time to complete;Stand by assistance  LE Dressing: Maximum assistance;Setup  LE Dressing Skilled Clinical Factors: Don slipper socks  Additional Comments: Throughout session pt limited per fatigue, pain and habitus. Pt declined further ADLs with encouragement d/t pain and fatigue        Bed mobility  Rolling to Right: Minimal assistance  Supine to Sit: Minimal assistance;2 Person assistance  Sit to Supine: 2 Person assistance;Minimal assistance  Scooting: Moderate assistance  Bed Mobility Comments: HOB elevated, utilizing bed rails.  Increased time and effort  Transfers  Sit to stand: 2 Person assistance;Maximum assistance  Stand to sit: 2 Person assistance;Maximum assistance  Transfer Comments: Pt cleared bed 2/2 utilizing bariatric platform and [id number] assist and rocking motion for momentum     Cognition  Overall Cognitive Status: WFL  Orientation  Overall Orientation Status: Within Functional Limits          Education Given To: Patient  Education Provided: Role of Therapy;Transfer Training;ADL Adaptive Strategies  Education Provided Comments: body mechanics to increase bed mobility and transfer independence  Education Method: Demonstration;Verbal  Barriers to Learning: None  Education Outcome: Continued education needed;Verbalized understanding;Demonstrated understanding     AM-PAC Score  AM-PAC Inpatient Daily Activity Raw Score: 17 (10/04/22 1135)  AM-PAC Inpatient ADL T-Scale Score : 37.26 (10/04/22 1135)  ADL Inpatient CMS 0-100% Score: 50.11 (10/04/22 1135)  ADL Inpatient CMS G-Code Modifier : CK (10/04/22 1135)      Goals  Short Term Goals  Time Frame for Short Term Goals: By discharge, pt will:  Short Term Goal 1:  Increase BUE strength by 1/2+ muscle grade through utilization of BUE strengthening HEP  Short Term Goal 2: Demo Min A for UB ADLs  Short Term Goal 3: Demo Mod A for LB ADLs with AE PRN  Short Term Goal 4: Demo 15+ min dynamic sitting and reaching outside ADRIANA with unilateral hand release and CGA for improved performance of ADLs/IADLs  Short Term Goal 5: Follow 75% of 2-step commands with appropriate initiation and sequencing for improved functional independence  Short Term Goal 6: NOTIFY OTR TO UPDATE GOALS AS PT PROGRESSES       Therapy Time   Individual Concurrent Group Co-treatment   Time In 0915         Time Out 1000         Minutes 45         Timed Code Treatment Minutes: 25 Minutes (Co-tx with PT for safety and goal progression)       LUIS Mon/SAMUEL

## 2022-10-04 NOTE — PROGRESS NOTES
[facility]  Office: [address], DO, Martinez Humphries, DO, Valdezpolly Brand, DO, Luis Manuel Gaxiola Blood, DO, Cyn Valentine MD, Sonia Jansen MD, Nicole Carty MD, Darylene Sparks, MD,  Ludmila Hoffman MD, Christiano Chopra MD, Natalia Engle, DO, Aretha Portillo MD,  Marciano Lugo MD, Abhi Muhammad MD, Darya Nunez DO, Billie Marion MD, Jarrod Espitia MD, Stark Mcardle, MD, Anish Saxena MD, Nereyda Eller MD, Hillis Kocher, MD, Nehal Schwartz, DO, Noemi Galvan MD, Natty Merino MD, Angel Simms CNP,  Jim Merida, CNP, Demetria Ku, CNP, dAy Good, CNP,  Etelvina Kim, [facility], Amrita Gandhi, CNP, Lorena Gibbons, CNP, Candee Dakin, CNP, Isadora Verdugo, CNP, Arpita Mayo, CNP, Eda Fisher PA-C, Oni Rascon, [facility], [facility], [facility], Lucía Vaughan, CNP, Kathleen Bird, CNP, Leon Chavez, [address] [place] [address]    Progress Note    10/4/2022    1:10 PM    Name:   Harsha Tran  MRN:     [id number]     Acct:      [de-identified]   Room:   [address] [place] [address] Day:  24  Admit Date:  9/10/2022  4:45 PM    PCP:   No primary care provider on file. Code Status:  Full Code    Subjective:     C/C: Found unresponsive at home  Interval History Status: not changed. Complaining of abdominal pain and nausea today. RUQ pain and abdominal fullness. Discussed ultrasound. Brief History:       47-year-old female with known medical history of morbid obesity, hypothyroidism, history of gastric fundoplication, COPD presents to the hospital from [facility]. Patient was found unresponsive at home covered in the urine and stools with several scattered wounds and maggots in the wounds. Patient was noted to have acute kidney injury and CT scan of abdomen showed malposition of jejunal with small bowel obstruction and possible hepatic steatosis.   Patient had abnormal labs including hyponatremia, hyperkalemia, elevated procalcitonin. Patient was transferred to [facility] ICU for sepsis, leukocytosis, UTI. Patient was started on broad-spectrum antibiotics, infectious disease was consulted. Positive blood cultures shows gram-negative rods and gram-positive cocci in clusters. A CT scan in [facility] did not show concern for small bowel obstruction. Patient was intubated due to metabolic encephalopathy. Patient remained intubated from 9/17. Patient was also on pressor support with Levophed. Patient had sepsis with Proteus, staph hominis, staph epidermidis. Urine cultures showed E. coli. Patient on ceftriaxone for 2 weeks till 9/24 per infectious disease, also did short course of daptomycin, already completed till 9/17. Review of Systems:     Constitutional:  negative for chills, fevers, sweats  Respiratory:  negative for cough, dyspnea on exertion, shortness of breath, wheezing  Cardiovascular:  negative for chest pain, chest pressure/discomfort, lower extremity edema, palpitations  Gastrointestinal: Positive for nausea, no abdominal pain  Neurological:  negative for dizziness, headache    Medications: Allergies:     Allergies   Allergen Reactions    Amoxicillin Hives    Robitussin Day-Night Value Nickolas [Phenylephrine-Diphenhyd-Dm-Gg] Hives and Rash       Current Meds:   Scheduled Meds:    pantoprazole  40 mg Oral BID AC    mirtazapine  45 mg Oral Nightly    sennosides-docusate sodium  2 tablet Oral BID    venlafaxine  150 mg Oral Daily    lactobacillus  1 capsule Oral TID WC    sodium chloride  500 mL IntraVENous Once    budesonide-formoterol  1 puff Inhalation BID    lidocaine 1 % injection  5 mL IntraDERmal Once    midodrine  10 mg Oral q8h    enoxaparin  30 mg SubCUTAneous BID    sodium chloride flush  5-40 mL IntraVENous 2 times per day    miconazole   Topical BID    levothyroxine  75 mcg Oral Daily     Continuous Infusions:    sodium chloride      sodium chloride 100 mL/hr at 22 0924     PRN Meds: prochlorperazine, aluminum & magnesium hydroxide-simethicone, melatonin, oxyCODONE **OR** oxyCODONE, calcium carbonate, magnesium sulfate, sodium chloride flush, sodium chloride, sodium chloride flush, sodium chloride, ondansetron **OR** ondansetron, polyethylene glycol, acetaminophen **OR** acetaminophen, potassium chloride **OR** potassium chloride    Data:     Past Medical History: Morbid obesity  Lymphedema  COPD  History of smoking    Social History:    Quit smoking many years go  No alcohol    Family History:Family history cancer ovarian- maternal aunt       Vitals:  /65   Pulse (!) 105   Temp 97.7 °F (36.5 °C) (Oral)   Resp 25   Ht 5' 7\" (1.702 m)   Wt (!) 319 lb 14.2 oz (145.1 kg)   SpO2 96%   BMI 50.10 kg/m²   Temp (24hrs), Av °F (36.7 °C), Min:97.7 °F (36.5 °C), Max:98.2 °F (36.8 °C)    No results for input(s): POCGLU in the last 72 hours. I/O (24Hr):     Intake/Output Summary (Last 24 hours) at 10/4/2022 1310  Last data filed at 10/4/2022 0005  Gross per 24 hour   Intake --   Output 700 ml   Net -700 ml         Labs:  Hematology:  Recent Labs     10/03/22  1008   WBC 10.9   RBC 3.10*   HGB 9.1*   HCT 28.4*   MCV 91.6   MCH 29.4   MCHC 32.0   RDW 19.0*   *   MPV 9.2       Chemistry:  Recent Labs     10/03/22  1008      K 4.3      CO2 21   GLUCOSE 98   BUN 6   CREATININE 0.71   ANIONGAP 14   LABGLOM >60   GFRAA >60   CALCIUM 8.2*       Recent Labs     10/03/22  1008   PROT 6.0*   LABALBU 1.9*   AST 64*   ALT 32   ALKPHOS 154*   BILITOT 0.2*       ABG:  Lab Results   Component Value Date/Time    POCPH 7.498 2022 04:15 AM    POCPCO2 35.3 2022 04:15 AM    POCPO2 134.7 2022 04:15 AM    POCHCO3 27.4 2022 04:15 AM    NBEA 4 2022 03:42 AM    PBEA 4 2022 04:15 AM    XPQZ0TZX 99 2022 04:15 AM    FIO2 30.0 2022 04:15 AM     Lab Results   Component Value Date/Time    SPECIAL R WRIST 0.1ML 09/10/2022 07:32 PM     Lab Results   Component Value Date/Time    CULTURE NO GROWTH 09/10/2022 08:31 PM       Radiology:  No results found.     Physical Examination:        General appearance:  alert, cooperative and morbidly obese   mental Status:  oriented to person, place and time and normal affect  Lungs: Decreased breath sounds bilaterally, normal effort   heart:  regular rate and rhythm, no murmur  Abdomen:  soft, nontender, nondistended, normal bowel sounds, no masses, hepatomegaly, splenomegaly  Extremities: 2+ edema, redness, tenderness in the calves  Skin: Multiple skin wounds present on sacral area, under the breast    Assessment:        Hospital Problems             Last Modified POA    * (Principal) Septic shock ([address] 9/17/2022 Yes    Altered mental status 9/11/2022 Yes    Acute kidney injury ([address] 9/11/2022 Yes    Bandemia 9/11/2022 Yes    Cellulitis of sacral region 9/11/2022 Yes    Breast, fat necrosis 9/11/2022 Yes    Gram-neg septicemia ([address] 9/11/2022 Yes    Gram positive septicemia ([address] 9/11/2022 Yes    Acute encephalopathy 9/11/2022 Yes    Elevated procalcitonin 9/11/2022 Yes    Chronic anemia 9/18/2022 Yes    Acute respiratory failure with hypoxia ([address] 9/18/2022 Yes    Severe protein-calorie malnutrition ([address] 9/18/2022 Yes    Simple chronic bronchitis ([address] 9/18/2022 Yes    Hypothyroid 9/18/2022 Yes    Hypoalbuminemia 9/18/2022 Yes    Systemic inflammatory response syndrome (SIRS) of infectious origin with acute organ failure ([address] 9/18/2022 Yes    MDD (major depressive disorder), recurrent episode, moderate ([address] 9/24/2022 Yes    Malrotation of intestine 9/29/2022 Yes   Plan:        Sepsis with septic shock- resolved     Multiple wounds on breast, buttocks-had maggots, continue wound care     Anemia of chronic disease- hb stable, encourage oral nutrition     Hypothyroidism on Synthyroid     COPD-continue inhalers     Hypoalbuminemia with severe malnutrition - encourage supplements    Constipation- continue bowel regimen    Morbid obesity-needs lifestyle modification, encourage physical therapy    Check RUQ ultrasound for elevated alk phos    Check lipase  Add maalox and protonix BID    Mikel Velez DO  10/4/2022  1:10 PM

## 2022-10-05 VITALS
SYSTOLIC BLOOD PRESSURE: 133 MMHG | HEART RATE: 100 BPM | RESPIRATION RATE: 16 BRPM | OXYGEN SATURATION: 93 % | TEMPERATURE: 97.3 F | BODY MASS INDEX: 45.99 KG/M2 | WEIGHT: 293 LBS | DIASTOLIC BLOOD PRESSURE: 75 MMHG | HEIGHT: 67 IN

## 2022-10-05 PROCEDURE — 94761 N-INVAS EAR/PLS OXIMETRY MLT: CPT

## 2022-10-05 PROCEDURE — 6360000002 HC RX W HCPCS: Performed by: STUDENT IN AN ORGANIZED HEALTH CARE EDUCATION/TRAINING PROGRAM

## 2022-10-05 PROCEDURE — 6370000000 HC RX 637 (ALT 250 FOR IP)

## 2022-10-05 PROCEDURE — 6370000000 HC RX 637 (ALT 250 FOR IP): Performed by: INTERNAL MEDICINE

## 2022-10-05 PROCEDURE — 6370000000 HC RX 637 (ALT 250 FOR IP): Performed by: STUDENT IN AN ORGANIZED HEALTH CARE EDUCATION/TRAINING PROGRAM

## 2022-10-05 PROCEDURE — 6370000000 HC RX 637 (ALT 250 FOR IP): Performed by: EMERGENCY MEDICINE

## 2022-10-05 PROCEDURE — 6370000000 HC RX 637 (ALT 250 FOR IP): Performed by: PSYCHIATRY & NEUROLOGY

## 2022-10-05 PROCEDURE — 94640 AIRWAY INHALATION TREATMENT: CPT

## 2022-10-05 PROCEDURE — 99239 HOSP IP/OBS DSCHRG MGMT >30: CPT | Performed by: INTERNAL MEDICINE

## 2022-10-05 RX ORDER — PANTOPRAZOLE SODIUM 40 MG/1
40 TABLET, DELAYED RELEASE ORAL
Qty: 30 TABLET | Refills: 3 | Status: SHIPPED | OUTPATIENT
Start: 2022-10-05

## 2022-10-05 RX ORDER — OXYCODONE HYDROCHLORIDE 10 MG/1
10 TABLET ORAL EVERY 8 HOURS PRN
Qty: 10 TABLET | Refills: 0 | Status: SHIPPED | OUTPATIENT
Start: 2022-10-05 | End: 2022-10-08

## 2022-10-05 RX ORDER — SENNA AND DOCUSATE SODIUM 50; 8.6 MG/1; MG/1
2 TABLET, FILM COATED ORAL 2 TIMES DAILY
Qty: 40 TABLET | Refills: 0 | Status: SHIPPED | OUTPATIENT
Start: 2022-10-05 | End: 2022-10-15

## 2022-10-05 RX ORDER — MAGNESIUM HYDROXIDE/ALUMINUM HYDROXICE/SIMETHICONE 120; 1200; 1200 MG/30ML; MG/30ML; MG/30ML
30 SUSPENSION ORAL EVERY 6 HOURS PRN
Qty: 355 ML | Refills: 0 | Status: SHIPPED | OUTPATIENT
Start: 2022-10-05 | End: 2022-10-15

## 2022-10-05 RX ADMIN — OXYCODONE 10 MG: 5 TABLET ORAL at 03:20

## 2022-10-05 RX ADMIN — VENLAFAXINE 150 MG: 75 TABLET ORAL at 08:01

## 2022-10-05 RX ADMIN — Medication 1 CAPSULE: at 11:50

## 2022-10-05 RX ADMIN — MIDODRINE HYDROCHLORIDE 10 MG: 5 TABLET ORAL at 03:13

## 2022-10-05 RX ADMIN — BUDESONIDE AND FORMOTEROL FUMARATE DIHYDRATE 1 PUFF: 80; 4.5 AEROSOL RESPIRATORY (INHALATION) at 08:00

## 2022-10-05 RX ADMIN — Medication 1 CAPSULE: at 08:01

## 2022-10-05 RX ADMIN — LEVOTHYROXINE SODIUM 75 MCG: 75 TABLET ORAL at 05:34

## 2022-10-05 RX ADMIN — ANTI-FUNGAL POWDER MICONAZOLE NITRATE TALC FREE: 1.42 POWDER TOPICAL at 08:05

## 2022-10-05 RX ADMIN — ONDANSETRON 4 MG: 4 TABLET, ORALLY DISINTEGRATING ORAL at 10:38

## 2022-10-05 RX ADMIN — OXYCODONE 10 MG: 5 TABLET ORAL at 11:50

## 2022-10-05 RX ADMIN — PANTOPRAZOLE SODIUM 40 MG: 40 TABLET, DELAYED RELEASE ORAL at 05:34

## 2022-10-05 RX ADMIN — ENOXAPARIN SODIUM 30 MG: 100 INJECTION SUBCUTANEOUS at 08:01

## 2022-10-05 NOTE — CARE COORDINATION
Transition planning  Spoke with Chantal Borja at [facility], updated that patient is Covid positive, she states she will check to see if they are able to accept patient and call CM back. 1000 Received call from Chantal Borja at [facility], she states they are able to accept patient and will isolate her for 14 days. Report # [phone]  Fax # [phone] AVS, positive Covid test, HENS, recent dietary note and documentation patient is not on O2 to [facility] at [phone]. Transportation set for 3:00 pm per Namb[facility] at [facility]. Updated Chantal Borja at [facility], pt's RN, patient per phone and pt's  per phone.

## 2022-10-05 NOTE — DISCHARGE SUMMARY
[facility]  Office: [address], DO, Bharat Patella, DO, Amanda Suero, DO, Layne Joaquin Blood, DO, Mihir Johnson MD, Alicja Wan MD, Dariusz Bourgeois MD, Juliana Gonzales MD,  Alecia Trevino MD, Michael Quintanilla MD, Lázaro Jc DO, Irene Banks MD,  Julia Crabtree MD, Violetta Ramos MD, Raúl Ochoa DO, Fer Padilla MD, Vince Agosto MD, Asha Perez MD, Linda Simmons MD, Kirt Del Angel MD, Nery Warren MD, Lee Ann Clark DO, Estefani Walker MD, Wil Rutherford MD, Tricia Ybarra, CNP,  Geoff Conte, CNP, Ann-Marie Osman, CNP, Merwyn Cooks, CNP,  Mariangel Vance, DNP, Pamela Shah, CNP, Zaid Fregoso, CNP, Thiago Wong, CNP, Himanshu Dunham, CNP, Lars Hernandez, [facility], Jaden Sharif PA-JAMIA, Joi Guzman, CNS, Jonas Lan, DNP, Heather Woodward, CNP, Sherman Leger, CNP, Ej Kingston, [facility]    Discharge Summary     Patient ID: Tom Drake  :  1980   MRN: [id number]     ACCOUNT:  [de-identified]   Patient's PCP: No primary care provider on file.   Admit Date: 9/10/2022   Discharge Date: 10/5/2022     Length of Stay: 25  Code Status:  Full Code  Admitting Physician: Margaret Vivas MD  Discharge Physician: Lázaro Jc DO     Active Discharge Diagnoses:     Hospital Problem Lists:  Principal Problem:    Septic shock [facility])  Active Problems:    Altered mental status    Acute kidney injury ([facility]    Bandemia    Cellulitis of sacral region    Breast, fat necrosis    Gram-neg septicemia ([facility]    Gram positive septicemia ([facility]    Acute encephalopathy    Elevated procalcitonin    Chronic anemia    Acute respiratory failure with hypoxia (HCC)    Severe protein-calorie malnutrition (HCC)    Simple chronic bronchitis (HCC)    Hypothyroid    Hypoalbuminemia    Systemic inflammatory response syndrome (SIRS) of infectious origin with acute organ failure [facility])    MDD (major depressive disorder), recurrent episode, moderate (HCC)    Malrotation of intestine  Resolved Problems:    * No resolved hospital problems. *      Admission Condition:  good     Discharged Condition: good    Hospital Stay:     Hospital Course:    From H&P  75-year-old female with known medical history of morbid obesity, hypothyroidism, history of gastric fundoplication, COPD presents to the hospital from [facility]. Patient was found unresponsive at home covered in the urine and stools with several scattered wounds and maggots in the wounds. Patient was noted to have acute kidney injury and CT scan of abdomen showed malposition of jejunal with small bowel obstruction and possible hepatic steatosis. Patient had abnormal labs including hyponatremia, hyperkalemia, elevated procalcitonin. Patient was transferred to [facility] ICU for sepsis, leukocytosis, UTI. Patient was started on broad-spectrum antibiotics, infectious disease was consulted. Positive blood cultures shows gram-negative rods and gram-positive cocci in clusters. A CT scan in [facility] did not show concern for small bowel obstruction. Patient was intubated due to metabolic encephalopathy. Patient remained intubated from 9/17. Patient was also on pressor support with Levophed. Patient had sepsis with Proteus, staph hominis, staph epidermidis. Urine cultures showed E. coli. Patient on ceftriaxone for 2 weeks till 9/24 per infectious disease, also did short course of daptomycin, already completed till 9/17. Patient eventually was transferred out of ICU, patient remained in the hospital for multiple days due to placement issues. On day of discharge patient was incidentally found to have positive test for COVID, she was on room air with no symptoms. She was told to continue to monitor and isolate per protocol at her facility. Also of note, she had epigastric pain with nausea that improved with Maalox.   Patient was started on twice daily PPI for 1 month     Significant therapeutic interventions: none    Significant Diagnostic Studies:   Labs / Micro:  CBC:   Lab Results   Component Value Date/Time    WBC 10.9 10/03/2022 10:08 AM    RBC 3.10 10/03/2022 10:08 AM    HGB 9.1 10/03/2022 10:08 AM    HCT 28.4 10/03/2022 10:08 AM    MCV 91.6 10/03/2022 10:08 AM    MCH 29.4 10/03/2022 10:08 AM    MCHC 32.0 10/03/2022 10:08 AM    RDW 19.0 10/03/2022 10:08 AM     10/03/2022 10:08 AM     BMP:    Lab Results   Component Value Date/Time    GLUCOSE 98 10/03/2022 10:08 AM     10/03/2022 10:08 AM    K 4.3 10/03/2022 10:08 AM     10/03/2022 10:08 AM    CO2 21 10/03/2022 10:08 AM    ANIONGAP 14 10/03/2022 10:08 AM    BUN 6 10/03/2022 10:08 AM    CREATININE 0.71 10/03/2022 10:08 AM    CALCIUM 8.2 10/03/2022 10:08 AM    LABGLOM >60 10/03/2022 10:08 AM    GFRAA >60 10/03/2022 10:08 AM    GFR      10/03/2022 10:08 AM     HFP:    Lab Results   Component Value Date/Time    PROT 6.0 10/03/2022 10:08 AM     CMP:    Lab Results   Component Value Date/Time    GLUCOSE 98 10/03/2022 10:08 AM     10/03/2022 10:08 AM    K 4.3 10/03/2022 10:08 AM     10/03/2022 10:08 AM    CO2 21 10/03/2022 10:08 AM    BUN 6 10/03/2022 10:08 AM    CREATININE 0.71 10/03/2022 10:08 AM    ANIONGAP 14 10/03/2022 10:08 AM    ALKPHOS 154 10/03/2022 10:08 AM    ALT 32 10/03/2022 10:08 AM    AST 64 10/03/2022 10:08 AM    BILITOT 0.2 10/03/2022 10:08 AM    LABALBU 1.9 10/03/2022 10:08 AM    ALBUMIN 0.5 10/03/2022 10:08 AM    LABGLOM >60 10/03/2022 10:08 AM    GFRAA >60 10/03/2022 10:08 AM    GFR      10/03/2022 10:08 AM    PROT 6.0 10/03/2022 10:08 AM    CALCIUM 8.2 10/03/2022 10:08 AM     PT/INR:    Lab Results   Component Value Date/Time    PROTIME 11.1 09/10/2022 08:38 PM    INR 1.0 09/10/2022 08:38 PM     PTT:   Lab Results   Component Value Date/Time    APTT 25.2 09/10/2022 08:38 PM     FLP:  No results found for: CHOL, TRIG, HDL  U/A:    Lab Results   Component Value Date/Time    COLORU Dark Yellow 09/10/2022 08:32 PM    TURBIDITY Turbid 09/10/2022 08:32 PM    SPECGRAV 1.022 09/10/2022 08:32 PM    HGBUR SMALL 09/10/2022 08:32 PM    PHUR 5.0 09/10/2022 08:32 PM    PROTEINU 1+ 09/10/2022 08:32 PM    GLUCOSEU NEGATIVE 09/10/2022 08:32 PM    KETUA TRACE 09/10/2022 08:32 PM    BILIRUBINUR NEGATIVE 09/10/2022 08:32 PM    UROBILINOGEN Normal 09/10/2022 08:32 PM    NITRU NEGATIVE 09/10/2022 08:32 PM    LEUKOCYTESUR TRACE 09/10/2022 08:32 PM     TSH:    Lab Results   Component Value Date/Time    TSH 4.96 09/10/2022 08:38 PM        Radiology:  XR ABDOMEN (KUB) (SINGLE AP VIEW)    Result Date: 10/3/2022  No evidence of bowel obstruction or significant bowel dilatation. US ABDOMEN LIMITED Specify organ? GALLBLADDER, LIVER    Result Date: 10/4/2022  1. Hepatomegaly and hepatic steatosis. 2. No sonographic evidence of cholecystitis. Consultations:    Consults:     Final Specialist Recommendations/Findings:   IP CONSULT TO GENERAL SURGERY  IP CONSULT TO INFECTIOUS DISEASES  IP CONSULT TO SOCIAL WORK  IP CONSULT TO DIETITIAN  IP CONSULT TO PSYCHIATRY      The patient was seen and examined on day of discharge and this discharge summary is in conjunction with any daily progress note from day of discharge. Discharge plan:     Disposition:  To a non-[facility] facility    Physician Follow Up:     PCP    Schedule an appointment as soon as possible for a visit  see your own or make same day appointment at [facility]       Requiring Further Evaluation/Follow Up POST HOSPITALIZATION/Incidental Findings:  none    Diet: regular diet    Activity: As tolerated    Instructions to Patient: see pcp up discharge 1 week    Discharge Medications:      Medication List        START taking these medications      aluminum & magnesium hydroxide-simethicone 200-200-20 MG/5ML Susp suspension  Commonly known as: MAALOX  Take 30 mLs by mouth every 6 hours as needed for Indigestion     oxyCODONE HCl 10 MG immediate release tablet  Commonly known as: OXY-IR  Take 1 tablet by mouth every 8 hours as needed for Pain for up to 3 days. pantoprazole 40 MG tablet  Commonly known as: PROTONIX  Take 1 tablet by mouth 2 times daily (before meals)     sennosides-docusate sodium 8.6-50 MG tablet  Commonly known as: SENOKOT-S  Take 2 tablets by mouth in the morning and at bedtime for 10 days     venlafaxine 75 MG tablet  Commonly known as: EFFEXOR  Take 2 tablets by mouth daily            CONTINUE taking these medications      metFORMIN 500 MG tablet  Commonly known as: GLUCOPHAGE     oxybutynin 15 MG extended release tablet  Commonly known as: DITROPAN XL            STOP taking these medications      ciprofloxacin 250 MG tablet  Commonly known as: CIPRO               Where to Get Your Medications        These medications were sent to [facility] [address] [facility], [address] [place] [address], [address] [address]      Phone: [phone]   aluminum & magnesium hydroxide-simethicone 200-200-20 MG/5ML Susp suspension  pantoprazole 40 MG tablet  sennosides-docusate sodium 8.6-50 MG tablet  venlafaxine 75 MG tablet       You can get these medications from any pharmacy    Bring a paper prescription for each of these medications  oxyCODONE HCl 10 MG immediate release tablet         No discharge procedures on file. Time Spent on discharge is  39 mins in patient examination, evaluation, counseling as well as medication reconciliation, prescriptions for required medications, discharge plan and follow up. Electronically signed by   Mikel Velez DO  10/5/2022  12:16 PM      Thank you Dr. Jasmin Garg primary care provider on file. for the opportunity to be involved in this patient's care.

## 2022-10-05 NOTE — PROGRESS NOTES
Patient discharged to [address] [place] [address] via [id number] [facility] [address] transportation stretcher with 3 transporters.

## 2023-01-31 NOTE — PROGRESS NOTES
[facility] Wound Ostomy Continence Nurse  Follow Up      NAME:  Rosa Isela Metcalf  MEDICAL RECORD NUMBER:  [id number]  AGE: 38 y.o. GENDER: female  : 1980  TODAY'S DATE:  2022    Subjective:     Reason for WOCN Evaluation and Assessment: \"multiple wounds to back, breasts\"      Alan Vasquez is a 38 y.o. female referred by:   [x] Physician  [] Nursing  [] Other:      Wound Identification:  Wound Type: pressure and caustic dermatitis  Contributing Factors: chronic pressure, decreased mobility, shear force, obesity, incontinence of stool, incontinence of urine, poor hygiene, and non-adherence        Patient was found to be obtunded. Down time in bed prolonged but unknown exact amount. Severe buttock, groin, posterior thigh incontinence associated dermatitis, torso and extremity fold intertrigo, pressure injures of the breasts, left upper arm, and bilateral buttock/ischial tuberosities. Objective:      /60   Pulse (!) 120   Temp 98.4 °F (36.9 °C) (Bladder)   Resp 16   Ht 5' 7\" (1.702 m)   Wt (!) 318 lb 6.4 oz (144.4 kg)   SpO2 100%   BMI 49.87 kg/m²   Facundo Risk Score: Facundo Scale Score: 7    LABS    CBC:   Lab Results   Component Value Date/Time    WBC 13.1 2022 01:13 AM    RBC 3.32 2022 01:13 AM    HGB 9.1 2022 01:13 AM     CMP:  Albumin:    Lab Results   Component Value Date/Time    LABALBU 1.5 2022 01:13 AM     PT/INR:    Lab Results   Component Value Date/Time    PROTIME 11.1 09/10/2022 08:38 PM    INR 1.0 09/10/2022 08:38 PM     HgBA1c:  No results found for: LABA1C  PTT: No components found for: LABPTT      Assessment:     Moist eschar thinning/lifting from the distal and anterior proximal breast. Lateral breast to chest wall red, drainage, subcutaneous  Right breast eschar has resolved.  Wound subcutaneous, pink  Inframammary wounds clean  Medial thighs- worse on the left media thigh wounds are clean and red  Buttocks, posterior thighs with full thickness wounds and desquamation. One linear area of necrotic tissue remains on the right buttock. Measurements:        09/13/22 1530   Wound 09/10/22 Breast Left   Date First Assessed/Time First Assessed: 09/10/22 1730   Present on Hospital Admission: Yes  Primary Wound Type: Pressure Injury  Location: Breast  Wound Location Orientation: Left   Wound Image    Wound Etiology Pressure Unstageable   Dressing Status New dressing applied   Wound Cleansed Soap and water   Dressing/Treatment Triad hydro/zinc oxide-based hydrophilic paste; Hydrofiber Ag; Other (comment)  (Sorbex pads, PINC tape to secure)   Dressing Change Due 09/14/22   Wound Assessment Pink/red;Subcutaneous; Slough;Eschar moist   Drainage Amount Moderate   Drainage Description Serosanguinous   Odor None   Felicia-wound Assessment Fragile   Wound 09/10/22 Other (Comment) mammary folds bilaterally   Date First Assessed/Time First Assessed: 09/10/22 1730   Present on Hospital Admission: Yes  Primary Wound Type: Other (comment)  Location: Other (Comment)  Wound Description (Comments): mammary folds bilaterally   Wound Image    Wound Etiology Other   Dressing Status New dressing applied   Wound Cleansed Soap and water   Dressing/Treatment Hydrofiber Ag   Dressing Change Due 09/14/22   Wound Assessment Subcutaneous;Pink/red   Drainage Amount Moderate   Drainage Description Serosanguinous   Odor None   Felicia-wound Assessment Intact; Blanchable erythema   Wound 09/10/22 Breast Right   Date First Assessed/Time First Assessed: 09/10/22 1730   Present on Hospital Admission: Yes  Primary Wound Type: Pressure Injury  Location: Breast  Wound Location Orientation: Right   Wound Image    Wound Etiology Pressure Stage 3   Dressing Status New dressing applied   Wound Cleansed Soap and water   Dressing/Treatment Hydrofiber Ag; Other (comment)  (Sorbex pad, PINC tape to secure)   Wound Assessment Subcutaneous;Pink/red;Slough   Drainage Amount Moderate   Drainage Description Serosanguinous   Odor None   Wound 09/10/22 Rib Cage Left;Lateral   Date First Assessed/Time First Assessed: 09/10/22 1530   Present on Hospital Admission: Yes  Primary Wound Type: Pressure Injury  Location: (c) Rib Cage  Wound Location Orientation: Left;Lateral   Wound Etiology Pressure Stage 3   Dressing Status New dressing applied   Wound Cleansed Soap and water   Dressing/Treatment Hydrofiber Ag; Other (comment)  (Sorbex pad, moisture wicking underpad)   Wound Assessment Subcutaneous;Pink/red   Drainage Amount Moderate   Drainage Description Serosanguinous   Odor None   Felicia-wound Assessment Fragile   Wound 09/10/22 Buttocks incontinence associated dermatitis   Date First Assessed/Time First Assessed: 09/10/22 1530   Present on Hospital Admission: Yes  Primary Wound Type: Pressure Injury  Location: Buttocks  Wound Description (Comments): incontinence associated dermatitis   Wound Image    Wound Etiology Pressure Stage 3  (also diffuse, severe, incontinence associated dermatitis)   Dressing Status New dressing applied   Wound Cleansed Soap and water   Dressing/Treatment Triad hydro/zinc oxide-based hydrophilic paste; Other (comment)  (moisture wicking under pads)   Wound Assessment Subcutaneous;[place]/red;Bleeding   Drainage Amount Moderate   Drainage Description Serosanguinous; Sanguinous   Odor None   Felicia-wound Assessment Blanchable erythema   Wound 09/12/22 Arm Left;Proximal   Date First Assessed/Time First Assessed: 09/12/22 1600   Present on Hospital Admission: Yes  Primary Wound Type: Pressure Injury  Location: Arm  Wound Location Orientation: Left;Proximal   Wound Image    Dressing Status New dressing applied   Wound Cleansed Soap and water   Dressing/Treatment Triad hydro/zinc oxide-based hydrophilic paste   Dressing Change Due 09/14/22   Wound Assessment Pink/red;Fibrinous   Drainage Amount Small   Drainage Description Serosanguinous   Odor None   Felicia-wound Assessment Blanchable erythema   Wound 09/12/22 Thigh Left;Medial severe intertrigo   Date First Assessed/Time First Assessed: 09/12/22 1705   Present on Hospital Admission: Yes  Location: Thigh  Wound Location Orientation: Left;Medial  Wound Description (Comments): severe intertrigo   Wound Etiology Other   Dressing Status New dressing applied   Wound Cleansed Soap and water   Dressing/Treatment Triad hydro/zinc oxide-based hydrophilic paste; Other (comment)  (moisture wicking pad to separate medial thighs)   Dressing Change Due 09/14/22   Wound Assessment Subcutaneous;Pink/red   Drainage Amount Moderate   Drainage Description Serosanguinous   Odor None   Felicia-wound Assessment Blanchable erythema; Intact     Response to treatment:  Well tolerated by patient. Plan:      Plan of Care: Turn every 2 hours  Float heels off of bed with pillows under calves  Use lift sling to reposition patient to minimize potential for shear injury. Breasts: Triad Hydrophilic Wound Dressing paste to the remaining necrotic tissue, Opticell Ag gelling fiber to the red open areas, Sorbex pads, Pinc/HyTape to secure. Change daily and prn soilage  Breast folds/chest: Opticell Ag gelling fiber to the wound beds, separate the inframammary folds with DriGo HP cloths. Change daily and prn soilage  Abdominal fold: DriGo HP cloths. Change at least every 5 days and prn solage  Medial thighs and groin: separate with cloths, moisture wicking under pads  Buttocks, posterior thighs, low back: Triad Hydrophiic Wound Dressing paste daily and prn hygiene.                  Specialty Bed Required : Yes   [x] Low Air Loss   [x] Pressure Redistribution  [] Fluid Immersion  [x] Bariatric  [] Total Pressure Relief  [] Other:      Discharge Plan:  tbd     Patient/Caregiver Teaching:        [] Indicates understanding                [] Needs reinforcement  [] Unsuccessful                                  [] Verbal Understanding  [] Demonstrated understanding        [x] No evidence of learning  [] Refused teaching                           [] N/A     Contact the Wound Ostomy RN on-call Monday-Friday [phone] via [employer] by searching \"wound\" under \"groups\" and selecting the on-call clinician.              Electronically signed by Stephen Ulloa RN, [address] [place] [address] on 9/13/2022 at 4:31 PM Self

## 2023-02-07 ENCOUNTER — HOSPITAL ENCOUNTER (OUTPATIENT)
Age: 43
Discharge: HOME | End: 2023-02-07
Payer: COMMERCIAL

## 2023-02-07 ENCOUNTER — HOSPITAL ENCOUNTER (OUTPATIENT)
Dept: CT IMAGING | Age: 43
Discharge: HOME | End: 2023-02-09
Payer: COMMERCIAL

## 2023-02-07 DIAGNOSIS — R11.0 NAUSEA: ICD-10-CM

## 2023-02-07 DIAGNOSIS — R10.9 ABDOMINAL PAIN, UNSPECIFIED ABDOMINAL LOCATION: ICD-10-CM

## 2023-02-07 LAB
ALBUMIN SERPL-MCNC: 4.1 G/DL (ref 3.5–5.2)
ALBUMIN/GLOBULIN RATIO: 1.1 (ref 1–2.5)
ALP SERPL-CCNC: 106 U/L (ref 35–104)
ALT SERPL-CCNC: 21 U/L (ref 5–33)
ANION GAP SERPL CALCULATED.3IONS-SCNC: 12 MMOL/L (ref 9–17)
AST SERPL-CCNC: 16 U/L
BILIRUB SERPL-MCNC: 0.2 MG/DL (ref 0.3–1.2)
BUN SERPL-MCNC: 25 MG/DL (ref 6–20)
BUN/CREAT BLD: 27 (ref 9–20)
CALCIUM SERPL-MCNC: 9.6 MG/DL (ref 8.6–10.4)
CHLORIDE SERPL-SCNC: 103 MMOL/L (ref 98–107)
CO2 SERPL-SCNC: 23 MMOL/L (ref 20–31)
CREAT SERPL-MCNC: 0.91 MG/DL (ref 0.5–0.9)
GFR SERPL CREATININE-BSD FRML MDRD: >60 ML/MIN/1.73M2
GLUCOSE SERPL-MCNC: 81 MG/DL (ref 70–99)
POTASSIUM SERPL-SCNC: 4.1 MMOL/L (ref 3.7–5.3)
PROT SERPL-MCNC: 7.7 G/DL (ref 6.4–8.3)
SODIUM SERPL-SCNC: 138 MMOL/L (ref 135–144)

## 2023-02-07 PROCEDURE — 80053 COMPREHEN METABOLIC PANEL: CPT

## 2023-02-07 PROCEDURE — 36415 COLL VENOUS BLD VENIPUNCTURE: CPT

## 2023-09-13 ENCOUNTER — HOSPITAL ENCOUNTER (OUTPATIENT)
Dept: WOMENS IMAGING | Age: 43
Discharge: HOME | End: 2023-09-15
Payer: COMMERCIAL

## 2023-09-13 VITALS — BODY MASS INDEX: 48.82 KG/M2 | HEIGHT: 65 IN | WEIGHT: 293 LBS

## 2023-09-13 DIAGNOSIS — Z12.31 BREAST CANCER SCREENING BY MAMMOGRAM: ICD-10-CM

## 2023-09-13 PROCEDURE — 77063 BREAST TOMOSYNTHESIS BI: CPT

## 2023-09-15 ENCOUNTER — OFFICE VISIT (OUTPATIENT)
Dept: VASCULAR SURGERY | Age: 43
End: 2023-09-15
Payer: COMMERCIAL

## 2023-09-15 VITALS
BODY MASS INDEX: 48.82 KG/M2 | SYSTOLIC BLOOD PRESSURE: 131 MMHG | DIASTOLIC BLOOD PRESSURE: 80 MMHG | WEIGHT: 293 LBS | HEIGHT: 65 IN | RESPIRATION RATE: 20 BRPM | HEART RATE: 85 BPM | TEMPERATURE: 97.9 F | OXYGEN SATURATION: 98 %

## 2023-09-15 DIAGNOSIS — E66.01 MORBID OBESITY (HCC): ICD-10-CM

## 2023-09-15 DIAGNOSIS — I89.0 LYMPHEDEMA: Primary | ICD-10-CM

## 2023-09-15 PROCEDURE — 1036F TOBACCO NON-USER: CPT | Performed by: SURGERY

## 2023-09-15 PROCEDURE — 99203 OFFICE O/P NEW LOW 30 MIN: CPT | Performed by: SURGERY

## 2023-09-15 PROCEDURE — G8417 CALC BMI ABV UP PARAM F/U: HCPCS | Performed by: SURGERY

## 2023-09-15 PROCEDURE — G8428 CUR MEDS NOT DOCUMENT: HCPCS | Performed by: SURGERY

## 2023-09-15 ASSESSMENT — ENCOUNTER SYMPTOMS
ABDOMINAL PAIN: 0
VOMITING: 0
CHEST TIGHTNESS: 0
COUGH: 0
COLOR CHANGE: 0
SHORTNESS OF BREATH: 0
VOICE CHANGE: 0
TROUBLE SWALLOWING: 0
EYE PAIN: 0
ABDOMINAL DISTENTION: 0

## 2023-09-15 NOTE — PROGRESS NOTES
[address] [facility] [address]  Dept: [phone]     Patient: Kali Cheema  : 1980  MRN: [id number]  DOS: 9/15/2023    Referring provider:  MABEL Del Real         HPI:  Kali Cheema is a 37 y.o. female who comes to the office for the first time regarding bilateral lower extremity edema. She has edema now for quite some time. She explains that she has worn compression stockings in the past which did not help. When asked how long she wears the stockings she explains for several hours when she has swelling. She does not wear them on a daily basis. She is also quite morbidly obese measuring over 360 pounds with a body mass index of 60.24. She is not diabetic and not hypertensive. She does have high cholesterol. She stopped smoking 6 years ago. He is  No past medical history on file. No family history on file. Social History     Socioeconomic History    Marital status:      Spouse name: Not on file    Number of children: Not on file    Years of education: Not on file    Highest education level: Not on file   Occupational History    Not on file   Tobacco Use    Smoking status: Former     Types: Cigarettes     Quit date: 2017     Years since quittin.7    Smokeless tobacco: Never   Substance and Sexual Activity    Alcohol use: Not Currently    Drug use: Never    Sexual activity: Not on file   Other Topics Concern    Not on file   Social History Narrative    Not on file     Social Determinants of Health     Financial Resource Strain: Not on file   Food Insecurity: Not on file   Transportation Needs: Not on file   Physical Activity: Not on file   Stress: Not on file   Social Connections: Not on file   Intimate Partner Violence: Not on file   Housing Stability: Not on file      No past surgical history on file.    Review of Systems   Constitutional:  Negative for

## 2023-09-19 ENCOUNTER — TELEPHONE (OUTPATIENT)
Dept: BARIATRICS/WEIGHT MGMT | Age: 43
End: 2023-09-19

## 2023-09-19 NOTE — TELEPHONE ENCOUNTER
=Online Info Session Completed:  on 9/19/23     Verified Insurance Benefit   with [facility]    Patient informed the following: not a covered benefit      .

## 2023-10-10 ENCOUNTER — TELEPHONE (OUTPATIENT)
Dept: VASCULAR SURGERY | Age: 43
End: 2023-10-10

## 2023-10-10 NOTE — TELEPHONE ENCOUNTER
Patient called in regarding her insurance not covering weight loss surgery. She would like to know if there is anything Dr. Michelle Parry can do to regarding trying to get it approved. Or any other advise on plan of care.

## 2023-10-12 NOTE — TELEPHONE ENCOUNTER
Left message on voicemail to follow up with bariatrics regarding the weight loss surgery or other options. Or advised could change insurance. No new orders or plan of care received from Dr. Noelia Boxer at this time.